# Patient Record
Sex: FEMALE | Race: WHITE | Employment: OTHER | URBAN - METROPOLITAN AREA
[De-identification: names, ages, dates, MRNs, and addresses within clinical notes are randomized per-mention and may not be internally consistent; named-entity substitution may affect disease eponyms.]

---

## 2019-08-16 ENCOUNTER — TELEPHONE (OUTPATIENT)
Dept: PRIMARY CARE CLINIC | Age: 84
End: 2019-08-16

## 2019-08-16 RX ORDER — METFORMIN HYDROCHLORIDE 500 MG/5ML
500 SOLUTION ORAL 2 TIMES DAILY
Qty: 30 ML | Refills: 1 | Status: SHIPPED | OUTPATIENT
Start: 2019-08-16 | End: 2019-08-16

## 2019-08-16 NOTE — TELEPHONE ENCOUNTER
Pt's scripts was sent over for the solution instead of the tablets. Med corrected and pended to you.

## 2019-08-19 ENCOUNTER — TELEPHONE (OUTPATIENT)
Dept: PRIMARY CARE CLINIC | Age: 84
End: 2019-08-19

## 2019-09-16 ENCOUNTER — OFFICE VISIT (OUTPATIENT)
Dept: PRIMARY CARE CLINIC | Age: 84
End: 2019-09-16
Payer: MEDICARE

## 2019-09-16 VITALS
DIASTOLIC BLOOD PRESSURE: 68 MMHG | WEIGHT: 182 LBS | OXYGEN SATURATION: 97 % | SYSTOLIC BLOOD PRESSURE: 150 MMHG | BODY MASS INDEX: 33.49 KG/M2 | TEMPERATURE: 99 F | HEART RATE: 72 BPM | HEIGHT: 62 IN

## 2019-09-16 DIAGNOSIS — E11.8 TYPE 2 DIABETES MELLITUS WITH COMPLICATION, WITHOUT LONG-TERM CURRENT USE OF INSULIN (HCC): Primary | ICD-10-CM

## 2019-09-16 DIAGNOSIS — M79.672 BILATERAL FOOT PAIN: ICD-10-CM

## 2019-09-16 DIAGNOSIS — I10 ESSENTIAL HYPERTENSION: ICD-10-CM

## 2019-09-16 DIAGNOSIS — M15.9 PRIMARY OSTEOARTHRITIS INVOLVING MULTIPLE JOINTS: ICD-10-CM

## 2019-09-16 DIAGNOSIS — E66.09 CLASS 1 OBESITY DUE TO EXCESS CALORIES WITH SERIOUS COMORBIDITY AND BODY MASS INDEX (BMI) OF 33.0 TO 33.9 IN ADULT: ICD-10-CM

## 2019-09-16 DIAGNOSIS — M81.0 AGE-RELATED OSTEOPOROSIS WITHOUT CURRENT PATHOLOGICAL FRACTURE: ICD-10-CM

## 2019-09-16 DIAGNOSIS — E78.2 MIXED HYPERLIPIDEMIA: ICD-10-CM

## 2019-09-16 DIAGNOSIS — M79.671 BILATERAL FOOT PAIN: ICD-10-CM

## 2019-09-16 DIAGNOSIS — R26.2 AMBULATORY DYSFUNCTION: ICD-10-CM

## 2019-09-16 DIAGNOSIS — I73.9 PVD (PERIPHERAL VASCULAR DISEASE) (HCC): ICD-10-CM

## 2019-09-16 PROBLEM — E66.811 CLASS 1 OBESITY DUE TO EXCESS CALORIES WITH SERIOUS COMORBIDITY AND BODY MASS INDEX (BMI) OF 33.0 TO 33.9 IN ADULT: Status: ACTIVE | Noted: 2019-09-16

## 2019-09-16 PROBLEM — M15.0 PRIMARY OSTEOARTHRITIS INVOLVING MULTIPLE JOINTS: Status: ACTIVE | Noted: 2019-09-16

## 2019-09-16 PROCEDURE — G8427 DOCREV CUR MEDS BY ELIG CLIN: HCPCS | Performed by: FAMILY MEDICINE

## 2019-09-16 PROCEDURE — 4040F PNEUMOC VAC/ADMIN/RCVD: CPT | Performed by: FAMILY MEDICINE

## 2019-09-16 PROCEDURE — G8400 PT W/DXA NO RESULTS DOC: HCPCS | Performed by: FAMILY MEDICINE

## 2019-09-16 PROCEDURE — 99214 OFFICE O/P EST MOD 30 MIN: CPT | Performed by: FAMILY MEDICINE

## 2019-09-16 PROCEDURE — 1123F ACP DISCUSS/DSCN MKR DOCD: CPT | Performed by: FAMILY MEDICINE

## 2019-09-16 PROCEDURE — G8417 CALC BMI ABV UP PARAM F/U: HCPCS | Performed by: FAMILY MEDICINE

## 2019-09-16 PROCEDURE — 1090F PRES/ABSN URINE INCON ASSESS: CPT | Performed by: FAMILY MEDICINE

## 2019-09-16 PROCEDURE — 1036F TOBACCO NON-USER: CPT | Performed by: FAMILY MEDICINE

## 2019-09-16 RX ORDER — PREDNISOLONE ACETATE 10 MG/ML
1 SUSPENSION/ DROPS OPHTHALMIC 3 TIMES DAILY
Refills: 3 | COMMUNITY
Start: 2019-08-30

## 2019-09-16 RX ORDER — BRIMONIDINE TARTRATE/TIMOLOL 0.2%-0.5%
DROPS OPHTHALMIC (EYE)
COMMUNITY
Start: 2019-09-07 | End: 2020-08-17

## 2019-09-16 ASSESSMENT — ENCOUNTER SYMPTOMS
BLOOD IN STOOL: 0
PHOTOPHOBIA: 0
COUGH: 0
DIARRHEA: 0
VOMITING: 0
CONSTIPATION: 0
NAUSEA: 0
SHORTNESS OF BREATH: 0
SORE THROAT: 0
ABDOMINAL PAIN: 0
BACK PAIN: 1

## 2019-09-16 ASSESSMENT — PATIENT HEALTH QUESTIONNAIRE - PHQ9
2. FEELING DOWN, DEPRESSED OR HOPELESS: 0
1. LITTLE INTEREST OR PLEASURE IN DOING THINGS: 0
SUM OF ALL RESPONSES TO PHQ QUESTIONS 1-9: 0
SUM OF ALL RESPONSES TO PHQ QUESTIONS 1-9: 0
SUM OF ALL RESPONSES TO PHQ9 QUESTIONS 1 & 2: 0

## 2020-02-02 ENCOUNTER — APPOINTMENT (OUTPATIENT)
Dept: GENERAL RADIOLOGY | Age: 85
End: 2020-02-02
Payer: MEDICARE

## 2020-02-02 ENCOUNTER — APPOINTMENT (OUTPATIENT)
Dept: CT IMAGING | Age: 85
End: 2020-02-02
Payer: MEDICARE

## 2020-02-02 ENCOUNTER — HOSPITAL ENCOUNTER (EMERGENCY)
Age: 85
Discharge: HOME | End: 2020-02-02
Payer: MEDICARE

## 2020-02-02 VITALS
SYSTOLIC BLOOD PRESSURE: 122 MMHG | OXYGEN SATURATION: 95 % | DIASTOLIC BLOOD PRESSURE: 82 MMHG | TEMPERATURE: 98.2 F | HEART RATE: 80 BPM | WEIGHT: 179 LBS | BODY MASS INDEX: 31.71 KG/M2 | RESPIRATION RATE: 18 BRPM | HEIGHT: 63 IN

## 2020-02-02 PROCEDURE — 72125 CT NECK SPINE W/O DYE: CPT

## 2020-02-02 PROCEDURE — 99284 EMERGENCY DEPT VISIT MOD MDM: CPT

## 2020-02-02 PROCEDURE — 73070 X-RAY EXAM OF ELBOW: CPT

## 2020-02-02 PROCEDURE — 72131 CT LUMBAR SPINE W/O DYE: CPT

## 2020-02-02 PROCEDURE — 6370000000 HC RX 637 (ALT 250 FOR IP): Performed by: NURSE PRACTITIONER

## 2020-02-02 PROCEDURE — 70450 CT HEAD/BRAIN W/O DYE: CPT

## 2020-02-02 RX ORDER — ACETAMINOPHEN 325 MG/1
650 TABLET ORAL ONCE
Status: COMPLETED | OUTPATIENT
Start: 2020-02-02 | End: 2020-02-02

## 2020-02-02 RX ORDER — ACETAMINOPHEN 500 MG
1000 TABLET ORAL EVERY 6 HOURS PRN
Qty: 60 TABLET | Refills: 0 | Status: SHIPPED | OUTPATIENT
Start: 2020-02-02 | End: 2021-03-02

## 2020-02-02 RX ORDER — DIAPER,BRIEF,INFANT-TODD,DISP
EACH MISCELLANEOUS ONCE
Status: COMPLETED | OUTPATIENT
Start: 2020-02-02 | End: 2020-02-02

## 2020-02-02 RX ADMIN — BACITRACIN ZINC: 500 OINTMENT TOPICAL at 18:01

## 2020-02-02 RX ADMIN — ACETAMINOPHEN 650 MG: 325 TABLET, FILM COATED ORAL at 18:01

## 2020-02-02 ASSESSMENT — PAIN DESCRIPTION - ONSET: ONSET: SUDDEN

## 2020-02-02 ASSESSMENT — PAIN DESCRIPTION - PAIN TYPE: TYPE: ACUTE PAIN

## 2020-02-02 ASSESSMENT — PAIN DESCRIPTION - LOCATION: LOCATION: BACK

## 2020-02-02 ASSESSMENT — PAIN DESCRIPTION - DESCRIPTORS: DESCRIPTORS: SHARP

## 2020-02-02 ASSESSMENT — PAIN SCALES - GENERAL: PAINLEVEL_OUTOF10: 9

## 2020-02-02 ASSESSMENT — PAIN DESCRIPTION - FREQUENCY: FREQUENCY: CONTINUOUS

## 2020-02-02 ASSESSMENT — PAIN DESCRIPTION - ORIENTATION: ORIENTATION: LOWER;MID

## 2020-02-02 ASSESSMENT — PAIN DESCRIPTION - PROGRESSION: CLINICAL_PROGRESSION: GRADUALLY WORSENING

## 2020-02-03 ENCOUNTER — TELEPHONE (OUTPATIENT)
Dept: PRIMARY CARE CLINIC | Age: 85
End: 2020-02-03

## 2020-02-03 NOTE — ED PROVIDER NOTES
[facility]    HPI:  2/2/20,   Time: 10:39 PM         Manjula Hutchison is a 81 y.o. female presenting to the ED for fall, beginning 4 days ago. The complaint has been constant, mild in severity, and worsened by nothing. States she had a fall secondary to a syncopal episode which occurred approximately 4 to 5 days ago. States when she fell she struck the left side of her head, left side of her elbow and has had persistent right-sided low back pain and into right hip pain. She denies any symptoms over the last several days. Has been taking Tylenol for discomfort. Denies any chest pain or shortness of breath. ROS:   Pertinent positives and negatives are stated within HPI, all other systems reviewed and are negative.  --------------------------------------------- PAST HISTORY ---------------------------------------------  Past Medical History:  has a past medical history of Cataract, Diabetes mellitus ([facility] [address] and Pneumonia. Past Surgical History:  has a past surgical history that includes Hysterectomy and Cataract removal with implant. Social History:  reports that she has never smoked. She has never used smokeless tobacco. She reports that she does not drink alcohol or use drugs. Family History: family history is not on file. The patients home medications have been reviewed. Allergies: Penicillins    -------------------------------------------------- RESULTS -------------------------------------------------  All laboratory and radiology results have been personally reviewed by myself   LABS:  No results found for this visit on 02/02/20. RADIOLOGY:  Interpreted by Radiologist.  Karin Palacio Contrast   Final Result      No evidence for acute intracranial process. Cortical atrophy and chronic periventricular microangiopathy. CT Cervical Spine WO Contrast   Final Result      No acute fracture or spondylolisthesis is identified on CT of cervical   spine.       Diffuse degenerative disc and facet disease result in multilevel   central and foraminal stenosis. Atherosclerotic vascular disease. CT Lumbar Spine WO Contrast   Final Result      No acute fracture or spondylolisthesis is identified on CT of cervical   spine. Diffuse degenerative disc and facet disease result in multilevel   central and foraminal stenosis. Atherosclerotic vascular disease. XR ELBOW LEFT (2 VIEWS)   Final Result   No fracture or dislocation.                      ------------------------- NURSING NOTES AND VITALS REVIEWED ---------------------------   The nursing notes within the ED encounter and vital signs as below have been reviewed. /82   Pulse 80   Temp 98.2 °F (36.8 °C) (Oral)   Resp 18   Ht 5' 2.5\" (1.588 m)   Wt 179 lb (81.2 kg)   SpO2 95%   BMI 32.22 kg/m²   Oxygen Saturation Interpretation: Normal      ---------------------------------------------------PHYSICAL EXAM--------------------------------------      Constitutional/General: Alert and oriented x3, well appearing, non toxic in NAD  Head: NC/AT, mild contusion noted to the left parietal area. There is visible mild ecchymosis noted. Eyes: PERRL, EOMI, 3 mm briskly reactive. No nystagmus. Mouth: Oropharynx clear, handling secretions, no trismus  Neck: Supple, full ROM, no meningeal signs, no tenderness on palpation to the midline of the cervical spine. She is full range of motion. Pulmonary: Lungs clear to auscultation bilaterally, no wheezes, rales, or rhonchi. Not in respiratory distress  Cardiovascular:  Regular rate and rhythm, no murmurs, gallops, or rubs. 2+ distal pulses  Abdomen: Soft, non tender, non distended,   Extremities: Moves all extremities x 4. Warm and well perfused has a superficial skin tear noted to the left lateral elbow with a visible contusion and abrasion noted. There is mild ecchymosis noted around the left elbow. She does have full range of motion with flexion and extension.   Radial pulses

## 2020-02-05 ENCOUNTER — HOSPITAL ENCOUNTER (EMERGENCY)
Age: 85
Discharge: HOME | End: 2020-02-05
Attending: EMERGENCY MEDICINE
Payer: MEDICARE

## 2020-02-05 VITALS
WEIGHT: 179 LBS | DIASTOLIC BLOOD PRESSURE: 84 MMHG | HEART RATE: 94 BPM | OXYGEN SATURATION: 97 % | TEMPERATURE: 98.4 F | RESPIRATION RATE: 16 BRPM | BODY MASS INDEX: 31.71 KG/M2 | HEIGHT: 63 IN | SYSTOLIC BLOOD PRESSURE: 132 MMHG

## 2020-02-05 PROCEDURE — 87070 CULTURE OTHR SPECIMN AEROBIC: CPT

## 2020-02-05 PROCEDURE — 90471 IMMUNIZATION ADMIN: CPT | Performed by: EMERGENCY MEDICINE

## 2020-02-05 PROCEDURE — 99282 EMERGENCY DEPT VISIT SF MDM: CPT

## 2020-02-05 PROCEDURE — 6370000000 HC RX 637 (ALT 250 FOR IP): Performed by: EMERGENCY MEDICINE

## 2020-02-05 PROCEDURE — 90715 TDAP VACCINE 7 YRS/> IM: CPT | Performed by: EMERGENCY MEDICINE

## 2020-02-05 PROCEDURE — 6360000002 HC RX W HCPCS: Performed by: EMERGENCY MEDICINE

## 2020-02-05 RX ORDER — CLINDAMYCIN HYDROCHLORIDE 150 MG/1
150 CAPSULE ORAL ONCE
Status: COMPLETED | OUTPATIENT
Start: 2020-02-05 | End: 2020-02-05

## 2020-02-05 RX ORDER — CLINDAMYCIN HYDROCHLORIDE 150 MG/1
150 CAPSULE ORAL 3 TIMES DAILY
Qty: 15 CAPSULE | Refills: 0 | Status: SHIPPED | OUTPATIENT
Start: 2020-02-05 | End: 2020-02-10

## 2020-02-05 RX ADMIN — CLINDAMYCIN HYDROCHLORIDE 150 MG: 150 CAPSULE ORAL at 18:55

## 2020-02-05 RX ADMIN — TETANUS TOXOID, REDUCED DIPHTHERIA TOXOID AND ACELLULAR PERTUSSIS VACCINE, ADSORBED 0.5 ML: 5; 2.5; 8; 8; 2.5 SUSPENSION INTRAMUSCULAR at 18:55

## 2020-02-06 NOTE — ED NOTES
Wound left arm examined and treated per Dr Carlos Pardo,  Dry drsg left arm, no bleeding, I then discharge the pt.      Hortensia Bronson RN  02/05/20 [id number]

## 2020-02-06 NOTE — ED PROVIDER NOTES
HPI:  2/6/20,   Time: 3:02 AM         Mandy Ram is a 81 y.o. female presenting to the ED for wound check of L elbow, beginning 3 days ago when she had a syncopal episode in her kitchen. The complaint has been constant, moderate in severity, and worsened by nothing. She states that it hs been swollen and painful and she is worried it has pus in it. ROS:   Pertinent positives and negatives are stated within HPI, all other systems reviewed and are negative.  --------------------------------------------- PAST HISTORY ---------------------------------------------  Past Medical History:  has a past medical history of Cataract, Diabetes mellitus ([facility] [address] and Pneumonia. Past Surgical History:  has a past surgical history that includes Hysterectomy and Cataract removal with implant. Social History:  reports that she has never smoked. She has never used smokeless tobacco. She reports that she does not drink alcohol or use drugs. Family History: family history is not on file. The patients home medications have been reviewed. Allergies: Penicillins    -------------------------------------------------- RESULTS -------------------------------------------------  All laboratory and radiology results have been personally reviewed by myself   LABS:  No results found for this visit on 02/05/20. RADIOLOGY:  Interpreted by Radiologist.  No orders to display       ------------------------- NURSING NOTES AND VITALS REVIEWED ---------------------------   The nursing notes within the ED encounter and vital signs as below have been reviewed.    /84   Pulse 94   Temp 98.4 °F (36.9 °C) (Oral)   Resp 16   Ht 5' 2.5\" (1.588 m)   Wt 179 lb (81.2 kg)   SpO2 97%   BMI 32.22 kg/m²   Oxygen Saturation Interpretation: Normal      ---------------------------------------------------PHYSICAL EXAM--------------------------------------        Constitutional/General: Alert and oriented x3, well appearing, non toxic in NAD  Head: NC/AT  Eyes: PERRL, EOMI  Mouth: Oropharynx clear, handling secretions, no trismus  Neck: Supple, full ROM, no meningeal signs  Pulmonary: Lungs clear to auscultation bilaterally, no wheezes, rales, or rhonchi. Not in respiratory distress  Cardiovascular:  Regular rate and rhythm, no murmurs, gallops, or rubs. 2+ distal pulses  Abdomen: Soft, non tender, non distended,   Extremities: Moves all extremities x 4. Warm and well perfused  Skin: warm and dry without rash Wound over L prox olecranon is healing well without surrounding erythema, has some edema but no fluctuence or induration  Neurologic: GCS 15,  Psych: Normal Affect      ------------------------------ ED COURSE/MEDICAL DECISION MAKING----------------------  Medications   Tetanus-Diphth-Acell Pertussis (BOOSTRIX) injection 0.5 mL (0.5 mLs Intramuscular Given 2/5/20 1855)   clindamycin (CLEOCIN) capsule 150 mg (150 mg Oral Given 2/5/20 1855)         Medical Decision Making: Will give antibiotics as pt is not satisfied that the topical therapy is working. Counseling: The emergency provider has spoken with the patient and spouse/SO and discussed todays results, in addition to providing specific details for the plan of care and counseling regarding the diagnosis and prognosis. Questions are answered at this time and they are agreeable with the plan.      --------------------------------- IMPRESSION AND DISPOSITION ---------------------------------    IMPRESSION  1.  Encounter for wound re-check        DISPOSITION  Disposition: Discharge to home  Patient condition is serious                  Adali Almonte MD  02/06/20 [id number]

## 2020-02-08 LAB — WOUND/ABSCESS: NORMAL

## 2020-02-13 ENCOUNTER — TELEPHONE (OUTPATIENT)
Dept: ADMINISTRATIVE | Age: 85
End: 2020-02-13

## 2020-02-13 RX ORDER — AMLODIPINE BESYLATE AND ATORVASTATIN CALCIUM 2.5; 1 MG/1; MG/1
1 TABLET, FILM COATED ORAL DAILY
COMMUNITY
End: 2020-02-13 | Stop reason: SDUPTHER

## 2020-02-13 RX ORDER — AMLODIPINE BESYLATE AND ATORVASTATIN CALCIUM 2.5; 1 MG/1; MG/1
1 TABLET, FILM COATED ORAL DAILY
Qty: 30 TABLET | Refills: 0 | Status: SHIPPED
Start: 2020-02-13 | End: 2020-03-08 | Stop reason: SDUPTHER

## 2020-02-17 ENCOUNTER — OFFICE VISIT (OUTPATIENT)
Dept: PRIMARY CARE CLINIC | Age: 85
End: 2020-02-17
Payer: MEDICARE

## 2020-02-17 ENCOUNTER — HOSPITAL ENCOUNTER (OUTPATIENT)
Age: 85
Discharge: HOME | End: 2020-02-19
Payer: MEDICARE

## 2020-02-17 VITALS — TEMPERATURE: 98.3 F | HEART RATE: 73 BPM | DIASTOLIC BLOOD PRESSURE: 80 MMHG | SYSTOLIC BLOOD PRESSURE: 124 MMHG
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
## 2020-02-17 PROBLEM — S49.92XA INJURY OF LEFT UPPER ARM: Status: ACTIVE | Noted: 2020-02-17

## 2020-02-17 LAB
ALBUMIN SERPL-MCNC: 3.7 G/DL (ref 3.5–5.2)
ALP BLD-CCNC: 94 U/L (ref 35–104)
ALT SERPL-CCNC: 14 U/L (ref 0–32)
ANION GAP SERPL CALCULATED.3IONS-SCNC: 16 MMOL/L (ref 7–16)
AST SERPL-CCNC: 14 U/L (ref 0–31)
BACTERIA: ABNORMAL /HPF
BASOPHILS ABSOLUTE: 0.04 E9/L (ref 0–0.2)
BASOPHILS RELATIVE PERCENT: 0.6 % (ref 0–2)
BILIRUB SERPL-MCNC: 0.3 MG/DL (ref 0–1.2)
BILIRUBIN URINE: NEGATIVE
BLOOD, URINE: NEGATIVE
BUN BLDV-MCNC: 20 MG/DL (ref 8–23)
CALCIUM SERPL-MCNC: 9.4 MG/DL (ref 8.6–10.2)
CHLORIDE BLD-SCNC: 101 MMOL/L (ref 98–107)
CHOLESTEROL, TOTAL: 162 MG/DL (ref 0–199)
CLARITY: CLEAR
CO2: 23 MMOL/L (ref 22–29)
COLOR: YELLOW
CREAT SERPL-MCNC: 0.7 MG/DL (ref 0.5–1)
EOSINOPHILS ABSOLUTE: 0.11 E9/L (ref 0.05–0.5)
EOSINOPHILS RELATIVE PERCENT: 1.6 % (ref 0–6)
EPITHELIAL CELLS, UA: ABNORMAL /HPF
GFR AFRICAN AMERICAN: >60
GFR NON-AFRICAN AMERICAN: >60 ML/MIN/1.73
GLUCOSE BLD-MCNC: 137 MG/DL (ref 74–99)
GLUCOSE URINE: NEGATIVE MG/DL
HBA1C MFR BLD: 7.4 % (ref 4–5.6)
HCT VFR BLD CALC: 38.9 % (ref 34–48)
HDLC SERPL-MCNC: 70 MG/DL
HEMOGLOBIN: 11.7 G/DL (ref 11.5–15.5)
IMMATURE GRANULOCYTES #: 0.02 E9/L
IMMATURE GRANULOCYTES %: 0.3 % (ref 0–5)
KETONES, URINE: NEGATIVE MG/DL
LDL CHOLESTEROL CALCULATED: 77 MG/DL (ref 0–99)
LEUKOCYTE ESTERASE, URINE: ABNORMAL
LYMPHOCYTES ABSOLUTE: 1.83 E9/L (ref 1.5–4)
LYMPHOCYTES RELATIVE PERCENT: 26.5 % (ref 20–42)
MCH RBC QN AUTO: 29 PG (ref 26–35)
MCHC RBC AUTO-ENTMCNC: 30.1 % (ref 32–34.5)
MCV RBC AUTO: 96.3 FL (ref 80–99.9)
MONOCYTES ABSOLUTE: 0.51 E9/L (ref 0.1–0.95)
MONOCYTES RELATIVE PERCENT: 7.4 % (ref 2–12)
NEUTROPHILS ABSOLUTE: 4.4 E9/L (ref 1.8–7.3)
NEUTROPHILS RELATIVE PERCENT: 63.6 % (ref 43–80)
NITRITE, URINE: NEGATIVE
PDW BLD-RTO: 13.2 FL (ref 11.5–15)
PH UA: 5.5 (ref 5–9)
PLATELET # BLD: 391 E9/L (ref 130–450)
PMV BLD AUTO: 9.2 FL (ref 7–12)
POTASSIUM SERPL-SCNC: 4 MMOL/L (ref 3.5–5)
PROTEIN UA: NEGATIVE MG/DL
RBC # BLD: 4.04 E12/L (ref 3.5–5.5)
RBC UA: ABNORMAL /HPF (ref 0–2)
SODIUM BLD-SCNC: 140 MMOL/L (ref 132–146)
SPECIFIC GRAVITY UA: 1.02 (ref 1–1.03)
TOTAL PROTEIN: 7.2 G/DL (ref 6.4–8.3)
TRIGL SERPL-MCNC: 75 MG/DL (ref 0–149)
TSH SERPL DL<=0.05 MIU/L-ACNC: 1.6 UIU/ML (ref 0.27–4.2)
UROBILINOGEN, URINE: 0.2 E.U./DL
VLDLC SERPL CALC-MCNC: 15 MG/DL
WBC # BLD: 6.9 E9/L (ref 4.5–11.5)
WBC UA: ABNORMAL /HPF (ref 0–5)

## 2020-02-17 PROCEDURE — 36415 COLL VENOUS BLD VENIPUNCTURE: CPT

## 2020-02-17 PROCEDURE — 80061 LIPID PANEL: CPT

## 2020-02-17 PROCEDURE — 1123F ACP DISCUSS/DSCN MKR DOCD: CPT | Performed by: FAMILY MEDICINE

## 2020-02-17 PROCEDURE — 84443 ASSAY THYROID STIM HORMONE: CPT

## 2020-02-17 PROCEDURE — 4040F PNEUMOC VAC/ADMIN/RCVD: CPT | Performed by: FAMILY MEDICINE

## 2020-02-17 PROCEDURE — 80053 COMPREHEN METABOLIC PANEL: CPT

## 2020-02-17 PROCEDURE — 99213 OFFICE O/P EST LOW 20 MIN: CPT | Performed by: FAMILY MEDICINE

## 2020-02-17 PROCEDURE — 83036 HEMOGLOBIN GLYCOSYLATED A1C: CPT

## 2020-02-17 PROCEDURE — 85025 COMPLETE CBC W/AUTO DIFF WBC: CPT

## 2020-02-17 PROCEDURE — 81001 URINALYSIS AUTO W/SCOPE: CPT

## 2020-02-17 PROCEDURE — G8400 PT W/DXA NO RESULTS DOC: HCPCS | Performed by: FAMILY MEDICINE

## 2020-02-17 PROCEDURE — 1090F PRES/ABSN URINE INCON ASSESS: CPT | Performed by: FAMILY MEDICINE

## 2020-02-17 PROCEDURE — G8417 CALC BMI ABV UP PARAM F/U: HCPCS | Performed by: FAMILY MEDICINE

## 2020-02-17 PROCEDURE — G0438 PPPS, INITIAL VISIT: HCPCS | Performed by: FAMILY MEDICINE

## 2020-02-17 PROCEDURE — G8484 FLU IMMUNIZE NO ADMIN: HCPCS | Performed by: FAMILY MEDICINE

## 2020-02-17 PROCEDURE — G8427 DOCREV CUR MEDS BY ELIG CLIN: HCPCS | Performed by: FAMILY MEDICINE

## 2020-02-17 PROCEDURE — 1036F TOBACCO NON-USER: CPT | Performed by: FAMILY MEDICINE

## 2020-02-17 ASSESSMENT — PATIENT HEALTH QUESTIONNAIRE - PHQ9
2. FEELING DOWN, DEPRESSED OR HOPELESS: 0
SUM OF ALL RESPONSES TO PHQ9 QUESTIONS 1 & 2: 0
1. LITTLE INTEREST OR PLEASURE IN DOING THINGS: 0
SUM OF ALL RESPONSES TO PHQ QUESTIONS 1-9: 0
SUM OF ALL RESPONSES TO PHQ QUESTIONS 1-9: 0

## 2020-02-17 NOTE — PATIENT INSTRUCTIONS
close friend. ? Do you know enough about life support methods that might be used? If not, talk to your doctor so you understand. ? What are you most afraid of that might happen? You might be afraid of having pain, losing your independence, or being kept alive by machines. ? Where would you prefer to die? Choices include your home, a hospital, or a nursing home. ? Would you like to have information about hospice care to support you and your family? ? Do you want to donate organs when you die? ? Do you want certain Sikh practices performed before you die? If so, put your wishes in the advance directive. · Read your advance directive every year, and make changes as needed. When should you call for help? Be sure to contact your doctor if you have any questions. Where can you learn more? Go to https://chpechristeneweb.Dropbox. org and sign in to your [employer] account. Enter R264 in the Let box to learn more about \"Advance Directives: Care Instructions. \"     If you do not have an account, please click on the \"Sign Up Now\" link. Current as of: April 1, 2019  Content Version: 12.3  © 9628-4269 Healthwise, Incorporated. Care instructions adapted under license by [facility] ([facility]). If you have questions about a medical condition or this instruction, always ask your healthcare professional. [address] any warranty or liability for your use of this information. Body Mass Index: Care Instructions  Your Care Instructions    Body mass index (BMI) can help you see if your weight is raising your risk for health problems. It uses a formula to compare how much you weigh with how tall you are. · A BMI lower than 18.5 is considered underweight. · A BMI between 18.5 and 24.9 is considered healthy. · A BMI between 25 and 29.9 is considered overweight. A BMI of 30 or higher is considered obese.   If your BMI is in the normal range, it means that you have a lower risk 2,300 milligrams (mg) of sodium a day. If you limit your sodium to 1,500 mg a day, you can lower your blood pressure even more. Tips for success  · Start small. Do not try to make dramatic changes to your diet all at once. You might feel that you are missing out on your favorite foods and then be more likely to not follow the plan. Make small changes, and stick with them. Once those changes become habit, add a few more changes. · Try some of the following:  ? Make it a goal to eat a fruit or vegetable at every meal and at snacks. This will make it easy to get the recommended amount of fruits and vegetables each day. ? Try yogurt topped with fruit and nuts for a snack or healthy dessert. ? Add lettuce, tomato, cucumber, and onion to sandwiches. ? Combine a ready-made pizza crust with low-fat mozzarella cheese and lots of vegetable toppings. Try using tomatoes, squash, spinach, broccoli, carrots, cauliflower, and onions. ? Have a variety of cut-up vegetables with a low-fat dip as an appetizer instead of chips and dip. ? Sprinkle sunflower seeds or chopped almonds over salads. Or try adding chopped walnuts or almonds to cooked vegetables. ? Try some vegetarian meals using beans and peas. Add garbanzo or kidney beans to salads. Make burritos and tacos with mashed chapa beans or black beans. Where can you learn more? Go to https://HEROZhector.CareWire. org and sign in to your [employer] account. Enter K184 in the [employer] box to learn more about \"DASH Diet: Care Instructions. \"     If you do not have an account, please click on the \"Sign Up Now\" link. Current as of: April 9, 2019  Content Version: 12.3  © 2515-5450 Healthwise, Incorporated. Care instructions adapted under license by [facility] [employer] [facility] ([facility]).  If you have questions about a medical condition or this instruction, always ask your healthcare professional. [place] [address] any warranty or liability for your use of this information. Learning About Low-Carbohydrate Diets for Weight Loss  What is a low-carbohydrate diet? Low-carb diets avoid foods that are high in carbohydrate. These high-carb foods include pasta, bread, rice, cereal, fruits, and starchy vegetables. Instead, these diets usually have you eat foods that are high in fat and protein. Many people lose weight quickly on a low-carb diet. But the early weight loss is water. People on this diet often gain the weight back after they start eating carbs again. Not all diet plans are safe or work well. A lot of the evidence shows that low-carb diets aren't healthy. That's because these diets often don't include healthy foods like fruits and vegetables. Losing weight safely means balancing protein, fat, and carbs with every meal and snack. And low-carb diets don't always provide the vitamins, minerals, and fiber you need. If you have a serious medical condition, talk to your doctor before you try any diet. These conditions include kidney disease, heart disease, type 2 diabetes, high cholesterol, and high blood pressure. If you are pregnant, it may not be safe for your baby if you are on a low-carb diet. How can you lose weight safely? You might have heard that a diet plan helped another person lose weight. But that doesn't mean that it will work for you. It is very hard to stay on a diet that includes lots of big changes in your eating habits. If you want to get to a healthy weight and stay there, making healthy lifestyle changes will often work better than dieting. These steps can help. · Make a plan for change. Work with your doctor to create a plan that is right for you. · See a dietitian. He or she can show you how to make healthy changes in your eating habits. · Manage stress. If you have a lot of stress in your life, it can be hard to focus on making healthy changes to your daily habits. · Track your food and activity.  You are likely to do better at losing weight if you keep track of what you eat and what you do. Follow-up care is a key part of your treatment and safety. Be sure to make and go to all appointments, and call your doctor if you are having problems. It's also a good idea to know your test results and keep a list of the medicines you take. Where can you learn more? Go to https://chpepiceweb.[email or web address] [employer]. org and sign in to your [employer] account. Enter A121 in the Nautal box to learn more about \"Learning About Low-Carbohydrate Diets for Weight Loss. \"     If you do not have an account, please click on the \"Sign Up Now\" link. Current as of: August 21, 2019  Content Version: 12.3  © 7003-7609 Healthwise, Incorporated. Care instructions adapted under license by [facility] ([facility]). If you have questions about a medical condition or this instruction, always ask your healthcare professional. [place] [address] any warranty or liability for your use of this information. Personalized Preventive Plan for Francis Desir - 2/17/2020  Medicare offers a range of preventive health benefits. Some of the tests and screenings are paid in full while other may be subject to a deductible, co-insurance, and/or copay. Some of these benefits include a comprehensive review of your medical history including lifestyle, illnesses that may run in your family, and various assessments and screenings as appropriate. After reviewing your medical record and screening and assessments performed today your provider may have ordered immunizations, labs, imaging, and/or referrals for you. A list of these orders (if applicable) as well as your Preventive Care list are included within your After Visit Summary for your review. Other Preventive Recommendations:    · A preventive eye exam performed by an eye specialist is recommended every 1-2 years to screen for glaucoma; cataracts, macular degeneration, and other eye disorders.   · A preventive dental visit is recommended every 6 months. · Try to get at least 150 minutes of exercise per week or 10,000 steps per day on a pedometer . · Order or download the FREE \"Exercise & Physical Activity: Your Everyday Guide\" from The "[employer]" [employer]. Call 6-862.881.4927 or search The [employer] online. · You need 4061-1446 mg of calcium and 8233-1774 IU of vitamin D per day. It is possible to meet your calcium requirement with diet alone, but a vitamin D supplement is usually necessary to meet this goal.  · When exposed to the sun, use a sunscreen that protects against both UVA and UVB radiation with an SPF of 30 or greater. Reapply every 2 to 3 hours or after sweating, drying off with a towel, or swimming. · Always wear a seat belt when traveling in a car. Always wear a helmet when riding a bicycle or motorcycle.

## 2020-02-17 NOTE — PROGRESS NOTES
Medicare Annual Wellness Visit  Name: Minus Dash Date: 2020   MRN: [id number] Sex: Female   Age: 81 y.o. Ethnicity: Non-/Non    : 1935 Race: Linda Ahmadi is here for Follow-Up from [facility] and Medicare AWV    Screenings for behavioral, psychosocial and functional/safety risks, and cognitive dysfunction are all negative except as indicated below. These results, as well as other patient data from the [address] form, are documented in Flowsheets linked to this Encounter. Allergies   Allergen Reactions    Penicillins          Prior to Visit Medications    Medication Sig Taking? Authorizing Provider   amLODIPine-atorvastatatin (CADUET) 2.5-10 MG per tablet Take 1 tablet by mouth daily  Chandan Chamorro DO   acetaminophen (TYLENOL) 500 MG tablet Take 2 tablets by mouth every 6 hours as needed for Pain  Ivon Hussein, APRN - CNP   metFORMIN (GLUCOPHAGE) 500 MG tablet TAKE 1 TABLET BY MOUTH TWICE A DAY  Chandan Chamorro, DO   blood glucose test strips (ACCU-CHEK ARA) strip 1 each by In Vitro route daily As needed. Chandan Chamorro DO   COMBIGAN 0.2-0.5 % ophthalmic solution   Historical Provider, MD   prednisoLONE acetate (PRED FORTE) 1 % ophthalmic suspension INSTILL ONE DROP BY OPHTHALMIC ROUTE INTO THE LEFT EYE 3 X DAILY  Historical Provider, MD   amLODIPine-atorvastatatin (CADUET) 5-40 MG per tablet Take 1 tablet by mouth daily. Historical Provider, MD   hydrochlorothiazide (MICROZIDE) 12.5 MG capsule Take 12.5 mg by mouth daily. Historical Provider, MD   aspirin 81 MG tablet Take 81 mg by mouth daily. Historical Provider, MD         Past Medical History:   Diagnosis Date    Cataract     Diabetes mellitus ([address]     Pneumonia        Past Surgical History:   Procedure Laterality Date    CATARACT REMOVAL WITH IMPLANT      HYSTERECTOMY         No family history on file.     CareTeam (Including outside providers/suppliers regularly involved in providing care):   Patient Care Team:  Darrion Vaca DO as PCP - General (Family Medicine)  Darrion Vaca DO [facility]    Wt Readings from Last 3 Encounters:   02/05/20 179 lb (81.2 kg)   02/02/20 179 lb (81.2 kg)   09/16/19 182 lb (82.6 kg)     Vitals:    02/17/20 1446   BP: 124/80   Site: Right Upper Arm   Position: Sitting   Pulse: 73   Temp: 98.3 °F (36.8 °C)   TempSrc: Temporal     There is no height or weight on file to calculate BMI. Based upon direct observation of the patient, evaluation of cognition reveals recent and remote memory intact. Patient's complete Health Risk Assessment and screening values have been reviewed and are found in Flowsheets. The following problems were reviewed today and where indicated follow up appointments were made and/or referrals ordered. Positive Risk Factor Screenings with Interventions:     Fall Risk:  Timed Up and Go Test > 12 seconds? (Complete if either Fall Risk answers are Yes): (!) yes  2 or more falls in past year?: (!) yes  Fall with injury in past year?: (!) yes  Fall Risk Interventions:    · Home safety tips provided  · Home exercises provided to promote strength and balance  · Patient advised to follow-up in this office for further evaluation and treatment within 1 year(s)    Health Habits/Nutrition:  Health Habits/Nutrition  Do you exercise for at least 20 minutes 2-3 times per week?: (!) No  Have you lost any weight without trying in the past 3 months?: No  Do you eat fewer than 2 meals per day?: No  Have you seen a dentist within the past year?: (!) No  There is no height or weight on file to calculate BMI.   Health Habits/Nutrition Interventions:  · Inadequate physical activity:  patient is not ready to increase his/her physical activity level at this time  · Nutritional issues:  educational materials for healthy, well-balanced diet provided    Personalized Preventive Plan   Current Health Maintenance Status  Immunization History   Administered Date(s) Administered    DTaP vaccine 02/05/2020    Tdap (Boostrix, Adacel) 02/05/2020        Health Maintenance   Topic Date Due    Lipid screen  07/09/1945    DEXA (modify frequency per FRAX score)  07/09/2001    Pneumococcal 65+ years Vaccine (1 of 1 - PPSV23) 07/09/2001    Potassium monitoring  04/29/2016    Creatinine monitoring  04/29/2016    Annual Wellness Visit (AWV)  08/16/2019    Flu vaccine (1) 06/30/2020 (Originally 9/1/2019)    Hepatitis B vaccine (1 of 3 - Risk 3-dose series) 02/17/2021 (Originally 7/9/1954)    Shingles Vaccine (1 of 2) 02/17/2021 (Originally 7/9/1986)    DTaP/Tdap/Td vaccine (2 - Td) 02/05/2030    Hepatitis A vaccine  Aged Out    Hib vaccine  Aged Out    Meningococcal (ACWY) vaccine  Aged Out     Recommendations for [employer] Due: see orders and patient instructions/AVS.  . Recommended screening schedule for the next 5-10 years is provided to the patient in written form: see Patient Eleanor Kathleen was seen today for follow-up from [facility] and medicare awv. Diagnoses and all orders for this visit:    Routine general medical examination at a health care facility  -     HI Behavior  obesity 15m []    Body mass index (bmi) 32.0-32.9, adult   -     HI Behavior  obesity 15m []    Asymptomatic menopausal state  -     DEXA Bone Density Axial Skeleton; Future    At high risk for falls    Injury of left upper arm, subsequent encounter    Fall from standing, subsequent encounter                  Advance Care Planning   Advanced Care Planning: Discussed the patients choices for care and treatment in case of a health event that adversely affects decision-making abilities. Also discussed the patients long-term treatment options.  Reviewed with the patient the [facility] Will Declaration forms  Reviewed the process of designating a competent adult as an initial incident. She states that there initially was a skin tear/laceration which she treated at home. Denies any loss of consciousness pre-or post fall. Denies any concussive symptoms since the incident. She went to the emergency department and had multiple CT scans performed. CT scan of the neck, head, and back for acute issues. X-ray of the elbow revealed no acute fracture dislocation. There were chronic changes however on all imaging as noted above. CT of the head showed cortical atrophy and chronic periventricular microangiopathy. CT of the cervical spine revealed diffuse degenerative disc and facet disease and multilevel central and foraminal stenosis in addition to atherosclerotic vascular disease. CT of the lumbar spine revealed diffuse degenerative disc and facet disease and multilevel central and foraminal stenosis. Also atherosclerotic vascular disease. Patient states she has been doing well since the initial incident because she went back to the emergency department 3 days later and was placed on clindamycin which she finished completely without incident. She is still concerned about swelling over the left lateral epicondyle/radial head however there is no sign of infection at this time. Review of Systems   Constitutional: Positive for fatigue. Negative for chills and fever. HENT: Negative for congestion, hearing loss, nosebleeds and sore throat. Eyes: Negative for photophobia. Respiratory: Negative for cough and shortness of breath. Cardiovascular: Negative for chest pain, palpitations and leg swelling. Gastrointestinal: Negative for abdominal pain, blood in stool, constipation, diarrhea, nausea and vomiting. Endocrine: Negative for polydipsia. Genitourinary: Negative for dysuria, frequency, hematuria and urgency. Musculoskeletal: Positive for arthralgias, back pain, gait problem, joint swelling, myalgias, weakness, and neck pain. Skin: Negative.     Neurological: spelling errors may occur.

## 2020-03-08 RX ORDER — AMLODIPINE BESYLATE AND ATORVASTATIN CALCIUM 2.5; 1 MG/1; MG/1
TABLET, FILM COATED ORAL
Qty: 30 TABLET | Refills: 0 | Status: SHIPPED
Start: 2020-03-08 | End: 2020-04-01

## 2020-04-01 RX ORDER — AMLODIPINE BESYLATE AND ATORVASTATIN CALCIUM 2.5; 1 MG/1; MG/1
TABLET, FILM COATED ORAL
Qty: 30 TABLET | Refills: 3 | Status: SHIPPED
Start: 2020-04-01 | End: 2021-03-02

## 2020-04-01 RX ORDER — HYDROCHLOROTHIAZIDE 12.5 MG/1
12.5 CAPSULE, GELATIN COATED ORAL DAILY
Qty: 30 CAPSULE | Refills: 2 | Status: SHIPPED
Start: 2020-04-01 | End: 2020-06-23

## 2020-06-03 RX ORDER — LANCETS
EACH MISCELLANEOUS
Qty: 100 EACH | Refills: 3 | Status: SHIPPED
Start: 2020-06-03 | End: 2020-06-09 | Stop reason: SDUPTHER

## 2020-06-09 RX ORDER — LANCETS
EACH MISCELLANEOUS
COMMUNITY
End: 2020-06-09 | Stop reason: SDUPTHER

## 2020-06-09 RX ORDER — LANCETS
1 EACH MISCELLANEOUS DAILY
Qty: 100 EACH | Refills: 5 | Status: SHIPPED
Start: 2020-06-09 | End: 2021-03-02

## 2020-06-23 RX ORDER — HYDROCHLOROTHIAZIDE 12.5 MG/1
CAPSULE, GELATIN COATED ORAL
Qty: 30 CAPSULE | Refills: 2 | Status: SHIPPED
Start: 2020-06-23 | End: 2020-09-28

## 2020-08-03 RX ORDER — ATORVASTATIN CALCIUM 10 MG/1
TABLET, FILM COATED ORAL
Qty: 30 TABLET | Refills: 3 | Status: SHIPPED
Start: 2020-08-03 | End: 2020-12-14

## 2020-08-03 RX ORDER — AMLODIPINE BESYLATE 2.5 MG/1
TABLET ORAL
Qty: 30 TABLET | Refills: 3 | Status: SHIPPED
Start: 2020-08-03 | End: 2020-12-14

## 2020-08-17 ENCOUNTER — OFFICE VISIT (OUTPATIENT)
Dept: PRIMARY CARE CLINIC | Age: 85
End: 2020-08-17
Payer: MEDICARE

## 2020-08-17 VITALS
SYSTOLIC BLOOD PRESSURE: 140 MMHG | HEART RATE: 67 BPM | DIASTOLIC BLOOD PRESSURE: 70 MMHG | WEIGHT: 177 LBS | OXYGEN SATURATION: 97 % | RESPIRATION RATE: 16 BRPM | HEIGHT: 63 IN | BODY MASS INDEX: 31.36 KG/M2 | TEMPERATURE: 97.8 F
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
## 2020-08-17 PROBLEM — S49.92XA INJURY OF LEFT UPPER ARM: Status: RESOLVED | Noted: 2020-02-17 | Resolved: 2020-08-17

## 2020-08-17 PROCEDURE — G8417 CALC BMI ABV UP PARAM F/U: HCPCS | Performed by: FAMILY MEDICINE

## 2020-08-17 PROCEDURE — 3051F HG A1C>EQUAL 7.0%<8.0%: CPT | Performed by: FAMILY MEDICINE

## 2020-08-17 PROCEDURE — 1123F ACP DISCUSS/DSCN MKR DOCD: CPT | Performed by: FAMILY MEDICINE

## 2020-08-17 PROCEDURE — 99214 OFFICE O/P EST MOD 30 MIN: CPT | Performed by: FAMILY MEDICINE

## 2020-08-17 PROCEDURE — G8427 DOCREV CUR MEDS BY ELIG CLIN: HCPCS | Performed by: FAMILY MEDICINE

## 2020-08-17 PROCEDURE — 4040F PNEUMOC VAC/ADMIN/RCVD: CPT | Performed by: FAMILY MEDICINE

## 2020-08-17 PROCEDURE — G8400 PT W/DXA NO RESULTS DOC: HCPCS | Performed by: FAMILY MEDICINE

## 2020-08-17 PROCEDURE — 1090F PRES/ABSN URINE INCON ASSESS: CPT | Performed by: FAMILY MEDICINE

## 2020-08-17 PROCEDURE — 1036F TOBACCO NON-USER: CPT | Performed by: FAMILY MEDICINE

## 2020-08-17 RX ORDER — TIMOLOL MALEATE 5 MG/ML
1 SOLUTION/ DROPS OPHTHALMIC 2 TIMES DAILY
Status: ON HOLD | COMMUNITY
Start: 2020-06-18 | End: 2021-03-05 | Stop reason: HOSPADM

## 2020-08-17 RX ORDER — BRIMONIDINE TARTRATE 2 MG/ML
1 SOLUTION/ DROPS OPHTHALMIC 2 TIMES DAILY
COMMUNITY
Start: 2020-06-20

## 2020-08-17 ASSESSMENT — ENCOUNTER SYMPTOMS
PHOTOPHOBIA: 0
COUGH: 0
ABDOMINAL PAIN: 0
BACK PAIN: 1
DIARRHEA: 0
SHORTNESS OF BREATH: 0
SORE THROAT: 0
NAUSEA: 0
VOMITING: 0
CONSTIPATION: 0
BLOOD IN STOOL: 0

## 2020-08-17 NOTE — PROGRESS NOTES
2020     Jovita Riddle (:  1935) is a 81 y.o. female, here for evaluation of the following medical concerns:    HPI  Patient here to establish with new PCP. Previous PCP abruptly close practice. Past medical history significant for type II but diabetes, hypertension, hyperlipidemia, osteoporosis/osteoarthritis, peripheral vascular disease, and ambulatory dysfunction. Patient states she has been taking all medications as prescribed without side effect or adverse reaction. Denies any missed or skipped dosing. Her only real concern at this time has been bilateral foot pain and the need to follow with a podiatrist.    Update 2020  Patient is here to follow-up on chronic issues and for medication refills. Patient states she has been doing well since last visit. Did have a fall in February of this year which necessitated emergency room evaluation multiple times. She did develop cellulitis of the left arm which has healed without issue. X-rays and CT performed at that time showed no acute issue. Patient states she has been doing well without acute issues. No decompensation of chronic issues either. Review of Systems   Constitutional: Positive for fatigue. Negative for chills and fever. HENT: Negative for congestion, hearing loss, nosebleeds and sore throat. Eyes: Negative for photophobia. Respiratory: Negative for cough and shortness of breath. Cardiovascular: Negative for chest pain, palpitations and leg swelling. Gastrointestinal: Negative for abdominal pain, blood in stool, constipation, diarrhea, nausea and vomiting. Endocrine: Negative for polydipsia. Genitourinary: Negative for dysuria, frequency, hematuria and urgency. Musculoskeletal: Positive for arthralgias, back pain, gait problem, joint swelling, myalgias and neck pain. Skin: Negative. Neurological: Negative for dizziness, tremors, weakness and headaches. Hematological: Does not bruise/bleed easily. Psychiatric/Behavioral: Positive for dysphoric mood and sleep disturbance. Negative for hallucinations, self-injury and suicidal ideas. All other systems reviewed and are negative. Prior to Visit Medications    Medication Sig Taking? Authorizing Provider   brimonidine (ALPHAGAN) 0.2 % ophthalmic solution INSTILL 1 DROP TWICE A DAY IN LEFT EYE Yes Historical Provider, MD   timolol (TIMOPTIC) 0.5 % ophthalmic solution INSTILL 1 DROP TWICE A DAY IN LEFT EYE Yes Historical Provider, MD   amLODIPine (NORVASC) 2.5 MG tablet TAKE 1 TABLET BY MOUTH EVERY DAY Yes Chandan Chamorro DO   atorvastatin (LIPITOR) 10 MG tablet TAKE 1 TABLET BY MOUTH EVERY DAY Yes Chandan Chamorro DO   hydroCHLOROthiazide (MICROZIDE) 12.5 MG capsule TAKE 1 CAPSULE BY MOUTH EVERY DAY Yes Chandan Chamorro DO   Accu-Chek FastClix Lancets MISC 1 each by Does not apply route daily Yes Chandan Chamorro, DO   metFORMIN (GLUCOPHAGE) 500 MG tablet TAKE 1 TABLET BY MOUTH TWICE A DAY Yes Chandan Chamorro,    amLODIPine-atorvastatatin (CADUET) 2.5-10 MG per tablet TAKE 1 TABLET BY MOUTH EVERY DAY Yes Chandan Chamorro DO   acetaminophen (TYLENOL) 500 MG tablet Take 2 tablets by mouth every 6 hours as needed for Pain Yes Ivon Hussein, APRN - CNP   blood glucose test strips (ACCU-CHEK ARA) strip 1 each by In Vitro route daily As needed. Yes Chandan Chamorro,    prednisoLONE acetate (PRED FORTE) 1 % ophthalmic suspension INSTILL ONE DROP BY OPHTHALMIC ROUTE INTO THE LEFT EYE 3 X DAILY Yes Historical Provider, MD   aspirin 81 MG tablet Take 81 mg by mouth daily.  Yes Historical Provider, MD        Social History     Tobacco Use    Smoking status: Never Smoker    Smokeless tobacco: Never Used   Substance Use Topics    Alcohol use: No        Vitals:    08/17/20 1510 08/17/20 1556   BP: (!) 190/80 (!) 140/70   Pulse: 67    Resp: 16    Temp: 97.8 °F (36.6 °C)    SpO2: 97%    Weight: 177 lb (80.3 kg)    Height: 5' 2.5\" (1.588 m)      Estimated body mass index is 31.86 kg/m² as calculated from the following:    Height as of this encounter: 5' 2.5\" (1.588 m). Weight as of this encounter: 177 lb (80.3 kg). Physical Exam  HENT:      Head: Normocephalic and atraumatic. Eyes:      General: No scleral icterus. Conjunctiva/sclera: Conjunctivae normal.      Pupils: Pupils are equal, round, and reactive to light. Comments: She can only see shapes with her left eye secondary to previous surgery. Neck:      Musculoskeletal: Neck supple. Thyroid: No thyromegaly. Cardiovascular:      Rate and Rhythm: Normal rate and regular rhythm. Heart sounds: Murmur present. Systolic murmur present with a grade of 4/6. Pulmonary:      Effort: Pulmonary effort is normal.      Breath sounds: Decreased breath sounds present. No rales. Abdominal:      General: Bowel sounds are normal. There is no distension. Palpations: Abdomen is soft. Tenderness: There is no abdominal tenderness. Musculoskeletal:         General: Tenderness and deformity present. Lumbar back: She exhibits pain and spasm. Right hand: She exhibits decreased range of motion, tenderness, bony tenderness and deformity. Left hand: She exhibits decreased range of motion, tenderness, bony tenderness and deformity. Lymphadenopathy:      Cervical: No cervical adenopathy. Skin:     General: Skin is warm and dry. Findings: Erythema present. No rash. Neurological:      Mental Status: She is alert and oriented to person, place, and time. Cranial Nerves: No cranial nerve deficit. Psychiatric:         Judgment: Judgment normal.           Assessment/Plan:   Diagnosis Orders   1. Essential hypertension  Hemoglobin A1C    Microalbumin / Creatinine Urine Ratio    Lipid Panel    Basic Metabolic Panel    Hepatic Function Panel    Uric Acid    TSH without Reflex    Vitamin D 25 Hydroxy    CBC Auto Differential   2.  Ischemic heart disease  Hemoglobin A1C    Microalbumin / Creatinine Urine Ratio    Lipid Panel    Basic Metabolic Panel    Hepatic Function Panel    Uric Acid    TSH without Reflex    Vitamin D 25 Hydroxy    CBC Auto Differential   3. Mixed hyperlipidemia  Hemoglobin A1C    Microalbumin / Creatinine Urine Ratio    Lipid Panel    Basic Metabolic Panel    Hepatic Function Panel    Uric Acid    TSH without Reflex    Vitamin D 25 Hydroxy    CBC Auto Differential   4. Primary osteoarthritis involving multiple joints  Hemoglobin A1C    Microalbumin / Creatinine Urine Ratio    Lipid Panel    Basic Metabolic Panel    Hepatic Function Panel    Uric Acid    TSH without Reflex    Vitamin D 25 Hydroxy    CBC Auto Differential   5. PVD (peripheral vascular disease) (HCC)  Hemoglobin A1C    Microalbumin / Creatinine Urine Ratio    Lipid Panel    Basic Metabolic Panel    Hepatic Function Panel    Uric Acid    TSH without Reflex    Vitamin D 25 Hydroxy    CBC Auto Differential   6. Visual disturbance  Hemoglobin A1C    Microalbumin / Creatinine Urine Ratio    Lipid Panel    Basic Metabolic Panel    Hepatic Function Panel    Uric Acid    TSH without Reflex    Vitamin D 25 Hydroxy    CBC Auto Differential   7. Type 2 diabetes mellitus with complication, without long-term current use of insulin (HCC)  Hemoglobin A1C    Microalbumin / Creatinine Urine Ratio    Lipid Panel    Basic Metabolic Panel    Hepatic Function Panel    Uric Acid    TSH without Reflex    Vitamin D 25 Hydroxy    CBC Auto Differential   8. Class 1 obesity due to excess calories with serious comorbidity and body mass index (BMI) of 33.0 to 33.9 in adult  Hemoglobin A1C    Microalbumin / Creatinine Urine Ratio    Lipid Panel    Basic Metabolic Panel    Hepatic Function Panel    Uric Acid    TSH without Reflex    Vitamin D 25 Hydroxy    CBC Auto Differential   9.  Ambulatory dysfunction  Hemoglobin A1C    Microalbumin / Creatinine Urine Ratio    Lipid Panel    Basic Metabolic Panel    Hepatic Function Panel    Uric Acid TSH without Reflex    Vitamin D 25 Hydroxy    CBC Auto Differential   10. Age-related osteoporosis without current pathological fracture  Hemoglobin A1C    Microalbumin / Creatinine Urine Ratio    Lipid Panel    Basic Metabolic Panel    Hepatic Function Panel    Uric Acid    TSH without Reflex    Vitamin D 25 Hydroxy    CBC Auto Differential   11. Bilateral foot pain  Hemoglobin A1C    Microalbumin / Creatinine Urine Ratio    Lipid Panel    Basic Metabolic Panel    Hepatic Function Panel    Uric Acid    TSH without Reflex    Vitamin D 25 Hydroxy    CBC Auto Differential     Advised patient to monitor her blood pressure for the next week and contact the office with results. Mildly elevated today however she states she did not take her medicine today. Denies any symptoms including chest pain, shortness of breath, headache, visual change. Further evaluation and treatment will be based on results from blood pressure check. Labs prior to next visit. Patient refused pneumonia vaccination. See her back in 6 months or sooner if clinically stable. Kacie Lane D.O.   2:39 PM  8/18/2020       This document may have been prepared at least partially through the use of voice recognition software. Although effort is taken to assure the accuracy of this document, it is possible that grammatical, syntax,  or spelling errors may occur.

## 2020-09-28 RX ORDER — HYDROCHLOROTHIAZIDE 12.5 MG/1
CAPSULE, GELATIN COATED ORAL
Qty: 30 CAPSULE | Refills: 2 | Status: SHIPPED
Start: 2020-09-28 | End: 2020-12-16

## 2020-12-14 RX ORDER — AMLODIPINE BESYLATE 2.5 MG/1
TABLET ORAL
Qty: 30 TABLET | Refills: 3 | Status: SHIPPED
Start: 2020-12-14 | End: 2021-03-02

## 2020-12-14 RX ORDER — ATORVASTATIN CALCIUM 10 MG/1
TABLET, FILM COATED ORAL
Qty: 30 TABLET | Refills: 3 | Status: SHIPPED
Start: 2020-12-14 | End: 2021-03-02

## 2020-12-16 RX ORDER — HYDROCHLOROTHIAZIDE 12.5 MG/1
CAPSULE, GELATIN COATED ORAL
Qty: 30 CAPSULE | Refills: 2 | Status: SHIPPED
Start: 2020-12-16 | End: 2021-03-02

## 2021-03-02 ENCOUNTER — APPOINTMENT (OUTPATIENT)
Dept: GENERAL RADIOLOGY | Age: 86
DRG: 543 | End: 2021-03-02
Payer: MEDICARE

## 2021-03-02 ENCOUNTER — HOSPITAL ENCOUNTER (INPATIENT)
Age: 86
LOS: 4 days | Discharge: HOME HEALTH | DRG: 543 | End: 2021-03-08
Attending: EMERGENCY MEDICINE | Admitting: INTERNAL MEDICINE
Payer: MEDICARE

## 2021-03-02 ENCOUNTER — APPOINTMENT (OUTPATIENT)
Dept: CT IMAGING | Age: 86
DRG: 543 | End: 2021-03-02
Payer: MEDICARE

## 2021-03-02 DIAGNOSIS — S22.000A COMPRESSION FRACTURE OF BODY OF THORACIC VERTEBRA (HCC): ICD-10-CM

## 2021-03-02 DIAGNOSIS — S32.000A COMPRESSION FRACTURE OF LUMBAR VERTEBRA, INITIAL ENCOUNTER, UNSPECIFIED LUMBAR VERTEBRAL LEVEL: ICD-10-CM

## 2021-03-02 DIAGNOSIS — R55 SYNCOPE AND COLLAPSE: Primary | ICD-10-CM

## 2021-03-02 LAB
ALBUMIN SERPL-MCNC: 3.9 G/DL (ref 3.5–5.2)
ALP BLD-CCNC: 81 U/L (ref 35–104)
ALT SERPL-CCNC: 12 U/L (ref 0–32)
ANION GAP SERPL CALCULATED.3IONS-SCNC: 10 MMOL/L (ref 7–16)
AST SERPL-CCNC: 16 U/L (ref 0–31)
BACTERIA: ABNORMAL /HPF
BASOPHILS ABSOLUTE: 0.02 E9/L (ref 0–0.2)
BASOPHILS RELATIVE PERCENT: 0.2 % (ref 0–2)
BILIRUB SERPL-MCNC: 0.5 MG/DL (ref 0–1.2)
BILIRUBIN URINE: NEGATIVE
BLOOD, URINE: ABNORMAL
BUN BLDV-MCNC: 26 MG/DL (ref 8–23)
BURR CELLS: ABNORMAL
CALCIUM SERPL-MCNC: 9.5 MG/DL (ref 8.6–10.2)
CHLORIDE BLD-SCNC: 102 MMOL/L (ref 98–107)
CHOLESTEROL, TOTAL: 164 MG/DL (ref 0–199)
CHP ED QC CHECK: YES
CLARITY: CLEAR
CO2: 26 MMOL/L (ref 22–29)
COLOR: YELLOW
CREAT SERPL-MCNC: 1 MG/DL (ref 0.5–1)
CRYSTALS, UA: ABNORMAL /HPF
EOSINOPHILS ABSOLUTE: 0.02 E9/L (ref 0.05–0.5)
EOSINOPHILS RELATIVE PERCENT: 0.2 % (ref 0–6)
EPITHELIAL CELLS, UA: ABNORMAL /HPF
FOLATE: >20 NG/ML (ref 4.8–24.2)
GFR AFRICAN AMERICAN: >60
GFR NON-AFRICAN AMERICAN: 53 ML/MIN/1.73
GLUCOSE BLD-MCNC: 239 MG/DL
GLUCOSE BLD-MCNC: 239 MG/DL (ref 74–99)
GLUCOSE URINE: 500 MG/DL
HBA1C MFR BLD: 7.1 % (ref 4–5.6)
HCT VFR BLD CALC: 36.7 % (ref 34–48)
HDLC SERPL-MCNC: 77 MG/DL
HEMOGLOBIN: 11.4 G/DL (ref 11.5–15.5)
IMMATURE GRANULOCYTES #: 0.05 E9/L
IMMATURE GRANULOCYTES %: 0.6 % (ref 0–5)
KETONES, URINE: NEGATIVE MG/DL
LDL CHOLESTEROL CALCULATED: 77 MG/DL (ref 0–99)
LEUKOCYTE ESTERASE, URINE: NEGATIVE
LYMPHOCYTES ABSOLUTE: 0.53 E9/L (ref 1.5–4)
LYMPHOCYTES RELATIVE PERCENT: 6.2 % (ref 20–42)
MCH RBC QN AUTO: 29.2 PG (ref 26–35)
MCHC RBC AUTO-ENTMCNC: 31.1 % (ref 32–34.5)
MCV RBC AUTO: 93.9 FL (ref 80–99.9)
METER GLUCOSE: 187 MG/DL (ref 74–99)
METER GLUCOSE: 197 MG/DL (ref 74–99)
METER GLUCOSE: 239 MG/DL (ref 74–99)
MONOCYTES ABSOLUTE: 0.16 E9/L (ref 0.1–0.95)
MONOCYTES RELATIVE PERCENT: 1.9 % (ref 2–12)
NEUTROPHILS ABSOLUTE: 7.75 E9/L (ref 1.8–7.3)
NEUTROPHILS RELATIVE PERCENT: 90.9 % (ref 43–80)
NITRITE, URINE: NEGATIVE
OVALOCYTES: ABNORMAL
PDW BLD-RTO: 13.2 FL (ref 11.5–15)
PH UA: 7 (ref 5–9)
PLATELET # BLD: 307 E9/L (ref 130–450)
PMV BLD AUTO: 9.2 FL (ref 7–12)
POIKILOCYTES: ABNORMAL
POLYCHROMASIA: ABNORMAL
POTASSIUM SERPL-SCNC: 3.5 MMOL/L (ref 3.5–5)
PRO-BNP: 279 PG/ML (ref 0–450)
PROCALCITONIN: 0.06 NG/ML (ref 0–0.08)
PROTEIN UA: ABNORMAL MG/DL
RBC # BLD: 3.91 E12/L (ref 3.5–5.5)
RBC UA: ABNORMAL /HPF (ref 0–2)
SODIUM BLD-SCNC: 138 MMOL/L (ref 132–146)
SPECIFIC GRAVITY UA: 1.02 (ref 1–1.03)
TOTAL CK: 179 U/L (ref 20–180)
TOTAL PROTEIN: 7.2 G/DL (ref 6.4–8.3)
TRIGL SERPL-MCNC: 50 MG/DL (ref 0–149)
TROPONIN: <0.01 NG/ML (ref 0–0.03)
UROBILINOGEN, URINE: 0.2 E.U./DL
VITAMIN B-12: 288 PG/ML (ref 211–946)
VLDLC SERPL CALC-MCNC: 10 MG/DL
WBC # BLD: 8.5 E9/L (ref 4.5–11.5)
WBC UA: ABNORMAL /HPF (ref 0–5)

## 2021-03-02 PROCEDURE — 84484 ASSAY OF TROPONIN QUANT: CPT

## 2021-03-02 PROCEDURE — 72131 CT LUMBAR SPINE W/O DYE: CPT

## 2021-03-02 PROCEDURE — 80061 LIPID PANEL: CPT

## 2021-03-02 PROCEDURE — 82607 VITAMIN B-12: CPT

## 2021-03-02 PROCEDURE — 72125 CT NECK SPINE W/O DYE: CPT

## 2021-03-02 PROCEDURE — 99284 EMERGENCY DEPT VISIT MOD MDM: CPT

## 2021-03-02 PROCEDURE — 83880 ASSAY OF NATRIURETIC PEPTIDE: CPT

## 2021-03-02 PROCEDURE — 85025 COMPLETE CBC W/AUTO DIFF WBC: CPT

## 2021-03-02 PROCEDURE — 80053 COMPREHEN METABOLIC PANEL: CPT

## 2021-03-02 PROCEDURE — 70450 CT HEAD/BRAIN W/O DYE: CPT

## 2021-03-02 PROCEDURE — 96374 THER/PROPH/DIAG INJ IV PUSH: CPT

## 2021-03-02 PROCEDURE — 82962 GLUCOSE BLOOD TEST: CPT

## 2021-03-02 PROCEDURE — 83036 HEMOGLOBIN GLYCOSYLATED A1C: CPT

## 2021-03-02 PROCEDURE — 6360000002 HC RX W HCPCS: Performed by: EMERGENCY MEDICINE

## 2021-03-02 PROCEDURE — 82550 ASSAY OF CK (CPK): CPT

## 2021-03-02 PROCEDURE — 84145 PROCALCITONIN (PCT): CPT

## 2021-03-02 PROCEDURE — 2580000003 HC RX 258: Performed by: EMERGENCY MEDICINE

## 2021-03-02 PROCEDURE — G0378 HOSPITAL OBSERVATION PER HR: HCPCS

## 2021-03-02 PROCEDURE — 93005 ELECTROCARDIOGRAM TRACING: CPT | Performed by: EMERGENCY MEDICINE

## 2021-03-02 PROCEDURE — 71045 X-RAY EXAM CHEST 1 VIEW: CPT

## 2021-03-02 PROCEDURE — 82746 ASSAY OF FOLIC ACID SERUM: CPT

## 2021-03-02 PROCEDURE — 12002 RPR S/N/AX/GEN/TRNK2.6-7.5CM: CPT

## 2021-03-02 PROCEDURE — 81001 URINALYSIS AUTO W/SCOPE: CPT

## 2021-03-02 PROCEDURE — 84207 ASSAY OF VITAMIN B-6: CPT

## 2021-03-02 RX ORDER — AMLODIPINE BESYLATE 2.5 MG/1
2.5 TABLET ORAL EVERY EVENING
Status: ON HOLD | COMMUNITY
End: 2021-03-05 | Stop reason: HOSPADM

## 2021-03-02 RX ORDER — HYDROCHLOROTHIAZIDE 12.5 MG/1
12.5 TABLET ORAL DAILY
Status: DISCONTINUED | OUTPATIENT
Start: 2021-03-02 | End: 2021-03-08 | Stop reason: HOSPADM

## 2021-03-02 RX ORDER — ACETAMINOPHEN 650 MG/1
650 SUPPOSITORY RECTAL EVERY 6 HOURS PRN
Status: DISCONTINUED | OUTPATIENT
Start: 2021-03-02 | End: 2021-03-08 | Stop reason: HOSPADM

## 2021-03-02 RX ORDER — ATORVASTATIN CALCIUM 10 MG/1
10 TABLET, FILM COATED ORAL EVERY EVENING
COMMUNITY
End: 2021-04-26

## 2021-03-02 RX ORDER — PROMETHAZINE HYDROCHLORIDE 25 MG/1
12.5 TABLET ORAL EVERY 6 HOURS PRN
Status: DISCONTINUED | OUTPATIENT
Start: 2021-03-02 | End: 2021-03-08 | Stop reason: HOSPADM

## 2021-03-02 RX ORDER — ACETAMINOPHEN 325 MG/1
650 TABLET ORAL EVERY 6 HOURS PRN
Status: DISCONTINUED | OUTPATIENT
Start: 2021-03-02 | End: 2021-03-08 | Stop reason: HOSPADM

## 2021-03-02 RX ORDER — NICOTINE POLACRILEX 4 MG
15 LOZENGE BUCCAL PRN
Status: DISCONTINUED | OUTPATIENT
Start: 2021-03-02 | End: 2021-03-08 | Stop reason: HOSPADM

## 2021-03-02 RX ORDER — ONDANSETRON 2 MG/ML
4 INJECTION INTRAMUSCULAR; INTRAVENOUS EVERY 6 HOURS PRN
Status: DISCONTINUED | OUTPATIENT
Start: 2021-03-02 | End: 2021-03-08 | Stop reason: HOSPADM

## 2021-03-02 RX ORDER — POLYETHYLENE GLYCOL 3350 17 G/17G
17 POWDER, FOR SOLUTION ORAL DAILY PRN
Status: DISCONTINUED | OUTPATIENT
Start: 2021-03-02 | End: 2021-03-08 | Stop reason: HOSPADM

## 2021-03-02 RX ORDER — BRIMONIDINE TARTRATE 2 MG/ML
1 SOLUTION/ DROPS OPHTHALMIC 2 TIMES DAILY
Status: DISCONTINUED | OUTPATIENT
Start: 2021-03-02 | End: 2021-03-08 | Stop reason: HOSPADM

## 2021-03-02 RX ORDER — ASPIRIN 81 MG/1
81 TABLET, CHEWABLE ORAL DAILY
Status: DISCONTINUED | OUTPATIENT
Start: 2021-03-02 | End: 2021-03-08 | Stop reason: HOSPADM

## 2021-03-02 RX ORDER — SODIUM CHLORIDE 0.9 % (FLUSH) 0.9 %
10 SYRINGE (ML) INJECTION PRN
Status: DISCONTINUED | OUTPATIENT
Start: 2021-03-02 | End: 2021-03-08 | Stop reason: HOSPADM

## 2021-03-02 RX ORDER — SODIUM CHLORIDE 0.9 % (FLUSH) 0.9 %
10 SYRINGE (ML) INJECTION EVERY 12 HOURS SCHEDULED
Status: DISCONTINUED | OUTPATIENT
Start: 2021-03-02 | End: 2021-03-08 | Stop reason: HOSPADM

## 2021-03-02 RX ORDER — PREDNISOLONE ACETATE 10 MG/ML
1 SUSPENSION/ DROPS OPHTHALMIC 3 TIMES DAILY
Status: DISCONTINUED | OUTPATIENT
Start: 2021-03-02 | End: 2021-03-08 | Stop reason: HOSPADM

## 2021-03-02 RX ORDER — AMLODIPINE BESYLATE 2.5 MG/1
2.5 TABLET ORAL EVERY EVENING
Status: DISCONTINUED | OUTPATIENT
Start: 2021-03-02 | End: 2021-03-04

## 2021-03-02 RX ORDER — HYDROCHLOROTHIAZIDE 12.5 MG/1
12.5 TABLET ORAL DAILY
Status: ON HOLD | COMMUNITY
End: 2021-03-05 | Stop reason: HOSPADM

## 2021-03-02 RX ORDER — 0.9 % SODIUM CHLORIDE 0.9 %
500 INTRAVENOUS SOLUTION INTRAVENOUS ONCE
Status: DISCONTINUED | OUTPATIENT
Start: 2021-03-02 | End: 2021-03-08 | Stop reason: HOSPADM

## 2021-03-02 RX ORDER — DEXTROSE MONOHYDRATE 25 G/50ML
12.5 INJECTION, SOLUTION INTRAVENOUS PRN
Status: DISCONTINUED | OUTPATIENT
Start: 2021-03-02 | End: 2021-03-08 | Stop reason: HOSPADM

## 2021-03-02 RX ORDER — DEXTROSE MONOHYDRATE 50 MG/ML
100 INJECTION, SOLUTION INTRAVENOUS PRN
Status: DISCONTINUED | OUTPATIENT
Start: 2021-03-02 | End: 2021-03-08 | Stop reason: HOSPADM

## 2021-03-02 RX ORDER — SODIUM CHLORIDE 9 MG/ML
INJECTION, SOLUTION INTRAVENOUS CONTINUOUS
Status: DISCONTINUED | OUTPATIENT
Start: 2021-03-02 | End: 2021-03-03

## 2021-03-02 RX ORDER — FENTANYL CITRATE 50 UG/ML
25 INJECTION, SOLUTION INTRAMUSCULAR; INTRAVENOUS ONCE
Status: COMPLETED | OUTPATIENT
Start: 2021-03-02 | End: 2021-03-02

## 2021-03-02 RX ORDER — TIMOLOL MALEATE 5 MG/ML
1 SOLUTION/ DROPS OPHTHALMIC 2 TIMES DAILY
Status: DISCONTINUED | OUTPATIENT
Start: 2021-03-02 | End: 2021-03-05

## 2021-03-02 RX ADMIN — FENTANYL CITRATE 25 MCG: 0.05 INJECTION, SOLUTION INTRAMUSCULAR; INTRAVENOUS at 14:46

## 2021-03-02 RX ADMIN — SODIUM CHLORIDE: 9 INJECTION, SOLUTION INTRAVENOUS at 20:11

## 2021-03-02 ASSESSMENT — PAIN SCALES - GENERAL
PAINLEVEL_OUTOF10: 4
PAINLEVEL_OUTOF10: 8

## 2021-03-02 NOTE — ED PROVIDER NOTES
Department of Emergency Medicine   ED  Provider Note  Admit Date/RoomTime: 3/2/2021 11:16 AM  ED Room: Adolfo Lawler          History of Present Illness:  3/2/21, Time: 12:33 PM EST  Chief Complaint   Patient presents with    Fall    Loss of Consciousness     fell at home, states that she passed out in her kitchen. c collar in place. + vomitting                 Loi Ramirez is a 81 y.o. female presenting to the ED for syncope. Came on suddenly, nothing makes it better or worse, there is no prodromal symptoms. Patient states the last thing she remembers was making Lola morning. She was found on the ground by her son. There was a pool of blood. She did strike her head, there was loss of conscious, she is on no anticoagulation. She does complain of low back pain along with head pain. EMS reports he vomited multiple times in route. She has not had this before. She denies any neck pain, chest pain, shortness breath, cough, sputum, paresthesias, weakness in extremities, lethargy, or any other symptoms or complaints. Review of Systems:   Pertinent positives and negatives are stated within HPI, all other systems reviewed and are negative.        --------------------------------------------- PAST HISTORY ---------------------------------------------  Past Medical History:  has a past medical history of Cataract, Diabetes mellitus ([address] Fall from standing, Injury of left upper arm, Pneumonia, Syncope and collapse, and Uncontrolled type 2 diabetes mellitus ([address] Past Surgical History:  has a past surgical history that includes Hysterectomy and Cataract removal with implant. Social History:  reports that she has never smoked. She has never used smokeless tobacco. She reports that she does not drink alcohol or use drugs. Family History: family history is not on file. . Unless otherwise noted, family history is non contributory    The patients home medications have been reviewed.     Allergies: Penicillins        ---------------------------------------------------PHYSICAL EXAM--------------------------------------    Constitutional/General: Alert and oriented x3  Head: Normocephalic with a 3 cm laceration over the occipital lobe  Eyes: PERRL, EOMI, sclera non icteric  Mouth: Oropharynx clear, handling secretions, no trismus, no asymmetry of the posterior oropharynx or uvular edema  Neck: Supple, full ROM, no stridor, no meningeal signs  Respiratory: Lungs clear to auscultation bilaterally, no wheezes, rales, or rhonchi. Not in respiratory distress  Cardiovascular:  Regular rate. Regular rhythm. 2+ distal pulses. Equal extremity pulses. Chest: No chest wall tenderness  GI:  Abdomen Soft, Non tender, Non distended. No rebound, guarding, or rigidity. No pulsatile masses. Musculoskeletal: Moves all extremities x 4. Warm and well perfused, no clubbing, cyanosis, or edema. Capillary refill <3 seconds  Integument: skin warm and dry. No rashes. Neurologic: GCS 15, no focal deficits, symmetric strength 5/5 in the upper and lower extremities bilaterally  Psychiatric: Normal Affect  Back: Lumbar TTP, no step offs         -------------------------------------------------- RESULTS -------------------------------------------------  I have personally reviewed all laboratory and imaging results for this patient. Results are listed below.      LABS: (Lab results interpreted by me)  Results for orders placed or performed during the hospital encounter of 03/02/21   CBC Auto Differential   Result Value Ref Range    WBC 8.5 4.5 - 11.5 E9/L    RBC 3.91 3.50 - 5.50 E12/L    Hemoglobin 11.4 (L) 11.5 - 15.5 g/dL    Hematocrit 36.7 34.0 - 48.0 %    MCV 93.9 80.0 - 99.9 fL    MCH 29.2 26.0 - 35.0 pg    MCHC 31.1 (L) 32.0 - 34.5 %    RDW 13.2 11.5 - 15.0 fL    Platelets 780 522 - 421 E9/L    MPV 9.2 7.0 - 12.0 fL   Comprehensive Metabolic Panel   Result Value Ref Range    Sodium 138 132 - 146 mmol/L    Potassium 3.5 3.5 - 5.0 mmol/L    Chloride 102 98 - 107 mmol/L    CO2 26 22 - 29 mmol/L    Anion Gap 10 7 - 16 mmol/L    Glucose 239 (H) 74 - 99 mg/dL    BUN 26 (H) 8 - 23 mg/dL    CREATININE 1.0 0.5 - 1.0 mg/dL    GFR Non-African American 53 >=60 mL/min/1.73    GFR African American >60     Calcium 9.5 8.6 - 10.2 mg/dL    Total Protein 7.2 6.4 - 8.3 g/dL    Albumin 3.9 3.5 - 5.2 g/dL    Total Bilirubin 0.5 0.0 - 1.2 mg/dL    Alkaline Phosphatase 81 35 - 104 U/L    ALT 12 0 - 32 U/L    AST 16 0 - 31 U/L   Troponin   Result Value Ref Range    Troponin <0.01 0.00 - 0.03 ng/mL   POCT Glucose   Result Value Ref Range    Glucose 239 mg/dL    QC OK? yes    POCT Glucose   Result Value Ref Range    Meter Glucose 239 (H) 74 - 99 mg/dL   ,       RADIOLOGY:  Interpreted by Radiologist unless otherwise specified  XR CHEST PORTABLE   Final Result   Cardiomegaly with vascular congestion and atelectasis in lung bases. Mild   CHF is suspected. Superimposed pneumonia has to be excluded. CT LUMBAR SPINE WO CONTRAST   Final Result   Nearly 30% compression deformity of the superior endplate of H93 and L2 which   are new since the previous examination. Acute compression fractures are   considered. Diffuse degenerative changes with central disc bulges at L4-5 and L5-S1. CT HEAD WO CONTRAST   Final Result   No acute intracranial abnormality. CT CERVICAL SPINE WO CONTRAST   Final Result   No acute compression deformities of cervical spine. Multilevel degenerative   changes again demonstrated.             EKG Interpretation  Interpreted by emergency department physician, Dr. Lucero Ruiz     Sinus, rate 63, no STEMI        ------------------------- NURSING NOTES AND VITALS REVIEWED ---------------------------   The nursing notes within the ED encounter and vital signs as below have been reviewed by myself  BP (!) 178/86   Pulse 66   Temp 96.9 °F (36.1 °C)   Resp 18   Ht 5' 2\" (1.575 m)   Wt 177 lb (80.3 kg)   SpO2 95%   BMI 32.37 kg/m² Oxygen Saturation Interpretation: Normal    The patients available past medical records and past encounters were reviewed. ------------------------------ ED COURSE/MEDICAL DECISION MAKING----------------------  Medications   0.9 % sodium chloride bolus (has no administration in time range)   0.9 % sodium chloride infusion (has no administration in time range)       PROCEDURE NOTE  3/2/21       Time: 1300    LACERATION REPAIR  Risks, benefits and alternatives (for applicable procedures below) described. Performed By: Santana Gordon MD.    Laceration #: 1. Location: occipital scalp  Length: 4 cm. The wound area was cleansend with shur-clens and draped in a sterile fashion. Local Anesthesia:  Lidocaine 1% without epinephrine. The wound was explored with the following results:  no foreign body or tendon injury seen. Debridement: None. Undermining: None. Wound Margins Revised: None. Flaps Aligned: no. The wound was closed with 5-0 Prolene using interrupted sutures. Dressing:  bacitracin. There were no additional wounds requiring formal closure. Total number suture:  3. The cardiac monitor revealed sinus with a heart rate in the 80s as interpreted by me. The cardiac monitor was ordered secondary to the patient's syncope and to monitor the patient for dysrhythmia. CPT 74978         Medical Decision Making:    Labs and imaging reviewed. Reevaluation, patient's resting comfortably. Laceration was repaired. Stable. Family bedside, they report that she is been having multiple syncopal episodes for the past month. She meant to follow-up with her PCP regarding this, was not able to. Given this, along with her findings, patient was admitted. Neurosurgery was also consulted. Counseling:    The emergency provider has spoken with the patient and discussed todays results, in addition to providing specific details for the plan of care and counseling regarding the diagnosis and

## 2021-03-03 PROBLEM — S22.000A COMPRESSION FRACTURE OF BODY OF THORACIC VERTEBRA (HCC): Status: ACTIVE | Noted: 2021-03-03

## 2021-03-03 PROBLEM — S32.020A COMPRESSION FRACTURE OF L2 VERTEBRA (HCC): Status: ACTIVE | Noted: 2021-03-03

## 2021-03-03 PROBLEM — R00.1 BRADYCARDIA: Status: ACTIVE | Noted: 2021-03-03

## 2021-03-03 PROBLEM — S01.01XA SCALP LACERATION: Status: ACTIVE | Noted: 2021-03-03

## 2021-03-03 PROBLEM — S22.080A COMPRESSION FRACTURE OF T12 VERTEBRA (HCC): Status: ACTIVE | Noted: 2021-03-03

## 2021-03-03 PROBLEM — R29.6 RECURRENT FALLS: Status: ACTIVE | Noted: 2021-03-03

## 2021-03-03 LAB
ANION GAP SERPL CALCULATED.3IONS-SCNC: 5 MMOL/L (ref 7–16)
BASOPHILS ABSOLUTE: 0.01 E9/L (ref 0–0.2)
BASOPHILS RELATIVE PERCENT: 0.1 % (ref 0–2)
BUN BLDV-MCNC: 21 MG/DL (ref 8–23)
CALCIUM SERPL-MCNC: 9 MG/DL (ref 8.6–10.2)
CHLORIDE BLD-SCNC: 103 MMOL/L (ref 98–107)
CO2: 31 MMOL/L (ref 22–29)
CREAT SERPL-MCNC: 0.8 MG/DL (ref 0.5–1)
EKG ATRIAL RATE: 63 BPM
EKG P AXIS: 69 DEGREES
EKG P-R INTERVAL: 166 MS
EKG Q-T INTERVAL: 436 MS
EKG QRS DURATION: 78 MS
EKG QTC CALCULATION (BAZETT): 446 MS
EKG R AXIS: 35 DEGREES
EKG T AXIS: 33 DEGREES
EKG VENTRICULAR RATE: 63 BPM
EOSINOPHILS ABSOLUTE: 0.01 E9/L (ref 0.05–0.5)
EOSINOPHILS RELATIVE PERCENT: 0.1 % (ref 0–6)
GFR AFRICAN AMERICAN: >60
GFR NON-AFRICAN AMERICAN: >60 ML/MIN/1.73
GLUCOSE BLD-MCNC: 168 MG/DL (ref 74–99)
HCT VFR BLD CALC: 32.8 % (ref 34–48)
HCT VFR BLD CALC: 33.1 % (ref 34–48)
HEMOGLOBIN: 10.1 G/DL (ref 11.5–15.5)
HEMOGLOBIN: 10.8 G/DL (ref 11.5–15.5)
IMMATURE GRANULOCYTES #: 0.04 E9/L
IMMATURE GRANULOCYTES %: 0.5 % (ref 0–5)
LYMPHOCYTES ABSOLUTE: 0.95 E9/L (ref 1.5–4)
LYMPHOCYTES RELATIVE PERCENT: 11.8 % (ref 20–42)
MCH RBC QN AUTO: 29.7 PG (ref 26–35)
MCHC RBC AUTO-ENTMCNC: 30.8 % (ref 32–34.5)
MCV RBC AUTO: 96.5 FL (ref 80–99.9)
METER GLUCOSE: 157 MG/DL (ref 74–99)
METER GLUCOSE: 187 MG/DL (ref 74–99)
METER GLUCOSE: 193 MG/DL (ref 74–99)
MONOCYTES ABSOLUTE: 0.5 E9/L (ref 0.1–0.95)
MONOCYTES RELATIVE PERCENT: 6.2 % (ref 2–12)
NEUTROPHILS ABSOLUTE: 6.53 E9/L (ref 1.8–7.3)
NEUTROPHILS RELATIVE PERCENT: 81.3 % (ref 43–80)
PDW BLD-RTO: 13.2 FL (ref 11.5–15)
PLATELET # BLD: 284 E9/L (ref 130–450)
PMV BLD AUTO: 10 FL (ref 7–12)
POTASSIUM SERPL-SCNC: 3.8 MMOL/L (ref 3.5–5)
RBC # BLD: 3.4 E12/L (ref 3.5–5.5)
REASON FOR REJECTION: NORMAL
REJECTED TEST: NORMAL
SODIUM BLD-SCNC: 139 MMOL/L (ref 132–146)
VITAMIN D 25-HYDROXY: 17 NG/ML (ref 30–100)
WBC # BLD: 8 E9/L (ref 4.5–11.5)

## 2021-03-03 PROCEDURE — 6370000000 HC RX 637 (ALT 250 FOR IP): Performed by: INTERNAL MEDICINE

## 2021-03-03 PROCEDURE — 2580000003 HC RX 258: Performed by: EMERGENCY MEDICINE

## 2021-03-03 PROCEDURE — 85018 HEMOGLOBIN: CPT

## 2021-03-03 PROCEDURE — G0378 HOSPITAL OBSERVATION PER HR: HCPCS

## 2021-03-03 PROCEDURE — 85025 COMPLETE CBC W/AUTO DIFF WBC: CPT

## 2021-03-03 PROCEDURE — 36415 COLL VENOUS BLD VENIPUNCTURE: CPT

## 2021-03-03 PROCEDURE — 80048 BASIC METABOLIC PNL TOTAL CA: CPT

## 2021-03-03 PROCEDURE — 82306 VITAMIN D 25 HYDROXY: CPT

## 2021-03-03 PROCEDURE — 82962 GLUCOSE BLOOD TEST: CPT

## 2021-03-03 PROCEDURE — 93010 ELECTROCARDIOGRAM REPORT: CPT | Performed by: INTERNAL MEDICINE

## 2021-03-03 PROCEDURE — 2580000003 HC RX 258: Performed by: INTERNAL MEDICINE

## 2021-03-03 PROCEDURE — 85014 HEMATOCRIT: CPT

## 2021-03-03 PROCEDURE — 99219 PR INITIAL OBSERVATION CARE/DAY 50 MINUTES: CPT | Performed by: INTERNAL MEDICINE

## 2021-03-03 RX ORDER — VITAMIN B COMPLEX
1000 TABLET ORAL DAILY
Status: DISCONTINUED | OUTPATIENT
Start: 2021-03-03 | End: 2021-03-08 | Stop reason: HOSPADM

## 2021-03-03 RX ADMIN — PREDNISOLONE ACETATE 1 DROP: 10 SUSPENSION/ DROPS OPHTHALMIC at 13:53

## 2021-03-03 RX ADMIN — SODIUM CHLORIDE: 9 INJECTION, SOLUTION INTRAVENOUS at 11:25

## 2021-03-03 RX ADMIN — PREDNISOLONE ACETATE 1 DROP: 10 SUSPENSION/ DROPS OPHTHALMIC at 20:05

## 2021-03-03 RX ADMIN — INSULIN LISPRO 1 UNITS: 100 INJECTION, SOLUTION INTRAVENOUS; SUBCUTANEOUS at 12:26

## 2021-03-03 RX ADMIN — ASPIRIN 81 MG: 81 TABLET, CHEWABLE ORAL at 09:04

## 2021-03-03 RX ADMIN — TIMOLOL MALEATE 1 DROP: 5 SOLUTION/ DROPS OPHTHALMIC at 09:01

## 2021-03-03 RX ADMIN — Medication 1000 UNITS: at 15:50

## 2021-03-03 RX ADMIN — BRIMONIDINE TARTRATE 1 DROP: 2 SOLUTION OPHTHALMIC at 08:59

## 2021-03-03 RX ADMIN — BRIMONIDINE TARTRATE 1 DROP: 2 SOLUTION OPHTHALMIC at 20:04

## 2021-03-03 RX ADMIN — PREDNISOLONE ACETATE 1 DROP: 10 SUSPENSION/ DROPS OPHTHALMIC at 09:02

## 2021-03-03 RX ADMIN — INSULIN LISPRO 1 UNITS: 100 INJECTION, SOLUTION INTRAVENOUS; SUBCUTANEOUS at 20:51

## 2021-03-03 RX ADMIN — INSULIN LISPRO 1 UNITS: 100 INJECTION, SOLUTION INTRAVENOUS; SUBCUTANEOUS at 17:30

## 2021-03-03 RX ADMIN — HYDROCHLOROTHIAZIDE 12.5 MG: 12.5 TABLET ORAL at 15:03

## 2021-03-03 RX ADMIN — SODIUM CHLORIDE, PRESERVATIVE FREE 10 ML: 5 INJECTION INTRAVENOUS at 20:03

## 2021-03-03 RX ADMIN — SODIUM CHLORIDE: 9 INJECTION, SOLUTION INTRAVENOUS at 00:59

## 2021-03-03 RX ADMIN — AMLODIPINE BESYLATE 2.5 MG: 5 TABLET ORAL at 18:03

## 2021-03-03 ASSESSMENT — PAIN SCALES - GENERAL: PAINLEVEL_OUTOF10: 0

## 2021-03-03 NOTE — PROGRESS NOTES
[facility]  Internal Medicine Residency Program  Progress Note - House Team 2    Patient:  Arleth Moran 81 y.o. female MRN: [id number]     Date of Service: 3/3/2021     CC: Multiple falls and possible syncope  Overnight events: no significant ON events   Hospital Day: 2    Subjective     The patient was seen and examined at bedside this AM, AOx3 in no acute distress. Today she has no new complaints aside from her head laceration which was repaired in the ED. She denies headache, chest pain, SOB, abdominal pain. She also denies vision changes, focal deficits or extremity swelling. Objective     Physical Exam:  Vitals: BP (!) 148/70   Pulse 60   Temp 98 °F (36.7 °C) (Oral)   Resp 16   Ht 5' 2\" (1.575 m)   Wt 177 lb (80.3 kg)   SpO2 96%   BMI 32.37 kg/m²     I & O - 24hr:     Intake/Output Summary (Last 24 hours) at 3/3/2021 1323  Last data filed at 3/3/2021 0915  Gross per 24 hour   Intake 120 ml   Output --   Net 120 ml       · General Appearance: alert, appears stated age, cooperative and no distress  · HEENT:  Head: scalp contusion  · Neck: no adenopathy, no carotid bruit, no JVD, supple, symmetrical, trachea midline and thyroid not enlarged, symmetric, no tenderness/mass/nodules  · Lung: clear to auscultation bilaterally  · Heart: regular rate and rhythm, S1, S2 normal, no murmur, click, rub or gallop  · Abdomen: soft, non-tender; bowel sounds normal; no masses,  no organomegaly  · Extremities:  extremities normal, atraumatic, no cyanosis or edema  · Musculokeletal: No joint swelling, no muscle tenderness. ROM normal in all joints of extremities.    · Neurologic: Mental status: Alert, oriented, thought content appropriate    Pertinent Labs & Imaging Studies     CBC:   Lab Results   Component Value Date    WBC 8.0 03/03/2021    RBC 3.40 03/03/2021    HGB 10.1 03/03/2021    HCT 32.8 03/03/2021    MCV 96.5 03/03/2021    MCH 29.7 03/03/2021    MCHC 30.8 03/03/2021    RDW 13.2 03/03/2021     03/03/2021    MPV 10.0 03/03/2021     CMP:    Lab Results   Component Value Date     03/03/2021    K 3.8 03/03/2021     03/03/2021    CO2 31 03/03/2021    BUN 21 03/03/2021    CREATININE 0.8 03/03/2021    GFRAA >60 03/03/2021    LABGLOM >60 03/03/2021    GLUCOSE 168 03/03/2021    PROT 7.2 03/02/2021    LABALBU 3.9 03/02/2021    CALCIUM 9.0 03/03/2021    BILITOT 0.5 03/02/2021    ALKPHOS 81 03/02/2021    AST 16 03/02/2021    ALT 12 03/02/2021     BUN/Creatinine:    Lab Results   Component Value Date    BUN 21 03/03/2021    CREATININE 0.8 03/03/2021     Hepatic Function Panel:    Lab Results   Component Value Date    ALKPHOS 81 03/02/2021    ALT 12 03/02/2021    AST 16 03/02/2021    PROT 7.2 03/02/2021    BILITOT 0.5 03/02/2021    BILIDIR 0.2 10/04/2013    LABALBU 3.9 03/02/2021     Albumin:    Lab Results   Component Value Date    LABALBU 3.9 03/02/2021     Calcium:    Lab Results   Component Value Date    CALCIUM 9.0 03/03/2021     Last 3 Troponin:    Lab Results   Component Value Date    TROPONINI <0.01 03/02/2021    TROPONINI <0.01 10/04/2013     U/A:    Lab Results   Component Value Date    COLORU Yellow 03/02/2021    PROTEINU TRACE 03/02/2021    PHUR 7.0 03/02/2021    WBCUA NONE 03/02/2021    RBCUA 1-3 03/02/2021    BACTERIA FEW 03/02/2021    CLARITYU Clear 03/02/2021    SPECGRAV 1.020 03/02/2021    LEUKOCYTESUR Negative 03/02/2021    UROBILINOGEN 0.2 03/02/2021    BILIRUBINUR Negative 03/02/2021    BLOODU MODERATE 03/02/2021    GLUCOSEU 500 03/02/2021     HgBA1c:    Lab Results   Component Value Date    LABA1C 7.1 03/02/2021     FOLATE:    Lab Results   Component Value Date    FOLATE >20.0 03/02/2021       Resident's Assessment and Plan     Slade Feil is a 81 y.o. female with a pmhx of T2 DM, HTN and PVD who presented to the ED on 03/02 with a c/o multiple falls and syncope, We are currently managing her for:     1.  Syncope likely 2/2 cardiogenic vs orthostatic vs neurogenic  Pt found unresponsive by family, states she has passed out before with no prodrome. CT head and neck w/o contrast show no acute intracranial pathology without acute compression deformities of spine. b12 and folate WNL. Troponins negative, EKG shows NSR, rate 65. . Lipid panel WNL , no evidence of rhabdomyolis   Will obtain echo   Also to check orthostatics    Neurosurgery consulted   B6 level pending   To review meds, pt taking HCTZ, Amlodipine, and timolol eye drops    Continue to monitor pts vitals     2. Compression fractures of T12 and L2 in the setting of recently diagnosed osteoporosis  L spine MRI shows compression fractures of T12 and L2,    Pain control PRN with tylenol [address] Neurosurgery to follow    3. Concern for CHF vs PNA   CXR shows mild vascular congestion with atelectasis at bases. Procalcitonin WNL, No leukocytosis. Pt afebrile. proBNP 279  - no antibiotics at this time  - pt shows no sign of volume overload/edema/JVD     4. Insulin dependent Diabetes mellitus   A1C 7.1%. Glucose on admission was 239, trending 180s-200.  Continue LDSS, humalog 0-3 nightly and humalog 0-6 TID with meals    POCT glucose with meals     5. Vitamin D deficiency    Vit D 17, to replenish with 1,000u capsule daily    6.  HTN   Continue amlodipine 2.5 daily       PT/OT evaluation: recs appreciated   DVT prophylaxis/GI prophylaxis: PCDs/-  Disposition: continue to monitor inpatient    [address] student, [employer]  Attending Physician: Dr. Zahra Brumfield

## 2021-03-03 NOTE — H&P
[facility]  Internal Medicine Residency Program  History and Physical    Patient:  Dai Torres 81 y.o. female MRN: [id number]     Date of Service: 3/2/2021    Hospital Day: 1      Chief complaint: had concerns including Fall and Loss of Consciousness (fell at home, states that she passed out in her kitchen. c collar in place. + vomitting ). History of Present Illness   The patient is a 81 y.o. female with a past medical history of T2 DM, HTN, PVD, ambulatory dysfunction who was admitted from the ED on 3/2/2021 for multiple falls. Per patient and family report, the patient has fallen multiple times in the recent past, but has refused ED evaluation each time. On the day of admission, she was making lunch when she experienced a prodrome of lightheadedness and dizziness prior to losing consciousness and falling to the floor, striking her head in the process. Downtime unknown. When the patient was found by family members, she was lying in a pool of blood 2/2 an L occipital scalp laceration. The patient was brought urgently to the ED for further evaluation. In the ED, the patient was hypertensive to 178/86, pulse 66 bpm, afebrile, oxygen saturation 95% on room air. Labs were remarkable for blood glucose 239 and hemoglobin 11.4. CT head negative for acute intracranial hemorrhage, showing only a posterior laceration with focal hematoma 3.4 cm in size. CT lumbar spine showing a 30% compression deformity of the superior endplate of Q92 and L2, concerning for new compression fractures in the setting of recent falls. CXR showing cardiomegaly with vascular congestion with concern for superimposed B/L pneumonia. Neurosurgery was consulted for the patient's new compression fractures, she was given fentanyl x1, and was admitted for further medical management and stabilization. On exam in the ED, the patient is complaining of some lightheadedness/dizziness.   She denies any vision changes, exam including sensation were truncated D/T patient's inability to cooperate and simultaneous suturing of her L occipital wound by ED staff. Vitals: /61   Pulse 65   Temp 98 °F (36.7 °C)   Resp 16   Ht 5' 2\" (1.575 m)   Wt 177 lb (80.3 kg)   SpO2 98%   BMI 32.37 kg/m²     Constitutional: Alert, conversational. Urban Sven commands. In no apparent distress. Head: Small laceration on the L occipital region. Otherwise normocephalic. Eyes: EOMI, (-) conjunctivitis (-) scleral icterus. Mucus membranes moist.  Mouth: Mucus membranes moist. Oropharynx clear. No deviation of the tongue or uvula. Neck: (-)  Swelling. Supple, Trachea midline. Respiratory: Lungs clear to auscultation bilaterally. (-) wheezes, (-)  rales, (-)  Rhonchi. No increased work of breathing or respiratory distress  Cardiovascular: RRR. (-) murmurs, (-) gallops, (-) rubs. S1 and S2 were normal.   GI:  Abdomen soft, (-) tenderness, (-) distention. (+) BS. (-)  rebound, (-) guarding, (-) rigidity. Extremities: Feet diffusely dry, multiple calluses and poorly kept nails. Warm and well perfused. (-) clubbing, (-) cyanosis. 2+ distal pulses. (-) peripheral edema. Moving all extremities spontaneously. Neurologic: No focal neurological deficits, no speech slurring or tremor.     Labs and Imaging Studies   Basic Labs  Recent Labs     03/02/21  1148 03/02/21  1152     --    K 3.5  --      --    CO2 26  --    BUN 26*  --    CREATININE 1.0  --    GLUCOSE 239* 239   CALCIUM 9.5  --        Recent Labs     03/02/21  1148   WBC 8.5   RBC 3.91   HGB 11.4*   HCT 36.7   MCV 93.9   MCH 29.2   MCHC 31.1*   RDW 13.2      MPV 9.2     CBC with Differential:    Lab Results   Component Value Date    WBC 8.5 03/02/2021    RBC 3.91 03/02/2021    HGB 11.4 03/02/2021    HCT 36.7 03/02/2021     03/02/2021    MCV 93.9 03/02/2021    MCH 29.2 03/02/2021    MCHC 31.1 03/02/2021    RDW 13.2 03/02/2021    LYMPHOPCT 6.2 03/02/2021 MONOPCT 1.9 03/02/2021    BASOPCT 0.2 03/02/2021    MONOSABS 0.16 03/02/2021    LYMPHSABS 0.53 03/02/2021    EOSABS 0.02 03/02/2021    BASOSABS 0.02 03/02/2021     CMP:    Lab Results   Component Value Date     03/02/2021    K 3.5 03/02/2021     03/02/2021    CO2 26 03/02/2021    BUN 26 03/02/2021    CREATININE 1.0 03/02/2021    GFRAA >60 03/02/2021    LABGLOM 53 03/02/2021    GLUCOSE 239 03/02/2021    PROT 7.2 03/02/2021    LABALBU 3.9 03/02/2021    CALCIUM 9.5 03/02/2021    BILITOT 0.5 03/02/2021    ALKPHOS 81 03/02/2021    AST 16 03/02/2021    ALT 12 03/02/2021     Troponin:    Lab Results   Component Value Date    TROPONINI <0.01 03/02/2021     U/A:    Lab Results   Component Value Date    COLORU Yellow 03/02/2021    PROTEINU TRACE 03/02/2021    PHUR 7.0 03/02/2021    WBCUA NONE 03/02/2021    RBCUA 1-3 03/02/2021    BACTERIA FEW 03/02/2021    CLARITYU Clear 03/02/2021    SPECGRAV 1.020 03/02/2021    LEUKOCYTESUR Negative 03/02/2021    UROBILINOGEN 0.2 03/02/2021    BILIRUBINUR Negative 03/02/2021    BLOODU MODERATE 03/02/2021    GLUCOSEU 500 03/02/2021     HgBA1c:    Lab Results   Component Value Date    LABA1C 7.1 03/02/2021     Vit B12 and Folate   Vitamin B-12 288  211 - 946 pg/mL Final 03/02/2021  8:15 PM  - [facility]   Folate >20.0  4.8 - 24.2 ng/mL Final 03/02/2021  8:15 PM  - [id number]  [address] [facility]     CK  Total   20 - 180 U/L Final 03/02/2021 11:48 AM  - [address] [facility]     Imaging Studies:     Ct Head Wo Contrast    Result Date: 3/2/2021  EXAMINATION: CT OF THE HEAD WITHOUT CONTRAST  3/2/2021 8:50 am TECHNIQUE: CT of the head was performed without the administration of intravenous contrast. Dose modulation, iterative reconstruction, and/or weight based adjustment of the mA/kV was utilized to reduce the radiation dose to as low as reasonably achievable.  COMPARISON: CT head without contrast 02/02/2020 HISTORY: ORDERING SYSTEM PROVIDED HISTORY: trauma TECHNOLOGIST PROVIDED HISTORY: Has a \"code stroke\" or \"stroke alert\" been called? ->No Reason for exam:->trauma Decision Support Exception->Emergency Medical Condition (MA) What reading provider will be dictating this exam?->CRC FINDINGS: BRAIN/VENTRICLES: There is no acute intracranial hemorrhage, mass effect or midline shift. No abnormal extra-axial fluid collection. The gray-white differentiation is maintained without evidence of an acute infarct. There is no evidence of hydrocephalus. There is mild burden of white matter hypoattenuation, which is typically seen in the setting of chronic small vessel ischemic disease. ORBITS: The visualized portion of the orbits demonstrate no acute abnormality. Status post left lens extraction. SINUSES: The visualized paranasal sinuses and mastoid air cells demonstrate no acute abnormality. SOFT TISSUES/SKULL:  A left posterior scalp contusion/laceration is present. There is diffuse soft tissue swelling in the area of contusion with a superimposed focal hematoma measuring up to 3.4 cm. No retained radiopaque foreign body is identified. No acute fracture is identified. There are advanced degenerative changes of the TMJs, left greater than right. No acute intracranial abnormality. Ct Cervical Spine Wo Contrast    Result Date: 3/2/2021  EXAMINATION: CT OF THE CERVICAL SPINE WITHOUT CONTRAST 3/2/2021 11:50 am TECHNIQUE: CT of the cervical spine was performed without the administration of intravenous contrast. Multiplanar reformatted images are provided for review. Dose modulation, iterative reconstruction, and/or weight based adjustment of the mA/kV was utilized to reduce the radiation dose to as low as reasonably achievable.  COMPARISON: CT cervical spine February 2, 2020 HISTORY: ORDERING SYSTEM PROVIDED HISTORY: trauma TECHNOLOGIST PROVIDED HISTORY: Reason for exam:->trauma Decision Support Exception->Emergency Medical Condition (MA) What reading provider will be dictating this exam?->CRC FINDINGS: BONES/ALIGNMENT: There is no acute fracture or traumatic malalignment. DEGENERATIVE CHANGES: Again demonstrated multilevel facet arthropathy and uncovertebral arthropathy of the cervical spine. SOFT TISSUES: There is no prevertebral soft tissue swelling. No acute compression deformities of cervical spine. Multilevel degenerative changes again demonstrated. Ct Lumbar Spine Wo Contrast    Result Date: 3/2/2021  EXAMINATION: CT OF THE LUMBAR SPINE WITHOUT CONTRAST  3/2/2021 TECHNIQUE: CT of the lumbar spine was performed without the administration of intravenous contrast. Multiplanar reformatted images are provided for review. Dose modulation, iterative reconstruction, and/or weight based adjustment of the mA/kV was utilized to reduce the radiation dose to as low as reasonably achievable. COMPARISON: Previous examination 02/02/2020 HISTORY: ORDERING SYSTEM PROVIDED HISTORY: trauma TECHNOLOGIST PROVIDED HISTORY: Reason for exam:->trauma Decision Support Exception->Emergency Medical Condition (MA) What reading provider will be dictating this exam?->CRC FINDINGS: There is nearly 30% compression deformity of the superior endplate of D90 and L2 which are  new since the previous examination. There is osteopenia. There is degenerative changes lumbar spine with central disc bulges at L4-5 and L5-S1. There is calcification of the aorta. Diverticulosis of colon is noted. Nearly 30% compression deformity of the superior endplate of W36 and L2 which are new since the previous examination. Acute compression fractures are considered. Diffuse degenerative changes with central disc bulges at L4-5 and L5-S1.     Xr Chest Portable    Result Date: 3/2/2021  EXAMINATION: ONE XRAY VIEW OF THE CHEST 3/2/2021 12:28 pm COMPARISON: October 4, 2013 HISTORY: ORDERING SYSTEM PROVIDED HISTORY: weakness, Possible Stroke TECHNOLOGIST PROVIDED HISTORY: Reason for exam:->weakness, Possible Stroke What reading provider will be dictating this exam?->CRC FINDINGS: There is cardiomegaly. There is mild vascular congestion with minimal atelectasis in lung bases. Mild hazy opacities are identified in the left midlung field and right costophrenic angle. There is no focal consolidation. Cardiomegaly with vascular congestion and atelectasis in lung bases. Mild CHF is suspected. Superimposed pneumonia has to be excluded. EKG: NSR with some respiratory variation, normal axis, , no acute ST segment elevations or depressions. Resident's Assessment and Plan     1. Compression fractures of lumbar spine 2/2 multiple falls in the setting of documented osteoporosis  · Per PCP note on 8/17/2020, patient has age-related osteoporosis  · Current radiographic imaging showing new compression fractures of T12 and L2  · Follow-up neurosurgical recommendations  · Continue to monitor  · Pain control as needed     2. Multiple falls, likely multifactorial, R/O mechanical causes versus general deconditioning/dehydration versus orthostatic hypotension versus neurocardiogenic syncope  · Patient does not appear clinically volume depleted on exam  · Hemoglobin A1c 7.1  · ProBNP, lipid panel, vitamin B12/folate all WNL  · F/U orthostatic vital signs and echocardiogram  · F/U vitamin B6  · Continue to monitor patient vitals  · PT/OT consult once cleared by neurosurgery  · Hold anticoagulation in setting of recent head trauma, PCD's for DVT prophylaxis  · Continue to monitor on telemetry  · Follow labs and replete electrolytes as necessary    3. Concern for mild CHF +/- pneumonia  · CXR showing cardiomegaly with pulmonary vascular congestion, no effusions noted. Concern for superimposed pneumonia. · Patient afebrile, no leukocytosis, Pro-Calc WNL  · proBNP 279  · No antibiotics or diuresis at this time  · We will continue to monitor and adjust treatment as necessary  · Follow-up echo    4.    H/O HTN  · Patient hypertensive in ED  · Continue amlodipine and HCTZ  · Daily labs, will replete electrolytes as necessary     5. Noninsulin-dependent diabetes mellitus  · Hemoglobin A1c 7.1%  · LDSS  · POCT BG with meals and at bedtime  · Hypoglycemia protocol in place     6.   Hx PVD  · Lipid panel WNL  · Continue ASA 81 mg daily    DVT prophylaxis/ GI prophylaxis: PCDs/Diet   Disposition: Continue inpatient management     Carlos Tobar DO, PGY-1   Attending physician: Dr. Humble Sanz

## 2021-03-03 NOTE — ED NOTES
Nurse to nurse received from [facility] at [address] [place] [address]  03/03/21 2542

## 2021-03-03 NOTE — ED NOTES
This rn sent Dr Barbara Gibson this message via perfect serve to make him aware of pt's hgb dropping. Hello! I am not certain if you have even had the opportunity to round on this pt yet but I will be leaving for the day and wanted to make you aware of her hgb dropping from 11.4 yesterday afternoon to 10.1 this morning. Thank you and have a great day!      Jamil Wilcox RN  03/03/21 [id number]       Jamil Wilcox RN  03/03/21 9428

## 2021-03-03 NOTE — PROGRESS NOTES
[facility]  Internal Medicine Residency Program  Progress Note - House Team 2    Patient:  Dai Torres 81 y.o. female MRN: [id number]     Date of Service: 3/3/2021     CC: Syncope and collapse   Overnight events: NAEO      Subjective     Pt seen and examined at bedside this AM. Appears in NAD. States that she feels much improved this AM. Complains only of head pain at site of laceration. Does state that she feels lightheaded upon sitting up. Denies other vision changes, chest pain/palpitations, dyspnea/cough, abdominal pain/n/v, or paresthesias of the extremities. Objective     Physical Exam:  Vitals: BP (!) 148/70   Pulse 61   Temp 98 °F (36.7 °C) (Oral)   Resp 16   Ht 5' 2\" (1.575 m)   Wt 177 lb (80.3 kg)   SpO2 98%   BMI 32.37 kg/m²       Constitutional: Alert, pleasant, conversational. Follows commands. In no apparent distress. Head: Small sutured laceration of the L occipital region w/ mild surrounding soft tissue edema. No active bleeding from site. Otherwise normocephalic. Eyes: EOMI, conjunctiva normal, (-) scleral icterus. Mucus membranes moist.  Mouth: Mucus membranes moist. Oropharynx clear. No deviation of the tongue or uvula. Neck: (-)  swelling, trachea midline, no JVD or hepatojugular reflux   Respiratory: Some end-expiratory wheeze bilatarally. Lungs otherwise clear to auscultation bilaterally. (-)  rales, (-)  Rhonchi. No increased work of breathing or respiratory distress. Cardiovascular: RRR. (-) murmurs, (-) gallops, (-) rubs. S1 and S2 were normal.   GI:  Abdomen soft, non-tender, non-distended. (+) BS. (-) guarding, (-) rigidity. Extremities: Warm and well perfused. (-) clubbing, (-) cyanosis. Trace pitting peripheral edema. Neurologic:  Mild sensory loss on the plantar aspect of the R foot, namely the R great toe. No other focal neurological deficits. No speech slurring or tremor. Moving all extremities spontaneously.    Subject  Pertinent cardiomegaly with pulmonary vascular congestion, no effusions noted. Concern for superimposed pneumonia.   Patient afebrile, no leukocytosis, Pro-Calc WNL  proBNP 279  No antibiotics or diuresis at this time  We will continue to monitor and adjust treatment as necessary  Follow-up echo     4.   H/O HTN  Patient hypertensive in ED  Continue amlodipine and HCTZ  Daily labs, will replete electrolytes as necessary                5.  Noninsulin-dependent diabetes mellitus  Hemoglobin A1c 7.1%  LDSS  POCT BG with meals and at bedtime  Hypoglycemia protocol in place                6.  Hx PVD  Lipid panel WNL  Continue ASA 81 mg daily    PT/OT evaluation: Holding PT/OT consult until cleared by Neurosurgery   DVT prophylaxis/ GI prophylaxis: PCDs/Diet   Disposition: Continue inpatient management    Shannan Sanderson DO, PGY-1  Attending physician: Dr. Eri Alonso

## 2021-03-03 NOTE — ED NOTES
Dr Nathan Freeman was sent message below via perfect sheri to make aware of pt's hgb dropping. Hello! I am not certain if you have even had the opportunity to round on this pt yet but I will be leaving for the day and wanted to make you aware of her hgb dropping from 11.4 yesterday afternoon to 10.1 this morning. Thank you and have a great day!     She responded immediately and is forwarding it to team      Jael Gamble, RN  03/03/21 [id number] [facility], RN  03/03/21 9511

## 2021-03-03 NOTE — CARE COORDINATION
SOCIAL WORK/DISCHARGE PLANNING;  Care coordination; pt admitted to unit this a. m.from home. Spoke with pt in room. She is alert and oriented, pleasant. She reported she lives in a two story plan home with her eldest son. Pt stays on the first floor. Son works during the day until [employer]. Pt reported that she was independent at home and used a walker. She has four other children who are also supportive. Pt reports no history of HHC or lauren. Her pcp is Dr. Lisa Ojeda and she goes to [facility] on [address]. Pt is hoping to return home. Explained pt/ot has been ordered and will await their evals to further assess discharge planning needs.   Karyle Gian [facility]  [phone]

## 2021-03-03 NOTE — CONSULTS
Department of Podiatry  Consult Note        Reason for Consult: Toenail trimming    CHIEF COMPLAINT:  Painful elongated toenails, painful calluses    HISTORY OF PRESENT ILLNESS:    The patient is a 81 y.o. female with significant past medical history of Long nails who is consulted for painful elongated toenails. Patient states that toenails have been painful for some time. States that they cannot clip them because of decreased mobility and fear of cutting themselves. She also states that her son usually cuts them for her. She also mention that she has painful calluses as well. No other pedal complaints. Past Medical History:    Past Medical History:   Diagnosis Date    Cataract     Diabetes mellitus ([facility] [facility]     Fall from standing 2/17/2020    Injury of left upper arm 2/17/2020    Pneumonia     Syncope and collapse 10/17/2016    [facility]controlled [facility] 8/17/2010       Past Surgical History:    Past Surgical History:   Procedure Laterality Date    CATARACT REMOVAL WITH IMPLANT      HYSTERECTOMY         Current Medications:    Medications Prior to Admission: amLODIPine (NORVASC) 2.5 MG tablet, Take 2.5 mg by mouth every evening  hydroCHLOROthiazide (HYDRODIURIL) 12.5 MG tablet, Take 12.5 mg by mouth daily  metFORMIN (GLUCOPHAGE) 500 MG tablet, Take 500 mg by mouth 2 times daily (with meals)  brimonidine (ALPHAGAN) 0.2 % ophthalmic solution, Place 1 drop into the left eye 2 times daily   timolol (TIMOPTIC) 0.5 % ophthalmic solution, Place 1 drop into the left eye 2 times daily   prednisoLONE acetate (PRED FORTE) 1 % ophthalmic suspension, Place 1 drop into the left eye 3 times daily   aspirin 81 MG tablet, Take 81 mg by mouth daily. atorvastatin (LIPITOR) 10 MG tablet, Take 10 mg by mouth every evening    Allergies:    Allergies   Allergen Reactions    Penicillins        Social History:       Social History     Tobacco Use    Smoking status: Never Smoker    Smokeless tobacco: Never Used   Substance Use Topics    Alcohol use: No        Social History     Substance and Sexual Activity   Drug Use No       Family History:   No family history on file. REVIEW OF SYSTEMS:      Vitals:    BP (!) 148/70   Pulse 60   Temp 98 °F (36.7 °C) (Oral)   Resp 16   Ht 5' 2\" (1.575 m)   Wt 177 lb (80.3 kg)   SpO2 96%   BMI 32.37 kg/m²     LABS:   Recent Labs     03/02/21  1148 03/03/21  0534   WBC 8.5 8.0   HGB 11.4* 10.1*   HCT 36.7 32.8*    284     Recent Labs     03/03/21  0703      K 3.8      CO2 31*   BUN 21   CREATININE 0.8     Recent Labs     03/02/21  1148   PROT 7.2       LOWER EXTREMITY EXAM    Derm: Nails 1-5 b/l are thickened, elongated, and crumbly with the presence of subungual debris. Webspaces 1-4 b/l are clean, dry, and intact. No hyperkeratotic tissue. No breaks in skin integument     Neurologic: Epicritic sensation Diminished b/l. Light touch sensation Decreased. Vascular:  DP/PT pedal pulses Palpable b/l. Skin temperature is warm to cool from proximal to distal. No diffuse edema noted to the foot/ankle. No redness, no streaking. Musculoskeletal: Muscle strength to all pedal groups 5/5. Pain to palpation of toenails 1-3 b/l. IMPRESSION/RECOMMENDATIONS     1. Painful Elongated Toenails - Onychomycosis   - Patient was examined and evaluated. - Reviewed patient's recent lab results and pertinent diagnostic imaging.  - Reviewed ancillary service notes. - Debrided nails 1-5 in length and thickness using nail nippers  - Patient instructed to f/u with Dr. Shelli Wallace on discharge for routine foot care. - Recommend follow-up with podiatric physician for proper diabetic foot care upon discharge. Will sign off on the patient. If any other pedal problems occur please contact us. Thank you for the opportunity to take part in the patient's care.      Kylie Zhu DPM FACFAS  Fellowship-Trained Foot and Ankle Surgeon  Diplomate, American Board of Foot and Ankle Surgeons  [phone]    Florentino Young DPM PGY-2  Cell: [phone]

## 2021-03-03 NOTE — ED NOTES
Nurse to nurse given to [facility] RN and Aman Ocampo RN     Sara Khan RN  03/03/21 [id number]

## 2021-03-04 PROBLEM — R55 SYNCOPE: Status: ACTIVE | Noted: 2021-03-04

## 2021-03-04 PROBLEM — H61.23 BILATERAL IMPACTED CERUMEN: Status: ACTIVE | Noted: 2021-03-04

## 2021-03-04 LAB
ANION GAP SERPL CALCULATED.3IONS-SCNC: 5 MMOL/L (ref 7–16)
BASOPHILS ABSOLUTE: 0.03 E9/L (ref 0–0.2)
BASOPHILS RELATIVE PERCENT: 0.4 % (ref 0–2)
BUN BLDV-MCNC: 20 MG/DL (ref 8–23)
CALCIUM SERPL-MCNC: 8.7 MG/DL (ref 8.6–10.2)
CHLORIDE BLD-SCNC: 106 MMOL/L (ref 98–107)
CO2: 28 MMOL/L (ref 22–29)
CREAT SERPL-MCNC: 0.8 MG/DL (ref 0.5–1)
EOSINOPHILS ABSOLUTE: 0.05 E9/L (ref 0.05–0.5)
EOSINOPHILS RELATIVE PERCENT: 0.6 % (ref 0–6)
GFR AFRICAN AMERICAN: >60
GFR NON-AFRICAN AMERICAN: >60 ML/MIN/1.73
GLUCOSE BLD-MCNC: 167 MG/DL (ref 74–99)
HCT VFR BLD CALC: 33 % (ref 34–48)
HEMOGLOBIN: 10.6 G/DL (ref 11.5–15.5)
IMMATURE GRANULOCYTES #: 0.03 E9/L
IMMATURE GRANULOCYTES %: 0.4 % (ref 0–5)
LV EF: 65 %
LVEF MODALITY: NORMAL
LYMPHOCYTES ABSOLUTE: 1.42 E9/L (ref 1.5–4)
LYMPHOCYTES RELATIVE PERCENT: 18.3 % (ref 20–42)
MCH RBC QN AUTO: 30.3 PG (ref 26–35)
MCHC RBC AUTO-ENTMCNC: 32.1 % (ref 32–34.5)
MCV RBC AUTO: 94.3 FL (ref 80–99.9)
METER GLUCOSE: 171 MG/DL (ref 74–99)
METER GLUCOSE: 174 MG/DL (ref 74–99)
METER GLUCOSE: 176 MG/DL (ref 74–99)
METER GLUCOSE: 209 MG/DL (ref 74–99)
MONOCYTES ABSOLUTE: 0.59 E9/L (ref 0.1–0.95)
MONOCYTES RELATIVE PERCENT: 7.6 % (ref 2–12)
NEUTROPHILS ABSOLUTE: 5.64 E9/L (ref 1.8–7.3)
NEUTROPHILS RELATIVE PERCENT: 72.7 % (ref 43–80)
PDW BLD-RTO: 13.2 FL (ref 11.5–15)
PLATELET # BLD: 278 E9/L (ref 130–450)
PMV BLD AUTO: 9.5 FL (ref 7–12)
POTASSIUM SERPL-SCNC: 3.4 MMOL/L (ref 3.5–5)
RBC # BLD: 3.5 E12/L (ref 3.5–5.5)
SODIUM BLD-SCNC: 139 MMOL/L (ref 132–146)
WBC # BLD: 7.8 E9/L (ref 4.5–11.5)

## 2021-03-04 PROCEDURE — 99231 SBSQ HOSP IP/OBS SF/LOW 25: CPT | Performed by: INTERNAL MEDICINE

## 2021-03-04 PROCEDURE — 36415 COLL VENOUS BLD VENIPUNCTURE: CPT

## 2021-03-04 PROCEDURE — 80048 BASIC METABOLIC PNL TOTAL CA: CPT

## 2021-03-04 PROCEDURE — 97535 SELF CARE MNGMENT TRAINING: CPT

## 2021-03-04 PROCEDURE — 82962 GLUCOSE BLOOD TEST: CPT

## 2021-03-04 PROCEDURE — 2140000000 HC CCU INTERMEDIATE R&B

## 2021-03-04 PROCEDURE — 97161 PT EVAL LOW COMPLEX 20 MIN: CPT

## 2021-03-04 PROCEDURE — 97165 OT EVAL LOW COMPLEX 30 MIN: CPT

## 2021-03-04 PROCEDURE — 97530 THERAPEUTIC ACTIVITIES: CPT

## 2021-03-04 PROCEDURE — 93306 TTE W/DOPPLER COMPLETE: CPT

## 2021-03-04 PROCEDURE — 6370000000 HC RX 637 (ALT 250 FOR IP): Performed by: INTERNAL MEDICINE

## 2021-03-04 PROCEDURE — 85025 COMPLETE CBC W/AUTO DIFF WBC: CPT

## 2021-03-04 PROCEDURE — 2580000003 HC RX 258: Performed by: INTERNAL MEDICINE

## 2021-03-04 PROCEDURE — 6360000002 HC RX W HCPCS: Performed by: INTERNAL MEDICINE

## 2021-03-04 RX ORDER — HYDRALAZINE HYDROCHLORIDE 20 MG/ML
10 INJECTION INTRAMUSCULAR; INTRAVENOUS EVERY 4 HOURS PRN
Status: DISCONTINUED | OUTPATIENT
Start: 2021-03-04 | End: 2021-03-08 | Stop reason: HOSPADM

## 2021-03-04 RX ORDER — AMLODIPINE BESYLATE 5 MG/1
5 TABLET ORAL DAILY
Status: DISCONTINUED | OUTPATIENT
Start: 2021-03-04 | End: 2021-03-07

## 2021-03-04 RX ORDER — HYDRALAZINE HYDROCHLORIDE 20 MG/ML
5 INJECTION INTRAMUSCULAR; INTRAVENOUS EVERY 6 HOURS PRN
Status: DISCONTINUED | OUTPATIENT
Start: 2021-03-04 | End: 2021-03-04

## 2021-03-04 RX ORDER — HYDRALAZINE HYDROCHLORIDE 20 MG/ML
10 INJECTION INTRAMUSCULAR; INTRAVENOUS EVERY 6 HOURS PRN
Status: DISCONTINUED | OUTPATIENT
Start: 2021-03-04 | End: 2021-03-04

## 2021-03-04 RX ORDER — LATANOPROST 50 UG/ML
1 SOLUTION/ DROPS OPHTHALMIC NIGHTLY
Status: DISCONTINUED | OUTPATIENT
Start: 2021-03-04 | End: 2021-03-05

## 2021-03-04 RX ORDER — POTASSIUM CHLORIDE 20 MEQ/1
20 TABLET, EXTENDED RELEASE ORAL ONCE
Status: COMPLETED | OUTPATIENT
Start: 2021-03-04 | End: 2021-03-04

## 2021-03-04 RX ADMIN — LATANOPROST 1 DROP: 50 SOLUTION OPHTHALMIC at 19:04

## 2021-03-04 RX ADMIN — SODIUM CHLORIDE, PRESERVATIVE FREE 10 ML: 5 INJECTION INTRAVENOUS at 20:18

## 2021-03-04 RX ADMIN — BRIMONIDINE TARTRATE 1 DROP: 2 SOLUTION OPHTHALMIC at 20:19

## 2021-03-04 RX ADMIN — INSULIN LISPRO 1 UNITS: 100 INJECTION, SOLUTION INTRAVENOUS; SUBCUTANEOUS at 12:23

## 2021-03-04 RX ADMIN — AMLODIPINE BESYLATE 5 MG: 5 TABLET ORAL at 08:58

## 2021-03-04 RX ADMIN — INSULIN LISPRO 1 UNITS: 100 INJECTION, SOLUTION INTRAVENOUS; SUBCUTANEOUS at 21:39

## 2021-03-04 RX ADMIN — PREDNISOLONE ACETATE 1 DROP: 10 SUSPENSION/ DROPS OPHTHALMIC at 08:59

## 2021-03-04 RX ADMIN — SODIUM CHLORIDE, PRESERVATIVE FREE 10 ML: 5 INJECTION INTRAVENOUS at 08:59

## 2021-03-04 RX ADMIN — PREDNISOLONE ACETATE 1 DROP: 10 SUSPENSION/ DROPS OPHTHALMIC at 20:19

## 2021-03-04 RX ADMIN — HYDRALAZINE HYDROCHLORIDE 10 MG: 20 INJECTION INTRAMUSCULAR; INTRAVENOUS at 16:44

## 2021-03-04 RX ADMIN — ENOXAPARIN SODIUM 30 MG: 30 INJECTION SUBCUTANEOUS at 12:22

## 2021-03-04 RX ADMIN — Medication 1000 UNITS: at 08:58

## 2021-03-04 RX ADMIN — BRIMONIDINE TARTRATE 1 DROP: 2 SOLUTION OPHTHALMIC at 08:59

## 2021-03-04 RX ADMIN — ACETAMINOPHEN 650 MG: 325 TABLET ORAL at 15:16

## 2021-03-04 RX ADMIN — ASPIRIN 81 MG: 81 TABLET, CHEWABLE ORAL at 08:58

## 2021-03-04 RX ADMIN — POTASSIUM CHLORIDE 20 MEQ: 1500 TABLET, EXTENDED RELEASE ORAL at 08:58

## 2021-03-04 RX ADMIN — SODIUM CHLORIDE, PRESERVATIVE FREE 10 ML: 5 INJECTION INTRAVENOUS at 16:45

## 2021-03-04 RX ADMIN — INSULIN LISPRO 1 UNITS: 100 INJECTION, SOLUTION INTRAVENOUS; SUBCUTANEOUS at 06:37

## 2021-03-04 RX ADMIN — ACETAMINOPHEN 650 MG: 325 TABLET ORAL at 08:58

## 2021-03-04 RX ADMIN — PREDNISOLONE ACETATE 1 DROP: 10 SUSPENSION/ DROPS OPHTHALMIC at 14:45

## 2021-03-04 RX ADMIN — Medication 10 DROP: at 19:05

## 2021-03-04 RX ADMIN — INSULIN LISPRO 1 UNITS: 100 INJECTION, SOLUTION INTRAVENOUS; SUBCUTANEOUS at 16:48

## 2021-03-04 ASSESSMENT — PAIN SCALES - GENERAL
PAINLEVEL_OUTOF10: 5
PAINLEVEL_OUTOF10: 3
PAINLEVEL_OUTOF10: 5
PAINLEVEL_OUTOF10: 3

## 2021-03-04 ASSESSMENT — PAIN - FUNCTIONAL ASSESSMENT
PAIN_FUNCTIONAL_ASSESSMENT: ACTIVITIES ARE NOT PREVENTED
PAIN_FUNCTIONAL_ASSESSMENT: ACTIVITIES ARE NOT PREVENTED

## 2021-03-04 ASSESSMENT — PAIN DESCRIPTION - ORIENTATION
ORIENTATION: POSTERIOR
ORIENTATION: POSTERIOR

## 2021-03-04 ASSESSMENT — PAIN DESCRIPTION - FREQUENCY
FREQUENCY: CONTINUOUS
FREQUENCY: CONTINUOUS

## 2021-03-04 ASSESSMENT — PAIN DESCRIPTION - DESCRIPTORS
DESCRIPTORS: DISCOMFORT;DULL;CONSTANT
DESCRIPTORS: DULL;DISCOMFORT;CONSTANT

## 2021-03-04 ASSESSMENT — PAIN DESCRIPTION - LOCATION
LOCATION: HEAD
LOCATION: HEAD

## 2021-03-04 ASSESSMENT — PAIN DESCRIPTION - PROGRESSION
CLINICAL_PROGRESSION: GRADUALLY IMPROVING
CLINICAL_PROGRESSION: GRADUALLY IMPROVING

## 2021-03-04 ASSESSMENT — PAIN DESCRIPTION - ONSET
ONSET: ON-GOING
ONSET: ON-GOING

## 2021-03-04 ASSESSMENT — PAIN DESCRIPTION - PAIN TYPE
TYPE: ACUTE PAIN
TYPE: ACUTE PAIN

## 2021-03-04 NOTE — PROGRESS NOTES
Spoke with echo at ext #[phone] regarding patient is a potential discharge and patient needs echocardiogram to be done.    Lorenzo Last

## 2021-03-04 NOTE — CONSULTS
[address]                  [address] [facility]  [facility]                                  CONSULTATION    PATIENT NAME: BEBO CAMACHO                    :        1935  MED REC NO:   [id number]                            ROOM:       [id number]  ACCOUNT NO:   [de-identified]                           ADMIT DATE: 2021  PROVIDER:     Lazaro Meek MD    CONSULT DATE:  2021    CHIEF COMPLAINT:  History of fall and loss of consciousness. HISTORY OF PRESENT ILLNESS:  This is an 43-year-old female, who  apparently had, what appears to be, a syncopal episode. She fell and  hit her head on some sharp object and had bleeding from the scalp. The  scalp laceration was sutured in the emergency room. She underwent a CT  scan of the brain, which was reviewed. The CT scan of the head revealed  no acute intracranial abnormality. CT scan of the cervical spine also  revealed no acute fractures. CT scan of the lumbar spine was consistent  with a 30% compression deformity of the superior endplate of X48 and L2,  which appears new. The fracture was considered acute. She does have  bulging disk at level of L4-L5 and L5-S1. The patient walks with a  walker because of her difficulty with ambulation. She denies new pain  in the back or in the lower extremities. She does have problem with her  left knee. The CT scans were reviewed by me. PAST MEDICAL HISTORY:  Cataract surgery, diabetes mellitus, fall from  ascending height, injury of the left upper arm, pneumonia, syncope and  collapse. PAST SURGICAL HISTORY:  Cataract removed with implant and hysterectomy. PERSONAL HISTORY:  Allergy to PENICILLIN. SOCIAL HISTORY:  Not a smoker. Does not use alcohol or street drugs. PHYSICAL EXAMINATION:  GENERAL:  She is a well-developed, well-nourished female, in no acute  distress.   NEUROLOGIC:  She is alert, awake and oriented to time, place and person. Speech is good. Memory is good. Cranial nerves are grossly intact. Movements of cervical spine are fair. Extraocular movements are full. Handgrip is equal bilaterally, but she has difficulty abducting her  shoulders because of shoulder problems on both sides, worse on the right  than the left. Sensation intact in the upper extremities otherwise. She has equal strength in the lower extremities. Again has limitation  of movements of the knees bilaterally and lifting her legs off the bed  equally well but weak. No definite tenderness noted over the thoracic  or lumbar spine. Rest of the neurologic examination is unremarkable. IMPRESSION:  1. Syncopal episode, rule out cerebral concussion. 2.  Scalp laceration. 3.  Compression fracture of L2 and T12 and multiple medical  comorbidities. As the patient denies significant pain in the back and I have offered  her to have the MRI scan of the lumbar spine for further evaluation, the  patient would like to defer that for the time being. Nothing else to  offer to the patient as the patient is relatively comfortable at this  time from neurosurgical standpoint. We will follow along.         Annabelle Hay MD    D: 03/03/2021 17:15:50       T: 03/03/2021 17:22:28     MARIA [email or web address]  Job#: [id number]     Doc#: [id number]    CC:

## 2021-03-04 NOTE — PROGRESS NOTES
[facility]  Internal Medicine Residency Program  Progress Note - House Team 2    Patient:  Trav Contreras 81 y.o. female MRN: [id number]     Date of Service: 3/4/2021     CC: multiple falls and syncope   Overnight events: Hgb Stable   Hospital Day: 3    Subjective     The patient was seen and examined at bedside this AM, AO x 3 in no acute distress. She does state that her scalp laceration is causing her pain at this time and would like medication. She also would like her ear canals to be examined as she feels that her equilibrium is off and having trouble hearing. She denies headache/dizziness, chest pain, SOB, abdominal pain, urinary s/s, constipation, incontinence or swelling. Objective     Physical Exam:  Vitals: /74   Pulse 68   Temp 98.4 °F (36.9 °C) (Temporal)   Resp 20   Ht 5' 2\" (1.575 m)   Wt 177 lb (80.3 kg)   SpO2 96%   BMI 32.37 kg/m²     I & O - 24hr:     Intake/Output Summary (Last 24 hours) at 3/4/2021 1320  Last data filed at 3/4/2021 1256  Gross per 24 hour   Intake 900 ml   Output --   Net 900 ml       · General Appearance: alert, appears stated age, cooperative and no distress  · HEENT:  Head: scalp contusion  · Neck: no adenopathy, no carotid bruit, no JVD, supple, symmetrical, trachea midline and thyroid not enlarged, symmetric, no tenderness/mass/nodules  · Lung: clear to auscultation bilaterally  · Heart: regular rate and rhythm, S1, S2 normal, no murmur, click, rub or gallop  · Abdomen: soft, non-tender; bowel sounds normal; no masses,  no organomegaly  · Extremities:  extremities normal, atraumatic, no cyanosis or edema  · Musculokeletal: No joint swelling, no muscle tenderness. ROM normal in all joints of extremities.    · Neurologic: Mental status: Alert, oriented, thought content appropriate    Pertinent Labs & Imaging Studies     CBC with Differential:    Lab Results   Component Value Date    WBC 7.8 03/04/2021    RBC 3.50 03/04/2021    HGB 10.6 03/04/2021    HCT 33.0 03/04/2021     03/04/2021    MCV 94.3 03/04/2021    MCH 30.3 03/04/2021    MCHC 32.1 03/04/2021    RDW 13.2 03/04/2021    LYMPHOPCT 18.3 03/04/2021    MONOPCT 7.6 03/04/2021    BASOPCT 0.4 03/04/2021    MONOSABS 0.59 03/04/2021    LYMPHSABS 1.42 03/04/2021    EOSABS 0.05 03/04/2021    BASOSABS 0.03 03/04/2021     CMP:    Lab Results   Component Value Date     03/04/2021    K 3.4 03/04/2021     03/04/2021    CO2 28 03/04/2021    BUN 20 03/04/2021    CREATININE 0.8 03/04/2021    GFRAA >60 03/04/2021    LABGLOM >60 03/04/2021    GLUCOSE 167 03/04/2021    PROT 7.2 03/02/2021    LABALBU 3.9 03/02/2021    CALCIUM 8.7 03/04/2021    BILITOT 0.5 03/02/2021    ALKPHOS 81 03/02/2021    AST 16 03/02/2021    ALT 12 03/02/2021     Calcium:    Lab Results   Component Value Date    CALCIUM 8.7 03/04/2021     Magnesium:    Lab Results   Component Value Date    MG 1.9 10/04/2013     U/A:    Lab Results   Component Value Date    COLORU Yellow 03/02/2021    PROTEINU TRACE 03/02/2021    PHUR 7.0 03/02/2021    WBCUA NONE 03/02/2021    RBCUA 1-3 03/02/2021    BACTERIA FEW 03/02/2021    CLARITYU Clear 03/02/2021    SPECGRAV 1.020 03/02/2021    LEUKOCYTESUR Negative 03/02/2021    UROBILINOGEN 0.2 03/02/2021    BILIRUBINUR Negative 03/02/2021    BLOODU MODERATE 03/02/2021    GLUCOSEU 500 03/02/2021       Resident's Assessment and Plan     Alejandra Watt is a 81 y.o. female with a pmhx of T2 DM, HTN and PVD who presented to the ED on 03/02 with a c/o multiple falls and syncope, We are currently managing her for:     1. Syncope likely 2/2 cardiogenic vs orthostatic vs neurogenic  Pt found unresponsive by family, states she has passed out before with no prodrome. CT head and neck w/o contrast on 03/02 show no acute intracranial pathology without acute compression deformities of spine. b12 and folate WNL. Troponins negative, EKG shows NSR, rate 65. .  Lipid panel WNL , no evidence of rhabdomyolis  · Echo results pending  · Orthostatics negative,   · Neurosurgery consulted - offered pt MRI but she denies at this time. · B6 level pending  ·  pt taking HCTZ 12.5, and amlodipine 2.5 - holding HCTZ and will increase norvasc to 5mg  · Continue to monitor pts vitals      2. Compression fractures of T12 and L2 in the setting of recently diagnosed osteoporosis  L spine MRI shows compression fractures of T12 and L2,   · Pain control PRN with tylenol 650  · Neurosurgery recs appreciated      3. Insulin dependent Diabetes mellitus   A1C 7.1%. Glucose on admission was 239, trending 170's. · Continue LDSS, humalog 0-3 nightly and humalog 0-6 TID with meals   · POCT glucose with meals      4. Vitamin D deficiency   · Vit D 1,000u capsule daily     5.  HTN  - Increasing Norvasc to 5mg   - d/c HCTZ      PT/OT evaluation: PT recs appreciated   DVT prophylaxis/GI prophylaxis: lovenox/ -   Disposition: Continue to monitor     [address], OMS-III  Attending Physician: Dr. Jo Flannery

## 2021-03-04 NOTE — PROGRESS NOTES
[facility]  Internal Medicine Residency Program  Progress Note - House Team 2    Patient:  Julio Belle 81 y.o. female MRN: [id number]     Date of Service: 3/4/2021     CC: Syncope and collapse   Overnight events: NAEO      Subjective     Pt seen and examined at bedside this AM. Appears in NAD. Still c/o head pain at site of laceration, as well as a sensation of fullness in the ears. Otherwise reports overall improvement. No acute complaints or concerns. Denies lightheadedness/dizziness, chest pain/palpitations, abdominal pain/n/v.     Objective     Physical Exam:  Vitals: BP (!) 162/68   Pulse 75   Temp 98.3 °F (36.8 °C) (Oral)   Resp 20   Ht 5' 2\" (1.575 m)   Wt 177 lb (80.3 kg)   SpO2 95%   BMI 32.37 kg/m²       Constitutional: Alert, pleasant, conversational. Follows commands. In no apparent distress. Head: Small sutured laceration of the L occipital region w/ mild surrounding soft tissue edema. No active bleeding from site. Otherwise normocephalic. Eyes: EOMI, conjunctiva normal, (-) scleral icterus. Mucus membranes moist.  Ears: External canal patent w/o erythema. Cerumen impaction bilaterally, TM obscured. Mouth: Mucus membranes moist. Oropharynx clear. No deviation of the tongue or uvula. Neck: (-)  swelling, trachea midline, no JVD or hepatojugular reflux   Respiratory: Some end-expiratory wheeze bilatarally. Lungs otherwise clear to auscultation bilaterally. (-)  rales, (-)  Rhonchi. No increased work of breathing or respiratory distress. Cardiovascular: RRR. (-) murmurs, (-) gallops, (-) rubs. S1 and S2 were normal.   GI:  Abdomen soft, non-tender, non-distended. (+) BS. (-) guarding, (-) rigidity. Extremities: Warm and well perfused. (-) clubbing, (-) cyanosis. Trace pitting peripheral edema. Neurologic: No focal neurological deficits. No speech slurring or tremor. Moving all extremities spontaneously.    Subject  Pertinent Labs & Imaging Studies   asif  CBC with Differential:    Lab Results   Component Value Date    WBC 7.8 03/04/2021    RBC 3.50 03/04/2021    HGB 10.6 03/04/2021    HCT 33.0 03/04/2021     03/04/2021    MCV 94.3 03/04/2021    MCH 30.3 03/04/2021    MCHC 32.1 03/04/2021    RDW 13.2 03/04/2021    LYMPHOPCT 18.3 03/04/2021    MONOPCT 7.6 03/04/2021    BASOPCT 0.4 03/04/2021    MONOSABS 0.59 03/04/2021    LYMPHSABS 1.42 03/04/2021    EOSABS 0.05 03/04/2021    BASOSABS 0.03 03/04/2021     BMP:    Lab Results   Component Value Date     03/04/2021    K 3.4 03/04/2021     03/04/2021    CO2 28 03/04/2021    BUN 20 03/04/2021    LABALBU 3.9 03/02/2021    CREATININE 0.8 03/04/2021    CALCIUM 8.7 03/04/2021    GFRAA >60 03/04/2021    LABGLOM >60 03/04/2021    GLUCOSE 167 03/04/2021     Vit D   Vit D, 25-Hydroxy 17Low   30 - 100 ng/mL Final 03/03/2021  7:03 AM [facility]     Resident's Assessment and Plan     1. Compression fractures of lumbar spine 2/2 multiple falls in the setting of documented osteoporosis  Per PCP note on 8/17/2020, patient has age-related osteoporosis  Current radiographic imaging showing new compression fractures of T12 and L2  Pt declined MRI spine offered by Neurosurgery. No further interventions recommended. Pain control as needed  Will consult PT/OT after cleared by Neurosurgery                 2.  Multiple falls, likely multifactorial d/t polyuria and gait abnormality in the setting of general deconditioning. Orthostatic vitals (-)  F/u echocardiogram and vit B6   PT/OT consult once cleared by neurosurgery  Hold timolol eye drops d/t bradycardia   Continue to monitor on telemetry  Follow labs and replete electrolytes as necessary     3. Concern for mild CHF +/- pneumonia  CXR showing cardiomegaly with pulmonary vascular congestion, no effusions noted. Concern for superimposed pneumonia.   Patient afebrile, no leukocytosis, Pro-Calc WNL  proBNP 279  No antibiotics or diuresis at this time  We will continue to monitor and adjust treatment as necessary  Follow-up echo     4.   H/O HTN  Patient hypertensive in ED  Holding HCTZ d/t polyuria at home   Increased Amlodipine to 5mg daily - will monitor for effect   Daily labs, will replete electrolytes as necessary                5.  Noninsulin-dependent diabetes mellitus  Hemoglobin A1c 7.1%  LDSS  POCT BG with meals and at bedtime  Hypoglycemia protocol in place                6.   Cerumen impaction   Will irrigate B/L ears      7.    Hx PVD  Lipid panel WNL  Continue ASA 81 mg daily    PT/OT evaluation: Holding PT/OT consult until cleared by Neurosurgery   DVT prophylaxis/ GI prophylaxis: Lovenox, PCDs/Diet   Disposition: Continue inpatient management    Cesar Wei DO, PGY-1  Attending physician: Dr. Kody Wolf

## 2021-03-04 NOTE — PROGRESS NOTES
Spoke with Dr. Charlie Iraheta regarding if patient is ok to work with pt/ot. Dr. Charlie Iraheta states he will be around to see the patient and let us know.    Alvaro Diamond

## 2021-03-04 NOTE — CARE COORDINATION
SOCIAL WORK/DISCHARGE PLANNING;  Spoke with pt re; lauren as pt's p.t/ot [facility] scores are 15& 16 respectively. During initial discussion pt seemed like she would be receptive and asked me to call her son, Elton([phone])  or son-in-law, Norberto([phone])to discuss. Called Elton and no answer-unable to leave a message. I reached Elle Lam and he reported that family feels pt would benefit from lauren. He will have his wife Ivon call me when she gets off work. Elle Lam states Ivon will be up here at 5:00pm and will discuss with pt. I went back in the room and pt's room mate who is being discharged, was telling pt she should just go home. Pt is now saying she will not go to rehab and want to go home. She says she will hire someone to stay with her during the day while son is working. Will await call from pt's daughter for final discharge plan.   Irving Ferraro [facility]  [phone]

## 2021-03-04 NOTE — PROGRESS NOTES
Occupational Therapy  OCCUPATIONAL THERAPY INITIAL EVALUATION        Date:3/4/2021  Patient Name: Loi Ramirez  MRN: [id number]  : 1935  Room: [address] [place], [place] [address]    Referring Physician:  Munir Ramos DO    Evaluating OT:  MARQUISE Adair, OTR/L #[id number]    AM-PAC Daily Activity Raw Score:  1624  Recommended Adaptive Equipment:  TBD as pt progresses - Extended Tub Bench, Adaptive equipment    Reason for Admission:  Pt was admitted after experiencing syncopal episode at home w/ subsequent fall to the ground - hitting her head, (+) LOC. Sustained Spinal Injury - Compression Fx T12 and L2. No intervention per Neurosurgery. Diagnosis:     1. Syncope and collapse    2. Compression fracture of body of thoracic vertebra (HCC)    3. Compression fracture of lumbar vertebra, initial encounter, unspecified lumbar vertebral level ([facility] [address]      Procedures this admission:  None     Pertinent Medical History:  Cataract - Removal w/ implant, DM, Fall - injury L UE 2020     Precautions:  Falls  Spinal Precautions  Impulsive/Limited Safety Awareness      Home Living: Pt lives with her son in a 2-story house. Bed and Full bath on 1st floor.  (+) Basement. Bathroom setup:  Tub-Shower, standard-height Commode   Equipment owned: Tub \"Stool\", [employer]    Available Family Assist:  Son reportedly works Full Time - Pt is home alone most of the day    Prior Level of Function:  Pt reported being IND with all ADLs, Light IADLs (Folding Laundry, Making \"Sauce\", etc), Transfers and Mobility using [employer] for ambulation. Driving:  No  Occupation:  None reported    Pain Level:  Denies Pain;      Additional Complaints:  Mild Dizziness w/ upright ax    Vitals/Lab Values:  WFL Room Air, (-) Orthostatics per Nsg notes    Cognition: A & O x 3 - generally oriented   Able to Follow Multi-Step Commands w/ Mod VCs -    Memory:  fair (+)   Sequencing:  fair (+)   Problem solving:  fair  (+)   Judgement/safety:  fair (-)  Additional Comments: Purpose of OT services;  OT Plan of Care;     ADL-  Instruction/training on use of DME/AD/Adaptive equip/techs to improve safety/IND with Functional Ax - adherence to spinal precautions   Mobility-  Instruction/training on safety and improved independence with bed mobility, functional transfers, functional mobility, walker safety    Sitting EOB - to improve dynamic sitting balance and activity tolerance during ADLs as noted above   Activity tolerance - Instruction/training on energy conservation/work simplification, techs to increase endurance for completion of Functional Ax   Cognitive retraining -  Oriented pt to current Date, Place and Situation; Cues for safety during Functional Ax for safety, improved safety awareness, sequencing, problem solving, adherence to spinal precautions   Skilled monitoring of pt's response to tx ax   Techs for improved Safety/Safety Awareness w/ Functional Activity/Mobility     Recommendations for Continued Participation in OT services during Hospitalization and at D/C - Brief stay at SNF for continued therapy prior to returning home w/o 24/7 SUP/Assist     Made all appropriate Environmental Modifications to facilitate pt's level of IND and safety. Pt and/or Family verbalized/demonstrated a fair understanding of education provided. Will Review PRN.        Assessment of current deficits   Functional mobility [x]  ADLs [x] Strength [x]  Cognition [x]  Functional transfers  [x] IADLs [x] Safety Awareness [x]  Endurance [x]  Fine Motor Coordination [] Balance [x] Vision/perception [x] Sensation []   Gross Motor Coordination [] ROM [x] Delirium []                  Motor Control []      Plan of Care: OT 1-3 x/week for 1-2 weeks PRN   [x] ADL retraining/AE, Equipment Needs/Recommendations   [x] Energy Conservation Techniques/Strategies      [x] Neuromuscular Re-Education      [x] Functional Transfer Training         [x] Functional Mobility Training          [x] Cognitive Re-Training          [x] Splinting/Positioning Needs           [x] Therapeutic Activity   [x]Therapeutic Exercise   [x] Visual/Perceptual   [] Delirium Prevention/Treatment   [x] Positioning to Improve Functional [place], Safety, and Skin Integrity   [x] Patient and/or Family Education to Increase Safety and Functional [place]   [x] Environmental Modifications  [x] Compensatory techniques for ADLs   [x] Other:       Pt would benefit from continued skilled OT services to increase safety and independence with completion of ADL/IADL tasks for functional independence and quality of life. Pt/Family actively participated in the establishment of goals. Rehab Potential:  Fair(+) for established goals    Patient / Family Goal:  Not stated at this time     Patient and/or Family were instructed on Functional Diagnosis, Prognosis/Goals and OT Plan of Care. Demonstrated Fair(+) understanding. Evaluation Time includes thorough review of current medical information, gathering information on past medical history/social history and prior level of function, completion of standardized testing/informal observation of tasks, assessment of data and education on plan of care and goals.      Eval Complexity: Low  Profile and History - Mod  Assessment of Occupational Performance and Identification of Deficits -Mod  Clinical Decision Making - Low     Time In:  1252              Time Out:  1320  Total Treatment Time:  18 minutes      Treatment Charges: Mins Units   OT Eval Low 97165 X 1   OT Eval Medium 63647     OT Eval High 86412     OT Re-Eval [id number]     Therapeutic Ex  75967     Therapeutic Activities 72722     ADL/Self Care 21307 18 1   Neuro Re-ed 13589     Orthotic manage/training  78182     Non-Billable Time     Total Timed Treatment 18 [address] [place], [place], OTR/L  # [id number]

## 2021-03-04 NOTE — PROGRESS NOTES
Pt is impulsive and has poor insight into safety/deficits with mobility. Pt able to sit up to EOB without assist but close standby at all times for safety. Pt repeatedly standing up and sitting down by pulling on walker, unreceptive to education. Ambulating with use of walker showing flexed posture and slow gait speed. Pt is unsteady and anxious only able to tolerate a short distance and expressing fear of falling. Pt educated on recommendation of not ambulating alone at this time. Returned to bed to end session   Pt with all needs met and call light in reach. Pt would benefit from continued PT POC to address functional deficits described above. Treatment:  Patient practiced and was instructed in the following treatment:     Patient education provided continuously throughout session for sequencing, safety maintenance, and improving any deficits found during the evaluation.  Bed mobility training - pt given verbal and tactile cues to facilitate proper sequencing and safety during rolling and supine>sit as well as provided with physical assistance to complete task     STS and pivot transfer training - pt educated on proper hand and foot placement, safety and sequencing, and use of proper technique to safely complete sit<>stand and pivot transfers with hands on assistance to complete task safely. Pt stood from bed and commode with increased safety concern from commode.  Gait training- pt was given verbal and tactile cues to facilitate normal speed and improved balance during ambulation as well as provided with physical assistance to complete task. Maximal cues for safety. Pt's/ family goals   1. Return home     Patient and or family understand(s) diagnosis, prognosis, and plan of care.   Yes     PLAN:    Current Treatment Recommendations   [] Strengthening     [] ROM   [x] Balance Training   [x] Endurance Training   [x] Transfer Training   [x] Gait Training   [x] Stair Training   [] Positioning   [x]

## 2021-03-05 LAB
ANION GAP SERPL CALCULATED.3IONS-SCNC: 7 MMOL/L (ref 7–16)
BASOPHILS ABSOLUTE: 0.03 E9/L (ref 0–0.2)
BASOPHILS RELATIVE PERCENT: 0.4 % (ref 0–2)
BUN BLDV-MCNC: 20 MG/DL (ref 8–23)
CALCIUM SERPL-MCNC: 9 MG/DL (ref 8.6–10.2)
CHLORIDE BLD-SCNC: 108 MMOL/L (ref 98–107)
CO2: 27 MMOL/L (ref 22–29)
CREAT SERPL-MCNC: 0.8 MG/DL (ref 0.5–1)
EOSINOPHILS ABSOLUTE: 0.05 E9/L (ref 0.05–0.5)
EOSINOPHILS RELATIVE PERCENT: 0.7 % (ref 0–6)
GFR AFRICAN AMERICAN: >60
GFR NON-AFRICAN AMERICAN: >60 ML/MIN/1.73
GLUCOSE BLD-MCNC: 131 MG/DL (ref 74–99)
HCT VFR BLD CALC: 32.9 % (ref 34–48)
HEMOGLOBIN: 10.6 G/DL (ref 11.5–15.5)
IMMATURE GRANULOCYTES #: 0.04 E9/L
IMMATURE GRANULOCYTES %: 0.5 % (ref 0–5)
LYMPHOCYTES ABSOLUTE: 1.96 E9/L (ref 1.5–4)
LYMPHOCYTES RELATIVE PERCENT: 25.9 % (ref 20–42)
MCH RBC QN AUTO: 30.3 PG (ref 26–35)
MCHC RBC AUTO-ENTMCNC: 32.2 % (ref 32–34.5)
MCV RBC AUTO: 94 FL (ref 80–99.9)
METER GLUCOSE: 152 MG/DL (ref 74–99)
METER GLUCOSE: 175 MG/DL (ref 74–99)
METER GLUCOSE: 218 MG/DL (ref 74–99)
METER GLUCOSE: 241 MG/DL (ref 74–99)
MONOCYTES ABSOLUTE: 0.55 E9/L (ref 0.1–0.95)
MONOCYTES RELATIVE PERCENT: 7.3 % (ref 2–12)
NEUTROPHILS ABSOLUTE: 4.95 E9/L (ref 1.8–7.3)
NEUTROPHILS RELATIVE PERCENT: 65.2 % (ref 43–80)
PDW BLD-RTO: 13.3 FL (ref 11.5–15)
PLATELET # BLD: 291 E9/L (ref 130–450)
PMV BLD AUTO: 9.5 FL (ref 7–12)
POTASSIUM SERPL-SCNC: 3.6 MMOL/L (ref 3.5–5)
RBC # BLD: 3.5 E12/L (ref 3.5–5.5)
SARS-COV-2, NAAT: NOT DETECTED
SODIUM BLD-SCNC: 142 MMOL/L (ref 132–146)
WBC # BLD: 7.6 E9/L (ref 4.5–11.5)

## 2021-03-05 PROCEDURE — 36415 COLL VENOUS BLD VENIPUNCTURE: CPT

## 2021-03-05 PROCEDURE — 87635 SARS-COV-2 COVID-19 AMP PRB: CPT

## 2021-03-05 PROCEDURE — 80048 BASIC METABOLIC PNL TOTAL CA: CPT

## 2021-03-05 PROCEDURE — 6370000000 HC RX 637 (ALT 250 FOR IP): Performed by: INTERNAL MEDICINE

## 2021-03-05 PROCEDURE — 2140000000 HC CCU INTERMEDIATE R&B

## 2021-03-05 PROCEDURE — 6360000002 HC RX W HCPCS: Performed by: INTERNAL MEDICINE

## 2021-03-05 PROCEDURE — 85025 COMPLETE CBC W/AUTO DIFF WBC: CPT

## 2021-03-05 PROCEDURE — 2580000003 HC RX 258: Performed by: INTERNAL MEDICINE

## 2021-03-05 PROCEDURE — 97530 THERAPEUTIC ACTIVITIES: CPT

## 2021-03-05 PROCEDURE — 97535 SELF CARE MNGMENT TRAINING: CPT

## 2021-03-05 PROCEDURE — 82962 GLUCOSE BLOOD TEST: CPT

## 2021-03-05 PROCEDURE — 99231 SBSQ HOSP IP/OBS SF/LOW 25: CPT | Performed by: INTERNAL MEDICINE

## 2021-03-05 RX ORDER — DORZOLAMIDE HCL 20 MG/ML
1 SOLUTION/ DROPS OPHTHALMIC 3 TIMES DAILY
Qty: 10 ML | Refills: 1 | Status: SHIPPED | OUTPATIENT
Start: 2021-03-05

## 2021-03-05 RX ORDER — CHOLECALCIFEROL (VITAMIN D3) 25 MCG
1000 TABLET ORAL DAILY
Qty: 30 TABLET | Refills: 1 | Status: SHIPPED | OUTPATIENT
Start: 2021-03-06 | End: 2021-05-12

## 2021-03-05 RX ORDER — LANCETS
1 EACH MISCELLANEOUS DAILY
Qty: 100 EACH | Refills: 5 | Status: ON HOLD | OUTPATIENT
Start: 2021-03-05 | End: 2021-07-06 | Stop reason: HOSPADM

## 2021-03-05 RX ORDER — LATANOPROST 50 UG/ML
1 SOLUTION/ DROPS OPHTHALMIC NIGHTLY
Qty: 1 BOTTLE | Refills: 1 | Status: CANCELLED | OUTPATIENT
Start: 2021-03-05

## 2021-03-05 RX ORDER — AMLODIPINE BESYLATE 5 MG/1
5 TABLET ORAL DAILY
Qty: 30 TABLET | Refills: 1 | Status: SHIPPED | OUTPATIENT
Start: 2021-03-06 | End: 2021-03-08 | Stop reason: HOSPADM

## 2021-03-05 RX ORDER — DORZOLAMIDE HCL 20 MG/ML
1 SOLUTION/ DROPS OPHTHALMIC 3 TIMES DAILY
Status: DISCONTINUED | OUTPATIENT
Start: 2021-03-05 | End: 2021-03-08 | Stop reason: HOSPADM

## 2021-03-05 RX ADMIN — SODIUM CHLORIDE, PRESERVATIVE FREE 10 ML: 5 INJECTION INTRAVENOUS at 09:52

## 2021-03-05 RX ADMIN — DORZOLAMIDE HYDROCHLORIDE 1 DROP: 20 SOLUTION/ DROPS OPHTHALMIC at 20:00

## 2021-03-05 RX ADMIN — SODIUM CHLORIDE, PRESERVATIVE FREE 10 ML: 5 INJECTION INTRAVENOUS at 19:57

## 2021-03-05 RX ADMIN — HYDRALAZINE HYDROCHLORIDE 10 MG: 20 INJECTION INTRAMUSCULAR; INTRAVENOUS at 19:57

## 2021-03-05 RX ADMIN — AMLODIPINE BESYLATE 5 MG: 5 TABLET ORAL at 09:51

## 2021-03-05 RX ADMIN — ACETAMINOPHEN 650 MG: 325 TABLET ORAL at 18:47

## 2021-03-05 RX ADMIN — BRIMONIDINE TARTRATE 1 DROP: 2 SOLUTION OPHTHALMIC at 20:27

## 2021-03-05 RX ADMIN — Medication 10 DROP: at 09:53

## 2021-03-05 RX ADMIN — DORZOLAMIDE HYDROCHLORIDE 1 DROP: 20 SOLUTION/ DROPS OPHTHALMIC at 16:01

## 2021-03-05 RX ADMIN — PREDNISOLONE ACETATE 1 DROP: 10 SUSPENSION/ DROPS OPHTHALMIC at 14:12

## 2021-03-05 RX ADMIN — INSULIN LISPRO 1 UNITS: 100 INJECTION, SOLUTION INTRAVENOUS; SUBCUTANEOUS at 20:08

## 2021-03-05 RX ADMIN — INSULIN LISPRO 1 UNITS: 100 INJECTION, SOLUTION INTRAVENOUS; SUBCUTANEOUS at 16:02

## 2021-03-05 RX ADMIN — Medication 1000 UNITS: at 09:51

## 2021-03-05 RX ADMIN — ACETAMINOPHEN 650 MG: 325 TABLET ORAL at 11:33

## 2021-03-05 RX ADMIN — PREDNISOLONE ACETATE 1 DROP: 10 SUSPENSION/ DROPS OPHTHALMIC at 20:08

## 2021-03-05 RX ADMIN — PREDNISOLONE ACETATE 1 DROP: 10 SUSPENSION/ DROPS OPHTHALMIC at 09:53

## 2021-03-05 RX ADMIN — BRIMONIDINE TARTRATE 1 DROP: 2 SOLUTION OPHTHALMIC at 09:53

## 2021-03-05 RX ADMIN — ENOXAPARIN SODIUM 30 MG: 30 INJECTION SUBCUTANEOUS at 09:52

## 2021-03-05 RX ADMIN — INSULIN LISPRO 2 UNITS: 100 INJECTION, SOLUTION INTRAVENOUS; SUBCUTANEOUS at 11:27

## 2021-03-05 RX ADMIN — ACETAMINOPHEN 650 MG: 325 TABLET ORAL at 02:56

## 2021-03-05 RX ADMIN — ASPIRIN 81 MG: 81 TABLET, CHEWABLE ORAL at 09:51

## 2021-03-05 RX ADMIN — INSULIN LISPRO 1 UNITS: 100 INJECTION, SOLUTION INTRAVENOUS; SUBCUTANEOUS at 06:47

## 2021-03-05 ASSESSMENT — PAIN DESCRIPTION - PAIN TYPE
TYPE: ACUTE PAIN
TYPE: ACUTE PAIN

## 2021-03-05 ASSESSMENT — PAIN DESCRIPTION - ORIENTATION: ORIENTATION: POSTERIOR

## 2021-03-05 ASSESSMENT — PAIN DESCRIPTION - PROGRESSION
CLINICAL_PROGRESSION: GRADUALLY IMPROVING
CLINICAL_PROGRESSION: GRADUALLY IMPROVING
CLINICAL_PROGRESSION: GRADUALLY WORSENING

## 2021-03-05 ASSESSMENT — PAIN SCALES - GENERAL
PAINLEVEL_OUTOF10: 0
PAINLEVEL_OUTOF10: 2
PAINLEVEL_OUTOF10: 4
PAINLEVEL_OUTOF10: 3
PAINLEVEL_OUTOF10: 0
PAINLEVEL_OUTOF10: 4
PAINLEVEL_OUTOF10: 4
PAINLEVEL_OUTOF10: 0
PAINLEVEL_OUTOF10: 0
PAINLEVEL_OUTOF10: 2

## 2021-03-05 ASSESSMENT — PAIN DESCRIPTION - ONSET
ONSET: ON-GOING
ONSET: GRADUAL

## 2021-03-05 ASSESSMENT — PAIN DESCRIPTION - DESCRIPTORS
DESCRIPTORS: ACHING;DISCOMFORT;CONSTANT
DESCRIPTORS: ACHING;DISCOMFORT;HEADACHE

## 2021-03-05 ASSESSMENT — PAIN - FUNCTIONAL ASSESSMENT
PAIN_FUNCTIONAL_ASSESSMENT: PREVENTS OR INTERFERES SOME ACTIVE ACTIVITIES AND ADLS
PAIN_FUNCTIONAL_ASSESSMENT: ACTIVITIES ARE NOT PREVENTED

## 2021-03-05 ASSESSMENT — PAIN DESCRIPTION - LOCATION
LOCATION: HEAD
LOCATION: HEAD

## 2021-03-05 ASSESSMENT — PAIN DESCRIPTION - FREQUENCY
FREQUENCY: CONTINUOUS
FREQUENCY: INTERMITTENT

## 2021-03-05 NOTE — DISCHARGE SUMMARY
[address] [facility]  Discharge Summary    PCP: David Cerda DO    Admit Date:3/2/2021  Discharge Date: 3/5/2021    Admission Diagnosis:   1. ***  2.     Discharge Diagnosis:  1. ***  2.     Hospital Course: ***    Significant findings (history and exam, laboratory, radiological, pathology, other tests):     Vitals: /70   Pulse 75   Temp 97.8 °F (36.6 °C) (Oral)   Resp 18   Ht 5' 2\" (1.575 m)   Wt 169 lb 14.4 oz (77.1 kg)   SpO2 96%   BMI 31.08 kg/m²     Constitutional: Alert, conversational. Forestine Buoy commands. In no apparent distress. Head: Normocephalic and atraumatic. Eyes: EOMI, (-) conjunctivitis (-) scleral icterus. Mucus membranes moist.  Mouth: Mucus membranes moist. Oropharynx clear. No deviation of the tongue or uvula. Normal dentition. Neck: (-) swelling, (-) JVD, Supple, Trachea midline. Respiratory: Lungs clear to auscultation bilaterally. (-)  wheezes, (-)  rales, (-)  Rhonchi. No increased work of breathing or respiratory distress. Cardiovascular: RRR. (-)  murmurs, (-) gallops,  (-) rubs. S1 and S2 were normal.   GI:  Abdomen soft, (-) tenderness, (-) distention. (+) BS. (-) guarding, (-) rigidity. Extremities: Warm and well perfused. (-) clubbing, (-) cyanosis. 2+ distal pulses. (-) peripheral edema. Moving extremities spontaneously. Neurologic:  No focal neurological deficits.  No speech slurring or tremor     Pending test results: ***    Consults:  1. ***  2.     Procedures:  1. ***    Condition at discharge: [address] [facility] Patient Condition:[id number]}    Disposition: {disposition:39071}    Discharge Medications:  Current Discharge Medication List      CONTINUE these medications which have NOT CHANGED    Details   amLODIPine (NORVASC) 2.5 MG tablet Take 2.5 mg by mouth every evening      hydroCHLOROthiazide (HYDRODIURIL) 12.5 MG tablet Take 12.5 mg by mouth daily metFORMIN (GLUCOPHAGE) 500 MG tablet Take 500 mg by mouth 2 times daily (with meals)      brimonidine (ALPHAGAN) 0.2 % ophthalmic solution Place 1 drop into the left eye 2 times daily       timolol (TIMOPTIC) 0.5 % ophthalmic solution Place 1 drop into the left eye 2 times daily       prednisoLONE acetate (PRED FORTE) 1 % ophthalmic suspension Place 1 drop into the left eye 3 times daily   Refills: 3      aspirin 81 MG tablet Take 81 mg by mouth daily.       atorvastatin (LIPITOR) 10 MG tablet Take 10 mg by mouth every evening         STOP taking these medications       hydroCHLOROthiazide (MICROZIDE) 12.5 MG capsule Comments:   Reason for Stopping:         blood glucose test strips (ACCU-CHEK ARA) strip Comments:   Reason for Stopping:         Accu-Chek FastClix Lancets MISC Comments:   Reason for Stopping:         amLODIPine-atorvastatatin (CADUET) 2.5-10 MG per tablet Comments:   Reason for Stopping:         acetaminophen (TYLENOL) 500 MG tablet Comments:   Reason for Stopping:               Activity: {discharge activity:18579}  Diet: {diet:73047}    Follow-up appointments:   1. ***    Patient Instructions: David Lawson DO  PGY 1   10:48 AM 3/5/2021

## 2021-03-05 NOTE — PLAN OF CARE
Problem: Falls - Risk of:  Goal: Will remain free from falls  Description: Will remain free from falls  Outcome: Met This Shift     Problem: Pain:  Goal: Pain level will decrease  Description: Pain level will decrease  Outcome: Met This Shift     Problem:  Activity:  Goal: Ability to tolerate increased activity will improve  Description: Ability to tolerate increased activity will improve  Outcome: Met This Shift

## 2021-03-05 NOTE — PROGRESS NOTES
ENT resident notified of new consult via perfect serve.      Dr. Ashok Louise back and states he spoke with the provider and they plan on seeing patient outpatient   Myron Moreira

## 2021-03-05 NOTE — PROGRESS NOTES
OT BEDSIDE TREATMENT NOTE      Date:3/5/2021  Patient Name: Letha Watson  MRN: [id number]  : 1935  Room: [address] [place], [place] [address]     Referring Physician:  Janusz Sosa DO     Evaluating OT:  MARQUISE Kaminski, OTR/L #[id number]     AM-PAC Daily Activity Raw Score:  16/24  Recommended Adaptive Equipment:  TBD as pt progresses - Extended Tub Bench, Adaptive equipment     Reason for Admission:  Pt was admitted after experiencing syncopal episode at home w/ subsequent fall to the ground - hitting her head, (+) LOC.       Sustained Spinal Injury - Compression Fx T12 and L2. No intervention per Neurosurgery.       Diagnosis:     1. Syncope and collapse    2. Compression fracture of body of thoracic vertebra (HCC)    3. Compression fracture of lumbar vertebra, initial encounter, unspecified lumbar vertebral level ([facility] [address]       Procedures this admission:  None      Pertinent Medical History:  Cataract - Removal w/ implant, DM, Fall - injury L UE 2020      Precautions:  Falls  Spinal Precautions  Impulsive/Limited Safety Awareness        Home Living: Pt lives with her son in a 2-story house. Bed and Full bath on 1st floor.  (+) Basement. Bathroom setup:  Tub-Shower, standard-height Commode   Equipment owned: Tub \"Stool\", [employer]     Available Family Assist:  Son reportedly works Full Time - Pt is home alone most of the day     Prior Level of Function:  Pt reported being IND with all ADLs, Light IADLs (Folding Laundry, Making \"Sauce\", etc), Transfers and Mobility using [employer] for ambulation. Driving:  No  Occupation:  None reported     Pain Level:  Denies Pain;      Additional Complaints:  Mild Dizziness w/ upright ax     Vitals/Lab Values:  WFL Room Air, (-) Orthostatics per Nsg notes     Cognition: A & O x 3 - generally oriented       Able to Follow Multi-Step Commands w/ Mod VCs -               Memory:  fair (+)              Sequencing:  fair (+)              Problem solving:  fair  (+)              Judgement/safety:  fair (-)  Additional Comments:  Pt was pleasant and cooperative - Very talkative, Very easily distracted requiring Mod VCs to focus on task, improve safety and safety awareness                   Functional Assessment:    Initial Eval Status  Date: 3-4-21 Treatment Status  Date: 3/5/21 Short Term/Long Term Goals  Treatment frequency: PRN 1-3 x/week   1-2 weeks   Feeding IND       n/t  NA   Grooming Min A     Able to wash hands while standing at sink; despite VCs for improved posture/safety, pt leaned on forearms on sink, unable to tolerate further ax at sink d/t fatigue - required seated rest break  Min A for functional reaching to comb hair - limited AROM of Terrance Shoulders, Mod VCs to avoid scalp laceration during task    MIN A pt standing at sink to wash hands requiring assist for safety standing at sink  SUP  Standing At The Sink   UB Dressing Mod A     Mod A to don gown, Min A to Doff Gown - seated EOB after set up  Mod VCs for improved safety awareness    N/t MOD A per last report  Min A   LB Dressing Mod A     Min A for dynamic standing balance + Mod A to pull pants over hips (simulated)  Pt ed for safety, adaptive techs/equip    MAX A to reina/doff socks;  Min A   Bathing NT       n/t Min A   Toileting Mod A     Able to complete krystal-care w/ SUP while seated  Max A for bowel hygiene while standing, Min A for standing balance, pt ed for improved safety    MIN A pt completed all aspects of toileting requiring assist with clothing management  Min A   Bed Mobility  Rolling:  NT  Repositioning:  SUP/Mod VCs   Supine to Sit:  SUP    Sit to Supine:  SUP      VCs for safety  Supine>sit supervision   Sit>supering n/t pt up in chair  IND   Functional Transfers Sit to stand:  Min A  Stand to sit:  Min A       Min A for safety + Mod VCs/Pt ed re: safety/hand placement from EOB ~ 4x, Commode 2x    MIN A sit>stand from EOB   MIN A sit<>stand from toilet   MIN A stand>sit to chair pt requiring v/c's for hand placement and control during descent  SUP   Functional Mobility Min A      Min A w/ WW, limited distance d/t fatigue, required seated rest break for 2nd short distance to bathroom, mod VCs for walker safety/hand placement on walker    MIN A short house hold distances with s/w v/c's for walker safety  SUP   Balance Sitting:      Static:  SUP EOB/Commode    Dynamic:  Close SUP w/ functional ax EOB/Commode     Standing:      Static:  Close SUP w/ [employer]    Dynamic:  Min A  w/ functional ax/mobility w/ [employer]     sitting:   Static: supervision   Dynamic: close supervision   Standing: MIN A with s/w      Activity Tolerance Fair  Limited by fatigue, Limited endurance, weakness     Tolerated Sitting:  EOB ~ 5 mins 2x, + ~ 10 mins commode w/ functional ax      Tolerated Standing:  ~ 1 min + 1 min + 3 mins w/ functional ax/mobility - required seated rest breaks b/t trials    Fair completed light ADL and functional transfer/mobility tasks      Visual/  Perceptual WFL - pt reported chronic poor vision L eye, denied change in vision since recent fall  Glasses: Yes        Hearing WFL  Hearing Aids  No              Comments: Upon arrival pt supine in bed, agreeable to therapy session, son present. Pt educated with regards to bed mobility, functional transfers, energy conservation techniques, hand placement, functional mobility, LE dressing, toileting/hygiene, bathing AE. At end of session pt seated in chair with son present,  all lines and tubes intact, call light within reach. · Pt has made good  progress towards set goals.    · Continue with current plan of care      Treatment Time In:1027            Treatment Time Out: 7267                Treatment Charges: Mins Units   Ther Ex  71466     Manual Therapy Radha Robertson [phone] [id number]   ADL/Home Mgt 71065 10 1   Neuro Re-ed 29611     Group Therapy      Orthotic manage/training  29051     Non-Billable Time     Total Timed Treatment 29 347 No [facility] [place] RODRIGUEZ/L [id number]

## 2021-03-05 NOTE — PROGRESS NOTES
Notified primary team while rounding that the patient lost IV access this morning.    Mike Henderson

## 2021-03-05 NOTE — CARE COORDINATION
SOCIAL WORK/DISCHARGE PLANNING;  Spoke with pt's daughter Ivon and then with pt. Ivon states they talked with pt last evening and she agreed to short term rehab. Pt this a.m. told me she would go for short term rehab. Reviewed choices with Ivon and she requested [facility] as first choice and then [facility]. Referral called to Krupa at [facility]. She will call me after reviewing chart info. [facility]  [phone]    Per Mitch Infante pt accepted by [facility]. They will start insurance pre-cert and call when obtained(hoping for today). Completed Hospital Exempt form/enevlope and placed in chart. Will need covid negative test for tx to [facility]. Provided RN with test.  [facility]  [phone]    [facility] checked on insurance pre-cert and it is still pending. Will call when approved.   [facility]  [phone]

## 2021-03-05 NOTE — PLAN OF CARE
Problem:  Activity:  Goal: Ability to tolerate increased activity will improve  Description: Ability to tolerate increased activity will improve  Outcome: Met This Shift

## 2021-03-05 NOTE — PROGRESS NOTES
Physician Progress Note      PATIENT:               BEBO CAMACHO  CSN #:                  [id number]  :                       1935  ADMIT DATE:       3/2/2021 11:16 AM  DISCH DATE:  RESPONDING  PROVIDER #:        Erica Woods DO          QUERY TEXT:    Dear Dr Kylie Pizarro and associates,    Patient admitted with syncope and collapse, falls, and compression fracture. .   Noted documentation of pneumonia/CHF in H&P and PN note 3.3. In order to   support the diagnosis of pneumonia, please include additional clinical   indicators in your documentation. Or please document if the diagnosis of   pneumonia has been ruled out after further study. The medical record reflects the following:  Risk Factors: age DM pneumonia  Clinical Indicators: WBC 8.0 procalcitonin 0.06  CXR: Cardiomegaly with   vascular congestion and atelectasis in lung bases. ?Mild CHF is suspected. ?Superimposed pneumonia has to be excluded. ? Treatment: CXR NaCl infusion    Griselda Darnel RN BSN CCDS  Options provided:  -- Pneumonia present as evidenced by, Please document evidence. -- Pneumonia was ruled out  -- Other - I will add my own diagnosis  -- Disagree - Not applicable / Not valid  -- Disagree - Clinically unable to determine / Unknown  -- Refer to Clinical Documentation Reviewer    PROVIDER RESPONSE TEXT:    Pneumonia was ruled out after study.     Query created by: Yuni Villar on 3/4/2021 1:20 PM      Electronically signed by:  Erica Woods DO 3/5/2021 12:39 PM

## 2021-03-05 NOTE — PROGRESS NOTES
[facility]  Internal Medicine Residency Program  Progress Note - House Team 2    Patient:  Arleth Moran 81 y.o. female MRN: [id number]     Date of Service: 3/5/2021     CC: Syncope and collapse   Overnight events: NAEO      Subjective     Pt seen and examined at bedside this AM. Appears in NAD. C/o ear fullness and loss of hearing - concerned that this may be contributing to her falls. Otherwise feeling very well, no acute complaints or concerns. Objective     Physical Exam:  Vitals: BP (!) 129/99   Pulse 82   Temp 98.7 °F (37.1 °C) (Temporal)   Resp 16   Ht 5' 2\" (1.575 m)   Wt 169 lb 14.4 oz (77.1 kg)   SpO2 98%   BMI 31.08 kg/m²       Constitutional: Alert, pleasant, conversational. Mario Alberto  commands. In no apparent distress. Head: Small sutured laceration of the L occipital region w/ mild surrounding soft tissue edema. No active bleeding from site. Otherwise normocephalic. Eyes: EOMI, conjunctiva normal, (-) scleral icterus. Mucus membranes moist.  Ears: External canal patent w/o erythema. Cerumen impaction bilaterally, TM obscured. Mouth: Mucus membranes moist. Oropharynx clear. No deviation of the tongue or uvula. Neck: (-)  swelling, trachea midline, no JVD or hepatojugular reflux   Respiratory: CTAB. (-)  rales, (-)  Rhonchi. No increased work of breathing or respiratory distress. Cardiovascular: RRR. (-) murmurs, (-) gallops, (-) rubs. S1 and S2 were normal.   GI:  Abdomen soft, non-tender, non-distended. (+) BS. (-) guarding, (-) rigidity. Extremities: Warm and well perfused. (-) clubbing, (-) cyanosis. (-) peripheral edema. Neurologic: No focal neurological deficits. No speech slurring or tremor. Moving all extremities spontaneously.    Subject  Pertinent Labs & Imaging Studies   asif  CBC with Differential:    Lab Results   Component Value Date    WBC 7.6 03/05/2021    RBC 3.50 03/05/2021    HGB 10.6 03/05/2021    HCT 32.9 03/05/2021     03/05/2021 MCV 94.0 03/05/2021    MCH 30.3 03/05/2021    MCHC 32.2 03/05/2021    RDW 13.3 03/05/2021    LYMPHOPCT 25.9 03/05/2021    MONOPCT 7.3 03/05/2021    BASOPCT 0.4 03/05/2021    MONOSABS 0.55 03/05/2021    LYMPHSABS 1.96 03/05/2021    EOSABS 0.05 03/05/2021    BASOSABS 0.03 03/05/2021     BMP:    Lab Results   Component Value Date     03/05/2021    K 3.6 03/05/2021     03/05/2021    CO2 27 03/05/2021    BUN 20 03/05/2021    LABALBU 3.9 03/02/2021    CREATININE 0.8 03/05/2021    CALCIUM 9.0 03/05/2021    GFRAA >60 03/05/2021    LABGLOM >60 03/05/2021    GLUCOSE 131 03/05/2021     Vit D   Vit D, 25-Hydroxy 17Low   30 - 100 ng/mL Final 03/03/2021  7:03 AM  - [facility]     Resident's Assessment and Plan     1. Compression fractures of lumbar spine 2/2 multiple falls in the setting of documented age-related osteoporosis  Current radiographic imaging showing new compression fractures of T12 and L2  Pt declined MRI spine offered by Neurosurgery. No further interventions recommended. Vit D level low - started 1,000U cholecalciferol daily   Pain control as needed  PT/OT consulted, [facility] 15/16 respectively - pt amenable to [facility] for strengthening prior to d/c home                 2.  Multiple falls, likely multifactorial d/t polyuria and gait abnormality in the setting of general deconditioning.    Orthostatic vitals (-)  Echo showing EF 65 w/ grade I DD and mild mitral calcification and mild aortic valve sclerosis  D/c Timolol eye drops - concern for symptomatic bradycardia   Continue to monitor on telemetry  Follow labs and replete electrolytes as necessary     3.   H/O HTN  Patient normotensive   D/C HCTZ   Increased Amlodipine to 5mg daily - will monitor for effect   Daily labs, will replete electrolytes as necessary                5.  Noninsulin-dependent diabetes mellitus  Hemoglobin A1c 7.1%  LDSS  POCT BG with meals and at bedtime  Hypoglycemia protocol in place                6. Cerumen impaction   Attempted to irrigate ears this AM - unsuccessful   Continue Debrox for total of 7 days - will monitor   ENT consult as outpatient for cerumen disimpaction      7. Hx PVD  Lipid panel WNL  Continue ASA 81 mg daily    8.   Glaucoma   D/c Timolol d/t symptomatic bradycardia   Started Dorzolamide TID per Dr. Joyce Fitzgerald (Ophthalmology) recs   Continue Brimonidine and Prednisolone acetate  F/u as OP with Ophthalmology     PT/OT evaluation: Holding PT/OT consult until cleared by Neurosurgery   DVT prophylaxis/ GI prophylaxis: Lovenox, PCDs/Diet   Disposition: Continue inpatient management, anticipate d/c to [facility] within 24-48hrs     Ida Lares DO, PGY-1  Attending physician: Dr. Livan Dean

## 2021-03-06 LAB
ANION GAP SERPL CALCULATED.3IONS-SCNC: 6 MMOL/L (ref 7–16)
BASOPHILS ABSOLUTE: 0.02 E9/L (ref 0–0.2)
BASOPHILS RELATIVE PERCENT: 0.3 % (ref 0–2)
BUN BLDV-MCNC: 21 MG/DL (ref 8–23)
CALCIUM SERPL-MCNC: 8.5 MG/DL (ref 8.6–10.2)
CHLORIDE BLD-SCNC: 108 MMOL/L (ref 98–107)
CO2: 29 MMOL/L (ref 22–29)
CREAT SERPL-MCNC: 0.8 MG/DL (ref 0.5–1)
EOSINOPHILS ABSOLUTE: 0.11 E9/L (ref 0.05–0.5)
EOSINOPHILS RELATIVE PERCENT: 1.5 % (ref 0–6)
GFR AFRICAN AMERICAN: >60
GFR NON-AFRICAN AMERICAN: >60 ML/MIN/1.73
GLUCOSE BLD-MCNC: 139 MG/DL (ref 74–99)
HCT VFR BLD CALC: 30.8 % (ref 34–48)
HEMOGLOBIN: 9.5 G/DL (ref 11.5–15.5)
IMMATURE GRANULOCYTES #: 0.03 E9/L
IMMATURE GRANULOCYTES %: 0.4 % (ref 0–5)
LYMPHOCYTES ABSOLUTE: 2.34 E9/L (ref 1.5–4)
LYMPHOCYTES RELATIVE PERCENT: 32.5 % (ref 20–42)
MCH RBC QN AUTO: 29.6 PG (ref 26–35)
MCHC RBC AUTO-ENTMCNC: 30.8 % (ref 32–34.5)
MCV RBC AUTO: 96 FL (ref 80–99.9)
METER GLUCOSE: 139 MG/DL (ref 74–99)
METER GLUCOSE: 162 MG/DL (ref 74–99)
METER GLUCOSE: 169 MG/DL (ref 74–99)
METER GLUCOSE: 183 MG/DL (ref 74–99)
MONOCYTES ABSOLUTE: 0.58 E9/L (ref 0.1–0.95)
MONOCYTES RELATIVE PERCENT: 8.1 % (ref 2–12)
NEUTROPHILS ABSOLUTE: 4.11 E9/L (ref 1.8–7.3)
NEUTROPHILS RELATIVE PERCENT: 57.2 % (ref 43–80)
PDW BLD-RTO: 13.5 FL (ref 11.5–15)
PLATELET # BLD: 266 E9/L (ref 130–450)
PMV BLD AUTO: 9.5 FL (ref 7–12)
POTASSIUM SERPL-SCNC: 3.5 MMOL/L (ref 3.5–5)
RBC # BLD: 3.21 E12/L (ref 3.5–5.5)
SODIUM BLD-SCNC: 143 MMOL/L (ref 132–146)
WBC # BLD: 7.2 E9/L (ref 4.5–11.5)

## 2021-03-06 PROCEDURE — 6360000002 HC RX W HCPCS: Performed by: INTERNAL MEDICINE

## 2021-03-06 PROCEDURE — 80048 BASIC METABOLIC PNL TOTAL CA: CPT

## 2021-03-06 PROCEDURE — 6370000000 HC RX 637 (ALT 250 FOR IP): Performed by: INTERNAL MEDICINE

## 2021-03-06 PROCEDURE — 99231 SBSQ HOSP IP/OBS SF/LOW 25: CPT | Performed by: INTERNAL MEDICINE

## 2021-03-06 PROCEDURE — 85025 COMPLETE CBC W/AUTO DIFF WBC: CPT

## 2021-03-06 PROCEDURE — 36415 COLL VENOUS BLD VENIPUNCTURE: CPT

## 2021-03-06 PROCEDURE — 1200000000 HC SEMI PRIVATE

## 2021-03-06 PROCEDURE — 82962 GLUCOSE BLOOD TEST: CPT

## 2021-03-06 PROCEDURE — 2580000003 HC RX 258: Performed by: INTERNAL MEDICINE

## 2021-03-06 RX ORDER — BISACODYL 10 MG
10 SUPPOSITORY, RECTAL RECTAL DAILY PRN
Status: DISCONTINUED | OUTPATIENT
Start: 2021-03-06 | End: 2021-03-08 | Stop reason: HOSPADM

## 2021-03-06 RX ADMIN — INSULIN LISPRO 1 UNITS: 100 INJECTION, SOLUTION INTRAVENOUS; SUBCUTANEOUS at 21:38

## 2021-03-06 RX ADMIN — AMLODIPINE BESYLATE 5 MG: 5 TABLET ORAL at 08:17

## 2021-03-06 RX ADMIN — ASPIRIN 81 MG: 81 TABLET, CHEWABLE ORAL at 08:17

## 2021-03-06 RX ADMIN — INSULIN LISPRO 1 UNITS: 100 INJECTION, SOLUTION INTRAVENOUS; SUBCUTANEOUS at 17:26

## 2021-03-06 RX ADMIN — PREDNISOLONE ACETATE 1 DROP: 10 SUSPENSION/ DROPS OPHTHALMIC at 21:44

## 2021-03-06 RX ADMIN — PREDNISOLONE ACETATE 1 DROP: 10 SUSPENSION/ DROPS OPHTHALMIC at 08:18

## 2021-03-06 RX ADMIN — PREDNISOLONE ACETATE 1 DROP: 10 SUSPENSION/ DROPS OPHTHALMIC at 14:31

## 2021-03-06 RX ADMIN — SODIUM CHLORIDE, PRESERVATIVE FREE 10 ML: 5 INJECTION INTRAVENOUS at 08:17

## 2021-03-06 RX ADMIN — DORZOLAMIDE HYDROCHLORIDE 1 DROP: 20 SOLUTION/ DROPS OPHTHALMIC at 08:18

## 2021-03-06 RX ADMIN — Medication 1000 UNITS: at 08:17

## 2021-03-06 RX ADMIN — BRIMONIDINE TARTRATE 1 DROP: 2 SOLUTION OPHTHALMIC at 21:44

## 2021-03-06 RX ADMIN — BISACODYL 10 MG: 10 SUPPOSITORY RECTAL at 00:39

## 2021-03-06 RX ADMIN — ENOXAPARIN SODIUM 30 MG: 30 INJECTION SUBCUTANEOUS at 08:17

## 2021-03-06 RX ADMIN — DORZOLAMIDE HYDROCHLORIDE 1 DROP: 20 SOLUTION/ DROPS OPHTHALMIC at 14:31

## 2021-03-06 RX ADMIN — DORZOLAMIDE HYDROCHLORIDE 1 DROP: 20 SOLUTION/ DROPS OPHTHALMIC at 21:44

## 2021-03-06 RX ADMIN — BRIMONIDINE TARTRATE 1 DROP: 2 SOLUTION OPHTHALMIC at 08:18

## 2021-03-06 RX ADMIN — INSULIN LISPRO 1 UNITS: 100 INJECTION, SOLUTION INTRAVENOUS; SUBCUTANEOUS at 12:17

## 2021-03-06 RX ADMIN — ACETAMINOPHEN 650 MG: 325 TABLET ORAL at 17:06

## 2021-03-06 ASSESSMENT — PAIN SCALES - GENERAL
PAINLEVEL_OUTOF10: 5
PAINLEVEL_OUTOF10: 0
PAINLEVEL_OUTOF10: 0

## 2021-03-06 NOTE — PLAN OF CARE
Problem: Falls - Risk of:  Goal: Will remain free from falls  Description: Will remain free from falls  Outcome: Ongoing  Goal: Absence of physical injury  Description: Absence of physical injury  Outcome: Ongoing     Problem: Pain:  Goal: Pain level will decrease  Description: Pain level will decrease  Outcome: Ongoing  Goal: Control of acute pain  Description: Control of acute pain  Outcome: Ongoing  Goal: Control of chronic pain  Description: Control of chronic pain  Outcome: Ongoing     Problem: Musculor/Skeletal Functional Status  Goal: Highest potential functional level  Outcome: Ongoing  Goal: Absence of falls  Outcome: Ongoing     Problem:  Activity:  Goal: Ability to tolerate increased activity will improve  Description: Ability to tolerate increased activity will improve  Outcome: Ongoing

## 2021-03-06 NOTE — PROGRESS NOTES
per Dr. Karel Mitchell (Ophthalmology) recs   Continue Brimonidine and Prednisolone acetate  F/u as OP with Ophthalmology     PT/OT evaluation: Holding PT/OT consult until cleared by Neurosurgery   DVT prophylaxis/ GI prophylaxis: Lovenox, PCDs/Diet   Disposition: Continue inpatient management, anticipate d/c to [facility] pending pre-cert     Whitney Franco DO, PGY-1  Attending physician: Dr. Ezequiel Chua

## 2021-03-07 LAB
METER GLUCOSE: 108 MG/DL (ref 74–99)
METER GLUCOSE: 143 MG/DL (ref 74–99)
METER GLUCOSE: 185 MG/DL (ref 74–99)
METER GLUCOSE: 190 MG/DL (ref 74–99)

## 2021-03-07 PROCEDURE — 6360000002 HC RX W HCPCS: Performed by: INTERNAL MEDICINE

## 2021-03-07 PROCEDURE — 99231 SBSQ HOSP IP/OBS SF/LOW 25: CPT | Performed by: INTERNAL MEDICINE

## 2021-03-07 PROCEDURE — 82962 GLUCOSE BLOOD TEST: CPT

## 2021-03-07 PROCEDURE — 6370000000 HC RX 637 (ALT 250 FOR IP): Performed by: INTERNAL MEDICINE

## 2021-03-07 PROCEDURE — 97530 THERAPEUTIC ACTIVITIES: CPT

## 2021-03-07 PROCEDURE — 2580000003 HC RX 258: Performed by: INTERNAL MEDICINE

## 2021-03-07 PROCEDURE — 1200000000 HC SEMI PRIVATE

## 2021-03-07 PROCEDURE — 97535 SELF CARE MNGMENT TRAINING: CPT

## 2021-03-07 RX ORDER — SENNA PLUS 8.6 MG/1
1 TABLET ORAL DAILY
Status: DISCONTINUED | OUTPATIENT
Start: 2021-03-07 | End: 2021-03-08 | Stop reason: HOSPADM

## 2021-03-07 RX ORDER — AMLODIPINE BESYLATE 5 MG/1
2.5 TABLET ORAL 2 TIMES DAILY
Status: DISCONTINUED | OUTPATIENT
Start: 2021-03-07 | End: 2021-03-08 | Stop reason: HOSPADM

## 2021-03-07 RX ADMIN — BRIMONIDINE TARTRATE 1 DROP: 2 SOLUTION OPHTHALMIC at 21:01

## 2021-03-07 RX ADMIN — SODIUM CHLORIDE, PRESERVATIVE FREE 10 ML: 5 INJECTION INTRAVENOUS at 20:54

## 2021-03-07 RX ADMIN — AMLODIPINE BESYLATE 5 MG: 5 TABLET ORAL at 08:15

## 2021-03-07 RX ADMIN — ASPIRIN 81 MG: 81 TABLET, CHEWABLE ORAL at 08:15

## 2021-03-07 RX ADMIN — PREDNISOLONE ACETATE 1 DROP: 10 SUSPENSION/ DROPS OPHTHALMIC at 08:19

## 2021-03-07 RX ADMIN — BRIMONIDINE TARTRATE 1 DROP: 2 SOLUTION OPHTHALMIC at 08:15

## 2021-03-07 RX ADMIN — ACETAMINOPHEN 650 MG: 325 TABLET ORAL at 20:53

## 2021-03-07 RX ADMIN — SODIUM CHLORIDE, PRESERVATIVE FREE 10 ML: 5 INJECTION INTRAVENOUS at 08:15

## 2021-03-07 RX ADMIN — INSULIN LISPRO 1 UNITS: 100 INJECTION, SOLUTION INTRAVENOUS; SUBCUTANEOUS at 12:00

## 2021-03-07 RX ADMIN — HYDRALAZINE HYDROCHLORIDE 10 MG: 20 INJECTION INTRAMUSCULAR; INTRAVENOUS at 17:09

## 2021-03-07 RX ADMIN — PREDNISOLONE ACETATE 1 DROP: 10 SUSPENSION/ DROPS OPHTHALMIC at 20:53

## 2021-03-07 RX ADMIN — DORZOLAMIDE HYDROCHLORIDE 1 DROP: 20 SOLUTION/ DROPS OPHTHALMIC at 20:53

## 2021-03-07 RX ADMIN — PREDNISOLONE ACETATE 1 DROP: 10 SUSPENSION/ DROPS OPHTHALMIC at 14:44

## 2021-03-07 RX ADMIN — ENOXAPARIN SODIUM 30 MG: 30 INJECTION SUBCUTANEOUS at 08:15

## 2021-03-07 RX ADMIN — Medication 1000 UNITS: at 08:15

## 2021-03-07 RX ADMIN — INSULIN LISPRO 1 UNITS: 100 INJECTION, SOLUTION INTRAVENOUS; SUBCUTANEOUS at 08:17

## 2021-03-07 RX ADMIN — INSULIN LISPRO 1 UNITS: 100 INJECTION, SOLUTION INTRAVENOUS; SUBCUTANEOUS at 20:54

## 2021-03-07 RX ADMIN — SENNOSIDES 8.6 MG: 8.6 TABLET, FILM COATED ORAL at 12:00

## 2021-03-07 RX ADMIN — AMLODIPINE BESYLATE 2.5 MG: 5 TABLET ORAL at 20:53

## 2021-03-07 RX ADMIN — DORZOLAMIDE HYDROCHLORIDE 1 DROP: 20 SOLUTION/ DROPS OPHTHALMIC at 14:44

## 2021-03-07 RX ADMIN — DORZOLAMIDE HYDROCHLORIDE 1 DROP: 20 SOLUTION/ DROPS OPHTHALMIC at 08:15

## 2021-03-07 RX ADMIN — BISACODYL 10 MG: 10 SUPPOSITORY RECTAL at 17:13

## 2021-03-07 ASSESSMENT — PAIN DESCRIPTION - DESCRIPTORS: DESCRIPTORS: CONSTANT;DULL;DISCOMFORT

## 2021-03-07 ASSESSMENT — PAIN DESCRIPTION - FREQUENCY: FREQUENCY: CONTINUOUS

## 2021-03-07 ASSESSMENT — PAIN DESCRIPTION - ONSET: ONSET: ON-GOING

## 2021-03-07 ASSESSMENT — PAIN SCALES - GENERAL
PAINLEVEL_OUTOF10: 2
PAINLEVEL_OUTOF10: 2
PAINLEVEL_OUTOF10: 0
PAINLEVEL_OUTOF10: 2
PAINLEVEL_OUTOF10: 0

## 2021-03-07 ASSESSMENT — PAIN DESCRIPTION - ORIENTATION: ORIENTATION: POSTERIOR;LEFT

## 2021-03-07 ASSESSMENT — PAIN - FUNCTIONAL ASSESSMENT: PAIN_FUNCTIONAL_ASSESSMENT: ACTIVITIES ARE NOT PREVENTED

## 2021-03-07 ASSESSMENT — PAIN DESCRIPTION - LOCATION: LOCATION: HEAD;KNEE

## 2021-03-07 ASSESSMENT — PAIN DESCRIPTION - PAIN TYPE: TYPE: ACUTE PAIN

## 2021-03-07 NOTE — PLAN OF CARE
Problem: Falls - Risk of:  Goal: Will remain free from falls  Description: Will remain free from falls  3/7/2021 1214 by Lance Arriaza RN  Outcome: Met This Shift  3/6/2021 2256 by Doris Moreno RN  Outcome: Met This Shift  Goal: Absence of physical injury  Description: Absence of physical injury  3/7/2021 1214 by Lance Arriaza RN  Outcome: Met This Shift  3/6/2021 2256 by Doris Moreno RN  Outcome: Met This Shift     Problem: Pain:  Goal: Pain level will decrease  Description: Pain level will decrease  Outcome: Met This Shift  Goal: Control of acute pain  Description: Control of acute pain  Outcome: Met This Shift  Goal: Control of chronic pain  Description: Control of chronic pain  Outcome: Met This Shift     Problem: Musculor/Skeletal Functional Status  Goal: Highest potential functional level  Outcome: Met This Shift  Goal: Absence of falls  Outcome: Met This Shift     Problem:  Activity:  Goal: Ability to tolerate increased activity will improve  Description: Ability to tolerate increased activity will improve  Outcome: Met This Shift

## 2021-03-07 NOTE — PLAN OF CARE
House Team 2   Updated HPI     The patient is a 81 y.o. female with a past medical history of T2 DM, HTN, PVD, ambulatory dysfunction who was admitted from the ED on 3/2/2021 for multiple falls. Per patient and family report, the patient has fallen multiple times in the recent past, but has refused ED evaluation each time. On the day of admission, she was making lunch when she experienced a prodrome of lightheadedness and dizziness prior to losing consciousness and falling to the floor, striking her head in the process. Downtime unknown. When the patient was found by family members, she was lying in a pool of blood 2/2 an L occipital scalp laceration. The patient was brought urgently to the ED for further evaluation.      In the ED, the patient was hypertensive to 178/86, pulse 66 bpm, afebrile, oxygen saturation 95% on room air. Labs were remarkable for blood glucose 239 and hemoglobin 11.4. CT head negative for acute intracranial hemorrhage, showing only a posterior laceration with focal hematoma 3.4 cm in size. CT lumbar spine showing a 30% compression deformity of the superior endplate of R91 and L2, concerning for new compression fractures in the setting of recent falls. CXR showing cardiomegaly with vascular congestion with concern for superimposed B/L pneumonia. Neurosurgery was consulted for the patient's new compression fractures, she was given fentanyl x1, and was admitted for further medical management and stabilization. Neurosurgery evaluated the patient in the AM, and cleared her for PT/OT evaluation. No plans for further intervention at this time. PT/OT were consulted, and she scored 15/16 respectively, and has qualified for [facility] placement on discharge. She is accepted at [facility] and is currently pending pre-cert. Medically, her HCTZ was d/c'd as she was complaining of frequent polyuria. Amlodipine was increased to 5mg daily to compensate.  Timolol eye drops were d/c'd for concern over

## 2021-03-07 NOTE — PROGRESS NOTES
MCH 29.6 03/06/2021    MCHC 30.8 03/06/2021    RDW 13.5 03/06/2021    LYMPHOPCT 32.5 03/06/2021    MONOPCT 8.1 03/06/2021    BASOPCT 0.3 03/06/2021    MONOSABS 0.58 03/06/2021    LYMPHSABS 2.34 03/06/2021    EOSABS 0.11 03/06/2021    BASOSABS 0.02 03/06/2021     BMP:    Lab Results   Component Value Date     03/06/2021    K 3.5 03/06/2021     03/06/2021    CO2 29 03/06/2021    BUN 21 03/06/2021    LABALBU 3.9 03/02/2021    CREATININE 0.8 03/06/2021    CALCIUM 8.5 03/06/2021    GFRAA >60 03/06/2021    LABGLOM >60 03/06/2021    GLUCOSE 139 03/06/2021       Resident's Assessment and Plan     Lata Balderrama is a 81 y.o. female    Assessment:  1. Syncopal episode with recurrent falls, multifactorial, in the setting of pre-existing ambulatory dysfunction and bradychardia  2. Compression fractures of T12 and L2, 2/2 multiple falls and age-related osteoporosis and head laceration  3. Vit D deficiency  4. Hx HTN   5. Noninsulin-dependent diabetes mellitus  6. Cerumen impaction   7. Glaucoma    Plan:  -Echo showing EF 65 w/ grade I DD, orthostatic -  -Switch Timolol eye drops to Dorzolamide TID 2/2 Bradycardia contributing to falls  -Evaluated by NS, declined further w/up >> Awating dc to [facility]   -continue Vit D supp  -Dc HCTZ for BP management and split Norvasc to 2.5mg BID  -Continue on Debrox drops      PT/OT evaluation: Ordered   DVT prophylaxis/ GI prophylaxis:  Lovenox/Diet  Disposition:  To [facility] pending Justin Hinkle MD, PGY-3  Attending physician: Dr. Boyd Benson

## 2021-03-07 NOTE — PROGRESS NOTES
OT BEDSIDE TREATMENT NOTE      Date:3/7/2021  Patient Name: Ebenezer Jenkins  MRN: [id number]  : 1935  Room: [address] [place] [address]-A     Referring Physician:  Neldon Lennox, DO     Evaluating OT:  MARQUISE Tamayo, OTR/L #[id number]     AM-PAC Daily Activity Raw Score:  1724  Recommended Adaptive Equipment:  TBD as pt progresses - Extended Tub Bench, Adaptive equipment     Reason for Admission:  Pt was admitted after experiencing syncopal episode at home w/ subsequent fall to the ground - hitting her head, (+) LOC.       Sustained Spinal Injury - Compression Fx T12 and L2. No intervention per Neurosurgery.       Diagnosis:     1. Syncope and collapse    2. Compression fracture of body of thoracic vertebra (HCC)    3. Compression fracture of lumbar vertebra, initial encounter, unspecified lumbar vertebral level ([facility] [address]       Procedures this admission:  None      Pertinent Medical History:  Cataract - Removal w/ implant, DM, Fall - injury L UE 2020      Precautions:  Falls  Spinal Precautions/neutralitiy      Home Living: Pt lives with her son in a 2-story house. Bed and Full bath on 1st floor.  (+) Basement. Bathroom setup:  Tub-Shower, standard-height Commode   Equipment owned: Tub \"Stool\", [employer]     Available Family Assist:  Son reportedly works Full Time - Pt is home alone most of the day     Prior Level of Function:  Pt reported being IND with all ADLs, Light IADLs (Folding Laundry, Making \"Sauce\", etc), Transfers and Mobility using [employer] for ambulation.    Driving:  No  Occupation:  None reported     Pain Level:  Denies Pain;        Cognition: A & O x 3 - oriented, pleasant & cooperative, talkative, easily distracted off task, requires re-direction to task at hand to improve safety                Memory:  fair (+)              Sequencing:  fair (+)              Problem solving:  fair  (+)              Judgement/safety:  fair (-)                Functional Assessment:    Initial Eval Status  Date: 3-4-21 Treatment Status  Date:   3/7/21 Short Term/Long Term Goals  Treatment frequency: PRN 1-3 x/week   1-2 weeks   Feeding IND       ----- NA   Grooming Min A     Able to wash hands while standing at sink; despite VCs for improved posture/safety, pt leaned on forearms on sink, unable to tolerate further ax at sink d/t fatigue - required seated rest break  Min A for functional reaching to comb hair - limited AROM of Terrance Shoulders, Mod VCs to avoid scalp laceration during task    Min/CGA   standing at sink to wash hands requiring assist for safety & to reach soap SUP  Standing At The Sink   UB Dressing Mod A     Mod A to don gown, Min A to [employer] - seated EOB after set up  Mod VCs for improved safety awareness    Min A    With gown seated at EOB Min A   LB Dressing Mod A     Min A for dynamic standing balance + Mod A to pull pants over hips (simulated)  Pt ed for safety, adaptive techs/equip    Mod-Max A    Simulated pants  Continue to educate on AE to maintain spinal neutrality   Min A   Bathing NT       n/t Min A   Toileting Mod A     Able to complete krystal-care w/ SUP while seated  Max A for bowel hygiene while standing, Min A for standing balance, pt ed for improved safety    Min A    For standing balance, pt able to complete hygiene   Min A   Bed Mobility  Rolling:  NT  Repositioning:  SUP/Mod VCs   Supine to Sit:  SUP    Sit to Supine:  SUP      VCs for safety  Supervision  Supine to sit    IND   Functional Transfers Sit to stand:  Min A  Stand to sit:  Min A       Min A for safety + Mod VCs/Pt ed re: safety/hand placement from EOB ~ 4x, Commode 2x    Min A  Cues for hand placement & safety, importance for slowing down & stay focused on task at hand SUP   Functional Mobility Min A      Min A w/ WW, limited distance d/t fatigue, required seated rest break for 2nd short distance to bathroom, mod VCs for walker safety/hand placement on walker    Min A  With walker  short house hold distances with v/c's for walker safety, again cues for slowing down pace & improving posture SUP   Balance Sitting:      Static:  SUP EOB/Commode    Dynamic:  Close SUP w/ functional ax EOB/Commode     Standing:      Static:  Close SUP w/ [employer]    Dynamic:  Min A  w/ functional ax/mobility w/ [employer]    Sitting: SBA/S  Standing: Min A  With walker     Activity Tolerance Fair  Limited by fatigue, Limited endurance, weakness     Tolerated Sitting:  EOB ~ 5 mins 2x, + ~ 10 mins commode w/ functional ax      Tolerated Standing:  ~ 1 min + 1 min + 3 mins w/ functional ax/mobility - required seated rest breaks b/t trials    Fair  Moderate tasks     Visual/  Perceptual WFL - pt reported chronic poor vision L eye, denied change in vision since recent fall  Glasses: Yes        Hearing WFL  Hearing Aids  No         Comments: Upon arrival pt supine in bed, agreeable to therapy session. . Pt educated with regards to bed mobility, functional transfers, energy conservation techniques, hand placement, functional transfers & mobility, walker safety, LE dressing, toileting/hygiene. At end of session pt seated in chair, nsg informed all lines and tubes intact, call light within reach. · Pt has made good progress towards set goals. · Continue with current plan of care    Treatment Time In: 10:45           Treatment Time Out: 11:10              Treatment Charges: Mins Units   Ther Ex  26931     Manual Therapy [address] [facility]     Thera Activities 03235 10 1   ADL/Home Mgt 50728 15 1   Neuro Re-ed 14925     Group Therapy      Orthotic manage/training  46709     Non-Billable Time     Total Timed Treatment 25 2       Jennifer TREVIZOEdmundo  [address] [place] [address], [address] [place] [address]

## 2021-03-07 NOTE — PROGRESS NOTES
Physical Therapy  Treatment Note    Name: Malena Simpson  : 1935  MRN: [id number]    Referring Provider:  Nika Escobar DO    Date of Service: 3/7/2021    Evaluating PT:  Hermelindo Ramírez PT, DPT. [id number]    Room #:  5206/5206-A  Diagnosis:  Syncope and collapse   Reason for admission:  Multiple falls, dizziness, syncope   Precautions:  Falls, impulsive, T12 L2 comp fxs no intervention, impaired vision  Procedures: none  Equipment Recommendations:  FWW     SUBJECTIVE:  Pt lives with son (who works daily) in a 2 story home with 1 small step to enter. First floor setup. Ambulating with use of walker at home. OBJECTIVE:   Initial Evaluation  Date: 3/4 Treatment  Date: 3/7/2021   Short Term/ Long Term   Goals   AM-PAC 6 Clicks  70/76    Was pt agreeable to Eval/treatment? Yes  yes    Does pt have pain? Denies pain No c/o pain    Bed Mobility  Rolling: NT  Supine to sit: SBA  Sit to supine: SBA  Scooting: SBA Supervision Independent    Transfers Sit to stand: Rodolfo  Stand to sit: Rodolfo  Stand pivot: NT STS: Rodolfo  Stand pivot: Rodolfo front [employer] Independent    Ambulation    20 feet with [employer] Rodolfo   40 feet front [employer] Rodolfo x 2 reps >100 feet with [employer] Mod I    Stair negotiation: ascended and descended  NT NT >1 steps with 1 rail Mod I   ROM BUE:  See OT eval   BLE:  WFL     Strength BUE:  See OT eval   BLE:  knee ext 5/5  Ankle DF 5/5  Increase by 1/3 MMT grade    Balance Sitting EOB:  SBA  Dynamic Standing:  Rodolfo [employer] Sitting EOB: Supervision  Dynamic standing: Rodolfo front [employer] Sitting EOB:  indep  Dynamic Standing: Mod I [employer]     Pt is A & O x 4  Sensation:  Pt denies numbness and tingling to extremities  Edema:  unremarkable    Patient education  Pt educated on role of PT intervention. Pt educated on safety in room with utilization of call light for assistance with mobility.   Pt educated on importance of maximizing OOB time with assistance from nursing and therapy staff for improved overall activity tolerance and functional mobility. Reviewed safety with front [employer]. Patient response to education:   Pt verbalized understanding Pt demonstrated skill Pt requires further education in this area   yes yes yes     ASSESSMENT:    Comments:  RN cleared pt for activity prior to session. Pt received supine in bed and agreeable to PT intervention with OT collaboration at this time. Pt performed all functional mobility as noted above. Pt continues to present with poor safety awareness and generalized fatigue/weakness. Pt ambulated short distance in hallway and was then transferred to bedside chair. Pt left with all needs met and call light in reach. Pt requires frequent redirection throughout session as she is easily distracted. Pt requires continued skilled PT intervention for the purposes of maximizing functional mobility and independence. Treatment:  Patient practiced and was instructed in the following treatment:     Therapeutic Activities Completed:  o Functional mobility as noted above:   - Bed mobility: Supervision  - Transfer training: Rodolfo to complete with front [employer].  Cues throughout for proper hand placement and sequencing. Pt with poor safety awareness. - Ambulation: 40 feet with front [employer] Rodolfo x 2 reps. Cues throughout for proper sequencing with front [employer] for safety.   o Skilled repositioning in seated position for comfort.   o Pt education as noted above. PLAN:    Patient is making fair progress towards established goals. Will continue with current POC.       Time in  1048  Time out  1108    Total Treatment Time  10 minutes     CPT codes:  [] Gait training 71375 0 minutes  [] Manual therapy 36622 0 minutes  [x] Therapeutic activities 69536 10 minutes  [] Therapeutic exercises 14974 0 minutes  [] Neuromuscular reeducation 06197 0 minutes    Bryce Bird, PT, DPT  [id number]

## 2021-03-08 ENCOUNTER — TELEPHONE (OUTPATIENT)
Dept: PRIMARY CARE CLINIC | Age: 86
End: 2021-03-08

## 2021-03-08 ENCOUNTER — TELEPHONE (OUTPATIENT)
Dept: ADMINISTRATIVE | Age: 86
End: 2021-03-08

## 2021-03-08 VITALS
RESPIRATION RATE: 16 BRPM | SYSTOLIC BLOOD PRESSURE: 155 MMHG | OXYGEN SATURATION: 100 % | WEIGHT: 169.9 LBS | DIASTOLIC BLOOD PRESSURE: 70 MMHG | BODY MASS INDEX: 31.27 KG/M2 | HEART RATE: 95 BPM | HEIGHT: 62 IN | TEMPERATURE: 97 F

## 2021-03-08 LAB
ANION GAP SERPL CALCULATED.3IONS-SCNC: 8 MMOL/L (ref 7–16)
BUN BLDV-MCNC: 15 MG/DL (ref 8–23)
CALCIUM SERPL-MCNC: 9.5 MG/DL (ref 8.6–10.2)
CHLORIDE BLD-SCNC: 108 MMOL/L (ref 98–107)
CO2: 26 MMOL/L (ref 22–29)
CREAT SERPL-MCNC: 0.8 MG/DL (ref 0.5–1)
GFR AFRICAN AMERICAN: >60
GFR NON-AFRICAN AMERICAN: >60 ML/MIN/1.73
GLUCOSE BLD-MCNC: 154 MG/DL (ref 74–99)
METER GLUCOSE: 141 MG/DL (ref 74–99)
METER GLUCOSE: 156 MG/DL (ref 74–99)
POTASSIUM SERPL-SCNC: 3.8 MMOL/L (ref 3.5–5)
SODIUM BLD-SCNC: 142 MMOL/L (ref 132–146)

## 2021-03-08 PROCEDURE — 6360000002 HC RX W HCPCS: Performed by: INTERNAL MEDICINE

## 2021-03-08 PROCEDURE — 82962 GLUCOSE BLOOD TEST: CPT

## 2021-03-08 PROCEDURE — 6370000000 HC RX 637 (ALT 250 FOR IP): Performed by: INTERNAL MEDICINE

## 2021-03-08 PROCEDURE — 97535 SELF CARE MNGMENT TRAINING: CPT

## 2021-03-08 PROCEDURE — 99239 HOSP IP/OBS DSCHRG MGMT >30: CPT | Performed by: INTERNAL MEDICINE

## 2021-03-08 PROCEDURE — 80048 BASIC METABOLIC PNL TOTAL CA: CPT

## 2021-03-08 PROCEDURE — 36415 COLL VENOUS BLD VENIPUNCTURE: CPT

## 2021-03-08 PROCEDURE — 2580000003 HC RX 258: Performed by: INTERNAL MEDICINE

## 2021-03-08 RX ORDER — AMLODIPINE BESYLATE 2.5 MG/1
2.5 TABLET ORAL 2 TIMES DAILY
Qty: 30 TABLET | Refills: 3 | Status: SHIPPED | OUTPATIENT
Start: 2021-03-08 | End: 2021-05-21

## 2021-03-08 RX ADMIN — ENOXAPARIN SODIUM 30 MG: 30 INJECTION SUBCUTANEOUS at 08:36

## 2021-03-08 RX ADMIN — DORZOLAMIDE HYDROCHLORIDE 1 DROP: 20 SOLUTION/ DROPS OPHTHALMIC at 08:30

## 2021-03-08 RX ADMIN — SODIUM CHLORIDE, PRESERVATIVE FREE 10 ML: 5 INJECTION INTRAVENOUS at 04:48

## 2021-03-08 RX ADMIN — HYDRALAZINE HYDROCHLORIDE 10 MG: 20 INJECTION INTRAMUSCULAR; INTRAVENOUS at 04:47

## 2021-03-08 RX ADMIN — PREDNISOLONE ACETATE 1 DROP: 10 SUSPENSION/ DROPS OPHTHALMIC at 13:09

## 2021-03-08 RX ADMIN — INSULIN LISPRO 1 UNITS: 100 INJECTION, SOLUTION INTRAVENOUS; SUBCUTANEOUS at 12:23

## 2021-03-08 RX ADMIN — PREDNISOLONE ACETATE 1 DROP: 10 SUSPENSION/ DROPS OPHTHALMIC at 08:30

## 2021-03-08 RX ADMIN — BRIMONIDINE TARTRATE 1 DROP: 2 SOLUTION OPHTHALMIC at 08:30

## 2021-03-08 RX ADMIN — AMLODIPINE BESYLATE 2.5 MG: 5 TABLET ORAL at 08:31

## 2021-03-08 RX ADMIN — SODIUM CHLORIDE, PRESERVATIVE FREE 10 ML: 5 INJECTION INTRAVENOUS at 08:36

## 2021-03-08 RX ADMIN — Medication 1000 UNITS: at 08:31

## 2021-03-08 RX ADMIN — SENNOSIDES 8.6 MG: 8.6 TABLET, FILM COATED ORAL at 08:31

## 2021-03-08 RX ADMIN — DORZOLAMIDE HYDROCHLORIDE 1 DROP: 20 SOLUTION/ DROPS OPHTHALMIC at 13:08

## 2021-03-08 RX ADMIN — ASPIRIN 81 MG: 81 TABLET, CHEWABLE ORAL at 08:31

## 2021-03-08 NOTE — PROGRESS NOTES
Messaged Dr Zehra Osman, resident of Team 2, for change in discharge plan. Patient now wanting to go home with Magalis Torres.

## 2021-03-08 NOTE — PROGRESS NOTES
Messaged Dr Katherine Mandel,  Resident Team 2, for a discharge order to go to [facility].

## 2021-03-08 NOTE — DISCHARGE SUMMARY
[address]  Discharge Summary    PCP: Amber Landers DO    Admit Date:3/2/2021  Discharge Date: 3/8/2021    Admission Diagnosis:   1. Compression fractures of lumbar spine 2/2 multiple falls in the setting of documented osteoporosis  2. Multiple falls, likely multifactorial  3. Concern for mild CHF +/- pneumonia  4. Hx HTN  5. Hx Noninsulin-dependent diabetes mellitus  6. Hx PVD    Discharge Diagnosis:  1. Compression fractures of T12 and L2, 2/2 multiple falls in the setting of documented age-related osteoporosis  2. Syncopal episode with recurrent falls, likely multifactorial   3. Hx HTN   4. Noninsulin-dependent diabetes mellitus   5. Cerumen impaction   6. Hx PVD   7. Glaucoma   8. Vit D deficiency      Hospital Course: The patient is a 81 y.o. female with a past medical history of T2 DM, HTN, PVD, ambulatory dysfunction who was admitted from the ED on 3/2/2021 for multiple falls in the recent past. Last fall on the day of admission. Patient was making lunch when she experienced a prodrome of lightheadedness and dizziness prior to losing consciousness and falling to the floor, striking her head. Downtime unknown. Patient was brought to the ED for further evaluation.      In the ED, the patient was hypertensive to 178/86, pulse 66 bpm, afebrile, oxygen saturation 95% on room air. Labs were remarkable for blood glucose 239 and hemoglobin 11.4. CT head negative for acute intracranial hemorrhage, showing only a posterior laceration with focal hematoma 3.4 cm in size. CT lumbar spine showing a 30% compression deformity of the superior endplate of E04 and L2, concerning for new compression fractures in the setting of recent falls. CXR showing cardiomegaly with vascular congestion with concern for superimposed B/L pneumonia.  Neurosurgery was consulted for the patient's new compression fractures, she was given fentanyl x1, and was admitted for further medical management and stabilization.     On exam in the ED, the patient is complaining of some lightheadedness/dizziness. She denies any vision changes, chest pain, dyspnea/cough, current abdominal pain/nausea/vomiting, melena/hematochezia/constipation, hematuria/dysuria, or new paresthesias/swelling of the extremities. Neurosurgery evaluated the patient and no plan for further intervention at this time. Her HCTZ was discontinued as she was complaining of polyuria which may contribute to her falls. Amlodipine was increased to 5 mg twice daily to compensate. Atenolol eyedrops were discontinued for concerns of bradycardia. 5 hydroxy vitamin D level was 17. Patient is on cholecalciferol 1000 unit daily. Patient will need outpatient DEXA and biphosphonate. PT/OT were consulted, and she scored 15/16 respectively. The patient has been medically stable and was planned to be discharged to [facility] ([address]). However, family stated they wanted to take her home. Family is discharged home with home health care. Significant findings (history and exam, laboratory, radiological, pathology, other tests):   · General Appearance: alert, appears stated age, cooperative and no distress  · HEENT:  Small sutured laceration of the L occipital region. No active bleeding from site. Otherwise normocephalic. Left pupil irregular, non-reactive to light. Right pupil round and reactive to light. · Neck: no adenopathy, no carotid bruit, no JVD and supple, symmetrical, trachea midline  · Lung: mildly diminished breath sounds bilaterally. rales (-), rhonchi (-), wheezes (-)  · Heart: regular rate and rhythm, S1, S2 normal, no murmur, click, rub or gallop  · Abdomen: soft, non-tender; bowel sounds normal; no masses,  no organomegaly  · Extremities:  extremities normal, atraumatic, no cyanosis or edema  · Musculokeletal: No joint swelling, no muscle tenderness. ROM normal in all joints of extremities.  No spinal tenderness Reason for Stopping:         timolol (TIMOPTIC) 0.5 % ophthalmic solution Comments:   Reason for Stopping:         amLODIPine-atorvastatatin (CADUET) 2.5-10 MG per tablet Comments:   Reason for Stopping:         acetaminophen (TYLENOL) 500 MG tablet Comments:   Reason for Stopping:               Activity: activity as tolerated  Diet: regular diet    Follow-up appointments:    Please follow up with your primary care physician ( Abi Chamorro DO) within 10 days of discharge from hospital. Please call as soon as possible to make an appointment. Please contact the internal medicine clinic for an appointment if you are unable to get an appointment with your PCP.  Please keep all other follow up appointments:  o Dr. Fernando Braun (Ophthalmology) - Please make an appointment with Dr. Fernando Braun at [facility] as soon as possible after you get home, as your eye medications were changed while inpatient. o Dr. Bari Warner (Ear, Nose, and Throat) - Please make an appointment as soon as possible when you get home to have your ears cleaned. Patient Instructions:    New Medications started during this hospital stay  o Debrox - please place 10 drops in each ear twice a day as needed for wax removal.   o Dorzolamide (Trusopt) - please place one drop in your left eye three times daily     Changes to your medications  o Amlodipine 5mg - Please take one 2.5mg tablet twice a day.  Medications you should stop taking   o Hydrochlorothiazide   o Timolol eye drop    · Please measure your blood pressure once every day and write it down. Bring this log to your next doctor's appointment, and he will use the information to make adjustments to your medications. If you ever feel dizzy or lightheaded, have numbness, tingling, or weakness of one side of your body, or have chest pain, trouble breathing, headache, or confusion, take your blood pressure. If the top number is above 180, GO TO THE EMERGENCY DEPARTMENT IMMEDIATELY.  Please contact us if you have any concerns, wish to change or make an appointment:  [facility] Internal medicine clinic    Phone: [phone]   Fax: [phone] [place] [address] [address]   Or please call the nurse line at [phone].         Shayna Ivy MD  PGY 1   1:15 PM 3/8/2021

## 2021-03-08 NOTE — PROGRESS NOTES
[facility]  Internal Medicine Residency Program  Progress Note - House Team 1    Patient:  Michelle De Paz 81 y.o. female MRN: [id number]     Date of Service: 3/7/2021     CC: syncope and fall  Overnight events: NAEO     Subjective     Patient seen and examined at bedside this AM.    Patient has been ambulating without pain. She has mild pain at the site of laceration. Denies chest pain, SOB, cough, change in vision, change in bowel or urination.  before breakfast. Patient refused insulin. Objective     Physical Exam:  Vitals: BP (!) 140/78   Pulse 72   Temp 97.2 °F (36.2 °C) (Temporal)   Resp 16   Ht 5' 2\" (1.575 m)   Wt 169 lb 14.4 oz (77.1 kg)   SpO2 98%   BMI 31.08 kg/m²     I & O - 24hr: No intake/output data recorded. General Appearance: alert, appears stated age, cooperative, no distress and follow commands  HEENT:  Small sutured laceration of the L occipital region w/ mild surrounding soft tissue edema and healing ecchymosis. No active bleeding from site. Otherwise normocephalic. EOMI. Left pupil irregular, non-reactive to light. Right pupil round and reactive to light. Neck: no carotid bruit, no JVD and supple, symmetrical, trachea midline  Lung: Mildly diminished breath sounds bilaterally. no rales, rhonchi, or wheeze. no respiratory distress  Heart: regular rate and rhythm, S1, S2 normal, no murmur, click, rub or gallop  Abdomen: soft, non-tender; bowel sounds normal; no masses,  no organomegaly  Extremities:  extremities normal, atraumatic, no cyanosis or edema  Musculokeletal: No joint swelling, no muscle tenderness. ROM normal in all joints of extremities. Neurologic: Mental status: Alert, oriented, thought content appropriate, no focal neurological deficits, no slurred speech or tremor, moving all extremities spontaneously.   Subject  Pertinent Labs & Imaging Studies   asif  CBC with Differential:    Lab Results   Component Value Date    WBC 7.2 03/06/2021 RBC 3.21 03/06/2021    HGB 9.5 03/06/2021    HCT 30.8 03/06/2021     03/06/2021    MCV 96.0 03/06/2021    MCH 29.6 03/06/2021    MCHC 30.8 03/06/2021    RDW 13.5 03/06/2021    LYMPHOPCT 32.5 03/06/2021    MONOPCT 8.1 03/06/2021    BASOPCT 0.3 03/06/2021    MONOSABS 0.58 03/06/2021    LYMPHSABS 2.34 03/06/2021    EOSABS 0.11 03/06/2021    BASOSABS 0.02 03/06/2021     CMP:    Lab Results   Component Value Date     03/06/2021    K 3.5 03/06/2021     03/06/2021    CO2 29 03/06/2021    BUN 21 03/06/2021    CREATININE 0.8 03/06/2021    GFRAA >60 03/06/2021    LABGLOM >60 03/06/2021    GLUCOSE 139 03/06/2021    PROT 7.2 03/02/2021    LABALBU 3.9 03/02/2021    CALCIUM 8.5 03/06/2021    BILITOT 0.5 03/02/2021    ALKPHOS 81 03/02/2021    AST 16 03/02/2021    ALT 12 03/02/2021       Resident's Assessment and Plan     Ira Ocampo is a 81 y.o. female    1.  Compression fractures of T12 and L2, 2/2 multiple falls in the setting of documented age-related osteoporosis  Current radiographic imaging showing new compression fractures of T12 and L2  Neurosurgery consulted. Pt declined MRI spine. No need for treating compression fractures. Vit D level low - continue 1,000U cholecalciferol   Pain control as needed  PT/OT consulted, [facility] 15/16 respectively - awaiting d/c to [facility] pending pre-cert                2.  Syncopal episode with recurrent falls, likely multifactorial d/t polyuria and gait abnormality in the setting of pre-existing ambulatory dysfunction, bradycardia, and general deconditioning.    Orthostatic vitals (-)  Echo showing EF 65 w/ grade I DD and mild mitral calcification and mild aortic valve sclerosis   Continue to monitor on telemetry  Follow labs and replete electrolytes as necessary     3.   H/O HTN  D/C HCTZ d/t polyuria   Continue Amlodipine 2.5mg BID  Daily labs, will replete electrolytes as necessary                4.  Noninsulin-dependent diabetes mellitus  Hemoglobin A1c arrthymias - ?sinus preethi from timolol eye drops  Echo OK  DC tmie >30min -- now plan for home   >50% of time spent coordinating care with other providers and/or counseling patient/family  Remainder of medical problems as per resident note.

## 2021-03-08 NOTE — TELEPHONE ENCOUNTER
Pt called in and scheduled her Hospital follow up. She was discharged today and she said she needs scheduled in a week. I scheduled pt for next Monday, when she and I were done with all of her appt details, her son got on the phone and said he wanted to move the appt to a morning, she was yellling in the background not to move appt. They were fighting back and forth and he demanded that I move it to earlier, and she was screaming not to move it. I put pt on hold to speak with my supervisor for direction and he hung up. IF this gentleman calls back and wants to r/s her appt to a morning appt, please trasfer the call to me at 2045.

## 2021-03-08 NOTE — PROGRESS NOTES
Status  Date: 3-4-21 Treatment Status  Date:   3/8/21 Short Term/Long Term Goals  Treatment frequency: PRN 1-3 x/week   1-2 weeks   Feeding IND       Indpendent NA   Grooming Min A     Able to wash hands while standing at sink; despite VCs for improved posture/safety, pt leaned on forearms on sink, unable to tolerate further ax at sink d/t fatigue - required seated rest break  Min A for functional reaching to comb hair - limited AROM of Terrance Shoulders, Mod VCs to avoid scalp laceration during task    Rodolfo  Pt washed face, applied deodorant, with assistance to comb back of hair seated upright in chair at sink SUP  Standing At The Sink   UB Dressing Mod A     Mod A to don gown, Min A to [employer] - seated EOB after set up  Mod VCs for improved safety awareness    Rodolfo  [facility] hospital gown seated upright in chair Min A   LB Dressing Mod A     Min A for dynamic standing balance + Mod A to pull pants over hips (simulated)  Pt ed for safety, adaptive techs/equip    [facility] upright in chair, using of cross over technique, with max cueing to follow of spinal precautions, with pt having poor follow thorugh, and stating \"Its okay, I know when cued not to bed at hips to reina socks\"    DNT pants this date Min A   Bathing NT       modA  Pt completed sponge bathing task seated/standing upright in chair at sink, with pt able to wash of UB and krystal area, with assistance to wsah of LB and stand to wash of buttocks Min A   Toileting Mod A     Able to complete krystal-care w/ SUP while seated  Max A for bowel hygiene while standing, Min A for standing balance, pt ed for improved safety    Rodolfo  Pt completed toileting task on standard commode, with pt able to complete of clothing management and hygeine, with assistance with transfer for safety with use of grab bar Min A   Bed Mobility  Rolling:  NT  Repositioning:  SUP/Mod VCs   Supine to Sit:  SUP    Sit to Supine:  SUP      VCs for safety Supervision  EOB<>Supine    Cueing on log roll technique   IND   Functional Transfers Sit to stand:  Min A  Stand to sit:  Min A       Min A for safety + Mod VCs/Pt ed re: safety/hand placement from EOB ~ 4x, Commode 2x    Min A  Sit to Stand  Stand to Sit    Max cueing for hand placement SUP   Functional Mobility Min A      Min A w/ WW, limited distance d/t fatigue, required seated rest break for 2nd short distance to bathroom, mod VCs for walker safety/hand placement on walker    Rodolfo  Pt ambulated short household distance in room EOB<>Bathroom with w.w. poor safety, fast gait with cueing to slow down and manage of walker  SUP   Balance Sitting:      Static:  SUP EOB/Commode    Dynamic:  Close SUP w/ functional ax EOB/Commode     Standing:      Static:  Close SUP w/ [address]    Dynamic:  Min A  w/ functional ax/mobility w/ [address]    Sitting Supported:  SBA    Standing:  Rodolfo     Activity Tolerance Fair  Limited by fatigue, Limited endurance, weakness     Tolerated Sitting:  EOB ~ 5 mins 2x, + ~ 10 mins commode w/ functional ax      Tolerated Standing:  ~ 1 min + 1 min + 3 mins w/ functional ax/mobility - required seated rest breaks b/t trials    Fair       Visual/  Perceptual WFL - pt reported chronic poor vision L eye, denied change in vision since recent fall  Glasses: Yes        Hearing WFL  Hearing Aids  No         Treatment/Comments: Upon arrival pt seated EOB with nursing aide, agreeable to therapy. Pt completed of bed mobility, functional mobility, transfers and ADL task this session. Pt educated on precautions to follow, safety and hand placement with transfers, safety and walker management with mobility, and techniques to assit with LB dressing task to follow of precautions, with poor follow through. At end of session pt seated upright in bed, all lines and tubes intact, call light within reach. · Pt has made good progress towards set goals.    · Continue with current plan of care focusing on increasing of independency with transfers and ADL tasks    Treatment Time In: 10:00am         Treatment Time Out: 10:25am              Treatment Charges: Mins Units   Ther Ex  90618     Manual Therapy 06710     Thera Activities [id number]     ADL/Home Mgt [id number] 25 2   Neuro Re-ed [id number]     Group Therapy      Orthotic manage/training  07121     Non-Billable Time     Total Timed Treatment 25 2       [place] RODRIGUEZ/NARCISA [id number]

## 2021-03-08 NOTE — PLAN OF CARE
Problem: Falls - Risk of:  Goal: Will remain free from falls  Description: Will remain free from falls  3/8/2021 1053 by Milagros Fierro  Outcome: Met This Shift  3/7/2021 2336 by Radha Azul RN  Outcome: Met This Shift  Goal: Absence of physical injury  Description: Absence of physical injury  Outcome: Met This Shift     Problem: Pain:  Goal: Pain level will decrease  Description: Pain level will decrease  3/8/2021 1053 by Milagros Fierro  Outcome: Met This Shift  3/7/2021 2336 by Radha Azul RN  Outcome: Met This Shift  Goal: Control of acute pain  Description: Control of acute pain  3/8/2021 1053 by Milagros Fierro  Outcome: Met This Shift  3/7/2021 2336 by Radha Azul RN  Outcome: Met This Shift  Goal: Control of chronic pain  Description: Control of chronic pain  Outcome: Met This Shift

## 2021-03-08 NOTE — TELEPHONE ENCOUNTER
Taina Granados called from [address] today and asked if pcp will follow patient for Skilled Nursing, PT and OT. Will follow orders.

## 2021-03-09 LAB — VITAMIN B6: 18.8 NMOL/L (ref 20–125)

## 2021-03-13 ENCOUNTER — HOSPITAL ENCOUNTER (EMERGENCY)
Age: 86
Discharge: HOME | End: 2021-03-13
Payer: MEDICARE

## 2021-03-13 VITALS
TEMPERATURE: 97.1 F | SYSTOLIC BLOOD PRESSURE: 160 MMHG | OXYGEN SATURATION: 97 % | BODY MASS INDEX: 31.65 KG/M2 | HEART RATE: 83 BPM | RESPIRATION RATE: 16 BRPM | DIASTOLIC BLOOD PRESSURE: 76 MMHG | HEIGHT: 62 IN | WEIGHT: 172 LBS

## 2021-03-13 DIAGNOSIS — Z48.02 VISIT FOR SUTURE REMOVAL: Primary | ICD-10-CM

## 2021-03-13 PROCEDURE — 99282 EMERGENCY DEPT VISIT SF MDM: CPT

## 2021-03-13 PROCEDURE — 99283 EMERGENCY DEPT VISIT LOW MDM: CPT

## 2021-03-13 ASSESSMENT — PAIN DESCRIPTION - FREQUENCY: FREQUENCY: CONTINUOUS

## 2021-03-13 ASSESSMENT — PAIN DESCRIPTION - ORIENTATION: ORIENTATION: LEFT

## 2021-03-13 ASSESSMENT — PAIN - FUNCTIONAL ASSESSMENT: PAIN_FUNCTIONAL_ASSESSMENT: ACTIVITIES ARE NOT PREVENTED

## 2021-03-13 ASSESSMENT — PAIN SCALES - GENERAL: PAINLEVEL_OUTOF10: 2

## 2021-03-13 ASSESSMENT — PAIN DESCRIPTION - LOCATION: LOCATION: HEAD

## 2021-03-13 ASSESSMENT — PAIN DESCRIPTION - DESCRIPTORS: DESCRIPTORS: SORE

## 2021-03-14 NOTE — ED PROVIDER NOTES
[facility]  Department of Emergency Medicine   ED  Encounter Note  Admit Date/RoomTime: 3/13/2021  1:55 PM  ED Room: JEANMARIE/JEANMARIE    NAME: Bull Scott  : 1935  MRN: [id number]     Chief Complaint:  Suture / Staple Removal (Had sutures placed to left side of head on  after fall. Here for removal.)    History of Present Illness       Bull Scott is a 81 y.o. old female who presents to the emergency department by private vehicle for removal of sutures from scalp, which were placed 10 day(s) prior to arrival at an emergency department. Current antibiotic use: None. Tetanus Status:  up to date. Associated Symptoms:     []   Pain      []   Swelling      []   Redness      []   Drainage      []   Bleeding      []   Fever/Chills     ROS   Pertinent positives and negatives are stated within HPI, all other systems reviewed and are negative. Past Medical History:  has a past medical history of Cataract, Diabetes mellitus ([address] Fall from standing, Injury of left upper arm, Pneumonia, Syncope and collapse, and Uncontrolled type 2 diabetes mellitus ([address] Surgical History:  has a past surgical history that includes Hysterectomy and Cataract removal with implant. Social History:  reports that she has never smoked. She has never used smokeless tobacco. She reports that she does not drink alcohol or use drugs. Family History: family history is not on file. Allergies: Penicillins    Physical Exam   Oxygen Saturation Interpretation: Normal.        ED Triage Vitals [21 1353]   BP Temp Temp Source Pulse Resp SpO2 Height Weight   (!) 160/76 97.1 °F (36.2 °C) Infrared 83 16 97 % 5' 2\" (1.575 m) 172 lb (78 kg)         Constitutional:  Alert, development consistent with age. HEENT:  NC/NT. Airway patent. Neck:  Normal ROM. Supple.   Respiratory:  Clear to auscultation and breath sounds equal.    CV: Regular rate and rhythm, normal heart sounds, without for suture removal      Plan   Discharge home. Patient condition is good    New Medications     Discharge Medication List as of 3/13/2021  2:22 PM        Electronically signed by MICHELLE Melendez CNP   DD: 3/13/21  **This report was transcribed using voice recognition software. Every effort was made to ensure accuracy; however, inadvertent computerized transcription errors may be present.   END OF ED PROVIDER NOTE        MICHELLE Power CNP  03/13/21 7034

## 2021-03-15 ENCOUNTER — OFFICE VISIT (OUTPATIENT)
Dept: PRIMARY CARE CLINIC | Age: 86
End: 2021-03-15
Payer: MEDICARE

## 2021-03-15 VITALS
TEMPERATURE: 97.8 F | WEIGHT: 178 LBS | SYSTOLIC BLOOD PRESSURE: 146 MMHG | OXYGEN SATURATION: 98 % | HEIGHT: 62 IN | RESPIRATION RATE: 16 BRPM | BODY MASS INDEX: 32.76 KG/M2 | DIASTOLIC BLOOD PRESSURE: 70 MMHG | HEART RATE: 75 BPM

## 2021-03-15 DIAGNOSIS — S32.020S COMPRESSION FRACTURE OF L2 VERTEBRA, SEQUELA: Primary | ICD-10-CM

## 2021-03-15 DIAGNOSIS — R55 SYNCOPE, UNSPECIFIED SYNCOPE TYPE: ICD-10-CM

## 2021-03-15 DIAGNOSIS — M15.9 PRIMARY OSTEOARTHRITIS INVOLVING MULTIPLE JOINTS: ICD-10-CM

## 2021-03-15 DIAGNOSIS — S01.01XS LACERATION OF SCALP, SEQUELA: ICD-10-CM

## 2021-03-15 DIAGNOSIS — S22.000A COMPRESSION FRACTURE OF BODY OF THORACIC VERTEBRA (HCC): ICD-10-CM

## 2021-03-15 DIAGNOSIS — E11.8 TYPE 2 DIABETES MELLITUS WITH COMPLICATION, WITHOUT LONG-TERM CURRENT USE OF INSULIN (HCC): ICD-10-CM

## 2021-03-15 DIAGNOSIS — R00.1 BRADYCARDIA: ICD-10-CM

## 2021-03-15 DIAGNOSIS — R26.2 AMBULATORY DYSFUNCTION: ICD-10-CM

## 2021-03-15 DIAGNOSIS — I25.9 ISCHEMIC HEART DISEASE: ICD-10-CM

## 2021-03-15 DIAGNOSIS — G47.00 INSOMNIA, UNSPECIFIED TYPE: ICD-10-CM

## 2021-03-15 DIAGNOSIS — H61.22 IMPACTED CERUMEN, LEFT EAR: ICD-10-CM

## 2021-03-15 DIAGNOSIS — R82.71 BACTERIURIA: ICD-10-CM

## 2021-03-15 DIAGNOSIS — I10 ESSENTIAL HYPERTENSION: ICD-10-CM

## 2021-03-15 DIAGNOSIS — R29.6 RECURRENT FALLS: ICD-10-CM

## 2021-03-15 DIAGNOSIS — E55.9 VITAMIN D DEFICIENCY, UNSPECIFIED: ICD-10-CM

## 2021-03-15 PROCEDURE — G8484 FLU IMMUNIZE NO ADMIN: HCPCS | Performed by: FAMILY MEDICINE

## 2021-03-15 PROCEDURE — 1111F DSCHRG MED/CURRENT MED MERGE: CPT | Performed by: FAMILY MEDICINE

## 2021-03-15 PROCEDURE — 1090F PRES/ABSN URINE INCON ASSESS: CPT | Performed by: FAMILY MEDICINE

## 2021-03-15 PROCEDURE — G8417 CALC BMI ABV UP PARAM F/U: HCPCS | Performed by: FAMILY MEDICINE

## 2021-03-15 PROCEDURE — 99214 OFFICE O/P EST MOD 30 MIN: CPT | Performed by: FAMILY MEDICINE

## 2021-03-15 PROCEDURE — 1036F TOBACCO NON-USER: CPT | Performed by: FAMILY MEDICINE

## 2021-03-15 PROCEDURE — G8400 PT W/DXA NO RESULTS DOC: HCPCS | Performed by: FAMILY MEDICINE

## 2021-03-15 PROCEDURE — 3051F HG A1C>EQUAL 7.0%<8.0%: CPT | Performed by: FAMILY MEDICINE

## 2021-03-15 PROCEDURE — 1123F ACP DISCUSS/DSCN MKR DOCD: CPT | Performed by: FAMILY MEDICINE

## 2021-03-15 PROCEDURE — 4040F PNEUMOC VAC/ADMIN/RCVD: CPT | Performed by: FAMILY MEDICINE

## 2021-03-15 PROCEDURE — G8427 DOCREV CUR MEDS BY ELIG CLIN: HCPCS | Performed by: FAMILY MEDICINE

## 2021-03-15 RX ORDER — LORAZEPAM 0.5 MG/1
0.25 TABLET ORAL 3 TIMES DAILY PRN
Qty: 15 TABLET | Refills: 3 | Status: SHIPPED | OUTPATIENT
Start: 2021-03-15 | End: 2021-04-14

## 2021-03-15 RX ORDER — HYDROCHLOROTHIAZIDE 12.5 MG/1
12.5 CAPSULE, GELATIN COATED ORAL DAILY
COMMUNITY
End: 2021-06-28

## 2021-03-15 ASSESSMENT — ENCOUNTER SYMPTOMS
COUGH: 0
SHORTNESS OF BREATH: 0
VOMITING: 0
PHOTOPHOBIA: 0
CONSTIPATION: 0
SORE THROAT: 0
DIARRHEA: 0
NAUSEA: 0
BACK PAIN: 1
ABDOMINAL PAIN: 0
BLOOD IN STOOL: 0

## 2021-03-15 ASSESSMENT — PATIENT HEALTH QUESTIONNAIRE - PHQ9: 1. LITTLE INTEREST OR PLEASURE IN DOING THINGS: 0

## 2021-03-15 NOTE — PROGRESS NOTES
Post-Discharge Transitional Care Management Services or Hospital Follow Up      Bull Scott   YOB: 1935    Date of Office Visit:  3/15/2021  Date of Hospital Admission: 3/13/21  Date of Hospital Discharge: 3/13/21  Readmission Risk Score(high >=14%.  Medium >=10%):Readmission Risk Score: 13      Care management risk score Rising risk (score 2-5) and Complex Care (Scores >=6): 1     Non face to face  following discharge, date last encounter closed (first attempt may have been earlier): *No documented post hospital discharge outreach found in the last 14 days *No documented post hospital discharge outreach found in the last 14 days    Call initiated 2 business days of discharge: *No response recorded in the last 14 days     Patient Active Problem List   Diagnosis    Type 2 diabetes mellitus with complication, without long-term current use of insulin [facility]    Bilateral foot pain    Essential hypertension    Mixed hyperlipidemia    Age-related osteoporosis without current pathological fracture    Primary osteoarthritis involving multiple joints    Class 1 obesity due to excess calories with serious comorbidity and body mass index (BMI) of 33.0 to 33.9 in adult    PVD (peripheral vascular disease) ([facility] [address] [facility]    Ambulatory dysfunction    Ischemic heart disease    Visual disturbance    Syncope and collapse    Scalp laceration    Compression fracture of L2 vertebra (HCC)    Compression fracture of body of thoracic vertebra (HCC)    Bradycardia    Recurrent falls    Syncope    Bilateral impacted cerumen       Allergies   Allergen Reactions    Penicillins        Medications listed as ordered at the time of discharge from hospital   [place], [address] [place] [address] Medication Instructions RENALDO:    Printed on:03/15/21 0132   Medication Information                      Accu-Chek FastClix Lancets MISC  1 each by Does not apply route daily             amLODIPine (NORVASC) 2.5 MG tablet  Take 1 tablet by mouth 2 times daily             aspirin 81 MG tablet  Take 81 mg by mouth daily. atorvastatin (LIPITOR) 10 MG tablet  Take 10 mg by mouth every evening             blood glucose test strips (ACCU-CHEK ARA) strip  1 each by In Vitro route daily As needed. brimonidine (ALPHAGAN) 0.2 % ophthalmic solution  Place 1 drop into the left eye 2 times daily              Cholecalciferol (VITAMIN D) 25 MCG TABS  Take 1 tablet by mouth daily             dorzolamide (TRUSOPT) 2 % ophthalmic solution  Place 1 drop into the left eye 3 times daily             hydroCHLOROthiazide (MICROZIDE) 12.5 MG capsule  Take 12.5 mg by mouth daily             LORazepam (ATIVAN) 0.5 MG tablet  Take 0.5 tablets by mouth 3 times daily as needed for Anxiety for up to 30 days. metFORMIN (GLUCOPHAGE) 500 MG tablet  Take 500 mg by mouth 2 times daily (with meals)             prednisoLONE acetate (PRED FORTE) 1 % ophthalmic suspension  Place 1 drop into the left eye 3 times daily                    Medications marked \"taking\" at this time  Outpatient Medications Marked as Taking for the 3/15/21 encounter (Office Visit) with Thais Chamorro, DO   Medication Sig Dispense Refill    hydroCHLOROthiazide (MICROZIDE) 12.5 MG capsule Take 12.5 mg by mouth daily      LORazepam (ATIVAN) 0.5 MG tablet Take 0.5 tablets by mouth 3 times daily as needed for Anxiety for up to 30 days. 15 tablet 3    amLODIPine (NORVASC) 2.5 MG tablet Take 1 tablet by mouth 2 times daily 30 tablet 3    Cholecalciferol (VITAMIN D) 25 MCG TABS Take 1 tablet by mouth daily 30 tablet 1    blood glucose test strips (ACCU-CHEK ARA) strip 1 each by In Vitro route daily As needed.  100 each 3    Accu-Chek FastClix Lancets MISC 1 each by Does not apply route daily 100 each 5    dorzolamide (TRUSOPT) 2 % ophthalmic solution Place 1 drop into the left eye 3 times daily 10 mL 1    atorvastatin (LIPITOR) 10 MG tablet Take 10 mg by mouth every evening  metFORMIN (GLUCOPHAGE) 500 MG tablet Take 500 mg by mouth 2 times daily (with meals)      brimonidine (ALPHAGAN) 0.2 % ophthalmic solution Place 1 drop into the left eye 2 times daily       prednisoLONE acetate (PRED FORTE) 1 % ophthalmic suspension Place 1 drop into the left eye 3 times daily   3    aspirin 81 MG tablet Take 81 mg by mouth daily. Medications patient taking as of now reconciled against medications ordered at time of hospital discharge: Yes    Chief Complaint   Patient presents with    Follow-Up from Hospital     fall on 03/02/21, states hit head and had stitches, daughter-in-law states she doesn't sleep well       HPI    Inpatient course: Discharge summary reviewed- see chart. Interval history/Current status: She was discharged home after recent hospital visit. Patient was able to ambulate at the hospital without issue and refused any neurosurgical intervention. Patient states that since discharge she has been doing well without any increase in lower back pain. Denies any recent falls or syncopal episodes since discharge. Patient has been doing well with medication adjustments. Blood pressure mildly elevated today 146/70. Patient is tolerating the discontinuation of hydrochlorothiazide. This was held secondary to concern regarding hypotension and possible dehydration. Her main issue according to the daughter-in-law is that she is unable to sleep. She continues to get up at 4 in the morning and stay up until 10:00 at night. When questioned further she states that she gets symptomatic treatment her son gets up for work. No other issues at this time. Previous laceration site is healing without signs of cellulitis. Sutures have been removed as per previous notes. Patient has been taking all her medications as prescribed. Follow-up with ophthalmology next month. Review of Systems   Constitutional: Negative for chills, fatigue and fever.    HENT: Negative for congestion, hearing loss, nosebleeds and sore throat. Eyes: Negative for photophobia. Respiratory: Negative for cough and shortness of breath. Cardiovascular: Negative for chest pain, palpitations and leg swelling. Gastrointestinal: Negative for abdominal pain, blood in stool, constipation, diarrhea, nausea and vomiting. Endocrine: Negative for polydipsia. Genitourinary: Negative for dysuria, frequency, hematuria and urgency. Musculoskeletal: Positive for arthralgias, back pain, gait problem, joint swelling, myalgias and neck pain. Skin: Negative. Neurological: Positive for syncope (Prior to recent hospital visit). Negative for dizziness, tremors, weakness and headaches. Hematological: Does not bruise/bleed easily. Psychiatric/Behavioral: Positive for dysphoric mood and sleep disturbance. Negative for hallucinations, self-injury and suicidal ideas. All other systems reviewed and are negative. Vitals:    03/15/21 1405   BP: (!) 146/70   Pulse: 75   Resp: 16   Temp: 97.8 °F (36.6 °C)   SpO2: 98%   Weight: 178 lb (80.7 kg)   Height: 5' 2\" (1.575 m)     Body mass index is 32.56 kg/m². Wt Readings from Last 3 Encounters:   03/15/21 178 lb (80.7 kg)   03/13/21 172 lb (78 kg)   03/05/21 169 lb 14.4 oz (77.1 kg)     BP Readings from Last 3 Encounters:   03/15/21 (!) 146/70   03/13/21 (!) 160/76   03/08/21 (!) 155/70       Physical Exam  Vitals signs reviewed. HENT:      Head: Normocephalic and atraumatic. Left Ear: There is impacted cerumen. Eyes:      General: No scleral icterus. Conjunctiva/sclera: Conjunctivae normal.      Pupils: Pupils are equal, round, and reactive to light. Comments: She can only see shapes with her left eye secondary to previous surgery. Neck:      Musculoskeletal: Neck supple. Thyroid: No thyromegaly. Cardiovascular:      Rate and Rhythm: Normal rate and regular rhythm. Heart sounds: Murmur present.  Systolic murmur present with a grade of 4/6. Pulmonary:      Effort: Pulmonary effort is normal.      Breath sounds: Decreased breath sounds present. No rales. Abdominal:      General: Bowel sounds are normal. There is no distension. Palpations: Abdomen is soft. Tenderness: There is no abdominal tenderness. Musculoskeletal:         General: Tenderness and deformity present. Lumbar back: She exhibits pain and spasm. Right hand: She exhibits decreased range of motion, tenderness, bony tenderness and deformity. Left hand: She exhibits decreased range of motion, tenderness, bony tenderness and deformity. Lymphadenopathy:      Cervical: No cervical adenopathy. Skin:     General: Skin is warm and dry. Findings: Erythema present. No rash. Neurological:      Mental Status: She is alert and oriented to person, place, and time. Cranial Nerves: No cranial nerve deficit. Gait: Gait abnormal.      Comments: Walks with rolling walker   Psychiatric:         Mood and Affect: Mood normal.         Thought Content: Thought content normal.         Judgment: Judgment normal.       Echo  Left ventricular internal dimensions were normal in diastole and systole. Borderline concentric left ventricular hypertrophy. No regional wall motion abnormalities seen. Normal left ventricular ejection fraction. There is doppler evidence of stage I diastolic dysfunction. The left atrium is borderline dilated. Mild mitral annular calcification. Mild mitral regurgitation is present. The aortic valve appears mildly sclerotic. CT Head  No acute intracranial abnormality. CT Cervical     No acute compression deformities of cervical spine.  Multilevel degenerative   changes again demonstrated. CT Lumbar  There is nearly 30% compression deformity of the superior endplate of E85 and   L2 which are  new since the previous examination.  There is osteopenia.    There is degenerative changes lumbar spine with central disc bulges at L4-5   and L5-S1.  There is calcification of the aorta.  Diverticulosis of colon is   noted. Assessment/Plan:   Diagnosis Orders   1. Compression fracture of L2 vertebra, sequela  GA DISCHARGE MEDS RECONCILED W/ CURRENT OUTPATIENT MED LIST    CBC Auto Differential    COMPREHENSIVE METABOLIC PANEL    TSH    Vitamin D 25 Hydroxy    Urinalysis with Microscopic    Culture, Urine    LIPID PANEL    URINE ELECTROLYTES    OSMOLALITY, URINE    OSMOLALITY, SERUM    VITAMIN B12 & FOLATE   2. Compression fracture of body of thoracic vertebra (HCC)  GA DISCHARGE MEDS RECONCILED W/ CURRENT OUTPATIENT MED LIST    CBC Auto Differential    COMPREHENSIVE METABOLIC PANEL    TSH    Vitamin D 25 Hydroxy    Urinalysis with Microscopic    Culture, Urine    LIPID PANEL    URINE ELECTROLYTES    OSMOLALITY, URINE    OSMOLALITY, SERUM    VITAMIN B12 & FOLATE   3. Bradycardia  GA DISCHARGE MEDS RECONCILED W/ CURRENT OUTPATIENT MED LIST    CBC Auto Differential    COMPREHENSIVE METABOLIC PANEL    TSH    Vitamin D 25 Hydroxy    Urinalysis with Microscopic    Culture, Urine    LIPID PANEL    URINE ELECTROLYTES    OSMOLALITY, URINE    OSMOLALITY, SERUM    VITAMIN B12 & FOLATE   4. Recurrent falls  GA DISCHARGE MEDS RECONCILED W/ CURRENT OUTPATIENT MED LIST    CBC Auto Differential    COMPREHENSIVE METABOLIC PANEL    TSH    Vitamin D 25 Hydroxy    Urinalysis with Microscopic    Culture, Urine    LIPID PANEL    URINE ELECTROLYTES    OSMOLALITY, URINE    OSMOLALITY, SERUM    VITAMIN B12 & FOLATE   5. Syncope, unspecified syncope type  GA DISCHARGE MEDS RECONCILED W/ CURRENT OUTPATIENT MED LIST    CBC Auto Differential    COMPREHENSIVE METABOLIC PANEL    TSH    Vitamin D 25 Hydroxy    Urinalysis with Microscopic    Culture, Urine    LIPID PANEL    URINE ELECTROLYTES    OSMOLALITY, URINE    OSMOLALITY, SERUM    VITAMIN B12 & FOLATE   6.  Laceration of scalp, sequela  GA DISCHARGE MEDS RECONCILED W/ CURRENT OUTPATIENT MED LIST OSMOLALITY, SERUM    VITAMIN B12 & FOLATE   12. Impacted cerumen, left ear     13. Vitamin D deficiency, unspecified   Vitamin D 25 Hydroxy    Culture, Urine   14. Bacteriuria   Culture, Urine   15. Insomnia, unspecified type  LORazepam (ATIVAN) 0.5 MG tablet     At this time patient will continue with current medication regimen. Follow with home health as directed. Follow with ophthalmology as scheduled. Patient again refuses any neurosurgical intervention. If symptoms worsen or fail to improve in the next several months we may refer back to neurosurgery. Patient will continue to monitor blood pressure twice daily. If it starts to elevate again we will add additional medications. Baseline labs ordered prior to next visit. Next visit will also be an annual wellness visit. See her back and 1 month. Holger Lock D.O.   2:58 PM  3/15/2021       This document may have been prepared at least partially through the use of voice recognition software. Although effort is taken to assure the accuracy of this document, it is possible that grammatical, syntax,  or spelling errors may occur.       Medical Decision Making: high complexity

## 2021-04-26 RX ORDER — ATORVASTATIN CALCIUM 10 MG/1
TABLET, FILM COATED ORAL
Qty: 30 TABLET | Refills: 3 | Status: SHIPPED
Start: 2021-04-26 | End: 2021-09-17

## 2021-05-06 RX ORDER — MULTIVIT-MIN/IRON/FOLIC ACID/K 18-600-40
CAPSULE ORAL
Qty: 30 TABLET | Refills: 1 | OUTPATIENT
Start: 2021-05-06

## 2021-05-12 RX ORDER — MULTIVIT-MIN/IRON/FOLIC ACID/K 18-600-40
CAPSULE ORAL
Qty: 30 TABLET | Refills: 1 | Status: SHIPPED
Start: 2021-05-12 | End: 2021-12-13

## 2021-05-17 RX ORDER — DORZOLAMIDE HCL 20 MG/ML
1 SOLUTION/ DROPS OPHTHALMIC 3 TIMES DAILY
Qty: 10 ML | Refills: 1 | OUTPATIENT
Start: 2021-05-17

## 2021-05-21 RX ORDER — AMLODIPINE BESYLATE 2.5 MG/1
TABLET ORAL
Qty: 60 TABLET | Refills: 1 | Status: SHIPPED
Start: 2021-05-21 | End: 2021-07-15

## 2021-06-28 ENCOUNTER — HOSPITAL ENCOUNTER (INPATIENT)
Age: 86
LOS: 8 days | Discharge: NURSING, REHAB OR LONG-TERM CARE | DRG: 871 | End: 2021-07-06
Attending: EMERGENCY MEDICINE | Admitting: HOSPITALIST
Payer: MEDICARE

## 2021-06-28 ENCOUNTER — APPOINTMENT (OUTPATIENT)
Dept: GENERAL RADIOLOGY | Age: 86
DRG: 871 | End: 2021-06-28
Payer: MEDICARE

## 2021-06-28 ENCOUNTER — APPOINTMENT (OUTPATIENT)
Dept: CT IMAGING | Age: 86
DRG: 871 | End: 2021-06-28
Payer: MEDICARE

## 2021-06-28 DIAGNOSIS — R41.0 CONFUSION: ICD-10-CM

## 2021-06-28 DIAGNOSIS — J18.9 COMMUNITY ACQUIRED PNEUMONIA OF LEFT UPPER LOBE OF LUNG: Primary | ICD-10-CM

## 2021-06-28 DIAGNOSIS — R91.8 LUNG MASS: ICD-10-CM

## 2021-06-28 PROBLEM — J13 CAP (COMMUNITY ACQUIRED PNEUMONIA) DUE TO PNEUMOCOCCUS (HCC): Status: ACTIVE | Noted: 2021-06-28

## 2021-06-28 LAB
ALBUMIN SERPL-MCNC: 3 G/DL (ref 3.5–5.2)
ALP BLD-CCNC: 57 U/L (ref 35–104)
ALT SERPL-CCNC: 17 U/L (ref 0–32)
ANION GAP SERPL CALCULATED.3IONS-SCNC: 14 MMOL/L (ref 7–16)
AST SERPL-CCNC: 28 U/L (ref 0–31)
BACTERIA: ABNORMAL /HPF
BASOPHILS ABSOLUTE: 0 E9/L (ref 0–0.2)
BASOPHILS RELATIVE PERCENT: 0.2 % (ref 0–2)
BILIRUB SERPL-MCNC: 0.7 MG/DL (ref 0–1.2)
BILIRUBIN URINE: NEGATIVE
BLOOD, URINE: ABNORMAL
BUN BLDV-MCNC: 23 MG/DL (ref 6–23)
BURR CELLS: ABNORMAL
CALCIUM SERPL-MCNC: 8.9 MG/DL (ref 8.6–10.2)
CHLORIDE BLD-SCNC: 101 MMOL/L (ref 98–107)
CLARITY: ABNORMAL
CO2: 20 MMOL/L (ref 22–29)
COLOR: YELLOW
CREAT SERPL-MCNC: 1.3 MG/DL (ref 0.5–1)
EKG ATRIAL RATE: 95 BPM
EKG P AXIS: 31 DEGREES
EKG P-R INTERVAL: 142 MS
EKG Q-T INTERVAL: 346 MS
EKG QRS DURATION: 90 MS
EKG QTC CALCULATION (BAZETT): 434 MS
EKG R AXIS: 32 DEGREES
EKG T AXIS: 28 DEGREES
EKG VENTRICULAR RATE: 95 BPM
EOSINOPHILS ABSOLUTE: 0 E9/L (ref 0.05–0.5)
EOSINOPHILS RELATIVE PERCENT: 0 % (ref 0–6)
EPITHELIAL CELLS, UA: ABNORMAL /HPF
GFR AFRICAN AMERICAN: 47
GFR NON-AFRICAN AMERICAN: 39 ML/MIN/1.73
GLUCOSE BLD-MCNC: 275 MG/DL (ref 74–99)
GLUCOSE URINE: 500 MG/DL
HCT VFR BLD CALC: 29.7 % (ref 34–48)
HEMOGLOBIN: 9.4 G/DL (ref 11.5–15.5)
KETONES, URINE: ABNORMAL MG/DL
LACTIC ACID, SEPSIS: 1 MMOL/L (ref 0.5–1.9)
LACTIC ACID, SEPSIS: 2.3 MMOL/L (ref 0.5–1.9)
LEUKOCYTE ESTERASE, URINE: NEGATIVE
LYMPHOCYTES ABSOLUTE: 0.3 E9/L (ref 1.5–4)
LYMPHOCYTES RELATIVE PERCENT: 1.7 % (ref 20–42)
MAGNESIUM: 1.5 MG/DL (ref 1.6–2.6)
MCH RBC QN AUTO: 29.7 PG (ref 26–35)
MCHC RBC AUTO-ENTMCNC: 31.6 % (ref 32–34.5)
MCV RBC AUTO: 93.7 FL (ref 80–99.9)
MONOCYTES ABSOLUTE: 0.45 E9/L (ref 0.1–0.95)
MONOCYTES RELATIVE PERCENT: 2.6 % (ref 2–12)
NEUTROPHILS ABSOLUTE: 14.5 E9/L (ref 1.8–7.3)
NEUTROPHILS RELATIVE PERCENT: 95.7 % (ref 43–80)
NITRITE, URINE: NEGATIVE
OVALOCYTES: ABNORMAL
PDW BLD-RTO: 13.3 FL (ref 11.5–15)
PH UA: 6 (ref 5–9)
PLATELET # BLD: 256 E9/L (ref 130–450)
PMV BLD AUTO: 9.7 FL (ref 7–12)
POIKILOCYTES: ABNORMAL
POLYCHROMASIA: ABNORMAL
POTASSIUM REFLEX MAGNESIUM: 3.5 MMOL/L (ref 3.5–5)
PROTEIN UA: 100 MG/DL
RBC # BLD: 3.17 E12/L (ref 3.5–5.5)
RBC UA: ABNORMAL /HPF (ref 0–2)
SODIUM BLD-SCNC: 135 MMOL/L (ref 132–146)
SPECIFIC GRAVITY UA: >=1.03 (ref 1–1.03)
TOTAL PROTEIN: 6.6 G/DL (ref 6.4–8.3)
TROPONIN, HIGH SENSITIVITY: 53 NG/L (ref 0–9)
TROPONIN, HIGH SENSITIVITY: 55 NG/L (ref 0–9)
UROBILINOGEN, URINE: 1 E.U./DL
WBC # BLD: 15.1 E9/L (ref 4.5–11.5)
WBC UA: ABNORMAL /HPF (ref 0–5)

## 2021-06-28 PROCEDURE — 81001 URINALYSIS AUTO W/SCOPE: CPT

## 2021-06-28 PROCEDURE — 93010 ELECTROCARDIOGRAM REPORT: CPT | Performed by: INTERNAL MEDICINE

## 2021-06-28 PROCEDURE — 93005 ELECTROCARDIOGRAM TRACING: CPT | Performed by: STUDENT IN AN ORGANIZED HEALTH CARE EDUCATION/TRAINING PROGRAM

## 2021-06-28 PROCEDURE — 96375 TX/PRO/DX INJ NEW DRUG ADDON: CPT

## 2021-06-28 PROCEDURE — 6360000004 HC RX CONTRAST MEDICATION: Performed by: RADIOLOGY

## 2021-06-28 PROCEDURE — 70450 CT HEAD/BRAIN W/O DYE: CPT

## 2021-06-28 PROCEDURE — 6360000002 HC RX W HCPCS: Performed by: STUDENT IN AN ORGANIZED HEALTH CARE EDUCATION/TRAINING PROGRAM

## 2021-06-28 PROCEDURE — 96366 THER/PROPH/DIAG IV INF ADDON: CPT

## 2021-06-28 PROCEDURE — 84484 ASSAY OF TROPONIN QUANT: CPT

## 2021-06-28 PROCEDURE — 71260 CT THORAX DX C+: CPT

## 2021-06-28 PROCEDURE — 73562 X-RAY EXAM OF KNEE 3: CPT

## 2021-06-28 PROCEDURE — 87040 BLOOD CULTURE FOR BACTERIA: CPT

## 2021-06-28 PROCEDURE — 2580000003 HC RX 258: Performed by: RADIOLOGY

## 2021-06-28 PROCEDURE — 36415 COLL VENOUS BLD VENIPUNCTURE: CPT

## 2021-06-28 PROCEDURE — 71045 X-RAY EXAM CHEST 1 VIEW: CPT

## 2021-06-28 PROCEDURE — 80053 COMPREHEN METABOLIC PANEL: CPT

## 2021-06-28 PROCEDURE — 2060000000 HC ICU INTERMEDIATE R&B

## 2021-06-28 PROCEDURE — 85025 COMPLETE CBC W/AUTO DIFF WBC: CPT

## 2021-06-28 PROCEDURE — 99222 1ST HOSP IP/OBS MODERATE 55: CPT | Performed by: NEUROLOGICAL SURGERY

## 2021-06-28 PROCEDURE — 83605 ASSAY OF LACTIC ACID: CPT

## 2021-06-28 PROCEDURE — 6370000000 HC RX 637 (ALT 250 FOR IP): Performed by: FAMILY MEDICINE

## 2021-06-28 PROCEDURE — 2500000003 HC RX 250 WO HCPCS: Performed by: STUDENT IN AN ORGANIZED HEALTH CARE EDUCATION/TRAINING PROGRAM

## 2021-06-28 PROCEDURE — 83735 ASSAY OF MAGNESIUM: CPT

## 2021-06-28 PROCEDURE — 72125 CT NECK SPINE W/O DYE: CPT

## 2021-06-28 PROCEDURE — 2580000003 HC RX 258: Performed by: STUDENT IN AN ORGANIZED HEALTH CARE EDUCATION/TRAINING PROGRAM

## 2021-06-28 PROCEDURE — 6370000000 HC RX 637 (ALT 250 FOR IP): Performed by: HOSPITALIST

## 2021-06-28 PROCEDURE — 99285 EMERGENCY DEPT VISIT HI MDM: CPT

## 2021-06-28 PROCEDURE — 96365 THER/PROPH/DIAG IV INF INIT: CPT

## 2021-06-28 PROCEDURE — 6370000000 HC RX 637 (ALT 250 FOR IP): Performed by: STUDENT IN AN ORGANIZED HEALTH CARE EDUCATION/TRAINING PROGRAM

## 2021-06-28 RX ORDER — PREDNISOLONE ACETATE 10 MG/ML
1 SUSPENSION/ DROPS OPHTHALMIC 3 TIMES DAILY
Status: DISCONTINUED | OUTPATIENT
Start: 2021-06-28 | End: 2021-07-06 | Stop reason: HOSPADM

## 2021-06-28 RX ORDER — AMLODIPINE BESYLATE AND ATORVASTATIN CALCIUM 5; 40 MG/1; MG/1
1 TABLET, FILM COATED ORAL DAILY
Status: ON HOLD | COMMUNITY
End: 2021-07-06 | Stop reason: HOSPADM

## 2021-06-28 RX ORDER — AMLODIPINE BESYLATE AND ATORVASTATIN CALCIUM 5; 40 MG/1; MG/1
1 TABLET, FILM COATED ORAL DAILY
Status: DISCONTINUED | OUTPATIENT
Start: 2021-06-28 | End: 2021-06-28 | Stop reason: ALTCHOICE

## 2021-06-28 RX ORDER — SODIUM CHLORIDE 0.9 % (FLUSH) 0.9 %
10 SYRINGE (ML) INJECTION
Status: COMPLETED | OUTPATIENT
Start: 2021-06-28 | End: 2021-06-28

## 2021-06-28 RX ORDER — ASPIRIN 81 MG/1
81 TABLET ORAL DAILY
Status: DISCONTINUED | OUTPATIENT
Start: 2021-06-28 | End: 2021-07-06 | Stop reason: HOSPADM

## 2021-06-28 RX ORDER — AMLODIPINE BESYLATE 2.5 MG/1
2.5 TABLET ORAL 2 TIMES DAILY
Status: DISCONTINUED | OUTPATIENT
Start: 2021-06-28 | End: 2021-07-06 | Stop reason: HOSPADM

## 2021-06-28 RX ORDER — ACETAMINOPHEN 325 MG/1
650 TABLET ORAL EVERY 4 HOURS PRN
Status: DISCONTINUED | OUTPATIENT
Start: 2021-06-28 | End: 2021-07-06 | Stop reason: HOSPADM

## 2021-06-28 RX ORDER — ATORVASTATIN CALCIUM 10 MG/1
10 TABLET, FILM COATED ORAL DAILY
Status: DISCONTINUED | OUTPATIENT
Start: 2021-06-28 | End: 2021-07-06 | Stop reason: HOSPADM

## 2021-06-28 RX ORDER — MAGNESIUM SULFATE IN WATER 40 MG/ML
2000 INJECTION, SOLUTION INTRAVENOUS ONCE
Status: COMPLETED | OUTPATIENT
Start: 2021-06-28 | End: 2021-06-28

## 2021-06-28 RX ORDER — ACETAMINOPHEN 500 MG
1000 TABLET ORAL ONCE
Status: COMPLETED | OUTPATIENT
Start: 2021-06-28 | End: 2021-06-28

## 2021-06-28 RX ORDER — DORZOLAMIDE HCL 20 MG/ML
1 SOLUTION/ DROPS OPHTHALMIC 3 TIMES DAILY
Status: DISCONTINUED | OUTPATIENT
Start: 2021-06-28 | End: 2021-07-06 | Stop reason: HOSPADM

## 2021-06-28 RX ORDER — IBUPROFEN 200 MG
200 TABLET ORAL EVERY 6 HOURS PRN
Status: ON HOLD | COMMUNITY
End: 2021-07-06 | Stop reason: HOSPADM

## 2021-06-28 RX ORDER — VITAMIN B COMPLEX
1000 TABLET ORAL DAILY
Status: DISCONTINUED | OUTPATIENT
Start: 2021-06-28 | End: 2021-07-06 | Stop reason: HOSPADM

## 2021-06-28 RX ORDER — ACETAMINOPHEN 325 MG/1
650 TABLET ORAL EVERY 6 HOURS PRN
COMMUNITY

## 2021-06-28 RX ORDER — BRIMONIDINE TARTRATE 2 MG/ML
1 SOLUTION/ DROPS OPHTHALMIC 2 TIMES DAILY
Status: DISCONTINUED | OUTPATIENT
Start: 2021-06-28 | End: 2021-07-06 | Stop reason: HOSPADM

## 2021-06-28 RX ORDER — 0.9 % SODIUM CHLORIDE 0.9 %
1000 INTRAVENOUS SOLUTION INTRAVENOUS ONCE
Status: COMPLETED | OUTPATIENT
Start: 2021-06-28 | End: 2021-06-28

## 2021-06-28 RX ADMIN — DORZOLAMIDE HYDROCHLORIDE 1 DROP: 20 SOLUTION/ DROPS OPHTHALMIC at 19:58

## 2021-06-28 RX ADMIN — IOPAMIDOL 90 ML: 755 INJECTION, SOLUTION INTRAVENOUS at 14:11

## 2021-06-28 RX ADMIN — ACETAMINOPHEN 650 MG: 325 TABLET ORAL at 22:20

## 2021-06-28 RX ADMIN — CEFTRIAXONE 1000 MG: 1 INJECTION, POWDER, FOR SOLUTION INTRAMUSCULAR; INTRAVENOUS at 11:59

## 2021-06-28 RX ADMIN — Medication 10 ML: at 14:11

## 2021-06-28 RX ADMIN — MAGNESIUM SULFATE HEPTAHYDRATE 2000 MG: 40 INJECTION, SOLUTION INTRAVENOUS at 09:21

## 2021-06-28 RX ADMIN — DOXYCYCLINE 100 MG: 100 INJECTION, POWDER, LYOPHILIZED, FOR SOLUTION INTRAVENOUS at 12:21

## 2021-06-28 RX ADMIN — ACETAMINOPHEN 1000 MG: 500 TABLET ORAL at 07:22

## 2021-06-28 RX ADMIN — SODIUM CHLORIDE 1000 ML: 0.9 INJECTION, SOLUTION INTRAVENOUS at 07:15

## 2021-06-28 RX ADMIN — METFORMIN HYDROCHLORIDE 500 MG: 500 TABLET ORAL at 19:57

## 2021-06-28 RX ADMIN — PREDNISOLONE ACETATE 1 DROP: 10 SUSPENSION/ DROPS OPHTHALMIC at 19:58

## 2021-06-28 RX ADMIN — AMLODIPINE BESYLATE 2.5 MG: 2.5 TABLET ORAL at 19:57

## 2021-06-28 RX ADMIN — BRIMONIDINE TARTRATE 1 DROP: 2 SOLUTION OPHTHALMIC at 19:58

## 2021-06-28 ASSESSMENT — ENCOUNTER SYMPTOMS
PHOTOPHOBIA: 0
COUGH: 0
DIARRHEA: 0
NAUSEA: 0
CONSTIPATION: 0
VOICE CHANGE: 0
TROUBLE SWALLOWING: 0
VOMITING: 0
SHORTNESS OF BREATH: 0
ABDOMINAL PAIN: 0
RHINORRHEA: 0

## 2021-06-28 ASSESSMENT — PAIN DESCRIPTION - FREQUENCY: FREQUENCY: CONTINUOUS

## 2021-06-28 ASSESSMENT — PAIN DESCRIPTION - PAIN TYPE: TYPE: ACUTE PAIN

## 2021-06-28 ASSESSMENT — PAIN SCALES - GENERAL
PAINLEVEL_OUTOF10: 7
PAINLEVEL_OUTOF10: 0
PAINLEVEL_OUTOF10: 0
PAINLEVEL_OUTOF10: 6

## 2021-06-28 ASSESSMENT — PAIN DESCRIPTION - ORIENTATION: ORIENTATION: RIGHT

## 2021-06-28 ASSESSMENT — PAIN DESCRIPTION - LOCATION: LOCATION: KNEE

## 2021-06-28 ASSESSMENT — PAIN DESCRIPTION - DESCRIPTORS: DESCRIPTORS: CONSTANT

## 2021-06-28 NOTE — ED NOTES
Bed: 21  Expected date:   Expected time:   Means of arrival:   Comments:  ems     Rolanda Dos Santos, RN  06/28/21 2415

## 2021-06-28 NOTE — H&P
Hospital Medicine History & Physical      PCP: Greg Aburto DO    Date of Admission: 6/28/2021    Date of Service: Pt seen/examined on 06/28/2021 and Admitted inpatient status   Hx taken from patient    Chief Complaint: Mechanical fall with bilateral knee pain right greater than left and altered mental status with confusion and combative behavior by family  History Of Present Illness: Marta Matute is a 81 y.o. female who has a past medical history of diabetes, hypertension, hyperlipidemia, osteoarthritis, peripheral vascular disease, ischemic heart disease with recurrent falls. Presents emergency department and altered mental status confusion after walking on the floor found down by family at home and complaining of right knee pain. Family states the patient has been altered with confusion for the last week. Patient when seen in emergency department was alone without any family and was awake alert oriented and did not present confused. She denies any pain or discomfort. At this time patient will be admitted to [facility] service for community-acquired pneumonia and altered mental status. With right knee pain. Past Medical History:        Diagnosis Date    Cataract     Diabetes mellitus ([facility] [address]     Fall from standing 2/17/2020    Injury of left upper arm 2/17/2020    Pneumonia     Syncope and collapse 10/17/2016    Uncontrolled type 2 diabetes mellitus ([address] 8/17/2010       Past Surgical History:        Procedure Laterality Date    CATARACT REMOVAL WITH IMPLANT      HYSTERECTOMY         MedicationsPrior to Admission:    Prior to Admission medications    Medication Sig Start Date End Date Taking?  Authorizing Provider   ibuprofen (ADVIL;MOTRIN) 200 MG tablet Take 200 mg by mouth every 6 hours as needed for Pain   Yes Historical Provider, MD   acetaminophen (TYLENOL) 325 MG tablet Take 650 mg by mouth every 6 hours as needed for Pain   Yes Historical Provider, MD amLODIPine-atorvastatatin (CADUET) 5-40 MG per tablet Take 1 tablet by mouth daily   Yes Historical Provider, MD   metFORMIN (GLUCOPHAGE) 500 MG tablet TAKE 1 TABLET BY MOUTH TWICE A DAY 5/10/21  Yes Chandan Chamorro, DO   aspirin 81 MG tablet Take 81 mg by mouth daily. Yes Historical Provider, MD   amLODIPine (NORVASC) 2.5 MG tablet TAKE 1 TABLET BY MOUTH TWICE A DAY 5/21/21   Chandan Chamorro, DO   Vitamin D, Cholecalciferol, 25 MCG (1000 UT) TABS TAKE 1 TABLET BY MOUTH EVERY DAY 5/12/21   Chandan Chamorro, DO   atorvastatin (LIPITOR) 10 MG tablet TAKE 1 TABLET BY MOUTH EVERY DAY 4/26/21   Chandan Chamorro, DO   blood glucose test strips (ACCU-CHEK ARA) strip 1 each by In Vitro route daily As needed. 3/5/21   Montserrat Dion Courts, DO   Accu-Chek FastClix Lancets MISC 1 each by Does not apply route daily 3/5/21   Montserrat L Spangler, DO   dorzolamide (TRUSOPT) 2 % ophthalmic solution Place 1 drop into the left eye 3 times daily 3/5/21   Montserrat L Aníbal, DO   brimonidine (ALPHAGAN) 0.2 % ophthalmic solution Place 1 drop into the left eye 2 times daily  6/20/20   Historical Provider, MD   prednisoLONE acetate (PRED FORTE) 1 % ophthalmic suspension Place 1 drop into the left eye 3 times daily  8/30/19   Historical Provider, MD       :    Penicillins    Social History:    The patient currently lives family  TOBACCO:   reports that she has never smoked. She has never used smokeless tobacco.  ETOH:   reports no history of alcohol use. Family History:  Positive as follows: Family history has been reviewed and is not pertinent to this admission    History reviewed. No pertinent family history. REVIEW OF SYSTEMS:   Pertinent positives and negatives  as notedin the HPI and ROS. All other systems reviewed and negative. Review of Systems   Neurological: Positive for weakness. Psychiatric/Behavioral: Positive for confusion.          PHYSICAL EXAM:  BP (!) 107/47   Pulse 77   Temp 97.7 °F (36.5 °C) (Oral)   Resp 22   Ht 5' 2\" (1.575 m)   Wt 178 lb (80.7 kg)   SpO2 95%   BMI 32.56 kg/m²     Physical Exam  Constitutional:       Appearance: Normal appearance. HENT:      Head: Normocephalic. Nose: Nose normal.      Mouth/Throat:      Mouth: Mucous membranes are moist.   Eyes:      Pupils: Pupils are equal, round, and reactive to light. Cardiovascular:      Rate and Rhythm: Normal rate and regular rhythm. Pulses: Normal pulses. Heart sounds: Normal heart sounds. Pulmonary:      Effort: Pulmonary effort is normal.      Breath sounds: Normal breath sounds. Abdominal:      General: Bowel sounds are normal.      Palpations: Abdomen is soft. Musculoskeletal:         General: Normal range of motion. Cervical back: Normal range of motion and neck supple. Skin:     General: Skin is warm and dry. Neurological:      Mental Status: She is disoriented. Psychiatric:         Behavior: Behavior normal.           Imaging:    XR KNEE LEFT (3 VIEWS)    Result Date: 6/28/2021  EXAMINATION: ONE XRAY VIEW OF THE CHEST; THREE XRAY VIEWS OF THE LEFT KNEE 6/28/2021 7:22 am COMPARISON: Chest 2nd March 2021 HISTORY: ORDERING SYSTEM PROVIDED HISTORY: fever TECHNOLOGIST PROVIDED HISTORY: Reason for exam:->fever What reading provider will be dictating this exam?->CRC; ORDERING SYSTEM PROVIDED HISTORY: fall TECHNOLOGIST PROVIDED HISTORY: Reason for exam:->fall What reading provider will be dictating this exam?->CRC FINDINGS: Left knee: Tricompartmental osteoarthritis is overall moderate. No fracture or dislocation. Normal soft tissues. Chest: A soft tissue density seen centrally abutting the left pulmonary hilum may represent a central neoplasm or pneumonia. Heart and pulmonary vascularity are normal.  Neither costophrenic angle is blunted. Left knee: Moderate osteoarthritis. Chest: Central left lung neoplasm versus pneumonia.   Recommend contrast-enhanced CT if this does not resolve spontaneously after appropriate medical therapy. XR KNEE RIGHT (3 VIEWS)    Result Date: 6/28/2021  EXAMINATION: THREE XRAY VIEWS OF THE RIGHT KNEE 6/28/2021 7:22 am COMPARISON: None. HISTORY: ORDERING SYSTEM PROVIDED HISTORY: fall, swelling TECHNOLOGIST PROVIDED HISTORY: Reason for exam:->fall, swelling What reading provider will be dictating this exam?->CRC FINDINGS: Moderate tricompartmental osteoarthritis. No fracture or dislocation. Normal soft tissues. Moderate tricompartmental osteoarthritis. CT HEAD WO CONTRAST    Result Date: 6/28/2021  EXAMINATION: CT OF THE HEAD WITHOUT CONTRAST  6/28/2021 8:42 am TECHNIQUE: CT of the head was performed without the administration of intravenous contrast. Dose modulation, iterative reconstruction, and/or weight based adjustment of the mA/kV was utilized to reduce the radiation dose to as low as reasonably achievable. COMPARISON: None. HISTORY: ORDERING SYSTEM PROVIDED HISTORY: found on floor TECHNOLOGIST PROVIDED HISTORY: Has a \"code stroke\" or \"stroke alert\" been called? ->No Reason for exam:->found on floor What reading provider will be dictating this exam?->CRC FINDINGS: There are streaking artifacts in the polar regions of both frontal lobes. There is a vague ill-defined area of apparent increased density over the left frontal polar convexity with small focus of hyperdensity. These findings are difficult to be interpreted. Cannot exclude discrete superficial cortical hemorrhage in presence of history of trauma. Recommended follow-up study in few hours time interval for re-evaluation and better baseline determination. Peripheral CSF space ventricular system correlates with the age group. The areas of hypodensity the which are vague and ill-defined throughout the white matter both cerebral hemispheres correlate with chronic small vessel changes. The There is no focal mass effect or midline shift.  There is no evidence for a sizable area of a new acute recent insult to the brain parenchyma not accounting the findings in the polar regions of the left frontal lobe. Images with bone window settings demonstrate the no significant findings. There are streaking artifacts in the polar regions of both frontal lobes which makes difficult interpretation of fine detail. There is an apparently relative focal localized increased density in the polar region of the left frontal lobe. In presence of a history of trauma cannot exclude discrete areas of superficial cortical hemorrhage. Recommended follow-up study if yours time interval for re-evaluation and better baseline determination. Alternative will be correlation with MRI study of the brain. CT CERVICAL SPINE WO CONTRAST    Result Date: 6/28/2021  EXAMINATION: CT OF THE CERVICAL SPINE WITHOUT CONTRAST 6/28/2021 8:42 am TECHNIQUE: CT of the cervical spine was performed without the administration of intravenous contrast. Multiplanar reformatted images are provided for review. Dose modulation, iterative reconstruction, and/or weight based adjustment of the mA/kV was utilized to reduce the radiation dose to as low as reasonably achievable. COMPARISON: 03/02/2021 HISTORY: ORDERING SYSTEM PROVIDED HISTORY: found on bathroom floor TECHNOLOGIST PROVIDED HISTORY: Reason for exam:->found on bathroom floor Decision Support Exception - unselect if not a suspected or confirmed emergency medical condition->Emergency Medical Condition (MA) What reading provider will be dictating this exam?->CRC FINDINGS: Some images are mildly degraded by patient motion artifact. No prevertebral edema. Advanced diffuse facet arthritis. Grade 1 anterior spondylolisthesis of C4 on C5, C5 on C6, C6 on C7 and C7 on T1. These findings are similar to previous and likely due to the facet arthritis. The previously described retrolisthesis of C1 on C2 is also similar to previous. The hypertrophic changes contribute to at least mild-moderate multilevel canal and foraminal narrowing.   No acute fracture identified. Relatively advanced degenerative changes of the bilateral temporomandibular joints. Scattered vascular calcifications. No focal fluid collection. Advanced degenerative changes. MRI would be useful if symptoms persist.     XR CHEST PORTABLE    Result Date: 6/28/2021  EXAMINATION: ONE XRAY VIEW OF THE CHEST; THREE XRAY VIEWS OF THE LEFT KNEE 6/28/2021 7:22 am COMPARISON: Chest 2nd March 2021 HISTORY: ORDERING SYSTEM PROVIDED HISTORY: fever TECHNOLOGIST PROVIDED HISTORY: Reason for exam:->fever What reading provider will be dictating this exam?->CRC; ORDERING SYSTEM PROVIDED HISTORY: fall TECHNOLOGIST PROVIDED HISTORY: Reason for exam:->fall What reading provider will be dictating this exam?->CRC FINDINGS: Left knee: Tricompartmental osteoarthritis is overall moderate. No fracture or dislocation. Normal soft tissues. Chest: A soft tissue density seen centrally abutting the left pulmonary hilum may represent a central neoplasm or pneumonia. Heart and pulmonary vascularity are normal.  Neither costophrenic angle is blunted. Left knee: Moderate osteoarthritis. Chest: Central left lung neoplasm versus pneumonia. Recommend contrast-enhanced CT if this does not resolve spontaneously after appropriate medical therapy. EKG:      CBC   Recent Labs     06/28/21  0718   WBC 15.1*   HGB 9.4*   HCT 29.7*         RENAL  Recent Labs     06/28/21  0718      K 3.5      CO2 20*   BUN 23   CREATININE 1.3*     LFT'S  Recent Labs     06/28/21  0718   AST 28   ALT 17   BILITOT 0.7   ALKPHOS 57     COAG  No results for input(s): INR in the last 72 hours. CARDIAC ENZYMES  No results for input(s): CKTOTAL, CKMB, CKMBINDEX, TROPONINT in the last 72 hours.     U/A:   Lab Results   Component Value Date    COLORU Yellow 06/28/2021    WBCUA 0-1 06/28/2021    RBCUA 1-3 06/28/2021    BACTERIA MANY 06/28/2021    CLARITYU SL CLOUDY 06/28/2021    SPECGRAV >=1.030 06/28/2021    LEUKOCYTESUR Negative 06/28/2021    BLOODU LARGE 06/28/2021    GLUCOSEU 500 06/28/2021       ABG  No results found for: LMT7XUQ, BEART, N8LOBCRS, PHART, THGBART, LVE0SXR, PO2ART, UUO0YIX        Active Hospital Problems    Diagnosis Date Noted    Pneumonia [J18.9] 06/28/2021    CAP (community acquired pneumonia) due to Pneumococcus [facility]) Naresh Half 06/28/2021       PHYSICIAN CERTIFICATION    I certify that Destinee Phan is expected to be hospitalized for > 2  midnights based on the following assessment and plan:    ASSESSMENT/PLAN:  1. Metabolic encephalopathy with confusion-maybe 2 to fever previously pneumonia that was found on x-ray at this time patient is a lot clearer we will continue to monitor; patient was found on the floor of his home on buttocks; denies head injury while moving in the emergency department  2. Community-acquired pneumonia-IV antibiotics, good pulmonary toiletry, maintain hydration  3. Hypertension-amlodipine, will monitor blood pressure  4. Non-insulin-dependent diabetes mellitus-Metformin, monitor blood glucose and diabetic diet  5.  Hyperlipidemia-statin high        DVT Prophylaxis: scd  Diet: No diet orders on file  Code Status:  When stable back       Coleta Lennox, MD  [facility]

## 2021-06-28 NOTE — ED PROVIDER NOTES
Patient is an 81-year-old female with past medical history of diabetes who presents to the emergency department with complaint of episodes of confusion. Patient presents by EMS from home, EMS reports patient for the past week has had episodes of confusion including on their arrival this morning patient was agitated, confused, yelling and in argument with family. EMS reports patient was on the floor in the bathroom on her knees on their arrival.   Unknown if she fell or sat down. Patient had become AAO x4 in the ambulance but then confused again on arrival in the emergency department. She did have episode of noted hypotension of 80s over 40s for EMS, that returned to 120s/80s with IV fluids. Patient without any reported falls or trauma. Patient is not on anticoagulation. Patient has been noted to be febrile at home. Patient complains of problems urinating with retention, and bilateral knee pain. Patient denies neck pain or head pain, chest pain or shortness of breath. History is limited secondary to the patient's confusion         Review of Systems   Constitutional: Positive for fever. Negative for chills. HENT: Negative for congestion, rhinorrhea and voice change. Eyes: Negative for visual disturbance. Respiratory: Negative for cough and shortness of breath. Cardiovascular: Negative for chest pain, palpitations and leg swelling. Gastrointestinal: Negative for abdominal pain, constipation, diarrhea, nausea and vomiting. Endocrine: Negative for polyuria. Genitourinary:        Feeling of urinary retention   Musculoskeletal: Positive for arthralgias and joint swelling. Negative for myalgias and neck pain. Skin: Negative for rash and wound. Allergic/Immunologic: Negative for immunocompromised state. Neurological: Negative for dizziness, syncope, weakness, light-headedness, numbness and headaches. Psychiatric/Behavioral: Negative for confusion. The patient is not nervous/anxious. Physical Exam  Vitals and nursing note reviewed. Constitutional:       General: She is not in acute distress. Appearance: She is not ill-appearing or toxic-appearing. HENT:      Head: Normocephalic and atraumatic. Nose: Nose normal.      Mouth/Throat:      Mouth: Mucous membranes are moist.      Pharynx: Oropharynx is clear. Eyes:      Extraocular Movements: Extraocular movements intact. Conjunctiva/sclera: Conjunctivae normal.      Pupils: Pupils are equal, round, and reactive to light. Cardiovascular:      Rate and Rhythm: Normal rate and regular rhythm. Pulses: Normal pulses. Heart sounds: Normal heart sounds. Pulmonary:      Effort: Pulmonary effort is normal.      Breath sounds: Normal breath sounds. Abdominal:      General: There is no distension. Palpations: Abdomen is soft. Tenderness: There is no abdominal tenderness. Musculoskeletal:         General: Swelling present. Cervical back: Normal range of motion and neck supple. Right knee: Swelling and bony tenderness present. No crepitus. Tenderness present. Left knee: Bony tenderness present. Skin:     General: Skin is warm and dry. Capillary Refill: Capillary refill takes less than 2 seconds. Neurological:      General: No focal deficit present. Mental Status: She is alert and oriented to person, place, and time. GCS: GCS eye subscore is 4. GCS verbal subscore is 5. GCS motor subscore is 6. Cranial Nerves: Cranial nerves are intact. Sensory: Sensation is intact. Motor: Motor function is intact. Patient is an 81-year-old female presents to the emergency department after reported fall at home landing on her knees, with intermittent confusion. Fall was unwitnessed, unknown if she struck her head or neck. Patient presents awake, alert, following all commands, reported confusion and hypotension by EMS and family.   Patient vital signs were stable, physical exam is unremarkable. Patient does have bruising to her knees, with right greater than left. Work-up including labs shows leukocytosis, lactic acidosis. Chest x-ray shows mass versus pneumonia in the left upper lobe. CT shows the same with more likely mass. Patient was covered for antibiotics for pneumonia, magnesium was repleted. Patient without any further confusion or hypotension in the ER. Decision made to admit and patient was discussed with hospitalist, agreed to put on their service. Further scans and x-rays of the knees head and neck are unremarkable. Patient was monitored in the emergency department further change and was admitted in stable condition. Radiology Procedure Waiver   Name: Nazario Garcia  : 1935  MRN: [id number]    Date:  21    Time: 9:09 AM EDT    Benefits of immediately proceeding with Radiology exam(s) without pre-testing outweigh the risks or are not indicated as specified below and therefore the following is/are being waived:    [] Pregnancy test   [] Patients LMP on-time and regular.   [] Patient had Tubal Ligation or has other Contraception Device. [] Patient  is Menopausal or Premenarcheal.    [] Patient had Full or Partial Hysterectomy. [] Protocol for Iodine allergy    [] MRI Questionnaire     [x] BUN/Creatinine   [] Patient age w/no hx of renal dysfunction. [x] Patient on Dialysis. [] Recent Normal Labs. Electronically signed by Alex Cherry MD on 21 at 9:09 AM EDT               --------------------------------------------- PAST HISTORY ---------------------------------------------  Past Medical History:  has a past medical history of Arthritis, Cataract, Diabetes mellitus ([address] Fall from standing, Injury of left upper arm, Pneumonia, Syncope and collapse, and Uncontrolled type 2 diabetes mellitus ([address] Past Surgical History:  has a past surgical history that includes Hysterectomy and Cataract removal with implant.     Social History: reports that she has never smoked. She has never used smokeless tobacco. She reports that she does not drink alcohol and does not use drugs. Family History: family history is not on file. The patients home medications have been reviewed.     Allergies: Penicillins    -------------------------------------------------- RESULTS -------------------------------------------------    Lab  Results for orders placed or performed during the hospital encounter of 06/28/21   Culture, Blood 1    Specimen: Blood   Result Value Ref Range    Blood Culture, Routine 24 Hours no growth    Culture, Blood 2    Specimen: Blood   Result Value Ref Range    Culture, Blood 2 24 Hours no growth    CBC Auto Differential   Result Value Ref Range    WBC 15.1 (H) 4.5 - 11.5 E9/L    RBC 3.17 (L) 3.50 - 5.50 E12/L    Hemoglobin 9.4 (L) 11.5 - 15.5 g/dL    Hematocrit 29.7 (L) 34.0 - 48.0 %    MCV 93.7 80.0 - 99.9 fL    MCH 29.7 26.0 - 35.0 pg    MCHC 31.6 (L) 32.0 - 34.5 %    RDW 13.3 11.5 - 15.0 fL    Platelets 395 158 - 037 E9/L    MPV 9.7 7.0 - 12.0 fL    Neutrophils % 95.7 (H) 43.0 - 80.0 %    Lymphocytes % 1.7 (L) 20.0 - 42.0 %    Monocytes % 2.6 2.0 - 12.0 %    Eosinophils % 0.0 0.0 - 6.0 %    Basophils % 0.2 0.0 - 2.0 %    Neutrophils Absolute 14.50 (H) 1.80 - 7.30 E9/L    Lymphocytes Absolute 0.30 (L) 1.50 - 4.00 E9/L    Monocytes Absolute 0.45 0.10 - 0.95 E9/L    Eosinophils Absolute 0.00 (L) 0.05 - 0.50 E9/L    Basophils Absolute 0.00 0.00 - 0.20 E9/L    Polychromasia 1+     Poikilocytes 1+     Columbus Cells 1+     Ovalocytes 1+    Comprehensive Metabolic Panel w/ Reflex to MG   Result Value Ref Range    Sodium 135 132 - 146 mmol/L    Potassium reflex Magnesium 3.5 3.5 - 5.0 mmol/L    Chloride 101 98 - 107 mmol/L    CO2 20 (L) 22 - 29 mmol/L    Anion Gap 14 7 - 16 mmol/L    Glucose 275 (H) 74 - 99 mg/dL    BUN 23 6 - 23 mg/dL    CREATININE 1.3 (H) 0.5 - 1.0 mg/dL    GFR Non-African American 39 >=60 mL/min/1.73    GFR African American 47     Calcium 8.9 8.6 - 10.2 mg/dL    Total Protein 6.6 6.4 - 8.3 g/dL    Albumin 3.0 (L) 3.5 - 5.2 g/dL    Total Bilirubin 0.7 0.0 - 1.2 mg/dL    Alkaline Phosphatase 57 35 - 104 U/L    ALT 17 0 - 32 U/L    AST 28 0 - 31 U/L   Urinalysis with Microscopic   Result Value Ref Range    Color, UA Yellow Straw/Yellow    Clarity, UA SL CLOUDY Clear    Glucose, Ur 500 (A) Negative mg/dL    Bilirubin Urine Negative Negative    Ketones, Urine TRACE (A) Negative mg/dL    Specific Gravity, UA >=1.030 1.005 - 1.030    Blood, Urine LARGE (A) Negative    pH, UA 6.0 5.0 - 9.0    Protein,  (A) Negative mg/dL    Urobilinogen, Urine 1.0 <2.0 E.U./dL    Nitrite, Urine Negative Negative    Leukocyte Esterase, Urine Negative Negative    WBC, UA 0-1 0 - 5 /HPF    RBC, UA 1-3 0 - 2 /HPF    Epithelial Cells, UA FEW /HPF    Bacteria, UA MANY (A) None Seen /HPF   Lactate, Sepsis   Result Value Ref Range    Lactic Acid, Sepsis 2.3 (H) 0.5 - 1.9 mmol/L   Lactate, Sepsis   Result Value Ref Range    Lactic Acid, Sepsis 1.0 0.5 - 1.9 mmol/L   Troponin   Result Value Ref Range    Troponin, High Sensitivity 55 (H) 0 - 9 ng/L   Magnesium   Result Value Ref Range    Magnesium 1.5 (L) 1.6 - 2.6 mg/dL   Troponin   Result Value Ref Range    Troponin, High Sensitivity 53 (H) 0 - 9 ng/L   Procalcitonin   Result Value Ref Range    Procalcitonin 3.81 (H) 0.00 - 0.08 ng/mL   CBC   Result Value Ref Range    WBC 20.5 (H) 4.5 - 11.5 E9/L    RBC 3.08 (L) 3.50 - 5.50 E12/L    Hemoglobin 9.0 (L) 11.5 - 15.5 g/dL    Hematocrit 28.1 (L) 34.0 - 48.0 %    MCV 91.2 80.0 - 99.9 fL    MCH 29.2 26.0 - 35.0 pg    MCHC 32.0 32.0 - 34.5 %    RDW 14.1 11.5 - 15.0 fL    Platelets 152 472 - 766 E9/L    MPV 9.6 7.0 - 12.0 fL   Comprehensive metabolic panel   Result Value Ref Range    Sodium 133 132 - 146 mmol/L    Potassium 3.7 3.5 - 5.0 mmol/L    Chloride 100 98 - 107 mmol/L    CO2 22 22 - 29 mmol/L    Anion Gap 11 7 - 16 mmol/L    Glucose 217 (H) 74 - 99 mg/dL    BUN 26 (H) 6 - 23 mg/dL    CREATININE 1.1 (H) 0.5 - 1.0 mg/dL    GFR Non-African American 47 >=60 mL/min/1.73    GFR African American 57     Calcium 9.2 8.6 - 10.2 mg/dL    Total Protein 6.1 (L) 6.4 - 8.3 g/dL    Albumin 2.5 (L) 3.5 - 5.2 g/dL    Total Bilirubin 0.3 0.0 - 1.2 mg/dL    Alkaline Phosphatase 79 35 - 104 U/L    ALT 25 0 - 32 U/L    AST 28 0 - 31 U/L   Procalcitonin   Result Value Ref Range    Procalcitonin 2.35 (H) 0.00 - 0.08 ng/mL   CBC   Result Value Ref Range    WBC 15.1 (H) 4.5 - 11.5 E9/L    RBC 3.07 (L) 3.50 - 5.50 E12/L    Hemoglobin 9.0 (L) 11.5 - 15.5 g/dL    Hematocrit 28.2 (L) 34.0 - 48.0 %    MCV 91.9 80.0 - 99.9 fL    MCH 29.3 26.0 - 35.0 pg    MCHC 31.9 (L) 32.0 - 34.5 %    RDW 14.3 11.5 - 15.0 fL    Platelets 601 152 - 898 E9/L    MPV 9.6 7.0 - 12.0 fL   Comprehensive metabolic panel   Result Value Ref Range    Sodium 139 132 - 146 mmol/L    Potassium 3.6 3.5 - 5.0 mmol/L    Chloride 104 98 - 107 mmol/L    CO2 26 22 - 29 mmol/L    Anion Gap 9 7 - 16 mmol/L    Glucose 212 (H) 74 - 99 mg/dL    BUN 25 (H) 6 - 23 mg/dL    CREATININE 1.1 (H) 0.5 - 1.0 mg/dL    GFR Non-African American 47 >=60 mL/min/1.73    GFR African American 57     Calcium 9.1 8.6 - 10.2 mg/dL    Total Protein 6.1 (L) 6.4 - 8.3 g/dL    Albumin 2.3 (L) 3.5 - 5.2 g/dL    Total Bilirubin 0.3 0.0 - 1.2 mg/dL    Alkaline Phosphatase 79 35 - 104 U/L    ALT 24 0 - 32 U/L    AST 22 0 - 31 U/L   EKG 12 Lead   Result Value Ref Range    Ventricular Rate 95 BPM    Atrial Rate 95 BPM    P-R Interval 142 ms    QRS Duration 90 ms    Q-T Interval 346 ms    QTc Calculation (Bazett) 434 ms    P Axis 31 degrees    R Axis 32 degrees    T Axis 28 degrees       Radiology  CT CHEST W CONTRAST   Final Result   1. Large, 9.1 x 4.9 x 5.5 cm masslike consolidative opacity in the left upper   lobe, abutting the mediastinal pleural reflection. The opacity may be   infectious, inflammatory or neoplastic in etiology.    2. Mildly prominent mediastinal lymph nodes, which may be reactive or   metastatic.   3. 1.9 x 1.4 cm nodule in the body of the left adrenal gland of unclear   etiology. 4. Age indeterminate anterior wedge compression deformity of T12.   5. Sclerosis along the superior endplate of the T3 vertebral body of unclear   etiology. 6. Especially if the biopsy proves the left upper lobe opacity to be   neoplastic in etiology, PET scan would be recommended for further evaluation. CT HEAD WO CONTRAST   Final Result   1. No skull fracture or acute intracranial abnormality. 2. Specifically, no intracranial hemorrhage detected. CT HEAD WO CONTRAST   Final Result   There are streaking artifacts in the polar regions of both frontal lobes   which makes difficult interpretation of fine detail. There is an apparently   relative focal localized increased density in the polar region of the left   frontal lobe. In presence of a history of trauma cannot exclude discrete   areas of superficial cortical hemorrhage. Recommended follow-up study if yours time interval for re-evaluation and   better baseline determination. Alternative will be correlation with MRI study of the brain. CT CERVICAL SPINE WO CONTRAST   Final Result   Advanced degenerative changes. MRI would be useful if symptoms persist.         XR KNEE LEFT (3 VIEWS)   Final Result   Left knee: Moderate osteoarthritis. Chest: Central left lung neoplasm versus pneumonia. Recommend   contrast-enhanced CT if this does not resolve spontaneously after appropriate   medical therapy. XR KNEE RIGHT (3 VIEWS)   Final Result   Moderate tricompartmental osteoarthritis. XR CHEST PORTABLE   Final Result   Left knee: Moderate osteoarthritis. Chest: Central left lung neoplasm versus pneumonia. Recommend   contrast-enhanced CT if this does not resolve spontaneously after appropriate   medical therapy. EKG:  This EKG is signed and interpreted by me. Rate: 95  Rhythm: Sinus  Interpretation: no acute changes  Comparison: stable as compared to patient's most recent EKG      ------------------------- NURSING NOTES AND VITALS REVIEWED ---------------------------  Date / Time Roomed:  6/28/2021  6:38 AM  ED Bed Assignment:  [id number]    The nursing notes within the ED encounter and vital signs as below have been reviewed. Patient Vitals for the past 24 hrs:   BP Temp Temp src Pulse Resp SpO2   07/01/21 0747 123/62 97 °F (36.1 °C) Temporal 85 20 91 %   07/01/21 0507 -- -- -- -- -- 92 %   06/30/21 2324 (!) 117/56 96.5 °F (35.8 °C) Temporal 78 18 95 %   06/30/21 2012 122/64 96.4 °F (35.8 °C) Temporal 88 16 97 %       Oxygen Saturation Interpretation: Normal    ------------------------------------------ PROGRESS NOTES ------------------------------------------  Re-evaluation(s):  Patients symptoms show no change  Repeat physical examination is not changed    I have spoken with the patient and discussed todays results, in addition to providing specific details for the plan of care and counseling regarding the diagnosis and prognosis. Their questions are answered at this time and they are agreeable with the plan.      --------------------------------- ADDITIONAL PROVIDER NOTES ---------------------------------  Consultations:  Spoke with Dr. Wendel Aase,  They will admit this patient. This patient's ED course included: a personal history and physicial examination, re-evaluation prior to disposition, cardiac monitoring and continuous pulse oximetry    This patient has remained hemodynamically stable, remained unchanged and been closely monitored during their ED course. Please note that the withdrawal or failure to initiate urgent interventions for this patient would likely result in a life threatening deterioration or permanent disability. Clinical Impression  1. Community acquired pneumonia of left upper lobe of lung    2. Lung mass    3.  Confusion Disposition  Patient's disposition: Admit to telemetry  Patient's condition is stable    6/28/21, 6:19 PM EDT. This note is prepared by Aaron Viveros MD -PGY- 2             Aaron Viveros MD  Resident  07/01/21 9044    ATTENDING PROVIDER ATTESTATION:     Lance Garcia presented to the emergency department for evaluation of Fall (fall in bathroom. \"knees gave out. \" c/o knee pain. increased confusion and agitation over the last week.) and Fever    I have reviewed and discussed the case, including pertinent history (medical, surgical, family and social) and exam findings with the Resident and the Nurse assigned to Lance Garcia. I have personally performed and/or participated in the history, exam, medical decision making, and procedures and agree with all pertinent clinical information. I have reviewed my findings and recommendations with Lance Garcia and members of family present at the time of disposition. I, Dr. Shahriar Roberts am the primary physician of record for this patient. MDM: The patient is 81 y.o. female  with a past medical history of       Diagnosis Date    Arthritis     Cataract     Diabetes mellitus ([address]     Fall from standing 2/17/2020    Injury of left upper arm 2/17/2020    Pneumonia     Syncope and collapse 10/17/2016    Uncontrolled type 2 diabetes mellitus ([address] 8/17/2010     presenting to the emergency department with a chief complaint of fall confusion. Differential diagnosis but not limited to, sepsis, electrolyte derangement, dehydration, intracranial hemorrhage. The patient did have labs and imaging which were reviewed. The patient CBC remarkable for leukocytosis, CMP unremarkable the patient did have chest x-ray concerning for mass versus infection, patient given antibiotics. Patient will be admitted. My findings/plan: The primary encounter diagnosis was Community acquired pneumonia of left upper lobe of lung.  Diagnoses of Lung mass and Confusion were also pertinent to this visit.   Current Discharge Medication List        Tanner Cisse, [address], DO  07/01/21 5236

## 2021-06-28 NOTE — Clinical Note
Patient Class: Inpatient [101]   REQUIRED: Diagnosis: Pneumonia [[id number]]   Estimated Length of Stay: Estimated stay of more than 2 midnights   Admitting Provider: Mahesh Epps [[id number]]   Telemetry/Cardiac Monitoring Required?: Yes

## 2021-06-28 NOTE — CONSULTS
NEUROSURGERY CONSULTATION     Chief Complaint: Fall, subdural hematoma    HPI:   Edyta Naik is a 81 y.o.  female who presents to the ED after falling. It is reported that pt was found down by her family with increased confusion. Pt states she did not hit her head. Denies LOC. Currently c/o bilateral knee pain. Head CT scan demonstrated possible cortical hemorrhage for which neurosurgery was consulted. Repeat head CT scan does not demonstrate any intracranial hemorrhage. Past Medical History:   Diagnosis Date    Arthritis     Cataract     Diabetes mellitus ([facility] [address]     Fall from standing 2/17/2020    Injury of left upper arm 2/17/2020    Pneumonia     Syncope and collapse 10/17/2016    Uncontrolled type 2 diabetes mellitus ([facility] [address] 8/17/2010     Past Surgical History:   Procedure Laterality Date    CATARACT REMOVAL WITH IMPLANT      HYSTERECTOMY        History reviewed. No pertinent family history. Social History     Socioeconomic History    Marital status:       Spouse name: Not on file    Number of children: Not on file    Years of education: Not on file    Highest education level: Not on file   Occupational History    Not on file   Tobacco Use    Smoking status: Never Smoker    Smokeless tobacco: Never Used   Substance and Sexual Activity    Alcohol use: No    Drug use: No    Sexual activity: Not on file   Other Topics Concern    Not on file   Social History Narrative    Not on file     Social Determinants of Health     Financial Resource Strain:     Difficulty of Paying Living Expenses:    Food Insecurity:     Worried About [address] in the Last Year:     Ran Out of Food in the Last Year:    Transportation Needs:     Lack of Transportation (Medical):     Lack of Transportation (Non-Medical):    Physical Activity:     Days of Exercise per Week:     Minutes of Exercise per Session:    Stress:     Feeling of Stress :    Social Connections:     Frequency of Communication with Friends and Family: Frequency of Social Gatherings with Friends and Family:     Attends Gn[employer] Services: Active Member of Clubs or Organizations:     Attends Club or Organization Meetings:     Marital Status:    Intimate Partner Violence:     Fear of Current or Ex-Partner:     Emotionally Abused:     Physically Abused:     Sexually Abused:        Medications:   Current Facility-Administered Medications   Medication Dose Route Frequency Provider Last Rate Last Admin    prednisoLONE acetate (PRED FORTE) 1 % ophthalmic suspension 1 drop  1 drop Left Eye TID Asia Coleman MD        brimonidine (ALPHAGAN) 0.2 % ophthalmic solution 1 drop  1 drop Left Eye BID Asia Coleman MD        dorzolamide (TRUSOPT) 2 % ophthalmic solution 1 drop  1 drop Left Eye TID Asia Coleman MD        atorvastatin (LIPITOR) tablet 10 mg  10 mg Oral Daily Khanh Edward MD        amLODIPine (NORVASC) tablet 2.5 mg  2.5 mg Oral BID Asia Coleman MD        aspirin EC tablet 81 mg  81 mg Oral Daily Khanh Edward MD        metFORMIN (GLUCOPHAGE) tablet 500 mg  500 mg Oral BID Asia Coleman MD        Vitamin D (CHOLECALCIFEROL) tablet 1,000 Units  1,000 Units Oral Daily Asia Coleman MD            Allergies:    Penicillins       Review of Systems   Constitutional: Negative for fever. HENT: Negative for trouble swallowing. Eyes: Negative for photophobia. Respiratory: Negative for shortness of breath. Cardiovascular: Negative for chest pain. Gastrointestinal: Negative for abdominal pain. Endocrine: Negative for heat intolerance. Genitourinary: Negative for flank pain. Musculoskeletal: Positive for arthralgias. Negative for myalgias. Bilateral knee pain   Skin: Negative for wound. Neurological: Positive for weakness. Negative for seizures, numbness and headaches. Psychiatric/Behavioral: Negative for confusion. Physical Exam  Constitutional:       Appearance: Normal appearance. She is well-developed.    HENT:      Head: Normocephalic and atraumatic. Eyes:      Conjunctiva/sclera: Conjunctivae normal.      Pupils: Pupils are equal, round, and reactive to light. Cardiovascular:      Rate and Rhythm: Normal rate. Pulmonary:      Effort: Pulmonary effort is normal.   Abdominal:      General: There is no distension. Musculoskeletal:      Cervical back: Normal range of motion and neck supple. Skin:     General: Skin is warm and dry. Neurological:      Mental Status: She is alert. Comments: Alert and oriented x3  CN3-12 intact  4/5 strength throughout, pain in both knees with LE exam  Sensation intact to light touch     Psychiatric:         Thought Content: Thought content normal.          BP (!) 147/85   Pulse 83   Temp 97.8 °F (36.6 °C) (Axillary)   Resp 18   Ht 5' 2\" (1.575 m)   Wt 178 lb 9.2 oz (81 kg)   SpO2 97%   BMI 32.66 kg/m²        Assessment:   Fall from standing    Plan:  -No hemorrhage on head CT scan. No surgical intervention needed  -PT/OT  -Call if questions       Electronically signed by Katie Lozano PA-C on 6/28/2021 at 5:33 PM     I have examined the patient and agree with above.   No evidence of hemorrhage on CT    Edward Alexander MD

## 2021-06-29 PROCEDURE — 6360000002 HC RX W HCPCS: Performed by: INTERNAL MEDICINE

## 2021-06-29 PROCEDURE — 2580000003 HC RX 258: Performed by: INTERNAL MEDICINE

## 2021-06-29 PROCEDURE — 2060000000 HC ICU INTERMEDIATE R&B

## 2021-06-29 PROCEDURE — 6370000000 HC RX 637 (ALT 250 FOR IP): Performed by: INTERNAL MEDICINE

## 2021-06-29 PROCEDURE — 6370000000 HC RX 637 (ALT 250 FOR IP): Performed by: FAMILY MEDICINE

## 2021-06-29 PROCEDURE — 6370000000 HC RX 637 (ALT 250 FOR IP): Performed by: HOSPITALIST

## 2021-06-29 RX ORDER — HYDROXYZINE HYDROCHLORIDE 10 MG/1
10 TABLET, FILM COATED ORAL ONCE
Status: COMPLETED | OUTPATIENT
Start: 2021-06-29 | End: 2021-06-29

## 2021-06-29 RX ADMIN — PREDNISOLONE ACETATE 1 DROP: 10 SUSPENSION/ DROPS OPHTHALMIC at 13:13

## 2021-06-29 RX ADMIN — HYDROXYZINE HYDROCHLORIDE 10 MG: 10 TABLET ORAL at 22:22

## 2021-06-29 RX ADMIN — ASPIRIN 81 MG: 81 TABLET, COATED ORAL at 09:45

## 2021-06-29 RX ADMIN — PREDNISOLONE ACETATE 1 DROP: 10 SUSPENSION/ DROPS OPHTHALMIC at 20:08

## 2021-06-29 RX ADMIN — AMLODIPINE BESYLATE 2.5 MG: 2.5 TABLET ORAL at 09:45

## 2021-06-29 RX ADMIN — DORZOLAMIDE HYDROCHLORIDE 1 DROP: 20 SOLUTION/ DROPS OPHTHALMIC at 20:07

## 2021-06-29 RX ADMIN — ACETAMINOPHEN 650 MG: 325 TABLET ORAL at 06:20

## 2021-06-29 RX ADMIN — AMLODIPINE BESYLATE 2.5 MG: 2.5 TABLET ORAL at 20:06

## 2021-06-29 RX ADMIN — METFORMIN HYDROCHLORIDE 500 MG: 500 TABLET ORAL at 09:45

## 2021-06-29 RX ADMIN — ACETAMINOPHEN 650 MG: 325 TABLET ORAL at 20:00

## 2021-06-29 RX ADMIN — DORZOLAMIDE HYDROCHLORIDE 1 DROP: 20 SOLUTION/ DROPS OPHTHALMIC at 09:46

## 2021-06-29 RX ADMIN — BRIMONIDINE TARTRATE 1 DROP: 2 SOLUTION OPHTHALMIC at 20:08

## 2021-06-29 RX ADMIN — Medication 1000 UNITS: at 09:45

## 2021-06-29 RX ADMIN — WATER 1000 MG: 1 INJECTION INTRAMUSCULAR; INTRAVENOUS; SUBCUTANEOUS at 18:46

## 2021-06-29 RX ADMIN — PREDNISOLONE ACETATE 1 DROP: 10 SUSPENSION/ DROPS OPHTHALMIC at 09:46

## 2021-06-29 RX ADMIN — DORZOLAMIDE HYDROCHLORIDE 1 DROP: 20 SOLUTION/ DROPS OPHTHALMIC at 13:13

## 2021-06-29 RX ADMIN — ATORVASTATIN CALCIUM 10 MG: 10 TABLET, FILM COATED ORAL at 09:45

## 2021-06-29 RX ADMIN — BRIMONIDINE TARTRATE 1 DROP: 2 SOLUTION OPHTHALMIC at 09:46

## 2021-06-29 ASSESSMENT — PAIN SCALES - GENERAL
PAINLEVEL_OUTOF10: 0
PAINLEVEL_OUTOF10: 0
PAINLEVEL_OUTOF10: 6
PAINLEVEL_OUTOF10: 7
PAINLEVEL_OUTOF10: 0
PAINLEVEL_OUTOF10: 0

## 2021-06-29 NOTE — PLAN OF CARE
Problem: Falls - Risk of:  Goal: Will remain free from falls  Description: Will remain free from falls  6/29/2021 1043 by Minh Michaels RN  Outcome: Ongoing  6/29/2021 0454 by Yasmeen Youssef RN  Outcome: Ongoing  6/29/2021 0454 by Yasmeen Youssef RN  Outcome: Met This Shift  Goal: Absence of physical injury  Description: Absence of physical injury  6/29/2021 1043 by Minh Michaels RN  Outcome: Ongoing  6/29/2021 0457 by Yasmeen Youssef RN  Outcome: Ongoing     Problem: Skin Integrity:  Goal: Will show no infection signs and symptoms  Description: Will show no infection signs and symptoms  Outcome: Ongoing  Goal: Absence of new skin breakdown  Description: Absence of new skin breakdown  Outcome: Ongoing

## 2021-06-29 NOTE — CARE COORDINATION
Care Coordination-  The patient was admitted after sustaining a fall at home and is now complaining bilateral knee pain. She is also confused and has combative behavior with her family. Ct of the chest shows a large mass consolidation in the left upper lobe. She is on 2 l n.c now. Pt Russ singleton ordered. I spoke to the patients older sister who has the POA her name is David eGiger @ [phone] and she tells me the patient lives with her son Pablo Dotson in a 2 story home 2 steps to get into the main entrance. Her bed abd bath are all on the main floor. Dme she has a quad a cane and a wheeled walker at home. No history of having hhc or skilled care.  Her PCP is Dr Ce Greenwood and her pharmacy is [facility]. I will await pt russ singleton I will follow

## 2021-06-29 NOTE — PROGRESS NOTES
Patient refusing to sit or stand for orthostatic BP. Tried educating patient. Can not convince patient to sit edge of bed or stand. She states \"I'll fall! I have all these bruises from falling! \"   Will attempt tomorrow with physical therapy consult

## 2021-06-29 NOTE — PROGRESS NOTES
OT SESSION ATTEMPT     Date:2021  Patient Name: Soo Nicolas  MRN: [id number]  : 1935  Room: [address] [place] [address]     Attempted OT session this date:    [] unavailable due to other medical staff currently with pt   [] on hold, await MRI/ neurosurgical recommendations. [] on hold per nursing staff secondary to lab / radiology results    [x] Pt declined Occupational Therapy  this date due to \"pain in BLE\" pt stated \"I will fall, look at all these bruises on my arms from falling. \"  Benefits of participation in therapy reviewed with pt and pt educated on support from therapy team to prevent risk of fall. Pt continued to decline services. [] off unit   [] Other:     Will reattempt OT eval at a later time.     Colton Hurd, [address] OTR/L #[id number]

## 2021-06-29 NOTE — PLAN OF CARE
Problem: Falls - Risk of:    Goal: Will remain free from falls  Description: Will remain free from falls    Outcome: Ongoing

## 2021-06-29 NOTE — PROGRESS NOTES
Hospitalist Progress Note      Synopsis: Patient admitted on 6/28/2021 for a suspected mechanical fall in the setting of AMS and hypotension. Imaging concerning for community acquired pneumonia and extensive lung mass, now on IV antibiotics with pulmonology consult. Subjective    Pt poor historian  Per nursing she is apparently refusing orthostatics and therapy evals    Exam:  /60   Pulse 92   Temp 98.3 °F (36.8 °C) (Temporal)   Resp 30   Ht 5' 2\" (1.575 m)   Wt 178 lb 9.2 oz (81 kg)   SpO2 97%   BMI 32.66 kg/m²   General appearance: No apparent distress, appears stated age and cooperative. Obese   HEENT: Pupils equal, round, and reactive to light. Conjunctivae/corneas clear. Neck: Supple. No jugular venous distention. Trachea midline. Respiratory:  Normal respiratory effort. Clear to auscultation, bilaterally without Rales/Wheezes/Rhonchi. Cardiovascular: Regular rate and rhythm with normal S1/S2 without murmurs, rubs or gallops. Abdomen: Soft, non-tender, non-distended with normal bowel sounds. Musculoskeletal: No clubbing, cyanosis or edema bilaterally. Brisk capillary refill. 2+ lower extremity pulses (dorsalis pedis).    Skin:  No rashes    Neurologic: awake, alert and following commands; oriented only to self    Medications:  Reviewed    Infusion Medications   Scheduled Medications    prednisoLONE acetate  1 drop Left Eye TID    brimonidine  1 drop Left Eye BID    dorzolamide  1 drop Left Eye TID    atorvastatin  10 mg Oral Daily    amLODIPine  2.5 mg Oral BID    aspirin  81 mg Oral Daily    metFORMIN  500 mg Oral BID    Vitamin D  1,000 Units Oral Daily     PRN Meds: acetaminophen    I/O    Intake/Output Summary (Last 24 hours) at 6/29/2021 1803  Last data filed at 6/29/2021 1546  Gross per 24 hour   Intake --   Output 1550 ml   Net -1550 ml       Labs:   Recent Labs     06/28/21  0718   WBC 15.1*   HGB 9.4*   HCT 29.7*          Recent Labs     06/28/21  0718    K 3.5      CO2 20*   BUN 23   CREATININE 1.3*   CALCIUM 8.9       Recent Labs     06/28/21  0718   PROT 6.6   ALKPHOS 57   ALT 17   AST 28   BILITOT 0.7       No results for input(s): INR in the last 72 hours. No results for input(s): Yohan Oiler in the last 72 hours. Chronic labs:  Lab Results   Component Value Date    CHOL 164 03/02/2021    TRIG 50 03/02/2021    HDL 77 03/02/2021    LDLCALC 77 03/02/2021    TSH 1.600 02/17/2020    INR 1.0 10/04/2013    LABA1C 7.1 (H) 03/02/2021       Radiology:  Imaging studies reviewed today. ASSESSMENT:  Fall - likely multifactorial  Recent Community acquired pneumonia  Metabolic encephalopathy  Lung mass   Mediastinal lymph nodes  Adrenal mass   HTN  DM2  HLD  Obesity      PLAN:  Orthostatics  PT/OT  Continue home meds as ordered  Rocephin started and will check procalcitonin   Pulmonary consulted given lung mass on CT imaging of chest   Encourage oral intake   Strict I/O    Diet: ADULT DIET; Regular; 3 carb choices (45 gm/meal)  Code Status: Prior  PT/OT Eval Status:   ordered  DVT Prophylaxis:   scd  Recommended disposition at discharge:  pending pt/ot eval     +++++++++++++++++++++++++++++++++++++++++++++++++  Rula Cris Goldberg, MD   [facility].  +++++++++++++++++++++++++++++++++++++++++++++++++  NOTE: This report was transcribed using voice recognition software. Every effort was made to ensure accuracy; however, inadvertent computerized transcription errors may be present.

## 2021-06-30 ENCOUNTER — ANESTHESIA EVENT (OUTPATIENT)
Dept: ENDOSCOPY | Age: 86
DRG: 871 | End: 2021-06-30
Payer: MEDICARE

## 2021-06-30 LAB
ALBUMIN SERPL-MCNC: 2.5 G/DL (ref 3.5–5.2)
ALP BLD-CCNC: 79 U/L (ref 35–104)
ALT SERPL-CCNC: 25 U/L (ref 0–32)
ANION GAP SERPL CALCULATED.3IONS-SCNC: 11 MMOL/L (ref 7–16)
AST SERPL-CCNC: 28 U/L (ref 0–31)
BILIRUB SERPL-MCNC: 0.3 MG/DL (ref 0–1.2)
BUN BLDV-MCNC: 26 MG/DL (ref 6–23)
CALCIUM SERPL-MCNC: 9.2 MG/DL (ref 8.6–10.2)
CHLORIDE BLD-SCNC: 100 MMOL/L (ref 98–107)
CO2: 22 MMOL/L (ref 22–29)
CREAT SERPL-MCNC: 1.1 MG/DL (ref 0.5–1)
GFR AFRICAN AMERICAN: 57
GFR NON-AFRICAN AMERICAN: 47 ML/MIN/1.73
GLUCOSE BLD-MCNC: 217 MG/DL (ref 74–99)
HCT VFR BLD CALC: 28.1 % (ref 34–48)
HEMOGLOBIN: 9 G/DL (ref 11.5–15.5)
MCH RBC QN AUTO: 29.2 PG (ref 26–35)
MCHC RBC AUTO-ENTMCNC: 32 % (ref 32–34.5)
MCV RBC AUTO: 91.2 FL (ref 80–99.9)
PDW BLD-RTO: 14.1 FL (ref 11.5–15)
PLATELET # BLD: 306 E9/L (ref 130–450)
PMV BLD AUTO: 9.6 FL (ref 7–12)
POTASSIUM SERPL-SCNC: 3.7 MMOL/L (ref 3.5–5)
PROCALCITONIN: 3.81 NG/ML (ref 0–0.08)
RBC # BLD: 3.08 E12/L (ref 3.5–5.5)
SODIUM BLD-SCNC: 133 MMOL/L (ref 132–146)
TOTAL PROTEIN: 6.1 G/DL (ref 6.4–8.3)
WBC # BLD: 20.5 E9/L (ref 4.5–11.5)

## 2021-06-30 PROCEDURE — 6370000000 HC RX 637 (ALT 250 FOR IP): Performed by: HOSPITALIST

## 2021-06-30 PROCEDURE — 80053 COMPREHEN METABOLIC PANEL: CPT

## 2021-06-30 PROCEDURE — 85027 COMPLETE CBC AUTOMATED: CPT

## 2021-06-30 PROCEDURE — 97165 OT EVAL LOW COMPLEX 30 MIN: CPT

## 2021-06-30 PROCEDURE — 99223 1ST HOSP IP/OBS HIGH 75: CPT | Performed by: INTERNAL MEDICINE

## 2021-06-30 PROCEDURE — 6360000002 HC RX W HCPCS: Performed by: INTERNAL MEDICINE

## 2021-06-30 PROCEDURE — 2060000000 HC ICU INTERMEDIATE R&B

## 2021-06-30 PROCEDURE — 36415 COLL VENOUS BLD VENIPUNCTURE: CPT

## 2021-06-30 PROCEDURE — 2580000003 HC RX 258: Performed by: INTERNAL MEDICINE

## 2021-06-30 PROCEDURE — 84145 PROCALCITONIN (PCT): CPT

## 2021-06-30 PROCEDURE — 6370000000 HC RX 637 (ALT 250 FOR IP): Performed by: FAMILY MEDICINE

## 2021-06-30 RX ADMIN — MEROPENEM 1000 MG: 1 INJECTION, POWDER, FOR SOLUTION INTRAVENOUS at 19:00

## 2021-06-30 RX ADMIN — ACETAMINOPHEN 650 MG: 325 TABLET ORAL at 13:46

## 2021-06-30 RX ADMIN — Medication 1000 UNITS: at 08:12

## 2021-06-30 RX ADMIN — PREDNISOLONE ACETATE 1 DROP: 10 SUSPENSION/ DROPS OPHTHALMIC at 20:12

## 2021-06-30 RX ADMIN — BRIMONIDINE TARTRATE 1 DROP: 2 SOLUTION OPHTHALMIC at 20:12

## 2021-06-30 RX ADMIN — AMLODIPINE BESYLATE 2.5 MG: 2.5 TABLET ORAL at 20:12

## 2021-06-30 RX ADMIN — DORZOLAMIDE HYDROCHLORIDE 1 DROP: 20 SOLUTION/ DROPS OPHTHALMIC at 13:48

## 2021-06-30 RX ADMIN — ATORVASTATIN CALCIUM 10 MG: 10 TABLET, FILM COATED ORAL at 08:12

## 2021-06-30 RX ADMIN — DORZOLAMIDE HYDROCHLORIDE 1 DROP: 20 SOLUTION/ DROPS OPHTHALMIC at 20:12

## 2021-06-30 RX ADMIN — ACETAMINOPHEN 650 MG: 325 TABLET ORAL at 20:12

## 2021-06-30 RX ADMIN — BRIMONIDINE TARTRATE 1 DROP: 2 SOLUTION OPHTHALMIC at 08:15

## 2021-06-30 RX ADMIN — PREDNISOLONE ACETATE 1 DROP: 10 SUSPENSION/ DROPS OPHTHALMIC at 08:11

## 2021-06-30 RX ADMIN — PREDNISOLONE ACETATE 1 DROP: 10 SUSPENSION/ DROPS OPHTHALMIC at 13:48

## 2021-06-30 RX ADMIN — ACETAMINOPHEN 650 MG: 325 TABLET ORAL at 08:10

## 2021-06-30 RX ADMIN — AMLODIPINE BESYLATE 2.5 MG: 2.5 TABLET ORAL at 08:12

## 2021-06-30 RX ADMIN — ASPIRIN 81 MG: 81 TABLET, COATED ORAL at 08:10

## 2021-06-30 RX ADMIN — DORZOLAMIDE HYDROCHLORIDE 1 DROP: 20 SOLUTION/ DROPS OPHTHALMIC at 08:18

## 2021-06-30 ASSESSMENT — PAIN DESCRIPTION - LOCATION
LOCATION: KNEE
LOCATION: KNEE

## 2021-06-30 ASSESSMENT — PAIN SCALES - GENERAL
PAINLEVEL_OUTOF10: 0
PAINLEVEL_OUTOF10: 4
PAINLEVEL_OUTOF10: 6
PAINLEVEL_OUTOF10: 0

## 2021-06-30 ASSESSMENT — PAIN DESCRIPTION - PROGRESSION: CLINICAL_PROGRESSION: NOT CHANGED

## 2021-06-30 ASSESSMENT — PAIN DESCRIPTION - FREQUENCY: FREQUENCY: CONTINUOUS

## 2021-06-30 ASSESSMENT — PAIN DESCRIPTION - DESCRIPTORS
DESCRIPTORS: CONSTANT;ACHING
DESCRIPTORS: ACHING;CONSTANT

## 2021-06-30 ASSESSMENT — PAIN DESCRIPTION - ORIENTATION
ORIENTATION: RIGHT;LEFT
ORIENTATION: RIGHT

## 2021-06-30 ASSESSMENT — LIFESTYLE VARIABLES: SMOKING_STATUS: 0

## 2021-06-30 ASSESSMENT — PAIN DESCRIPTION - ONSET: ONSET: ON-GOING

## 2021-06-30 ASSESSMENT — PAIN - FUNCTIONAL ASSESSMENT: PAIN_FUNCTIONAL_ASSESSMENT: PREVENTS OR INTERFERES SOME ACTIVE ACTIVITIES AND ADLS

## 2021-06-30 ASSESSMENT — PAIN DESCRIPTION - PAIN TYPE
TYPE: CHRONIC PAIN
TYPE: ACUTE PAIN

## 2021-06-30 NOTE — PLAN OF CARE
Problem: Skin Integrity:    Goal: Will show no infection signs and symptoms  Description: Will show no infection signs and symptoms    Outcome: Ongoing

## 2021-06-30 NOTE — ANESTHESIA PRE PROCEDURE
Department of Anesthesiology  Preprocedure Note       Name:  Rob Wilson   Age:  81 y.o.  :  1935                                          MRN:  [id number]         Date:  2021      Surgeon: Kieran Salgado):  Tj Manzano MD    Procedure Bronchoscopy    Medications prior to admission:   Prior to Admission medications    Medication Sig Start Date End Date Taking? Authorizing Provider   ibuprofen (ADVIL;MOTRIN) 200 MG tablet Take 200 mg by mouth every 6 hours as needed for Pain   Yes Historical Provider, MD   acetaminophen (TYLENOL) 325 MG tablet Take 650 mg by mouth every 6 hours as needed for Pain   Yes Historical Provider, MD   amLODIPine-atorvastatatin (CADUET) 5-40 MG per tablet Take 1 tablet by mouth daily   Yes Historical Provider, MD   amLODIPine (NORVASC) 2.5 MG tablet TAKE 1 TABLET BY MOUTH TWICE A DAY 21  Yes Chandan Chamorro, DO   Vitamin D, Cholecalciferol, 25 MCG (1000 UT) TABS TAKE 1 TABLET BY MOUTH EVERY DAY 21  Yes Chandan Chamorro DO   metFORMIN (GLUCOPHAGE) 500 MG tablet TAKE 1 TABLET BY MOUTH TWICE A DAY 5/10/21  Yes Chandan Chamorro DO   atorvastatin (LIPITOR) 10 MG tablet TAKE 1 TABLET BY MOUTH EVERY DAY 21  Yes Chandan Chamorro, DO   blood glucose test strips (ACCU-CHEK ARA) strip 1 each by In Vitro route daily As needed. 3/5/21  Yes Alyssa Marcelina Schaumann, DO   Accu-Chek FastClix Lancets MISC 1 each by Does not apply route daily 3/5/21  Yes Montserrat Spangler DO   dorzolamide (TRUSOPT) 2 % ophthalmic solution Place 1 drop into the left eye 3 times daily 3/5/21  Yes Montserrat Spangler, DO   brimonidine (ALPHAGAN) 0.2 % ophthalmic solution Place 1 drop into the left eye 2 times daily  20  Yes Historical Provider, MD   prednisoLONE acetate (PRED FORTE) 1 % ophthalmic suspension Place 1 drop into the left eye 3 times daily  19  Yes Historical Provider, MD   aspirin 81 MG tablet Take 81 mg by mouth daily.    Yes Historical Provider, MD       Current medications:    Current Facility-Administered Medications   Medication Dose Route Frequency Provider Last Rate Last Admin    cefTRIAXone (ROCEPHIN) 1,000 mg in sterile water 10 mL IV syringe  1,000 mg Intravenous Q24H Ankita Cunningham MD   1,000 mg at 06/29/21 1846    prednisoLONE acetate (PRED FORTE) 1 % ophthalmic suspension 1 drop  1 drop Left Eye TID Melinda Wilburn MD   1 drop at 06/30/21 0811    brimonidine (ALPHAGAN) 0.2 % ophthalmic solution 1 drop  1 drop Left Eye BID Melinda Wilburn MD   1 drop at 06/30/21 0815    dorzolamide (TRUSOPT) 2 % ophthalmic solution 1 drop  1 drop Left Eye TID Melinda Wilburn MD   1 drop at 06/30/21 0818    atorvastatin (LIPITOR) tablet 10 mg  10 mg Oral Daily Melinda Wilburn MD   10 mg at 06/30/21 0812    amLODIPine (NORVASC) tablet 2.5 mg  2.5 mg Oral BID Melinda Wilburn MD   2.5 mg at 06/30/21 0159    aspirin EC tablet 81 mg  81 mg Oral Daily Melinda Wilburn MD   81 mg at 06/30/21 0810    metFORMIN (GLUCOPHAGE) tablet 500 mg  500 mg Oral BID Melinda Wilburn MD   500 mg at 06/29/21 0945    Vitamin D (CHOLECALCIFEROL) tablet 1,000 Units  1,000 Units Oral Daily Melinda Wilburn MD   1,000 Units at 06/30/21 0812    acetaminophen (TYLENOL) tablet 650 mg  650 mg Oral Q4H PRN Sinan Das MD   650 mg at 06/30/21 0810       Allergies:     Allergies   Allergen Reactions    Penicillins        Problem List:    Patient Active Problem List   Diagnosis Code    Type 2 diabetes mellitus with complication, without long-term current use of insulin ([facility]) E11.8    Bilateral foot pain M79.671, M79.672    Essential hypertension I10    Mixed hyperlipidemia E78.2    Age-related osteoporosis without current pathological fracture M81.0    Primary osteoarthritis involving multiple joints M89.49    Class 1 obesity due to excess calories with serious comorbidity and body mass index (BMI) of 33.0 to 33.9 in adult E66.09, Z68.33    PVD (peripheral vascular disease) ([facility]) I73.9    Ambulatory dysfunction R26.2    Ischemic heart disease I25.9    Visual disturbance H53.9    Syncope and collapse R55    Scalp laceration S01. 01XA    Compression fracture of L2 vertebra ([facility] S32.020A    Compression fracture of body of thoracic vertebra (HCC) S22.000A    Bradycardia R00.1    Recurrent falls R29.6    Syncope R55    Bilateral impacted cerumen H61.23    Pneumonia J18.9    CAP (community acquired pneumonia) due to Pneumococcus ([facility] J13       Past Medical History:        Diagnosis Date    Arthritis     Cataract     Diabetes mellitus ([facility]     Fall from standing 2/17/2020    Injury of left upper arm 2/17/2020    Pneumonia     Syncope and collapse 10/17/2016    Uncontrolled type 2 diabetes mellitus ([facility] 8/17/2010       Past Surgical History:        Procedure Laterality Date    CATARACT REMOVAL WITH IMPLANT      HYSTERECTOMY         Social History:    Social History     Tobacco Use    Smoking status: Never Smoker    Smokeless tobacco: Never Used   Substance Use Topics    Alcohol use: No                                Counseling given: Not Answered      Vital Signs (Current):   Vitals:    06/29/21 2222 06/30/21 0345 06/30/21 0810 06/30/21 0815   BP:  (!) 115/58 139/76 139/76   Pulse:  89  93   Resp:  16  16   Temp: 36.9 °C (98.5 °F) 37.2 °C (98.9 °F)  37.1 °C (98.7 °F)   TempSrc:  Temporal  Temporal   SpO2:  96% 90% 92%   Weight:       Height:                                                  BP Readings from Last 3 Encounters:   06/30/21 139/76   03/15/21 (!) 146/70   03/13/21 (!) 160/76       NPO Status:   RN aware to remain NPO after midnight. BMI:   Wt Readings from Last 3 Encounters:   06/28/21 178 lb 9.2 oz (81 kg)   03/15/21 178 lb (80.7 kg)   03/13/21 172 lb (78 kg)     Body mass index is 32.66 kg/m².     CBC:   Lab Results   Component Value Date    WBC 20.5 06/30/2021    RBC 3.08 06/30/2021    HGB 9.0 06/30/2021    HCT 28.1 06/30/2021    MCV 91.2 06/30/2021    RDW 14.1 06/30/2021     06/30/2021       CMP:   Lab Results   Component Value Date     06/30/2021    K 3.7 06/30/2021    K 3.5 06/28/2021     06/30/2021    CO2 22 06/30/2021    BUN 26 06/30/2021    CREATININE 1.1 06/30/2021    GFRAA 57 06/30/2021    LABGLOM 47 06/30/2021    GLUCOSE 217 06/30/2021    PROT 6.1 06/30/2021    CALCIUM 9.2 06/30/2021    BILITOT 0.3 06/30/2021    ALKPHOS 79 06/30/2021    AST 28 06/30/2021    ALT 25 06/30/2021       POC Tests: No results for input(s): POCGLU, POCNA, POCK, POCCL, POCBUN, POCHEMO, POCHCT in the last 72 hours. Coags:   Lab Results   Component Value Date    PROTIME 10.9 10/04/2013    INR 1.0 10/04/2013    APTT 28.5 10/04/2013       HCG (If Applicable): No results found for: PREGTESTUR, PREGSERUM, HCG, HCGQUANT     ABGs: No results found for: PHART, PO2ART, IAT7KCI, CHS7PHU, BEART, P4CRLMXQ     Type & Screen (If Applicable):  No results found for: LABABO, LABRH    Drug/Infectious Status (If Applicable):  No results found for: HIV, HEPCAB    COVID-19 Screening (If Applicable):   Lab Results   Component Value Date    COVID19 Not Detected 03/05/2021     EKG 6/28/2021  Normal sinus rhythm  Normal ECG  When compared with ECG of 02-MAR-2021 11:36,  Significant changes have occurred  Confirmed by Julieta Doctor ([id number]) on 6/28/2021 11:11:41 AM    ECHO 3/4/2021   Findings      Left Ventricle   Left ventricular internal dimensions were normal in diastole and systole. Borderline concentric left ventricular hypertrophy. No regional wall motion abnormalities seen. Normal left ventricular ejection fraction. Ejection fraction is visually estimated at 65%. There is doppler evidence of stage I diastolic dysfunction. Right Ventricle   Normal right ventricular size and function. Left Atrium   The left atrium is borderline dilated. Interatrial septum appears intact. Right Atrium   Normal right atrium size. Mitral Valve   Structurally normal mitral valve. Mild mitral annular calcification. Mild mitral regurgitation is present. Tricuspid Valve   The tricuspid valve appears structurally normal.      Aortic Valve   The aortic valve appears mildly sclerotic. Pulmonic Valve   The pulmonic valve was not well visualized. Pericardial Effusion   No evidence of pericardial effusion. Aorta   Aortic root dimension within normal limits. Conclusions      Summary   Left ventricular internal dimensions were normal in diastole and systole. Borderline concentric left ventricular hypertrophy. No regional wall motion abnormalities seen. Normal left ventricular ejection fraction. There is doppler evidence of stage I diastolic dysfunction. The left atrium is borderline dilated. Mild mitral annular calcification. Mild mitral regurgitation is present. The aortic valve appears mildly sclerotic. Signature      ----------------------------------------------------------------   Electronically signed by Luis Bingham MD(Interpreting   physician) on 03/04/2021 04:51 PM    CT HEAD 6/28/202 1418  FINDINGS:   BRAIN/VENTRICLES: No mass effect, edema or hemorrhage is seen.    Mild-to-moderate volume loss and chronic microvascular ischemic changes in   the brain are both age-appropriate.  No hydrocephalus or extra-axial fluid is   seen.       ORBITS: The visualized portion of the orbits demonstrate no acute abnormality.       SINUSES: The visualized paranasal sinuses and mastoid air cells demonstrate   no acute abnormality.       SOFT TISSUES/SKULL:  No acute abnormality of the visualized skull or soft   tissues.             CT CERVICAL SPINE WO CONTRAST     Result Date: 6/28/2021  EXAMINATION: CT OF THE CERVICAL SPINE WITHOUT CONTRAST 6/28/2021 8:42 am TECHNIQUE: CT of the cervical spine was performed without the administration of intravenous contrast. Multiplanar reformatted images are provided for review. Dose modulation, iterative reconstruction, and/or weight based adjustment of the mA/kV was utilized to reduce the radiation dose to as low as reasonably achievable. COMPARISON: 03/02/2021 HISTORY: ORDERING SYSTEM PROVIDED HISTORY: found on bathroom floor TECHNOLOGIST PROVIDED HISTORY: Reason for exam:->found on bathroom floor Decision Support Exception - unselect if not a suspected or confirmed emergency medical condition->Emergency Medical Condition (MA) What reading provider will be dictating this exam?->CRC FINDINGS: Some images are mildly degraded by patient motion artifact. No prevertebral edema. Advanced diffuse facet arthritis. Grade 1 anterior spondylolisthesis of C4 on C5, C5 on C6, C6 on C7 and C7 on T1. These findings are similar to previous and likely due to the facet arthritis. The previously described retrolisthesis of C1 on C2 is also similar to previous. The hypertrophic changes contribute to at least mild-moderate multilevel canal and foraminal narrowing. No acute fracture identified. Relatively advanced degenerative changes of the bilateral temporomandibular joints. Scattered vascular calcifications. No focal fluid collection.      Advanced degenerative changes. MRI would be useful if symptoms persist.      XR CHEST PORTABLE     Result Date: 6/28/2021  EXAMINATION: ONE XRAY VIEW OF THE CHEST; THREE XRAY VIEWS OF THE LEFT KNEE 6/28/2021 7:22 am COMPARISON: Chest 2nd March 2021 HISTORY: ORDERING SYSTEM PROVIDED HISTORY: fever TECHNOLOGIST PROVIDED HISTORY: Reason for exam:->fever What reading provider will be dictating this exam?->CRC; ORDERING SYSTEM PROVIDED HISTORY: fall TECHNOLOGIST PROVIDED HISTORY: Reason for exam:->fall What reading provider will be dictating this exam?->CRC FINDINGS: Left knee: Tricompartmental osteoarthritis is overall moderate. No fracture or dislocation. Normal soft tissues.  Chest: A soft tissue density seen centrally abutting the left pulmonary hilum may represent a central neoplasm or pneumonia. Heart and pulmonary vascularity are normal.  Neither costophrenic angle is blunted.      Left knee: Moderate osteoarthritis. Chest: Central left lung neoplasm versus pneumonia. Recommend contrast-enhanced CT if this does not resolve spontaneously after appropriate medical therapy. Anesthesia Evaluation  Patient summary reviewed and Nursing notes reviewed no history of anesthetic complications:   Airway: Mallampati: II  TM distance: >3 FB   Neck ROM: limited  Mouth opening: > = 3 FB Dental:    (+) upper dentures and lower dentures      Pulmonary:   (+) pneumonia: unresolved,      (-) not a current smoker                          ROS comment: -extensive lung mass   Cardiovascular:  Exercise tolerance: poor (<4 METS),   (+) hypertension:, CAD:, dysrhythmias (bradycardia):,       ECG reviewed  Rhythm: regular  Rate: normal  Echocardiogram reviewed         Beta Blocker:  Not on Beta Blocker      ROS comment: Syncope  Ischemic heart disease     Neuro/Psych:                ROS comment: altered mental status confusion  GI/Hepatic/Renal:   (+) liver disease (Metabolic encephalopathy):,      Morbid obesity: BMI 32. Endo/Other:    (+) DiabetesType II DM, , : arthritis: OA., .                  ROS comment: Fall - likely multifactorial    Mediastinal lymph nodes  Adrenal mass     Compression fracture of L2 vertebra Abdominal:             Vascular:   + PVD, aortic or cerebral, . Other Findings:           Anesthesia Plan      general     ASA 3           MIPS: Prophylactic antiemetics administered. Plan discussed with CRNA and attending. Sade Del Angel RN   6/30/2021      Pt seen, examined, chart reviewed, plan discussed.   Mary Davila MD  7/1/2021  1:47 PM

## 2021-06-30 NOTE — CONSULTS
Pulmonary [address] [facility]                             Pulmonary Consult/Progress Note :          Patient: Rob Wilson  MRN: [id number]  : 1935      Date of Admission: .2021  6:38 AM    Consulting Physician:        Reason for Consultation:  CC : SOB and cough   HPI:   Rob Wilson is a 81y.o. year old who never smoke in the past ,presneted with fall and worsening SOB as well    She Presents emergency department and altered mental status with some  confusion after walking on the floor found down by family at home,later she was  complain of R knee pain . Family states the patient has been altered with confusion for the last week. Patient when seen in emergency department was alone without any family and was awake alert oriented and did not present confused. She denies any pain or discomfort. At this time patient will be admitted to [facility] service for community-acquired pneumonia and altered mental status. With right knee pain. PAST MEDICAL HISTORY:   Past Medical History:   Diagnosis Date    Arthritis     Cataract     [facility] mellitus ([facility] [address]     Fall from standing 2020    Injury of left upper arm 2020    Pneumonia     Syncope and collapse 10/17/2016    Uncontrolled type 2 diabetes mellitus ([facility] [address] 2010       PAST SURGICAL HISTORY:   Past Surgical History:   Procedure Laterality Date    CATARACT REMOVAL WITH IMPLANT      HYSTERECTOMY         FAMILY HISTORY:   History reviewed. No pertinent family history. SOCIAL HISTORY:   Social History     Socioeconomic History    Marital status:      Spouse name: Not on file    Number of children: Not on file    Years of education: Not on file    Highest education level: Not on file   Occupational History    Not on file   Tobacco Use    Smoking status: Never Smoker    Smokeless tobacco: Never Used   Substance and Sexual Activity    Alcohol use: No    Drug use:  No  Sexual activity: Not on file   Other Topics Concern    Not on file   Social History Narrative    Not on file     Social Determinants of Health     Financial Resource Strain:     Difficulty of Paying Living Expenses:    Food Insecurity:     Worried About Running Out of Food in the Last Year:     [address] in the Last Year:    Transportation Needs:     Lack of Transportation (Medical):  Lack of Transportation (Non-Medical):    Physical Activity:     Days of Exercise per Week:     Minutes of Exercise per Session:    Stress:     Feeling of Stress :    Social Connections:     Frequency of Communication with Friends and Family:     Frequency of Social Gatherings with Friends and Family:     Attends Druze Services:     Active Member of Clubs or Organizations:     Attends Club or Organization Meetings:     Marital Status:    Intimate Partner Violence:     Fear of Current or Ex-Partner:     Emotionally Abused:     Physically Abused:     Sexually Abused:      Social History     Tobacco Use   Smoking Status Never Smoker   Smokeless Tobacco Never Used     Social History     Substance and Sexual Activity   Alcohol Use No     Social History     Substance and Sexual Activity   Drug Use No       OCCUPATIONAL HISTORY:            HOME MEDICATIONS:  Prior to Admission medications    Medication Sig Start Date End Date Taking?  Authorizing Provider   ibuprofen (ADVIL;MOTRIN) 200 MG tablet Take 200 mg by mouth every 6 hours as needed for Pain   Yes Historical Provider, MD   acetaminophen (TYLENOL) 325 MG tablet Take 650 mg by mouth every 6 hours as needed for Pain   Yes Historical Provider, MD   amLODIPine-atorvastatatin (CADUET) 5-40 MG per tablet Take 1 tablet by mouth daily   Yes Historical Provider, MD   amLODIPine (NORVASC) 2.5 MG tablet TAKE 1 TABLET BY MOUTH TWICE A DAY 5/21/21  Yes Chandan Chamorro, DO   Vitamin D, Cholecalciferol, 25 MCG (1000 UT) TABS TAKE 1 TABLET BY MOUTH EVERY DAY 5/12/21 Yes Chandan Chamorro,    metFORMIN (GLUCOPHAGE) 500 MG tablet TAKE 1 TABLET BY MOUTH TWICE A DAY 5/10/21  Yes Chandan Chamorro, DO   atorvastatin (LIPITOR) 10 MG tablet TAKE 1 TABLET BY MOUTH EVERY DAY 4/26/21  Yes Chandan Chamorro, DO   blood glucose test strips (ACCU-CHEK ARA) strip 1 each by In Vitro route daily As needed. 3/5/21  Yes Montserrat Julio , DO   Accu-Chek FastClix Lancets MISC 1 each by Does not apply route daily 3/5/21  Yes Montserrat Spangler DO   dorzolamide (TRUSOPT) 2 % ophthalmic solution Place 1 drop into the left eye 3 times daily 3/5/21  Yes Montserrat Spangler DO   brimonidine (ALPHAGAN) 0.2 % ophthalmic solution Place 1 drop into the left eye 2 times daily  6/20/20  Yes Historical Provider, MD   prednisoLONE acetate (PRED FORTE) 1 % ophthalmic suspension Place 1 drop into the left eye 3 times daily  8/30/19  Yes Historical Provider, MD   aspirin 81 MG tablet Take 81 mg by mouth daily.    Yes Historical Provider, MD       CURRENT MEDICATIONS:  Current Facility-Administered Medications: cefTRIAXone (ROCEPHIN) 1,000 mg in sterile water 10 mL IV syringe, 1,000 mg, Intravenous, Q24H  prednisoLONE acetate (PRED FORTE) 1 % ophthalmic suspension 1 drop, 1 drop, Left Eye, TID  brimonidine (ALPHAGAN) 0.2 % ophthalmic solution 1 drop, 1 drop, Left Eye, BID  dorzolamide (TRUSOPT) 2 % ophthalmic solution 1 drop, 1 drop, Left Eye, TID  atorvastatin (LIPITOR) tablet 10 mg, 10 mg, Oral, Daily  amLODIPine (NORVASC) tablet 2.5 mg, 2.5 mg, Oral, BID  aspirin EC tablet 81 mg, 81 mg, Oral, Daily  metFORMIN (GLUCOPHAGE) tablet 500 mg, 500 mg, Oral, BID  Vitamin D (CHOLECALCIFEROL) tablet 1,000 Units, 1,000 Units, Oral, Daily  acetaminophen (TYLENOL) tablet 650 mg, 650 mg, Oral, Q4H PRN    IV MEDICATIONS:      ALLERGIES:  Allergies   Allergen Reactions    Penicillins        REVIEW OF SYSTEMS:  General ROS:  No weight loss ,no fatigue     ENT ROS:   No Sore throat ,no lymphoadenopathy,no nasal stuffiness Hematological and Lymphatic ROS:   No ecchymosis ,no tendency to bleed  Respiratory ROS:    SOB  Cardiovascular ROS:   No CP,No Palpitation   Gastrointestinal ROS:   No Gi bleed,no nausea or vomiting      - Musculoskeletal ROS:      - no joint swelling ,no joint pain   Neurological ROS:     -no weakness or numbness    Dermatological ROS:   No skin rash ,no urticaria     PHYSICAL EXAMINATION:     VITAL SIGNS:  /76   Pulse 93   Temp 98.7 °F (37.1 °C) (Temporal)   Resp 16   Ht 5' 2\" (1.575 m)   Wt 178 lb 9.2 oz (81 kg)   SpO2 92%   BMI 32.66 kg/m²   Wt Readings from Last 3 Encounters:   06/28/21 178 lb 9.2 oz (81 kg)   03/15/21 178 lb (80.7 kg)   03/13/21 172 lb (78 kg)     Temp Readings from Last 3 Encounters:   06/30/21 98.7 °F (37.1 °C) (Temporal)   03/15/21 97.8 °F (36.6 °C)   03/13/21 97.1 °F (36.2 °C) (Infrared)     TMAX:  BP Readings from Last 3 Encounters:   06/30/21 139/76   03/15/21 (!) 146/70   03/13/21 (!) 160/76     Pulse Readings from Last 3 Encounters:   06/30/21 93   03/15/21 75   03/13/21 83           INTAKE/OUTPUTS:  I/O last 3 completed shifts:  In: -   Out: 1050 [Urine:1050]    Intake/Output Summary (Last 24 hours) at 6/30/2021 1142  Last data filed at 6/30/2021 0813  Gross per 24 hour   Intake --   Output 1050 ml   Net -1050 ml       General Appearance: alert and oriented to person, place and time, well-developed and   well-nourished, in no acute distress   Eyes: pupils equal, round, and reactive to light, extraocular eye movements intact, conjunctivae normal and sclera anicteric   Neck: neck supple and non tender without mass, no thyromegaly, no thyroid nodules and no cervical adenopathy   Pulmonary/Chest:Rhonchi *   Cardiovascular: normal rate, regular rhythm, normal S1 and S2, no murmurs, rubs, clicks or gallops, distal pulses intact, no carotid bruits, no murmurs, no gallops, no carotid bruits and no JVD   Abdomen: obese, soft, non-tender, non-distended, normal bowel sounds, no masses or organomegaly   Extremities:mild edema*  Musculoskeletal: normal range of motion, no joint swelling, deformity or tenderness   Neurologic: reflexes normal and symmetric, no cranial nerve deficit noted    LABS/IMAGING:    CBC:  Lab Results   Component Value Date    WBC 20.5 (H) 06/30/2021    HGB 9.0 (L) 06/30/2021    HCT 28.1 (L) 06/30/2021    MCV 91.2 06/30/2021     06/30/2021    LYMPHOPCT 1.7 (L) 06/28/2021    RBC 3.08 (L) 06/30/2021    MCH 29.2 06/30/2021    MCHC 32.0 06/30/2021    RDW 14.1 06/30/2021    NEUTOPHILPCT 95.7 (H) 06/28/2021    MONOPCT 2.6 06/28/2021    BASOPCT 0.2 06/28/2021    NEUTROABS 14.50 (H) 06/28/2021    LYMPHSABS 0.30 (L) 06/28/2021    MONOSABS 0.45 06/28/2021    EOSABS 0.00 (L) 06/28/2021    BASOSABS 0.00 06/28/2021       Recent Labs     06/30/21  0620 06/28/21  0718   WBC 20.5* 15.1*   HGB 9.0* 9.4*   HCT 28.1* 29.7*   MCV 91.2 93.7    256       BMP:   Recent Labs     06/28/21  0718 06/30/21  0620    133   K 3.5 3.7    100   CO2 20* 22   BUN 23 26*   CREATININE 1.3* 1.1*       MG:   Lab Results   Component Value Date    MG 1.5 06/28/2021     Ca/Phos:   Lab Results   Component Value Date    CALCIUM 9.2 06/30/2021     Amylase: No results found for: AMYLASE  Lipase: No results found for: LIPASE  LIVER PROFILE:   Recent Labs     06/28/21  0718 06/30/21  0620   AST 28 28   ALT 17 25   BILITOT 0.7 0.3   ALKPHOS 57 79       PT/INR: No results for input(s): PROTIME, INR in the last 72 hours. APTT: No results for input(s): APTT in the last 72 hours.     Cardiac Enzymes:  Lab Results   Component Value Date    CKTOTAL 179 03/02/2021    TROPONINI <0.01 03/02/2021               MICROBIOLOGY:      CXR:    PFTs:    2D Echocardiogram:    CT Chest:    PROBLEM LIST:  Patient Active Problem List   Diagnosis    Type 2 diabetes mellitus with complication, without long-term current use of insulin (HCC)    Bilateral foot pain    Essential hypertension    Mixed hyperlipidemia

## 2021-06-30 NOTE — CONSULTS
Department of Internal Medicine  Infectious Diseases   Consult Note      Reason for Consult:  Pneumonia, Leukocytosis       Requesting Physician:  Dr Bhardwaj Brothers:              Pt is poor historian . This is an 81 yrs old female with hx of DM, Cataract presented to the ER with recurrent falls and weakness - she denied headache , nausea , vomiting . She was febrile, temp of 101 F   She denied chest pain, reported SOB . WBC was 15-20 K , procalcitonin 3.81  CT chest showed about  9.1 x 4.9 x 5.5 cm masslike consolidative opacity in the left upper   Lobe. Pt ws started on IV rocephin       Past Medical History:      Past Medical History:   Diagnosis Date    Arthritis     Cataract     [facility] mellitus ([facility] [address]     Fall from standing 2/17/2020    Injury of left upper arm 2/17/2020    Pneumonia     Syncope and collapse 10/17/2016    Uncontrolled type 2 diabetes mellitus ([facility] [address] 8/17/2010         Past Surgical History:      Past Surgical History:   Procedure Laterality Date    CATARACT REMOVAL WITH IMPLANT      HYSTERECTOMY           Current Medications:      Current Facility-Administered Medications   Medication Dose Route Frequency Provider Last Rate Last Admin    cefTRIAXone (ROCEPHIN) 1,000 mg in sterile water 10 mL IV syringe  1,000 mg Intravenous Q24H Ankita Cunningham MD   1,000 mg at 06/29/21 1846    prednisoLONE acetate (PRED FORTE) 1 % ophthalmic suspension 1 drop  1 drop Left Eye TID Laura Solorzano MD   1 drop at 06/30/21 1348    brimonidine (ALPHAGAN) 0.2 % ophthalmic solution 1 drop  1 drop Left Eye BID Laura Solorzano MD   1 drop at 06/30/21 0815    dorzolamide (TRUSOPT) 2 % ophthalmic solution 1 drop  1 drop Left Eye TID Laura Solorzano MD   1 drop at 06/30/21 1348    atorvastatin (LIPITOR) tablet 10 mg  10 mg Oral Daily Laura Solorzano MD   10 mg at 06/30/21 0812    amLODIPine (NORVASC) tablet 2.5 mg  2.5 mg Oral BID Laura Solorzano MD   2.5 mg at 06/30/21 7354    aspirin EC tablet 81 mg  81 mg Oral Daily Melinda Wilburn MD   81 mg at 06/30/21 0810    metFORMIN (GLUCOPHAGE) tablet 500 mg  500 mg Oral BID Melinda Wilburn MD   500 mg at 06/29/21 0945    Vitamin D (CHOLECALCIFEROL) tablet 1,000 Units  1,000 Units Oral Daily Melinda Wilburn MD   1,000 Units at 06/30/21 0606    acetaminophen (TYLENOL) tablet 650 mg  650 mg Oral Q4H PRN Sinan Das MD   650 mg at 06/30/21 1346       Allergies:  Penicillins    Social History:      Social History     Socioeconomic History    Marital status:      Spouse name: Not on file    Number of children: Not on file    Years of education: Not on file    Highest education level: Not on file   Occupational History    Not on file   Tobacco Use    Smoking status: Never Smoker    Smokeless tobacco: Never Used   Substance and Sexual Activity    Alcohol use: No    Drug use: No    Sexual activity: Not on file   Other Topics Concern    Not on file   Social History Narrative    Not on file     Social Determinants of Health     Financial Resource Strain:     Difficulty of Paying Living Expenses:    Food Insecurity:     Worried About Running Out of Food in the Last Year:     [address] in the Last Year:    Transportation Needs:     Lack of Transportation (Medical):      Lack of Transportation (Non-Medical):    Physical Activity:     Days of Exercise per Week:     Minutes of Exercise per Session:    Stress:     Feeling of Stress :    Social Connections:     Frequency of Communication with Friends and Family:     Frequency of Social Gatherings with Friends and Family:     Attends Pentecostal Services:     Active Member of Clubs or Organizations:     Attends Club or Organization Meetings:     Marital Status:    Intimate Partner Violence:     Fear of Current or Ex-Partner:     Emotionally Abused:     Physically Abused:     Sexually Abused:          Family History:     Not pertinent to present illness     REVIEW OF SYSTEMS: CONSTITUTIONAL: had fever   HEENT: denies blurring of vision or double vision, denies hearing problem  RESPIRATORY: SOB   CARDIOVASCULAR:  Denies palpitation  GASTROINTESTINAL:  Denies abdomen pain, diarrhea or constipation. GENITOURINARY:  Denies burning urination or frequency of urination  INTEGUMENT: denies wound , rash  HEMATOLOGIC/LYMPHATIC:  Denies lymph node swelling, gum bleeding or easy bruising. MUSCULOSKELETAL:  Denies leg pain , joint pain , joint swelling  NEUROLOGICAL:  Falls, weakness       PHYSICAL EXAM:      Vitals:     Vitals:    06/30/21 0815   BP: 139/76   Pulse: 93   Resp: 16   Temp: 98.7 °F (37.1 °C)   SpO2: 92%       General Appearance:    Awake, alert , no acute distress. Head:    Normocephalic, atraumatic   Eyes:    No pallor, no icterus,   Ears:    No obvious deformity or drainage.    Nose:   No nasal drainage   Throat:   Dry oral mucosa    Neck:   Supple, no lymphadenopathy   Lungs:     Crackles and rhonchi left lung    Heart:    Regular rate and rhythm, no murmur   Abdomen:     Soft, non-tender, bowel sounds present    Extremities:   No edema, no cyanosis   Pulses:   Dorsalis pedis palpable    Skin:   no rashes or lesions     CBC with Differential:      Lab Results   Component Value Date    WBC 20.5 06/30/2021    RBC 3.08 06/30/2021    HGB 9.0 06/30/2021    HCT 28.1 06/30/2021     06/30/2021    MCV 91.2 06/30/2021    MCH 29.2 06/30/2021    MCHC 32.0 06/30/2021    RDW 14.1 06/30/2021    LYMPHOPCT 1.7 06/28/2021    MONOPCT 2.6 06/28/2021    BASOPCT 0.2 06/28/2021    MONOSABS 0.45 06/28/2021    LYMPHSABS 0.30 06/28/2021    EOSABS 0.00 06/28/2021    BASOSABS 0.00 06/28/2021       CMP     Lab Results   Component Value Date     06/30/2021    K 3.7 06/30/2021    K 3.5 06/28/2021     06/30/2021    CO2 22 06/30/2021    BUN 26 06/30/2021    CREATININE 1.1 06/30/2021    GFRAA 57 06/30/2021    LABGLOM 47 06/30/2021    GLUCOSE 217 06/30/2021    PROT 6.1 06/30/2021    LABALBU 2.5 06/30/2021    CALCIUM 9.2 06/30/2021    BILITOT 0.3 06/30/2021    ALKPHOS 79 06/30/2021    AST 28 06/30/2021    ALT 25 06/30/2021         Hepatic Function Panel:    Lab Results   Component Value Date    ALKPHOS 79 06/30/2021    ALT 25 06/30/2021    AST 28 06/30/2021    PROT 6.1 06/30/2021    BILITOT 0.3 06/30/2021    BILIDIR 0.2 10/04/2013    LABALBU 2.5 06/30/2021       PT/INR:    Lab Results   Component Value Date    PROTIME 10.9 10/04/2013    INR 1.0 10/04/2013       TSH:    Lab Results   Component Value Date    TSH 1.600 02/17/2020       U/A:    Lab Results   Component Value Date    COLORU Yellow 06/28/2021    PHUR 6.0 06/28/2021    WBCUA 0-1 06/28/2021    RBCUA 1-3 06/28/2021    BACTERIA MANY 06/28/2021    CLARITYU SL CLOUDY 06/28/2021    SPECGRAV >=1.030 06/28/2021    LEUKOCYTESUR Negative 06/28/2021    UROBILINOGEN 1.0 06/28/2021    BILIRUBINUR Negative 06/28/2021    BLOODU LARGE 06/28/2021    GLUCOSEU 500 06/28/2021       ABG:  No results found for: ILI5ITP, BEART, Z6DBFPLT, PHART, THGBART, GIG6RIT, PO2ART, MPA6CGP    MICROBIOLOGY:    Blood culture - neg to date       Radiology :    Chest X ray    CT scan of chest -  1. Large, 9.1 x 4.9 x 5.5 cm masslike consolidative opacity in the left upper   lobe, abutting the mediastinal pleural reflection.  The opacity may be   infectious, inflammatory or neoplastic in etiology. 2. Mildly prominent mediastinal lymph nodes, which may be reactive or   metastatic.   3. 1.9 x 1.4 cm nodule in the body of the left adrenal gland of unclear   etiology. 4. Age indeterminate anterior wedge compression deformity of T12.   5. Sclerosis along the superior endplate of the T3 vertebral body of unclear   etiology. 6. Especially if the biopsy proves the left upper lobe opacity to be   neoplastic in etiology, PET scan would be recommended for further evaluation       IMPRESSION:       1. Left lung mass   2.  Pneumonia, leukocytosis, elevated procalcitonin     RECOMMENDATIONS: 1. IV rocephin to Meropenem 1 gram IV q 12 hrs    2. Bronchoscopy - check cx   3.  CBC with diff    Thank you Dr Roopa Rincon for the consult

## 2021-06-30 NOTE — PROGRESS NOTES
Pt with burst of AFIB  Strip signed and placed in pt chart  Pt is running Normal Sinus now  MD notified via [address] [place] [address]

## 2021-06-30 NOTE — PROGRESS NOTES
Physical Therapy    PT order received and medical chart reviewed 6/30 PM. Attempted PT evaluation however other medical staff were assessing/treating patient. Will re-attempt at a later time/date. Thank you.     Alissa Razo, PT, DPT  [id number]

## 2021-06-30 NOTE — PROGRESS NOTES
Hospitalist Progress Note      Synopsis: Patient admitted on 6/28/2021 for a suspected mechanical fall in the setting of AMS and hypotension. Imaging concerning for community acquired pneumonia and extensive lung mass, now on IV antibiotics with pulmonology consult. Subjective    Pt lacks insight  More confused today   Refused to work with therapy yesterday     Exam:  /76   Pulse 93   Temp 98.7 °F (37.1 °C) (Temporal)   Resp 16   Ht 5' 2\" (1.575 m)   Wt 178 lb 9.2 oz (81 kg)   SpO2 92%   BMI 32.66 kg/m²   General appearance: No apparent distress, appears stated age and cooperative. Obese   HEENT: Pupils equal, round, and reactive to light. Conjunctivae/corneas clear. Neck: Supple. No jugular venous distention. Trachea midline. Respiratory:  Normal respiratory effort. Clear to auscultation, bilaterally without Rales/Wheezes/Rhonchi. Cardiovascular: Regular rate and rhythm with normal S1/S2 without murmurs, rubs or gallops. Abdomen: Soft, non-tender, non-distended with normal bowel sounds. Musculoskeletal: No clubbing, cyanosis or edema bilaterally. Brisk capillary refill. 2+ lower extremity pulses (dorsalis pedis).    Skin:  No rashes    Neurologic: awake, alert and following commands; oriented only to self    Medications:  Reviewed    Infusion Medications   Scheduled Medications    meropenem  1,000 mg Intravenous Q12H    prednisoLONE acetate  1 drop Left Eye TID    brimonidine  1 drop Left Eye BID    dorzolamide  1 drop Left Eye TID    atorvastatin  10 mg Oral Daily    amLODIPine  2.5 mg Oral BID    aspirin  81 mg Oral Daily    metFORMIN  500 mg Oral BID    Vitamin D  1,000 Units Oral Daily     PRN Meds: acetaminophen    I/O    Intake/Output Summary (Last 24 hours) at 6/30/2021 1628  Last data filed at 6/30/2021 0639  Gross per 24 hour   Intake --   Output 650 ml   Net -650 ml       Labs:   Recent Labs     06/28/21  0718 06/30/21  0620   WBC 15.1* 20.5*   HGB 9.4* 9.0*   HCT 29.7* 28.1*    306       Recent Labs     06/28/21  0718 06/30/21  0620    133   K 3.5 3.7    100   CO2 20* 22   BUN 23 26*   CREATININE 1.3* 1.1*   CALCIUM 8.9 9.2       Recent Labs     06/28/21  0718 06/30/21  0620   PROT 6.6 6.1*   ALKPHOS 57 79   ALT 17 25   AST 28 28   BILITOT 0.7 0.3       No results for input(s): INR in the last 72 hours. No results for input(s): [place] in the last 72 hours. Chronic labs:  Lab Results   Component Value Date    CHOL 164 03/02/2021    TRIG 50 03/02/2021    HDL 77 03/02/2021    LDLCALC 77 03/02/2021    TSH 1.600 02/17/2020    INR 1.0 10/04/2013    LABA1C 7.1 (H) 03/02/2021       Radiology:  Imaging studies reviewed today. ASSESSMENT:  Fall - likely multifactorial  Recent Community acquired pneumonia  Metabolic encephalopathy  Lung mass   Mediastinal lymph nodes  Adrenal mass   HTN  DM2  HLD  Obesity  Paroxysmal atrial fibrillation       PLAN:  Orthostatics  PT/OT  Continue home meds as ordered  Rocephin switched to meropenem per ID  Pulmonary consulted given lung mass on CT imaging of chest; considering bronch in am   Encourage oral intake   Strict I/O  ECHO ordered and pending    Diet: ADULT DIET; Regular; 3 carb choices (45 gm/meal)  Code Status: Prior  PT/OT Eval Status:   ordered  DVT Prophylaxis:   scd  Recommended disposition at discharge:  pending pt/ot eval     +++++++++++++++++++++++++++++++++++++++++++++++++  Ankita Castillo Chi, MD   [facility].  +++++++++++++++++++++++++++++++++++++++++++++++++  NOTE: This report was transcribed using voice recognition software. Every effort was made to ensure accuracy; however, inadvertent computerized transcription errors may be present.

## 2021-07-01 ENCOUNTER — ANESTHESIA (OUTPATIENT)
Dept: ENDOSCOPY | Age: 86
DRG: 871 | End: 2021-07-01
Payer: MEDICARE

## 2021-07-01 VITALS
DIASTOLIC BLOOD PRESSURE: 65 MMHG | RESPIRATION RATE: 35 BRPM | SYSTOLIC BLOOD PRESSURE: 152 MMHG | OXYGEN SATURATION: 90 %

## 2021-07-01 LAB
ALBUMIN SERPL-MCNC: 2.3 G/DL (ref 3.5–5.2)
ALP BLD-CCNC: 79 U/L (ref 35–104)
ALT SERPL-CCNC: 24 U/L (ref 0–32)
ANION GAP SERPL CALCULATED.3IONS-SCNC: 9 MMOL/L (ref 7–16)
AST SERPL-CCNC: 22 U/L (ref 0–31)
BILIRUB SERPL-MCNC: 0.3 MG/DL (ref 0–1.2)
BUN BLDV-MCNC: 25 MG/DL (ref 6–23)
CALCIUM SERPL-MCNC: 9.1 MG/DL (ref 8.6–10.2)
CHLORIDE BLD-SCNC: 104 MMOL/L (ref 98–107)
CO2: 26 MMOL/L (ref 22–29)
CREAT SERPL-MCNC: 1.1 MG/DL (ref 0.5–1)
GFR AFRICAN AMERICAN: 57
GFR NON-AFRICAN AMERICAN: 47 ML/MIN/1.73
GLUCOSE BLD-MCNC: 212 MG/DL (ref 74–99)
HCT VFR BLD CALC: 28.2 % (ref 34–48)
HEMOGLOBIN: 9 G/DL (ref 11.5–15.5)
MCH RBC QN AUTO: 29.3 PG (ref 26–35)
MCHC RBC AUTO-ENTMCNC: 31.9 % (ref 32–34.5)
MCV RBC AUTO: 91.9 FL (ref 80–99.9)
PDW BLD-RTO: 14.3 FL (ref 11.5–15)
PLATELET # BLD: 329 E9/L (ref 130–450)
PMV BLD AUTO: 9.6 FL (ref 7–12)
POTASSIUM SERPL-SCNC: 3.6 MMOL/L (ref 3.5–5)
PROCALCITONIN: 2.35 NG/ML (ref 0–0.08)
RBC # BLD: 3.07 E12/L (ref 3.5–5.5)
SODIUM BLD-SCNC: 139 MMOL/L (ref 132–146)
TOTAL PROTEIN: 6.1 G/DL (ref 6.4–8.3)
WBC # BLD: 15.1 E9/L (ref 4.5–11.5)

## 2021-07-01 PROCEDURE — 87206 SMEAR FLUORESCENT/ACID STAI: CPT

## 2021-07-01 PROCEDURE — 97162 PT EVAL MOD COMPLEX 30 MIN: CPT

## 2021-07-01 PROCEDURE — 87070 CULTURE OTHR SPECIMN AEROBIC: CPT

## 2021-07-01 PROCEDURE — 3700000000 HC ANESTHESIA ATTENDED CARE: Performed by: INTERNAL MEDICINE

## 2021-07-01 PROCEDURE — 80053 COMPREHEN METABOLIC PANEL: CPT

## 2021-07-01 PROCEDURE — 31624 DX BRONCHOSCOPE/LAVAGE: CPT | Performed by: INTERNAL MEDICINE

## 2021-07-01 PROCEDURE — 85027 COMPLETE CBC AUTOMATED: CPT

## 2021-07-01 PROCEDURE — 6360000002 HC RX W HCPCS: Performed by: INTERNAL MEDICINE

## 2021-07-01 PROCEDURE — 2580000003 HC RX 258: Performed by: INTERNAL MEDICINE

## 2021-07-01 PROCEDURE — 6360000002 HC RX W HCPCS: Performed by: NURSE ANESTHETIST, CERTIFIED REGISTERED

## 2021-07-01 PROCEDURE — 87116 MYCOBACTERIA CULTURE: CPT

## 2021-07-01 PROCEDURE — 2060000000 HC ICU INTERMEDIATE R&B

## 2021-07-01 PROCEDURE — 0B9G8ZX DRAINAGE OF LEFT UPPER LUNG LOBE, VIA NATURAL OR ARTIFICIAL OPENING ENDOSCOPIC, DIAGNOSTIC: ICD-10-PCS | Performed by: INTERNAL MEDICINE

## 2021-07-01 PROCEDURE — 7100000000 HC PACU RECOVERY - FIRST 15 MIN: Performed by: INTERNAL MEDICINE

## 2021-07-01 PROCEDURE — 87205 SMEAR GRAM STAIN: CPT

## 2021-07-01 PROCEDURE — 36415 COLL VENOUS BLD VENIPUNCTURE: CPT

## 2021-07-01 PROCEDURE — 2500000003 HC RX 250 WO HCPCS: Performed by: NURSE ANESTHETIST, CERTIFIED REGISTERED

## 2021-07-01 PROCEDURE — 84145 PROCALCITONIN (PCT): CPT

## 2021-07-01 PROCEDURE — 3700000001 HC ADD 15 MINUTES (ANESTHESIA): Performed by: INTERNAL MEDICINE

## 2021-07-01 PROCEDURE — 6370000000 HC RX 637 (ALT 250 FOR IP): Performed by: HOSPITALIST

## 2021-07-01 PROCEDURE — 97530 THERAPEUTIC ACTIVITIES: CPT

## 2021-07-01 PROCEDURE — 7100000001 HC PACU RECOVERY - ADDTL 15 MIN: Performed by: INTERNAL MEDICINE

## 2021-07-01 PROCEDURE — 88112 CYTOPATH CELL ENHANCE TECH: CPT

## 2021-07-01 PROCEDURE — 94640 AIRWAY INHALATION TREATMENT: CPT

## 2021-07-01 PROCEDURE — 87015 SPECIMEN INFECT AGNT CONCNTJ: CPT

## 2021-07-01 PROCEDURE — 99233 SBSQ HOSP IP/OBS HIGH 50: CPT | Performed by: INTERNAL MEDICINE

## 2021-07-01 PROCEDURE — 2700000000 HC OXYGEN THERAPY PER DAY

## 2021-07-01 PROCEDURE — 89051 BODY FLUID CELL COUNT: CPT

## 2021-07-01 PROCEDURE — 2580000003 HC RX 258: Performed by: NURSE ANESTHETIST, CERTIFIED REGISTERED

## 2021-07-01 PROCEDURE — 3609010800 HC BRONCHOSCOPY ALVEOLAR LAVAGE: Performed by: INTERNAL MEDICINE

## 2021-07-01 PROCEDURE — 87102 FUNGUS ISOLATION CULTURE: CPT

## 2021-07-01 PROCEDURE — 6370000000 HC RX 637 (ALT 250 FOR IP): Performed by: FAMILY MEDICINE

## 2021-07-01 PROCEDURE — 2709999900 HC NON-CHARGEABLE SUPPLY: Performed by: INTERNAL MEDICINE

## 2021-07-01 PROCEDURE — 6370000000 HC RX 637 (ALT 250 FOR IP): Performed by: ANESTHESIOLOGY

## 2021-07-01 RX ORDER — MEPERIDINE HYDROCHLORIDE 25 MG/ML
12.5 INJECTION INTRAMUSCULAR; INTRAVENOUS; SUBCUTANEOUS EVERY 5 MIN PRN
Status: DISCONTINUED | OUTPATIENT
Start: 2021-07-01 | End: 2021-07-01 | Stop reason: HOSPADM

## 2021-07-01 RX ORDER — PROPOFOL 10 MG/ML
INJECTION, EMULSION INTRAVENOUS PRN
Status: DISCONTINUED | OUTPATIENT
Start: 2021-07-01 | End: 2021-07-01 | Stop reason: SDUPTHER

## 2021-07-01 RX ORDER — MORPHINE SULFATE 2 MG/ML
2 INJECTION, SOLUTION INTRAMUSCULAR; INTRAVENOUS EVERY 5 MIN PRN
Status: DISCONTINUED | OUTPATIENT
Start: 2021-07-01 | End: 2021-07-01 | Stop reason: HOSPADM

## 2021-07-01 RX ORDER — PROMETHAZINE HYDROCHLORIDE 25 MG/ML
6.25 INJECTION, SOLUTION INTRAMUSCULAR; INTRAVENOUS EVERY 10 MIN PRN
Status: DISCONTINUED | OUTPATIENT
Start: 2021-07-01 | End: 2021-07-01 | Stop reason: HOSPADM

## 2021-07-01 RX ORDER — HYDROCODONE BITARTRATE AND ACETAMINOPHEN 5; 325 MG/1; MG/1
1 TABLET ORAL PRN
Status: DISCONTINUED | OUTPATIENT
Start: 2021-07-01 | End: 2021-07-01 | Stop reason: HOSPADM

## 2021-07-01 RX ORDER — ONDANSETRON 2 MG/ML
INJECTION INTRAMUSCULAR; INTRAVENOUS PRN
Status: DISCONTINUED | OUTPATIENT
Start: 2021-07-01 | End: 2021-07-01 | Stop reason: SDUPTHER

## 2021-07-01 RX ORDER — HYDROCODONE BITARTRATE AND ACETAMINOPHEN 5; 325 MG/1; MG/1
2 TABLET ORAL PRN
Status: DISCONTINUED | OUTPATIENT
Start: 2021-07-01 | End: 2021-07-01 | Stop reason: HOSPADM

## 2021-07-01 RX ORDER — DEXAMETHASONE SODIUM PHOSPHATE 10 MG/ML
INJECTION, SOLUTION INTRAMUSCULAR; INTRAVENOUS PRN
Status: DISCONTINUED | OUTPATIENT
Start: 2021-07-01 | End: 2021-07-01 | Stop reason: SDUPTHER

## 2021-07-01 RX ORDER — MORPHINE SULFATE 2 MG/ML
1 INJECTION, SOLUTION INTRAMUSCULAR; INTRAVENOUS EVERY 5 MIN PRN
Status: DISCONTINUED | OUTPATIENT
Start: 2021-07-01 | End: 2021-07-01 | Stop reason: HOSPADM

## 2021-07-01 RX ORDER — SODIUM CHLORIDE 9 MG/ML
INJECTION, SOLUTION INTRAVENOUS CONTINUOUS PRN
Status: DISCONTINUED | OUTPATIENT
Start: 2021-07-01 | End: 2021-07-01 | Stop reason: SDUPTHER

## 2021-07-01 RX ORDER — IPRATROPIUM BROMIDE AND ALBUTEROL SULFATE 2.5; .5 MG/3ML; MG/3ML
1 SOLUTION RESPIRATORY (INHALATION)
Status: COMPLETED | OUTPATIENT
Start: 2021-07-01 | End: 2021-07-01

## 2021-07-01 RX ORDER — SUCCINYLCHOLINE/SOD CL,ISO/PF 200MG/10ML
SYRINGE (ML) INTRAVENOUS PRN
Status: DISCONTINUED | OUTPATIENT
Start: 2021-07-01 | End: 2021-07-01 | Stop reason: SDUPTHER

## 2021-07-01 RX ORDER — LIDOCAINE HYDROCHLORIDE 20 MG/ML
INJECTION, SOLUTION INTRAVENOUS PRN
Status: DISCONTINUED | OUTPATIENT
Start: 2021-07-01 | End: 2021-07-01 | Stop reason: SDUPTHER

## 2021-07-01 RX ADMIN — DORZOLAMIDE HYDROCHLORIDE 1 DROP: 20 SOLUTION/ DROPS OPHTHALMIC at 09:00

## 2021-07-01 RX ADMIN — BRIMONIDINE TARTRATE 1 DROP: 2 SOLUTION OPHTHALMIC at 20:39

## 2021-07-01 RX ADMIN — PROPOFOL 200 MG: 10 INJECTION, EMULSION INTRAVENOUS at 14:22

## 2021-07-01 RX ADMIN — DORZOLAMIDE HYDROCHLORIDE 1 DROP: 20 SOLUTION/ DROPS OPHTHALMIC at 20:39

## 2021-07-01 RX ADMIN — ONDANSETRON HYDROCHLORIDE 4 MG: 2 INJECTION, SOLUTION INTRAMUSCULAR; INTRAVENOUS at 14:26

## 2021-07-01 RX ADMIN — METFORMIN HYDROCHLORIDE 500 MG: 500 TABLET ORAL at 20:38

## 2021-07-01 RX ADMIN — Medication 120 MG: at 14:22

## 2021-07-01 RX ADMIN — DEXAMETHASONE SODIUM PHOSPHATE 10 MG: 10 INJECTION INTRAMUSCULAR; INTRAVENOUS at 14:26

## 2021-07-01 RX ADMIN — AMLODIPINE BESYLATE 2.5 MG: 2.5 TABLET ORAL at 20:38

## 2021-07-01 RX ADMIN — PREDNISOLONE ACETATE 1 DROP: 10 SUSPENSION/ DROPS OPHTHALMIC at 09:00

## 2021-07-01 RX ADMIN — BRIMONIDINE TARTRATE 1 DROP: 2 SOLUTION OPHTHALMIC at 09:00

## 2021-07-01 RX ADMIN — PREDNISOLONE ACETATE 1 DROP: 10 SUSPENSION/ DROPS OPHTHALMIC at 20:39

## 2021-07-01 RX ADMIN — MEROPENEM 1000 MG: 1 INJECTION, POWDER, FOR SOLUTION INTRAVENOUS at 17:08

## 2021-07-01 RX ADMIN — IPRATROPIUM BROMIDE AND ALBUTEROL SULFATE 1 AMPULE: .5; 2.5 SOLUTION RESPIRATORY (INHALATION) at 15:08

## 2021-07-01 RX ADMIN — LIDOCAINE HYDROCHLORIDE 80 MG: 20 INJECTION, SOLUTION INTRAVENOUS at 14:22

## 2021-07-01 RX ADMIN — MEROPENEM 1000 MG: 1 INJECTION, POWDER, FOR SOLUTION INTRAVENOUS at 03:36

## 2021-07-01 RX ADMIN — ACETAMINOPHEN 650 MG: 325 TABLET ORAL at 20:31

## 2021-07-01 RX ADMIN — SODIUM CHLORIDE: 9 INJECTION, SOLUTION INTRAVENOUS at 14:12

## 2021-07-01 ASSESSMENT — PULMONARY FUNCTION TESTS
PIF_VALUE: 0
PIF_VALUE: 0
PIF_VALUE: 25
PIF_VALUE: 22
PIF_VALUE: 22
PIF_VALUE: 23
PIF_VALUE: 0
PIF_VALUE: 1
PIF_VALUE: 25
PIF_VALUE: 0
PIF_VALUE: 15
PIF_VALUE: 22
PIF_VALUE: 25
PIF_VALUE: 0
PIF_VALUE: 2
PIF_VALUE: 20
PIF_VALUE: 2
PIF_VALUE: 0
PIF_VALUE: 20
PIF_VALUE: 22
PIF_VALUE: 2
PIF_VALUE: 23
PIF_VALUE: 13
PIF_VALUE: 21
PIF_VALUE: 23
PIF_VALUE: 1
PIF_VALUE: 5
PIF_VALUE: 0
PIF_VALUE: 22
PIF_VALUE: 0
PIF_VALUE: 23
PIF_VALUE: 0
PIF_VALUE: 1
PIF_VALUE: 20

## 2021-07-01 ASSESSMENT — PAIN SCALES - GENERAL
PAINLEVEL_OUTOF10: 0
PAINLEVEL_OUTOF10: 6

## 2021-07-01 ASSESSMENT — PAIN DESCRIPTION - PROGRESSION: CLINICAL_PROGRESSION: NOT CHANGED

## 2021-07-01 ASSESSMENT — PAIN DESCRIPTION - PAIN TYPE: TYPE: ACUTE PAIN;CHRONIC PAIN

## 2021-07-01 ASSESSMENT — PAIN DESCRIPTION - ORIENTATION: ORIENTATION: RIGHT

## 2021-07-01 ASSESSMENT — PAIN DESCRIPTION - DESCRIPTORS: DESCRIPTORS: CONSTANT

## 2021-07-01 ASSESSMENT — PAIN DESCRIPTION - LOCATION: LOCATION: KNEE

## 2021-07-01 NOTE — PROGRESS NOTES
Physical Therapy  Physical Therapy Initial Assessment     Name: Linn Fajardo  : 1935  MRN: [id number]      Date of Service: 2021    Evaluating PT:  Caitlin Ashford, PT, DPT [id number]    Room #:  5149/8848-E  Diagnosis:  Pneumonia [J18.9]  CAP (community acquired pneumonia) due to Pneumococcus ([address] [J13]  PMHx/PSHx:  Dm, PNA, syncope, arthritis, hx of falls  Precautions:  Fall Risk, o2 via NC, bed alarm, TAPS, Rampart      SUBJECTIVE:    Pt lives with son, had intermittent assist at home, completing tx and amb with ww and no physical assist. Pt did not elaborate on home setup regarding steps. OBJECTIVE:   Initial Evaluation  Date: 21 Treatment Short Term/ Long Term   Goals   AM-PAC 6 Clicks      Was pt agreeable to Eval/treatment? yes     Does pt have pain? no     Bed Mobility  Rolling: modA  Supine to sit: moda  Sit to supine: modA  Scooting: modA  Rolling: sup  Supine to sit: sup  Sit to supine: sup  Scooting: sup   Transfers Sit to stand: Rodolfo  Stand to sit: Rodolfo  Stand pivot: NT  Sit to stand: sup  Stand to sit: sup  Stand pivot: sup   Ambulation   NT  50 feet with sup using ww    Stair negotiation: ascended and descended  NT  * unclear if pt completing at baseline, will follow up to confirm*  Please set once confirmed if pt completing at baseline   ROM BUE:  Refer to OT note  BLE:  WNL     Strength BUE:  Refer to OT note  BLE:  WFL  N/A   Balance Sitting EOB:  SBA-CGA  Dynamic Standing:  Rodolfo  Sitting EOB:  sup  Dynamic Standing:  sup     Pt is A & O x x3, oriented to person, place, and time. Pt does demo anxiety and fear avoidance during tx and EOB sitting, fearful of falling, yelling and screaming at therapist with poor reception to cueing and support. Sensation:  Pt denies numbness and tingling to extremities    Vitals:  supine BP: 151/70 mmHg HR: 78 BPM   sit BP: 164/84 mmHg HR: 92 BPM   Stand BP: 175/81 mmHg HR: 103 BPM       Patient education  Pt educated on role of PT, tx. balance, current status and POC, d/c planning, safety awareness. Patient response to education:   Pt verbalized understanding Pt demonstrated skill Pt requires further education in this area   yes Partial skills demonstarted All areas     ASSESSMENT:    Conditions Requiring Skilled Therapeutic Intervention:    [x]Decreased strength     []Decreased ROM  [x]Decreased functional mobility  [x]Decreased balance   [x]Decreased endurance   [x]Decreased posture  []Decreased sensation  []Decreased coordination   []Decreased vision  [x]Decreased safety awareness   []Increased pain       Comments:  Rn clearance prior to session. Pt presents supine in bed, requiring verbal encouragement to participate but eventually agreeable to functional assessment. Pt required verbal cueing for transfer techniques, deescalation techniques provided as pt becoming very anxious and fearful of falling, yelling and screaming with minimal reception to verbal cueing and encouragement. Pt requiring physical assist to complete bed mobility and STS tx, tolerated standing for ~ 30 seconds with UE supported at ww, but standing time limited due to deficits in muscular strength and endurance. Pt returned to supine with physical assist and positioned to comfort with all needs met and call light in hand. RN notified of performance this session. Orthostatic vitals taken during session and communicated findings with RN, pt asymptomatic throughout. Treatment:  Patient practiced and was instructed in the following treatment:     Bed mobility- Physical assist and verbal cueing provided for safe technique. Increased time and effort required to complete.  Sit to stand-  Physical assist and verbal cueing provided for safe technique. Increased time and effort required to complete.     Standing balance- 30 sec at ww, focus of standing endurance while obtaining orthostatic vitals   Vital and symptoms assessment- Skilled monitoring and assessment of education on plan of care and goals.     CPT codes:  [] Low Complexity PT evaluation 37331  [x] Moderate Complexity PT evaluation 11249  [] High Complexity PT evaluation 21705  [] PT Re-evaluation 75263  [] Gait training 73740 0 minutes  [] Manual therapy 59028 0 minutes  [x] Therapeutic activities 99558 14 minutes  [] Therapeutic exercises 60046 0 minutes  [] Neuromuscular reeducation 69653 0 minutes     Mathew Marcano, PT, DPT  [id number]

## 2021-07-01 NOTE — PROGRESS NOTES
Hospitalist Progress Note      Synopsis: Patient admitted on 6/28/2021 for a suspected mechanical fall in the setting of AMS and hypotension. Imaging concerning for community acquired pneumonia and extensive lung mass, now on IV antibiotics with pulmonology consult. Subjective    Pt lacks insight  More confused today   Mostly upset that she is npo for bronchoscopy    Exam:  /65   Pulse 86   Temp 98.8 °F (37.1 °C) (Temporal)   Resp 20   Ht 5' 2\" (1.575 m)   Wt 178 lb 9.2 oz (81 kg)   SpO2 92%   BMI 32.66 kg/m²   General appearance: No apparent distress, appears stated age and cooperative. Obese   HEENT: Pupils equal, round, and reactive to light. Conjunctivae/corneas clear. Neck: Supple. No jugular venous distention. Trachea midline. Respiratory:  Normal respiratory effort. Clear to auscultation, bilaterally without Rales/Wheezes/Rhonchi. Cardiovascular: Regular rate and rhythm with normal S1/S2 without murmurs, rubs or gallops. Abdomen: Soft, non-tender, non-distended with normal bowel sounds. Musculoskeletal: No clubbing, cyanosis or edema bilaterally. Brisk capillary refill. 2+ lower extremity pulses (dorsalis pedis).    Skin:  No rashes    Neurologic: awake, alert and following commands; oriented only to self    Medications:  Reviewed    Infusion Medications   Scheduled Medications    meropenem  1,000 mg Intravenous Q12H    prednisoLONE acetate  1 drop Left Eye TID    brimonidine  1 drop Left Eye BID    dorzolamide  1 drop Left Eye TID    atorvastatin  10 mg Oral Daily    amLODIPine  2.5 mg Oral BID    aspirin  81 mg Oral Daily    metFORMIN  500 mg Oral BID    Vitamin D  1,000 Units Oral Daily     PRN Meds: meperidine, morphine, morphine, HYDROmorphone, HYDROmorphone, HYDROcodone 5 mg - acetaminophen **OR** HYDROcodone 5 mg - acetaminophen, promethazine, acetaminophen    I/O    Intake/Output Summary (Last 24 hours) at 7/1/2021 1616  Last data filed at 7/1/2021 1451  Gross per 24 hour   Intake 500 ml   Output 1525 ml   Net -1025 ml       Labs:   Recent Labs     06/30/21  0620 07/01/21  0510   WBC 20.5* 15.1*   HGB 9.0* 9.0*   HCT 28.1* 28.2*    329       Recent Labs     06/30/21  0620 07/01/21  0510    139   K 3.7 3.6    104   CO2 22 26   BUN 26* 25*   CREATININE 1.1* 1.1*   CALCIUM 9.2 9.1       Recent Labs     06/30/21  0620 07/01/21  0510   PROT 6.1* 6.1*   ALKPHOS 79 79   ALT 25 24   AST 28 22   BILITOT 0.3 0.3       No results for input(s): INR in the last 72 hours. No results for input(s): Ellie Fabricio in the last 72 hours. Chronic labs:  Lab Results   Component Value Date    CHOL 164 03/02/2021    TRIG 50 03/02/2021    HDL 77 03/02/2021    LDLCALC 77 03/02/2021    TSH 1.600 02/17/2020    INR 1.0 10/04/2013    LABA1C 7.1 (H) 03/02/2021       Radiology:  Imaging studies reviewed today. ASSESSMENT:  Fall - likely multifactorial  Recent Community acquired pneumonia  Metabolic encephalopathy  Lung mass   Mediastinal lymph nodes  Adrenal mass   HTN  DM2  HLD  Obesity  Paroxysmal atrial fibrillation       PLAN:  Orthostatics  PT/OT  Continue home meds as ordered  Rocephin switched to meropenem per ID  Pulmonary consulted given lung mass on CT imaging of chest; for bronch with pulm today  Encourage oral intake   Strict I/O  ECHO ordered and pending    Diet: Diet NPO  Code Status: Prior  PT/OT Eval Status:   ordered  DVT Prophylaxis:   scd  Recommended disposition at discharge:  pending pt/ot eval     +++++++++++++++++++++++++++++++++++++++++++++++++  Ankita Duong MD   [facility].  +++++++++++++++++++++++++++++++++++++++++++++++++  NOTE: This report was transcribed using voice recognition software. Every effort was made to ensure accuracy; however, inadvertent computerized transcription errors may be present.

## 2021-07-01 NOTE — CARE COORDINATION
Care Coordination-  PT [facility] 11/24 and she did not ambulated ot [facility] 13/24 and she is for a bronch at some point. The daughter did speak to Dr Desir Apo this am and I told the daughter we need some choices for skilled care and she said she will talk to her Dr and call me with choices. Im awaiting a call. The patient is on iv Merrum iv q12 hrs. I will follow     Addendum- The daughter  Justin Barreraas me skilled care choices 1st choice is [facility] referral made to [facility] and I will await if accepted. 2nd choice is [facility].

## 2021-07-01 NOTE — PROGRESS NOTES
Pulmonary [facility]                             Pulmonary Consult/Progress Note :            Reason for Consultation:  CC : SOB and cough   HPI:   She has been doing  betetr today  No fever or chills  No chest pain  S/P bronch     PHYSICAL EXAMINATION:     VITAL SIGNS:  /62   Pulse 85   Temp 97 °F (36.1 °C) (Temporal)   Resp 20   Ht 5' 2\" (1.575 m)   Wt 178 lb 9.2 oz (81 kg)   SpO2 91% Comment: Sleeping  BMI 32.66 kg/m²   Wt Readings from Last 3 Encounters:   06/28/21 178 lb 9.2 oz (81 kg)   03/15/21 178 lb (80.7 kg)   03/13/21 172 lb (78 kg)     Temp Readings from Last 3 Encounters:   07/01/21 97 °F (36.1 °C) (Temporal)   03/15/21 97.8 °F (36.6 °C)   03/13/21 97.1 °F (36.2 °C) (Infrared)     TMAX:  BP Readings from Last 3 Encounters:   07/01/21 123/62   03/15/21 (!) 146/70   03/13/21 (!) 160/76     Pulse Readings from Last 3 Encounters:   07/01/21 85   03/15/21 75   03/13/21 83           INTAKE/OUTPUTS:  I/O last 3 completed shifts:  In: -   Out: 1175 [Urine:1175]    Intake/Output Summary (Last 24 hours) at 7/1/2021 1451  Last data filed at 7/1/2021 1403  Gross per 24 hour   Intake 0 ml   Output 1525 ml   Net -1525 ml       General Appearance: alert and oriented to person, place and time, well-developed and   well-nourished, in no acute distress   Eyes: pupils equal, round, and reactive to light, extraocular eye movements intact, conjunctivae normal and sclera anicteric   Neck: neck supple and non tender without mass, no thyromegaly, no thyroid nodules and no cervical adenopathy   Pulmonary/Chest:Rhonchi *   Cardiovascular: normal rate, regular rhythm, normal S1 and S2, no murmurs, rubs, clicks or gallops, distal pulses intact, no carotid bruits, no murmurs, no gallops, no carotid bruits and no JVD   Abdomen: obese, soft, non-tender, non-distended, normal bowel sounds, no masses or organomegaly   Extremities:mild edema*  Musculoskeletal: normal range of motion, no joint swelling, deformity or tenderness   Neurologic: reflexes normal and symmetric, no cranial nerve deficit noted    LABS/IMAGING:    CBC:  Lab Results   Component Value Date    WBC 15.1 (H) 07/01/2021    HGB 9.0 (L) 07/01/2021    HCT 28.2 (L) 07/01/2021    MCV 91.9 07/01/2021     07/01/2021    LYMPHOPCT 1.7 (L) 06/28/2021    RBC 3.07 (L) 07/01/2021    MCH 29.3 07/01/2021    MCHC 31.9 (L) 07/01/2021    RDW 14.3 07/01/2021    NEUTOPHILPCT 95.7 (H) 06/28/2021    MONOPCT 2.6 06/28/2021    BASOPCT 0.2 06/28/2021    NEUTROABS 14.50 (H) 06/28/2021    LYMPHSABS 0.30 (L) 06/28/2021    MONOSABS 0.45 06/28/2021    EOSABS 0.00 (L) 06/28/2021    BASOSABS 0.00 06/28/2021       Recent Labs     07/01/21  0510 06/30/21  0620 06/28/21  0718   WBC 15.1* 20.5* 15.1*   HGB 9.0* 9.0* 9.4*   HCT 28.2* 28.1* 29.7*   MCV 91.9 91.2 93.7    306 256       BMP:   Recent Labs     06/30/21  0620 07/01/21  0510    139   K 3.7 3.6    104   CO2 22 26   BUN 26* 25*   CREATININE 1.1* 1.1*       MG:   Lab Results   Component Value Date    MG 1.5 06/28/2021     Ca/Phos:   Lab Results   Component Value Date    CALCIUM 9.1 07/01/2021     Amylase: No results found for: AMYLASE  Lipase: No results found for: LIPASE  LIVER PROFILE:   Recent Labs     06/30/21  0620 07/01/21  0510   AST 28 22   ALT 25 24   BILITOT 0.3 0.3   ALKPHOS 79 79       PT/INR: No results for input(s): PROTIME, INR in the last 72 hours. APTT: No results for input(s): APTT in the last 72 hours.     Cardiac Enzymes:  Lab Results   Component Value Date    CKTOTAL 179 03/02/2021    TROPONINI <0.01 03/02/2021               MICROBIOLOGY:      CXR:    PFTs:    2D Echocardiogram:    CT Chest:    PROBLEM LIST:  Patient Active Problem List   Diagnosis    Type 2 diabetes mellitus with complication, without long-term current use of insulin (HCC)    Bilateral foot pain    Essential hypertension    Mixed hyperlipidemia    Age-related osteoporosis without current pathological fracture    Primary osteoarthritis involving multiple joints    Class 1 obesity due to excess calories with serious comorbidity and body mass index (BMI) of 33.0 to 33.9 in adult    PVD (peripheral vascular disease) ([facility] [address]    Ambulatory dysfunction    Ischemic heart disease    Visual disturbance    Syncope and collapse    Scalp laceration    Compression fracture of L2 vertebra (HCC)    Compression fracture of body of thoracic vertebra (HCC)    Bradycardia    Recurrent falls    Syncope    Bilateral impacted cerumen    Pneumonia    CAP (community acquired pneumonia) due to Pneumococcus [facility])               ASSESSMENT:  1.) Large Left side pneumonia  2.)Fall   3.)Possible apsiration  4- Moderate hypoxia     PLAN:  *-Large Pneumonia as I see air bronchogram ,more than mass ,also Bronchoscopy did not show any endobronchial lesion ,with mass with air bronchoscgram,will treat and repeat CT in 6-8 weeks as OP  *-ID requested Bronch,follow culture   *-IV abx a per I  *- wean O2  Sputum culture and work up for pneumonia/legionella    Follow up CT in 6 weeks      Thank you very much for allowing me to participate in the care of this pleasant patient , should you have any questions ,please do not hesitate to contact me      Tracy Giles MD,[facility]  Pulmonary&Critical Care Medicine   Director of [address] [place] [address] Director of [address] [place] [address]    Nahomy Salvador    NOTE: This report was transcribed using voice recognition software. Every effort was made to ensure accuracy; however, inadvertent computerized transcription errors may be present.

## 2021-07-01 NOTE — PROCEDURES
[address] [facility] NOTE    DATE OF PROCEDURE:7 / 1 / 2021    INDICATIONS & HISTORY:  Pneumonia   PREOPERATIVE DIAGNOSIS:    Same   POSTOPERATIVE DIAGNOSES:  Pneumonia       PROCEDURE PERFORMED:   1-Diagnotic, Fiberoptic Bronchoscopy  2-Bronchial alveolar lavage from PAUL            SURGEON:    Tracy Giles     ASSISTANT:   Bronchoscopy Nursing/Technical Team/OR Team    SEDATION:  propofol   Regional Anesthesia,       ANESTHESIA:    Lidocaine 2% ,       ANESTHESIOLOGIST:  Per EPIC Note    SPECIMENS:  [x] Bronchial sample(s) for      Fungal smear & culture,   Acid-fast bacillus Smear and Culture,    Gram stain, C&S,    PCP               Cytology               BD glucan              Galactomanin      Description of Procedure: The patient was taken to the endoscopy suite, Richard Manzanares  and the procedure verified as Flexible Fiberoptic Bronchoscopy with BAL         After the patient was controlled with sedation bronchoscope was introduced throughET without difficulty. The scope was then passed into the trachea. Additional 2% lidocaine was used topically within the airway. Careful inspection of the tracheal lumen was accomplished. The scope was sequentially passed into all bronchial airways.            Endobronchial findings:   Vocal cord not seen   Trachea:  Normal mucosa, he the part seen outside the ET tube  Shi  Normal mucosa     Right Main Stem Bronchus  Normal mucosa  Right Upper Lobe Bronchi Normal mucosa  Right Middle Lobe Bronchi  Normal mucosa  Right Lower Lobe Bronchi (including the Superior segment)  Normal mucosa     Left Main Stem Bronchus Normal mucosa  Left Upper Lobe Bronchus, Upper Division Normal mucosa  Left Upper Lobe Bronchus, Lingula  Normal mucosa  Left Lower Lobe Bronchus (including the Superior segment)             BAL :PAUL     COMPLICATIONS:  Estimated blood loss less than-0 CC    IMPRESSIONS:   Pneumonia       RECOMMENDATIONS:   The

## 2021-07-01 NOTE — PROGRESS NOTES
bruising. MUSCULOSKELETAL:  Denies leg pain , joint pain , joint swelling  NEUROLOGICAL:  Falls, weakness       PHYSICAL EXAM:      Vitals:     Vitals:    07/01/21 1715   BP: 126/60   Pulse: 85   Resp: 21   Temp: 97.8 °F (36.6 °C)   SpO2: 92%       General Appearance:    Awake, alert , no acute distress. Head:    Normocephalic, atraumatic   Eyes:    No pallor, no icterus,   Ears:    No obvious deformity or drainage.    Nose:   No nasal drainage   Throat:   Dry oral mucosa    Neck:   Supple, no lymphadenopathy   Lungs:     Bilateral rhonchi    Heart:    Regular rate and rhythm, no murmur   Abdomen:     Soft, non-tender, bowel sounds present    Extremities:   No edema, no cyanosis   Pulses:   Dorsalis pedis palpable    Skin:   no rashes or lesions     CBC with Differential:      Lab Results   Component Value Date    WBC 15.1 07/01/2021    RBC 3.07 07/01/2021    HGB 9.0 07/01/2021    HCT 28.2 07/01/2021     07/01/2021    MCV 91.9 07/01/2021    MCH 29.3 07/01/2021    MCHC 31.9 07/01/2021    RDW 14.3 07/01/2021    LYMPHOPCT 1.7 06/28/2021    MONOPCT 2.6 06/28/2021    BASOPCT 0.2 06/28/2021    MONOSABS 0.45 06/28/2021    LYMPHSABS 0.30 06/28/2021    EOSABS 0.00 06/28/2021    BASOSABS 0.00 06/28/2021       CMP     Lab Results   Component Value Date     07/01/2021    K 3.6 07/01/2021    K 3.5 06/28/2021     07/01/2021    CO2 26 07/01/2021    BUN 25 07/01/2021    CREATININE 1.1 07/01/2021    GFRAA 57 07/01/2021    LABGLOM 47 07/01/2021    GLUCOSE 212 07/01/2021    PROT 6.1 07/01/2021    LABALBU 2.3 07/01/2021    CALCIUM 9.1 07/01/2021    BILITOT 0.3 07/01/2021    ALKPHOS 79 07/01/2021    AST 22 07/01/2021    ALT 24 07/01/2021         Hepatic Function Panel:    Lab Results   Component Value Date    ALKPHOS 79 07/01/2021    ALT 24 07/01/2021    AST 22 07/01/2021    PROT 6.1 07/01/2021    BILITOT 0.3 07/01/2021    BILIDIR 0.2 10/04/2013    LABALBU 2.3 07/01/2021       PT/INR:    Lab Results   Component Value Date    PROTIME 10.9 10/04/2013    INR 1.0 10/04/2013       TSH:    Lab Results   Component Value Date    TSH 1.600 02/17/2020       U/A:    Lab Results   Component Value Date    COLORU Yellow 06/28/2021    PHUR 6.0 06/28/2021    WBCUA 0-1 06/28/2021    RBCUA 1-3 06/28/2021    BACTERIA MANY 06/28/2021    CLARITYU SL CLOUDY 06/28/2021    SPECGRAV >=1.030 06/28/2021    LEUKOCYTESUR Negative 06/28/2021    UROBILINOGEN 1.0 06/28/2021    BILIRUBINUR Negative 06/28/2021    BLOODU LARGE 06/28/2021    GLUCOSEU 500 06/28/2021       ABG:  No results found for: NBO0UUR, BEART, Z5UKEXXX, PHART, THGBART, YHR1IVB, PO2ART, SBS6DTP    MICROBIOLOGY:    Blood culture - neg to date       Radiology :    Chest X ray    CT scan of chest -  1. Large, 9.1 x 4.9 x 5.5 cm masslike consolidative opacity in the left upper   lobe, abutting the mediastinal pleural reflection.  The opacity may be   infectious, inflammatory or neoplastic in etiology. 2. Mildly prominent mediastinal lymph nodes, which may be reactive or   metastatic.   3. 1.9 x 1.4 cm nodule in the body of the left adrenal gland of unclear   etiology. 4. Age indeterminate anterior wedge compression deformity of T12.   5. Sclerosis along the superior endplate of the T3 vertebral body of unclear   etiology. 6. Especially if the biopsy proves the left upper lobe opacity to be   neoplastic in etiology, PET scan would be recommended for further evaluation       IMPRESSION:       1. Left lung mass  S/P bronchoscopy ( 7/1)   2. Pneumonia, leukocytosis, elevated procalcitonin     RECOMMENDATIONS:      1. Meropenem 1 gram IV q 12 hrs    2.  Check cx

## 2021-07-01 NOTE — PROGRESS NOTES
Occupational Therapy     Date:2021  Patient Name: Janee Gonzalez  MRN: [id number]  : 1935  Room: [facility] POOL ROOM/NONE     Completed chart review & attempted to see pt with pt off the unit for testing. Will attempt to see pt at another time. Esther Hammond.  [address] [place] [address], [address] [place] [address]

## 2021-07-01 NOTE — ACP (ADVANCE CARE PLANNING)
ADVANCED CARE PLANNING    Patient Name: César Batista       YOB: 1935              MRN:    [id number]  Admission Date:  6/28/2021  6:38 AM    Active Diagnoses: Active Problems:    Pneumonia    CAP (community acquired pneumonia) due to Pneumococcus [facility])  Resolved Problems:    * No resolved hospital problems. *      These active diagnoses are of sufficient risk that focused discussion on advanced care planning is indicated in order to allow the patient to thoughtfully consider personal goals of care; and, if situations arise that prevent the patient to personally give input, to ensure appropriate representation of their personal desire for different levels and levels of care. Persons present in discussion: Shane Gifford MD, Family members:Daughter Ivon Dye/DPOA on phone    Discussion: I reviewed her admission for AMS and shortness of breath and her desires for ongoing aggressive care, including potential intubation and mechanical ventilation; also discussed who would speak on her behalf should she be unable to do so, her daughter Ivon and discussed what conversations she has had with her family so they understand her desires if such a situation occurred now or in the future. I presented and explained the availability of our palliative care team to her, including the availability of advanced directives forms which she can review with her family and then fill out if they so wish. At this time pt is confused. Ivon would like to speak with family to reassess code status but considering transitioning to limited code. Time Spent on Advanced Planning Documents: 16 minutes    Electronically signed by Koby Connell MD on 7/1/2021 at 4:17 PM    NOTE: This report was transcribed using voice recognition software. Every effort was made to ensure accuracy; however, inadvertent computerized transcription errors may be present.

## 2021-07-01 NOTE — ANESTHESIA POSTPROCEDURE EVALUATION
Department of Anesthesiology  Postprocedure Note    Patient: Jeannie Arita  MRN: [id number]  YOB: 1935  Date of evaluation: 7/1/2021  Time:  3:36 PM     Procedure Summary     Date: 07/01/21 Room / Location: [facility] 03 / [facility]    Anesthesia Start:  Anesthesia Stop:     Procedure: BRONCHOSCOPY DIAGNOSTIC OR CELL [address] (N/A ) Diagnosis: (.)    Surgeons: Bhupendra Kathleen MD Responsible Provider:     Anesthesia Type: general ASA Status: 3          Anesthesia Type: general    Arlette Phase I: Arlette Score: 9    Arlette Phase II:      Last vitals: Reviewed and per EMR flowsheets.        Anesthesia Post Evaluation    Patient location during evaluation: PACU  Patient participation: complete - patient participated  Level of consciousness: awake  Pain score: 3  Airway patency: patent  Nausea & Vomiting: no nausea and no vomiting  Complications: no  Cardiovascular status: blood pressure returned to baseline  Respiratory status: acceptable  Hydration status: euvolemic

## 2021-07-02 LAB
ALBUMIN SERPL-MCNC: 2.3 G/DL (ref 3.5–5.2)
ALP BLD-CCNC: 75 U/L (ref 35–104)
ALT SERPL-CCNC: 23 U/L (ref 0–32)
ANION GAP SERPL CALCULATED.3IONS-SCNC: 11 MMOL/L (ref 7–16)
APPEARANCE FLUID: NORMAL
AST SERPL-CCNC: 18 U/L (ref 0–31)
BASO FLUID: 0 %
BILIRUB SERPL-MCNC: 0.2 MG/DL (ref 0–1.2)
BUN BLDV-MCNC: 31 MG/DL (ref 6–23)
CALCIUM SERPL-MCNC: 9.3 MG/DL (ref 8.6–10.2)
CELL COUNT FLUID TYPE: NORMAL
CHLORIDE BLD-SCNC: 108 MMOL/L (ref 98–107)
CO2: 24 MMOL/L (ref 22–29)
COLOR FLUID: NORMAL
CREAT SERPL-MCNC: 1 MG/DL (ref 0.5–1)
EOSINOPHIL FLUID: 0 %
GFR AFRICAN AMERICAN: >60
GFR NON-AFRICAN AMERICAN: 53 ML/MIN/1.73
GLUCOSE BLD-MCNC: 320 MG/DL (ref 74–99)
GRAM STAIN ORDERABLE: NORMAL
HBA1C MFR BLD: 7.3 % (ref 4–5.6)
HCT VFR BLD CALC: 28.8 % (ref 34–48)
HEMOGLOBIN: 8.9 G/DL (ref 11.5–15.5)
LYMPHOCYTES, BODY FLUID: 24 %
MCH RBC QN AUTO: 29 PG (ref 26–35)
MCHC RBC AUTO-ENTMCNC: 30.9 % (ref 32–34.5)
MCV RBC AUTO: 93.8 FL (ref 80–99.9)
METER GLUCOSE: 263 MG/DL (ref 74–99)
METER GLUCOSE: 361 MG/DL (ref 74–99)
METER GLUCOSE: 363 MG/DL (ref 74–99)
MONOCYTE, FLUID: 54 %
NEUTROPHIL, FLUID: 22 %
NUCLEATED CELLS FLUID: 1500 /UL
PDW BLD-RTO: 14.9 FL (ref 11.5–15)
PLATELET # BLD: 347 E9/L (ref 130–450)
PMV BLD AUTO: 9.5 FL (ref 7–12)
POTASSIUM SERPL-SCNC: 4.3 MMOL/L (ref 3.5–5)
PROCALCITONIN: 1.46 NG/ML (ref 0–0.08)
RBC # BLD: 3.07 E12/L (ref 3.5–5.5)
RBC FLUID: NORMAL /UL
SARS-COV-2, NAAT: NOT DETECTED
SODIUM BLD-SCNC: 143 MMOL/L (ref 132–146)
TOTAL PROTEIN: 6.1 G/DL (ref 6.4–8.3)
WBC # BLD: 11.4 E9/L (ref 4.5–11.5)

## 2021-07-02 PROCEDURE — 2060000000 HC ICU INTERMEDIATE R&B

## 2021-07-02 PROCEDURE — 80053 COMPREHEN METABOLIC PANEL: CPT

## 2021-07-02 PROCEDURE — 82962 GLUCOSE BLOOD TEST: CPT

## 2021-07-02 PROCEDURE — 6360000002 HC RX W HCPCS: Performed by: INTERNAL MEDICINE

## 2021-07-02 PROCEDURE — 87635 SARS-COV-2 COVID-19 AMP PRB: CPT

## 2021-07-02 PROCEDURE — 85027 COMPLETE CBC AUTOMATED: CPT

## 2021-07-02 PROCEDURE — 6370000000 HC RX 637 (ALT 250 FOR IP): Performed by: HOSPITALIST

## 2021-07-02 PROCEDURE — 6370000000 HC RX 637 (ALT 250 FOR IP): Performed by: INTERNAL MEDICINE

## 2021-07-02 PROCEDURE — 2700000000 HC OXYGEN THERAPY PER DAY

## 2021-07-02 PROCEDURE — 36415 COLL VENOUS BLD VENIPUNCTURE: CPT

## 2021-07-02 PROCEDURE — 83036 HEMOGLOBIN GLYCOSYLATED A1C: CPT

## 2021-07-02 PROCEDURE — 6370000000 HC RX 637 (ALT 250 FOR IP): Performed by: FAMILY MEDICINE

## 2021-07-02 PROCEDURE — 84145 PROCALCITONIN (PCT): CPT

## 2021-07-02 PROCEDURE — 2580000003 HC RX 258: Performed by: INTERNAL MEDICINE

## 2021-07-02 RX ORDER — DEXTROSE MONOHYDRATE 50 MG/ML
100 INJECTION, SOLUTION INTRAVENOUS PRN
Status: DISCONTINUED | OUTPATIENT
Start: 2021-07-02 | End: 2021-07-06 | Stop reason: HOSPADM

## 2021-07-02 RX ORDER — DEXTROSE MONOHYDRATE 25 G/50ML
12.5 INJECTION, SOLUTION INTRAVENOUS PRN
Status: DISCONTINUED | OUTPATIENT
Start: 2021-07-02 | End: 2021-07-06 | Stop reason: HOSPADM

## 2021-07-02 RX ORDER — INSULIN GLARGINE 100 [IU]/ML
15 INJECTION, SOLUTION SUBCUTANEOUS NIGHTLY
Status: DISCONTINUED | OUTPATIENT
Start: 2021-07-02 | End: 2021-07-06 | Stop reason: HOSPADM

## 2021-07-02 RX ORDER — NICOTINE POLACRILEX 4 MG
15 LOZENGE BUCCAL PRN
Status: DISCONTINUED | OUTPATIENT
Start: 2021-07-02 | End: 2021-07-06 | Stop reason: HOSPADM

## 2021-07-02 RX ADMIN — INSULIN LISPRO 10 UNITS: 100 INJECTION, SOLUTION INTRAVENOUS; SUBCUTANEOUS at 16:34

## 2021-07-02 RX ADMIN — INSULIN LISPRO 10 UNITS: 100 INJECTION, SOLUTION INTRAVENOUS; SUBCUTANEOUS at 14:11

## 2021-07-02 RX ADMIN — AMLODIPINE BESYLATE 2.5 MG: 2.5 TABLET ORAL at 08:11

## 2021-07-02 RX ADMIN — BRIMONIDINE TARTRATE 1 DROP: 2 SOLUTION OPHTHALMIC at 19:58

## 2021-07-02 RX ADMIN — ASPIRIN 81 MG: 81 TABLET, COATED ORAL at 08:10

## 2021-07-02 RX ADMIN — AMLODIPINE BESYLATE 2.5 MG: 2.5 TABLET ORAL at 19:55

## 2021-07-02 RX ADMIN — MEROPENEM 1000 MG: 1 INJECTION, POWDER, FOR SOLUTION INTRAVENOUS at 04:06

## 2021-07-02 RX ADMIN — PREDNISOLONE ACETATE 1 DROP: 10 SUSPENSION/ DROPS OPHTHALMIC at 08:11

## 2021-07-02 RX ADMIN — DORZOLAMIDE HYDROCHLORIDE 1 DROP: 20 SOLUTION/ DROPS OPHTHALMIC at 19:55

## 2021-07-02 RX ADMIN — METFORMIN HYDROCHLORIDE 500 MG: 500 TABLET ORAL at 08:10

## 2021-07-02 RX ADMIN — PREDNISOLONE ACETATE 1 DROP: 10 SUSPENSION/ DROPS OPHTHALMIC at 19:57

## 2021-07-02 RX ADMIN — Medication 1000 UNITS: at 08:10

## 2021-07-02 RX ADMIN — DORZOLAMIDE HYDROCHLORIDE 1 DROP: 20 SOLUTION/ DROPS OPHTHALMIC at 08:15

## 2021-07-02 RX ADMIN — ACETAMINOPHEN 650 MG: 325 TABLET ORAL at 19:54

## 2021-07-02 RX ADMIN — INSULIN GLARGINE 15 UNITS: 100 INJECTION, SOLUTION SUBCUTANEOUS at 21:57

## 2021-07-02 RX ADMIN — PREDNISOLONE ACETATE 1 DROP: 10 SUSPENSION/ DROPS OPHTHALMIC at 13:00

## 2021-07-02 RX ADMIN — INSULIN LISPRO 3 UNITS: 100 INJECTION, SOLUTION INTRAVENOUS; SUBCUTANEOUS at 21:57

## 2021-07-02 RX ADMIN — ATORVASTATIN CALCIUM 10 MG: 10 TABLET, FILM COATED ORAL at 08:11

## 2021-07-02 RX ADMIN — METFORMIN HYDROCHLORIDE 500 MG: 500 TABLET ORAL at 19:55

## 2021-07-02 RX ADMIN — MEROPENEM 1000 MG: 1 INJECTION, POWDER, FOR SOLUTION INTRAVENOUS at 16:27

## 2021-07-02 RX ADMIN — ACETAMINOPHEN 650 MG: 325 TABLET ORAL at 08:10

## 2021-07-02 RX ADMIN — BRIMONIDINE TARTRATE 1 DROP: 2 SOLUTION OPHTHALMIC at 08:16

## 2021-07-02 RX ADMIN — DORZOLAMIDE HYDROCHLORIDE 1 DROP: 20 SOLUTION/ DROPS OPHTHALMIC at 13:00

## 2021-07-02 ASSESSMENT — PAIN SCALES - GENERAL
PAINLEVEL_OUTOF10: 0
PAINLEVEL_OUTOF10: 6
PAINLEVEL_OUTOF10: 4
PAINLEVEL_OUTOF10: 0

## 2021-07-02 ASSESSMENT — PAIN DESCRIPTION - PROGRESSION: CLINICAL_PROGRESSION: NOT CHANGED

## 2021-07-02 ASSESSMENT — PAIN DESCRIPTION - LOCATION: LOCATION: KNEE

## 2021-07-02 ASSESSMENT — PAIN DESCRIPTION - PAIN TYPE: TYPE: CHRONIC PAIN

## 2021-07-02 NOTE — PROGRESS NOTES
Physician Progress Note      PATIENT:               BEBO CAMACHO  CSN #:                  [id number]  :                       1935  ADMIT DATE:       2021 6:38 AM  DISCH DATE:  RESPONDING  PROVIDER #:        Rosemary Hickman MD          QUERY TEXT:    Pt with documented Pneumonia and Metabolic Encephalopathy noted to have WBC   20.5, Procalcitonin 3.81, Temp 101.5, on continuous oxygen. If possible,   please document in the progress notes and discharge summary if you are   evaluating and /or treating any of the following: The medical record reflects the following:  Risk Factors: Pneumonia  Clinical Indicators: Documented Pneumonia and metabolic encephalopathy per   notes. Noted to have the following labs and vitals: WBC 15.1, 20.5, 15.1. Procalcitonin 3.81, 2.35, 1.46. Lactic Acid 1.0. Temp 101.5 on admission, HR   into 90's and low 100s. RR into 30s on continuous oxygen. Treatment: Rocephin and Vibramycin switched to Merrem, labs and vitals    Thank you,  Rush Segura RN, BSN, CCDS, Clinical Documentation Improvement  [phone]  Options provided:  -- Sepsis, present on admission  -- Sepsis, not present on admission  -- Pneumonia without Sepsis  -- Other - I will add my own diagnosis  -- Disagree - Not applicable / Not valid  -- Disagree - Clinically unable to determine / Unknown  -- Refer to Clinical Documentation Reviewer    PROVIDER RESPONSE TEXT:    This patient has sepsis which was present on admission. Query created by: Linda Murray on 2021 12:05 PM      QUERY TEXT:    Pt with pneumonia noted to be on continuous oxygen. Documented saturation of   88%, RR into 30s, with labored/tachypneic breathing per nursing notes. If   possible, please document in the progress notes and discharge summary if you   are evaluating and/or treating any of the following: The medical record reflects the following:  Risk Factors: Pneumonia  Clinical Indicators: Patient noted to be on continuous oxygen.  Per vital sign   flowsheets 7/1/21 around 1500, 88% on unknown amount of oxygen, with RR into   30s and HR into 90s at that time. Also noted to be tachypneic with labored   breathing during that time per nursing flowsheets. Patient then 92% on 5L NC   and now 94% on 4L NC. No noted home O2 at baseline. Treatment: Continuous oxygen inpatient    Thank you,  Trish Bethea RN, BSN, CCDS, Clinical Documentation Improvement  [phone]  Options provided:  -- Acute respiratory failure with hypoxia  -- Acute respiratory failure with hypercapnia  -- Acute respiratory insufficiency, not in failure  -- Other - I will add my own diagnosis  -- Disagree - Not applicable / Not valid  -- Disagree - Clinically unable to determine / Unknown  -- Refer to Clinical Documentation Reviewer    PROVIDER RESPONSE TEXT:    This patient is in acute respiratory failure with hypoxia.     Query created by: Zhang Fraga on 7/2/2021 12:20 PM      Electronically signed by:  Dom Brito MD 7/2/2021 3:01 PM

## 2021-07-02 NOTE — PROGRESS NOTES
[facility]  [address] [facility], [address]  [facility], [address] [facility]      Pulmonary Progress Note      SUBJECTIVE:  Pt being seen for Dr Baldo Clark   On 4 li NC, claims doing some better with breathing, still with some cough/sputum production.     OBJECTIVE    Medications    Continuous Infusions:    Scheduled Meds:   meropenem  1,000 mg Intravenous Q12H    prednisoLONE acetate  1 drop Left Eye TID    brimonidine  1 drop Left Eye BID    dorzolamide  1 drop Left Eye TID    atorvastatin  10 mg Oral Daily    amLODIPine  2.5 mg Oral BID    aspirin  81 mg Oral Daily    metFORMIN  500 mg Oral BID    Vitamin D  1,000 Units Oral Daily       PRN Meds:acetaminophen    Physical    VITALS:  /72   Pulse 54   Temp 98.5 °F (36.9 °C) (Temporal)   Resp 20   Ht 5' 2\" (1.575 m)   Wt 178 lb 9.2 oz (81 kg)   SpO2 94%   BMI 32.66 kg/m²     24HR INTAKE/OUTPUT:      Intake/Output Summary (Last 24 hours) at 2021 1257  Last data filed at 2021 1057  Gross per 24 hour   Intake 580 ml   Output 1100 ml   Net -520 ml       24HR PULSE OXIMETRY RANGE:    SpO2  Av.8 %  Min: 82 %  Max: 100 %    General appearance: alert, appears stated age and cooperative  Lungs: rhonchi bibasilar with cough  Heart: regular rate and rhythm, S1, S2 normal, no murmur, click, rub or gallop  Abdomen: soft, non-tender; bowel sounds normal; no masses,  no organomegaly  Extremities: extremities normal, atraumatic, no cyanosis or edema  Neurologic: Mental status: Alert, oriented, thought content appropriate    Data    CBC:   Recent Labs     21  0620 21  0510 21  0603   WBC 20.5* 15.1* 11.4   HGB 9.0* 9.0* 8.9*   HCT 28.1* 28.2* 28.8*   MCV 91.2 91.9 93.8    329 347       BMP:  Recent Labs     21  0620 21  0510 21  0603    139 143   K 3.7 3.6 4.3    104 108*   CO2 22 26 24   BUN 26* 25* 31*   CREATININE 1.1* 1.1* 1.0 ALB:3,BILIDIR:3,BILITOT:3,ALKPHOS:3)@    PT/INR: No results for input(s): PROTIME, INR in the last 72 hours. ABG:   No results for input(s): PH, PO2, PCO2, HCO3, BE, O2SAT, METHB, O2HB, COHB, O2CON, HHB, THB in the last 72 hours. FiO2 : 40 %       Radiology/Other tests reviewed: none    Assessment:     Active Problems:    Pneumonia    CAP (community acquired pneumonia) due to Pneumococcus [facility])  Resolved Problems:    * No resolved hospital problems. *      Plan:       1. Cont with antibiotics as per ID  2. Await cultures from bronchoscopy  3. Cont with oxygen, taper as tolerated  4. Not on lung medications, observe  5. PT/OT      Time at the bedside, reviewing labs and radiographs, reviewing notes and consultations, discussing with staff and family was more than 35 minutes. Thanks for letting us see this patient in consultation. Please contact us with any questions. Office [phone] or after hours through [employer], x [phone].

## 2021-07-02 NOTE — CARE COORDINATION
Care Coordination -  The patient was accepted to go to [facility] and I notified the daughter. I asked Molina Guerrero the liason to start the precert today. Will do a covid test today as well as she had not had her vaccination as yet. [facility] ambulance envelope done.  I will follow

## 2021-07-02 NOTE — PROGRESS NOTES
[facility] [address] [facility]  [address] [facility], [address]  [facility], [address] [place] [address]      Pulmonary Progress Note      SUBJECTIVE:  On 4 li NC, claims doing some better with breathing, still with some cough/sputum production.     OBJECTIVE    Medications    Continuous Infusions:    Scheduled Meds:   meropenem  1,000 mg Intravenous Q12H    prednisoLONE acetate  1 drop Left Eye TID    brimonidine  1 drop Left Eye BID    dorzolamide  1 drop Left Eye TID    atorvastatin  10 mg Oral Daily    amLODIPine  2.5 mg Oral BID    aspirin  81 mg Oral Daily    metFORMIN  500 mg Oral BID    Vitamin D  1,000 Units Oral Daily       PRN Meds:acetaminophen    Physical    VITALS:  /72   Pulse 54   Temp 98.5 °F (36.9 °C) (Temporal)   Resp 20   Ht 5' 2\" (1.575 m)   Wt 178 lb 9.2 oz (81 kg)   SpO2 94%   BMI 32.66 kg/m²     24HR INTAKE/OUTPUT:      Intake/Output Summary (Last 24 hours) at 2021 1049  Last data filed at 2021 0535  Gross per 24 hour   Intake 580 ml   Output 950 ml   Net -370 ml       24HR PULSE OXIMETRY RANGE:    SpO2  Av.8 %  Min: 82 %  Max: 100 %    General appearance: alert, appears stated age and cooperative  Lungs: rhonchi bibasilar with cough  Heart: regular rate and rhythm, S1, S2 normal, no murmur, click, rub or gallop  Abdomen: soft, non-tender; bowel sounds normal; no masses,  no organomegaly  Extremities: extremities normal, atraumatic, no cyanosis or edema  Neurologic: Mental status: Alert, oriented, thought content appropriate    Data    CBC:   Recent Labs     21  0620 21  0510 21  0603   WBC 20.5* 15.1* 11.4   HGB 9.0* 9.0* 8.9*   HCT 28.1* 28.2* 28.8*   MCV 91.2 91.9 93.8    329 347       BMP:  Recent Labs     21  0620 21  0510 21  0603    139 143   K 3.7 3.6 4.3    104 108*   CO2 22 26 24   BUN 26* 25* 31*   CREATININE 1.1* 1.1* 1.0 ALB:3,BILIDIR:3,BILITOT:3,ALKPHOS:3)@    PT/INR: No results for input(s): PROTIME, INR in the last 72 hours. ABG:   No results for input(s): PH, PO2, PCO2, HCO3, BE, O2SAT, METHB, O2HB, COHB, O2CON, HHB, THB in the last 72 hours. FiO2 : 40 %       Radiology/Other tests reviewed: none    Assessment:     Active Problems:    Pneumonia    CAP (community acquired pneumonia) due to Pneumococcus [facility])  Resolved Problems:    * No resolved hospital problems. *      Plan:       1. Cont with antibiotics as per ID  2. Await cultures from bronchoscopy  3. Cont with oxygen, taper as tolerated  4. Not on lung medications, observe  5. PT/OT      Time at the bedside, reviewing labs and radiographs, reviewing notes and consultations, discussing with staff and family was more than 35 minutes. Thanks for letting us see this patient in consultation. Please contact us with any questions. Office [phone] or after hours through [employer], x [phone].

## 2021-07-02 NOTE — PROGRESS NOTES
Hospitalist Progress Note      Synopsis: Patient admitted on 6/28/2021 for a suspected mechanical fall in the setting of AMS and hypotension. Imaging concerning for community acquired pneumonia and extensive lung mass, now on IV antibiotics with pulmonology consult. Underwent bronch with brushings and cultures sent; to follow up with pulmonary outpatient and if not improved will need repeat bronch with biopsy. ID has pt on IV antibiotics pending final cultures     Subjective    Pt reports eating a good breakfast  She is in better spirits today     Exam:  /72   Pulse 54   Temp 98.5 °F (36.9 °C) (Temporal)   Resp 20   Ht 5' 2\" (1.575 m)   Wt 178 lb 9.2 oz (81 kg)   SpO2 94%   BMI 32.66 kg/m²   General appearance: No apparent distress, appears stated age and cooperative. Obese   HEENT: Pupils equal, round, and reactive to light. Conjunctivae/corneas clear. Neck: Supple. No jugular venous distention. Trachea midline. Respiratory:  Normal respiratory effort. Clear to auscultation, bilaterally without Rales/Wheezes/Rhonchi. Cardiovascular: Regular rate and rhythm with normal S1/S2 without murmurs, rubs or gallops. Abdomen: Soft, non-tender, non-distended with normal bowel sounds. Musculoskeletal: No clubbing, cyanosis or edema bilaterally. Brisk capillary refill. 2+ lower extremity pulses (dorsalis pedis).    Skin:  No rashes    Neurologic: awake, alert and following commands; oriented only to self    Medications:  Reviewed    Infusion Medications    dextrose       Scheduled Medications    insulin lispro  0-12 Units Subcutaneous TID     insulin lispro  0-6 Units Subcutaneous Nightly    insulin glargine  15 Units Subcutaneous Nightly    meropenem  1,000 mg Intravenous Q12H    prednisoLONE acetate  1 drop Left Eye TID    brimonidine  1 drop Left Eye BID    dorzolamide  1 drop Left Eye TID    atorvastatin  10 mg Oral Daily    amLODIPine  2.5 mg Oral BID    aspirin  81 mg Oral Daily    metFORMIN  500 mg Oral BID    Vitamin D  1,000 Units Oral Daily     PRN Meds: glucose, dextrose, glucagon (rDNA), dextrose, acetaminophen    I/O    Intake/Output Summary (Last 24 hours) at 7/2/2021 1507  Last data filed at 7/2/2021 1057  Gross per 24 hour   Intake 80 ml   Output 750 ml   Net -670 ml       Labs:   Recent Labs     06/30/21  0620 07/01/21  0510 07/02/21  0603   WBC 20.5* 15.1* 11.4   HGB 9.0* 9.0* 8.9*   HCT 28.1* 28.2* 28.8*    329 347       Recent Labs     06/30/21  0620 07/01/21  0510 07/02/21  0603    139 143   K 3.7 3.6 4.3    104 108*   CO2 22 26 24   BUN 26* 25* 31*   CREATININE 1.1* 1.1* 1.0   CALCIUM 9.2 9.1 9.3       Recent Labs     06/30/21 0620 07/01/21  0510 07/02/21  0603   PROT 6.1* 6.1* 6.1*   ALKPHOS 79 79 75   ALT 25 24 23   AST 28 22 18   BILITOT 0.3 0.3 0.2       No results for input(s): INR in the last 72 hours. No results for input(s): Jadene Moh in the last 72 hours. Chronic labs:  Lab Results   Component Value Date    CHOL 164 03/02/2021    TRIG 50 03/02/2021    HDL 77 03/02/2021    LDLCALC 77 03/02/2021    TSH 1.600 02/17/2020    INR 1.0 10/04/2013    LABA1C 7.3 (H) 07/02/2021       Radiology:  Imaging studies reviewed today. ASSESSMENT:  Fall - likely multifactorial  Recent Community acquired pneumonia  Metabolic encephalopathy  Lung mass   Mediastinal lymph nodes  Adrenal mass   HTN  DM2  HLD  Obesity  Paroxysmal atrial fibrillation       PLAN:  Orthostatics  PT/OT  Continue home meds as ordered  Rocephin switched to meropenem per ID; follow cultures   Will need close outpatient follow up with pulmonary   Discussed removal of alvarez with nursing   Encourage oral intake   Strict I/O    Diet: ADULT DIET;  Regular; 3 carb choices (45 gm/meal)  Code Status: Prior  PT/OT Eval Status:   ordered  DVT Prophylaxis:   scd  Recommended disposition at discharge:  pending pt/ot eval     +++++++++++++++++++++++++++++++++++++++++++++++++  Edison Bennett MD [facility].  +++++++++++++++++++++++++++++++++++++++++++++++++  NOTE: This report was transcribed using voice recognition software. Every effort was made to ensure accuracy; however, inadvertent computerized transcription errors may be present.

## 2021-07-02 NOTE — PROGRESS NOTES
Department of Internal Medicine  Infectious Diseases   Progress  Note      C/C   Pneumonia, Leukocytosis       Pt is awake and alert  Denies fever and chills  Denies SOB,   Afebrile         Current Facility-Administered Medications   Medication Dose Route Frequency Provider Last Rate Last Admin    meropenem (MERREM) 1,000 mg in sodium chloride 0.9 % 100 mL IVPB (mini-bag)  1,000 mg Intravenous Q12H Edgar Irene MD   Stopped at 07/02/21 0801    prednisoLONE acetate (PRED FORTE) 1 % ophthalmic suspension 1 drop  1 drop Left Eye TID Asia Coleman MD   1 drop at 07/02/21 0811    brimonidine (ALPHAGAN) 0.2 % ophthalmic solution 1 drop  1 drop Left Eye BID Asia Coleman MD   1 drop at 07/02/21 0816    dorzolamide (TRUSOPT) 2 % ophthalmic solution 1 drop  1 drop Left Eye TID Asia Coleman MD   1 drop at 07/02/21 0815    atorvastatin (LIPITOR) tablet 10 mg  10 mg Oral Daily Asia Coleman MD   10 mg at 07/02/21 0811    amLODIPine (NORVASC) tablet 2.5 mg  2.5 mg Oral BID Asia Coleman MD   2.5 mg at 07/02/21 4998    aspirin EC tablet 81 mg  81 mg Oral Daily Asia Coleman MD   81 mg at 07/02/21 0810    metFORMIN (GLUCOPHAGE) tablet 500 mg  500 mg Oral BID Asia Coleman MD   500 mg at 07/02/21 0810    Vitamin D (CHOLECALCIFEROL) tablet 1,000 Units  1,000 Units Oral Daily Asia Coleman MD   1,000 Units at 07/02/21 0810    acetaminophen (TYLENOL) tablet 650 mg  650 mg Oral Q4H PRN Rachell Stringer MD   650 mg at 07/02/21 0810       REVIEW OF SYSTEMS:    CONSTITUTIONAL: Denies fever    HEENT: denies blurring of vision or double vision, denies hearing problem  RESPIRATORY: SOB   CARDIOVASCULAR:  Denies palpitation  GASTROINTESTINAL:  Denies abdomen pain, diarrhea or constipation. GENITOURINARY:  Denies burning urination or frequency of urination  INTEGUMENT: denies wound , rash  HEMATOLOGIC/LYMPHATIC:  Denies lymph node swelling, gum bleeding or easy bruising.   MUSCULOSKELETAL:  Denies leg pain , joint pain , PROTIME 10.9 10/04/2013    INR 1.0 10/04/2013       TSH:    Lab Results   Component Value Date    TSH 1.600 02/17/2020       U/A:    Lab Results   Component Value Date    COLORU Yellow 06/28/2021    PHUR 6.0 06/28/2021    WBCUA 0-1 06/28/2021    RBCUA 1-3 06/28/2021    BACTERIA MANY 06/28/2021    CLARITYU SL CLOUDY 06/28/2021    SPECGRAV >=1.030 06/28/2021    LEUKOCYTESUR Negative 06/28/2021    UROBILINOGEN 1.0 06/28/2021    BILIRUBINUR Negative 06/28/2021    BLOODU LARGE 06/28/2021    GLUCOSEU 500 06/28/2021       ABG:  No results found for: WCR3QTR, BEART, X7CKBTFD, PHART, THGBART, PYV7QUP, PO2ART, JTT4GZH    MICROBIOLOGY:    Blood culture - neg to date       Radiology :    Chest X ray    CT scan of chest -  1. Large, 9.1 x 4.9 x 5.5 cm masslike consolidative opacity in the left upper   lobe, abutting the mediastinal pleural reflection.  The opacity may be   infectious, inflammatory or neoplastic in etiology. 2. Mildly prominent mediastinal lymph nodes, which may be reactive or   metastatic.   3. 1.9 x 1.4 cm nodule in the body of the left adrenal gland of unclear   etiology. 4. Age indeterminate anterior wedge compression deformity of T12.   5. Sclerosis along the superior endplate of the T3 vertebral body of unclear   etiology. 6. Especially if the biopsy proves the left upper lobe opacity to be   neoplastic in etiology, PET scan would be recommended for further evaluation       IMPRESSION:       1. Left lung mass  S/P bronchoscopy ( 7/1)   2. Pneumonia, leukocytosis, elevated procalcitonin - trending down    RECOMMENDATIONS:      1. Meropenem 1 gram IV q 12 hrs    2.  Check cx

## 2021-07-03 LAB
ALBUMIN SERPL-MCNC: 2.3 G/DL (ref 3.5–5.2)
ALP BLD-CCNC: 89 U/L (ref 35–104)
ALT SERPL-CCNC: 26 U/L (ref 0–32)
ANION GAP SERPL CALCULATED.3IONS-SCNC: 6 MMOL/L (ref 7–16)
AST SERPL-CCNC: 23 U/L (ref 0–31)
BILIRUB SERPL-MCNC: 0.3 MG/DL (ref 0–1.2)
BLOOD CULTURE, ROUTINE: NORMAL
BUN BLDV-MCNC: 42 MG/DL (ref 6–23)
CALCIUM SERPL-MCNC: 9.2 MG/DL (ref 8.6–10.2)
CHLORIDE BLD-SCNC: 105 MMOL/L (ref 98–107)
CO2: 25 MMOL/L (ref 22–29)
CREAT SERPL-MCNC: 0.9 MG/DL (ref 0.5–1)
CULTURE, BLOOD 2: NORMAL
CULTURE, RESPIRATORY: NORMAL
GFR AFRICAN AMERICAN: >60
GFR NON-AFRICAN AMERICAN: 60 ML/MIN/1.73
GLUCOSE BLD-MCNC: 203 MG/DL (ref 74–99)
HCT VFR BLD CALC: 29 % (ref 34–48)
HEMOGLOBIN: 9.2 G/DL (ref 11.5–15.5)
MCH RBC QN AUTO: 29.2 PG (ref 26–35)
MCHC RBC AUTO-ENTMCNC: 31.7 % (ref 32–34.5)
MCV RBC AUTO: 92.1 FL (ref 80–99.9)
METER GLUCOSE: 171 MG/DL (ref 74–99)
METER GLUCOSE: 180 MG/DL (ref 74–99)
METER GLUCOSE: 191 MG/DL (ref 74–99)
METER GLUCOSE: 194 MG/DL (ref 74–99)
METER GLUCOSE: 200 MG/DL (ref 74–99)
METER GLUCOSE: 246 MG/DL (ref 74–99)
PDW BLD-RTO: 15 FL (ref 11.5–15)
PLATELET # BLD: 425 E9/L (ref 130–450)
PMV BLD AUTO: 9.2 FL (ref 7–12)
POTASSIUM SERPL-SCNC: 4.7 MMOL/L (ref 3.5–5)
PROCALCITONIN: 1.04 NG/ML (ref 0–0.08)
RBC # BLD: 3.15 E12/L (ref 3.5–5.5)
SMEAR, RESPIRATORY: NORMAL
SODIUM BLD-SCNC: 136 MMOL/L (ref 132–146)
TOTAL PROTEIN: 5.6 G/DL (ref 6.4–8.3)
WBC # BLD: 12.9 E9/L (ref 4.5–11.5)

## 2021-07-03 PROCEDURE — 6370000000 HC RX 637 (ALT 250 FOR IP): Performed by: HOSPITALIST

## 2021-07-03 PROCEDURE — 36415 COLL VENOUS BLD VENIPUNCTURE: CPT

## 2021-07-03 PROCEDURE — 80053 COMPREHEN METABOLIC PANEL: CPT

## 2021-07-03 PROCEDURE — 6360000002 HC RX W HCPCS: Performed by: INTERNAL MEDICINE

## 2021-07-03 PROCEDURE — 85027 COMPLETE CBC AUTOMATED: CPT

## 2021-07-03 PROCEDURE — 2580000003 HC RX 258: Performed by: INTERNAL MEDICINE

## 2021-07-03 PROCEDURE — 82962 GLUCOSE BLOOD TEST: CPT

## 2021-07-03 PROCEDURE — 6370000000 HC RX 637 (ALT 250 FOR IP): Performed by: INTERNAL MEDICINE

## 2021-07-03 PROCEDURE — 2700000000 HC OXYGEN THERAPY PER DAY

## 2021-07-03 PROCEDURE — 2060000000 HC ICU INTERMEDIATE R&B

## 2021-07-03 PROCEDURE — 6370000000 HC RX 637 (ALT 250 FOR IP): Performed by: FAMILY MEDICINE

## 2021-07-03 PROCEDURE — 84145 PROCALCITONIN (PCT): CPT

## 2021-07-03 RX ADMIN — DORZOLAMIDE HYDROCHLORIDE 1 DROP: 20 SOLUTION/ DROPS OPHTHALMIC at 20:23

## 2021-07-03 RX ADMIN — MEROPENEM 1000 MG: 1 INJECTION, POWDER, FOR SOLUTION INTRAVENOUS at 03:34

## 2021-07-03 RX ADMIN — AMLODIPINE BESYLATE 2.5 MG: 2.5 TABLET ORAL at 20:24

## 2021-07-03 RX ADMIN — BRIMONIDINE TARTRATE 1 DROP: 2 SOLUTION OPHTHALMIC at 09:57

## 2021-07-03 RX ADMIN — PREDNISOLONE ACETATE 1 DROP: 10 SUSPENSION/ DROPS OPHTHALMIC at 09:57

## 2021-07-03 RX ADMIN — METFORMIN HYDROCHLORIDE 500 MG: 500 TABLET ORAL at 09:57

## 2021-07-03 RX ADMIN — ATORVASTATIN CALCIUM 10 MG: 10 TABLET, FILM COATED ORAL at 09:57

## 2021-07-03 RX ADMIN — Medication 1000 UNITS: at 09:57

## 2021-07-03 RX ADMIN — METFORMIN HYDROCHLORIDE 500 MG: 500 TABLET ORAL at 20:24

## 2021-07-03 RX ADMIN — ASPIRIN 81 MG: 81 TABLET, COATED ORAL at 09:57

## 2021-07-03 RX ADMIN — INSULIN LISPRO 4 UNITS: 100 INJECTION, SOLUTION INTRAVENOUS; SUBCUTANEOUS at 13:23

## 2021-07-03 RX ADMIN — PREDNISOLONE ACETATE 1 DROP: 10 SUSPENSION/ DROPS OPHTHALMIC at 17:39

## 2021-07-03 RX ADMIN — INSULIN GLARGINE 15 UNITS: 100 INJECTION, SOLUTION SUBCUTANEOUS at 20:25

## 2021-07-03 RX ADMIN — DORZOLAMIDE HYDROCHLORIDE 1 DROP: 20 SOLUTION/ DROPS OPHTHALMIC at 17:38

## 2021-07-03 RX ADMIN — BRIMONIDINE TARTRATE 1 DROP: 2 SOLUTION OPHTHALMIC at 20:23

## 2021-07-03 RX ADMIN — PREDNISOLONE ACETATE 1 DROP: 10 SUSPENSION/ DROPS OPHTHALMIC at 20:23

## 2021-07-03 RX ADMIN — AMLODIPINE BESYLATE 2.5 MG: 2.5 TABLET ORAL at 09:57

## 2021-07-03 RX ADMIN — INSULIN LISPRO 2 UNITS: 100 INJECTION, SOLUTION INTRAVENOUS; SUBCUTANEOUS at 09:58

## 2021-07-03 RX ADMIN — DORZOLAMIDE HYDROCHLORIDE 1 DROP: 20 SOLUTION/ DROPS OPHTHALMIC at 09:57

## 2021-07-03 RX ADMIN — INSULIN LISPRO 1 UNITS: 100 INJECTION, SOLUTION INTRAVENOUS; SUBCUTANEOUS at 20:25

## 2021-07-03 RX ADMIN — MEROPENEM 1000 MG: 1 INJECTION, POWDER, FOR SOLUTION INTRAVENOUS at 17:37

## 2021-07-03 RX ADMIN — ACETAMINOPHEN 650 MG: 325 TABLET ORAL at 20:33

## 2021-07-03 ASSESSMENT — PAIN SCALES - GENERAL
PAINLEVEL_OUTOF10: 0
PAINLEVEL_OUTOF10: 5
PAINLEVEL_OUTOF10: 5

## 2021-07-03 NOTE — PROGRESS NOTES
[facility]  [address], [address]  [facility], [address] [facility]      Pulmonary Progress Note      SUBJECTIVE:  Pt being seen for Dr Damion Chisholm   On 2 li NC, claims stable with breathing, still with some cough/sputum production though less and (?) not with po intake.     OBJECTIVE    Medications    Continuous Infusions:   dextrose         Scheduled Meds:   insulin lispro  0-12 Units Subcutaneous TID WC    insulin lispro  0-6 Units Subcutaneous Nightly    insulin glargine  15 Units Subcutaneous Nightly    meropenem  1,000 mg Intravenous Q12H    prednisoLONE acetate  1 drop Left Eye TID    brimonidine  1 drop Left Eye BID    dorzolamide  1 drop Left Eye TID    atorvastatin  10 mg Oral Daily    amLODIPine  2.5 mg Oral BID    aspirin  81 mg Oral Daily    metFORMIN  500 mg Oral BID    Vitamin D  1,000 Units Oral Daily       PRN Meds:glucose, dextrose, glucagon (rDNA), dextrose, acetaminophen    Physical    VITALS:  /83   Pulse 74   Temp 97.6 °F (36.4 °C) (Oral)   Resp 18   Ht 5' 2\" (1.575 m)   Wt 178 lb 9.2 oz (81 kg)   SpO2 91%   BMI 32.66 kg/m²     24HR INTAKE/OUTPUT:      Intake/Output Summary (Last 24 hours) at 7/3/2021 1607  Last data filed at 7/3/2021 1447  Gross per 24 hour   Intake 840 ml   Output 250 ml   Net 590 ml       24HR PULSE OXIMETRY RANGE:    SpO2  Av.3 %  Min: 91 %  Max: 96 %    General appearance: alert, appears stated age and cooperative  Lungs: rhonchi bibasilar with cough  Heart: regular rate and rhythm, S1, S2 normal, no murmur, click, rub or gallop  Abdomen: soft, non-tender; bowel sounds normal; no masses,  no organomegaly  Extremities: extremities normal, atraumatic, no cyanosis or edema  Neurologic: Mental status: Alert, oriented, thought content appropriate    Data    CBC:   Recent Labs     21  0510 21  0603 21  0637   WBC 15.1* 11.4 12.9*   HGB 9.0* 8.9* 9.2*   HCT 28.2* 28.8* 29.0* MCV 91.9 93.8 92.1    347 425       BMP:  Recent Labs     07/01/21  0510 07/02/21  0603 07/03/21  0637    143 136   K 3.6 4.3 4.7    108* 105   CO2 26 24 25   BUN 25* 31* 42*   CREATININE 1.1* 1.0 0.9    ALB:3,BILIDIR:3,BILITOT:3,ALKPHOS:3)@    PT/INR: No results for input(s): PROTIME, INR in the last 72 hours. ABG:   No results for input(s): PH, PO2, PCO2, HCO3, BE, O2SAT, METHB, O2HB, COHB, O2CON, HHB, THB in the last 72 hours. FiO2 : 40 %       Radiology/Other tests reviewed: none    Assessment:     Active Problems:    Pneumonia    CAP (community acquired pneumonia) due to Pneumococcus [facility])  Resolved Problems:    * No resolved hospital problems. *      Plan:       1. Cont with antibiotics as per ID  2. Await cultures from bronchoscopy  3. Cont with oxygen, taper as tolerated  4. Not on lung medications, observe  5. Mention by family of cough with po intake which pt denies, for swallowing evaluation, as per PCP  6. PT/OT      Time at the bedside, reviewing labs and radiographs, reviewing notes and consultations, discussing with staff and family was more than 35 minutes. Thanks for letting us see this patient in consultation. Please contact us with any questions. Office [phone] or after hours through [employer], x [phone].

## 2021-07-03 NOTE — PROGRESS NOTES
Department of Internal Medicine  Infectious Diseases   Progress  Note      C/C   Pneumonia, Leukocytosis       Pt is awake and alert  Denies fever and chills  Denies SOB,  Reports cough   Feels better  Afebrile        Current Facility-Administered Medications   Medication Dose Route Frequency Provider Last Rate Last Admin    insulin lispro (HUMALOG) injection vial 0-12 Units  0-12 Units Subcutaneous TID WC Ankita Cunningham MD   2 Units at 07/03/21 0958    insulin lispro (HUMALOG) injection vial 0-6 Units  0-6 Units Subcutaneous Nightly Ankita Cunningham MD   3 Units at 07/02/21 2157    glucose (GLUTOSE) 40 % oral gel 15 g  15 g Oral PRN Ankita Cunningham MD        dextrose 50 % IV solution  12.5 g Intravenous PRN Ankita Cunningham MD        glucagon (rDNA) injection 1 mg  1 mg Intramuscular PRN Ankita Cunningham MD        dextrose 5 % solution  100 mL/hr Intravenous PRN Ankita Cunningham MD        insulin glargine (LANTUS) injection vial 15 Units  15 Units Subcutaneous Nightly Ankita Cunningham MD   15 Units at 07/02/21 2157    meropenem (MERREM) 1,000 mg in sodium chloride 0.9 % 100 mL IVPB (mini-bag)  1,000 mg Intravenous Q12H Benpepe Sheffield MD   Stopped at 07/03/21 0627    prednisoLONE acetate (PRED FORTE) 1 % ophthalmic suspension 1 drop  1 drop Left Eye TID Lu Barrera MD   1 drop at 07/03/21 0957    brimonidine (ALPHAGAN) 0.2 % ophthalmic solution 1 drop  1 drop Left Eye BID Lu Barrera MD   1 drop at 07/03/21 0957    dorzolamide (TRUSOPT) 2 % ophthalmic solution 1 drop  1 drop Left Eye TID Lu Barrera MD   1 drop at 07/03/21 0957    atorvastatin (LIPITOR) tablet 10 mg  10 mg Oral Daily Lu Barrera MD   10 mg at 07/03/21 0957    amLODIPine (NORVASC) tablet 2.5 mg  2.5 mg Oral BID Lu Barrera MD   2.5 mg at 07/03/21 0957    aspirin EC tablet 81 mg  81 mg Oral Daily Lu Barrera MD   81 mg at 07/03/21 0957    metFORMIN (GLUCOPHAGE) tablet 500 mg  500 mg Oral BID Lu Barrera MD   500 mg at 07/03/21 0908    07/03/2021    K 4.7 07/03/2021    K 3.5 06/28/2021     07/03/2021    CO2 25 07/03/2021    BUN 42 07/03/2021    CREATININE 0.9 07/03/2021    GFRAA >60 07/03/2021    LABGLOM 60 07/03/2021    GLUCOSE 203 07/03/2021    PROT 5.6 07/03/2021    LABALBU 2.3 07/03/2021    CALCIUM 9.2 07/03/2021    BILITOT 0.3 07/03/2021    ALKPHOS 89 07/03/2021    AST 23 07/03/2021    ALT 26 07/03/2021         Hepatic Function Panel:    Lab Results   Component Value Date    ALKPHOS 89 07/03/2021    ALT 26 07/03/2021    AST 23 07/03/2021    PROT 5.6 07/03/2021    BILITOT 0.3 07/03/2021    BILIDIR 0.2 10/04/2013    LABALBU 2.3 07/03/2021       PT/INR:    Lab Results   Component Value Date    PROTIME 10.9 10/04/2013    INR 1.0 10/04/2013       TSH:    Lab Results   Component Value Date    TSH 1.600 02/17/2020       U/A:    Lab Results   Component Value Date    COLORU Yellow 06/28/2021    PHUR 6.0 06/28/2021    WBCUA 0-1 06/28/2021    RBCUA 1-3 06/28/2021    BACTERIA MANY 06/28/2021    CLARITYU SL CLOUDY 06/28/2021    SPECGRAV >=1.030 06/28/2021    LEUKOCYTESUR Negative 06/28/2021    UROBILINOGEN 1.0 06/28/2021    BILIRUBINUR Negative 06/28/2021    BLOODU LARGE 06/28/2021    GLUCOSEU 500 06/28/2021       ABG:  No results found for: GBL4UZY, BEART, I4YLQUBN, PHART, THGBART, HCN0FNX, PO2ART, XTV7TIW    MICROBIOLOGY:    Blood culture - neg to date      Specimen: Respiratory from BAL- Bronch. Lavage Updated: 07/02/21 1242    CULTURE, RESPIRATORY --    Growth not present, incubation continues   Oral Pharyngeal Monet absent     Smear, Respiratory Refer to ordered Gram stain for results   Narrative:     Source: BAL       Site: Respiratory&Respiratory             Gram Stain [[id number]] Collected: 07/01/21 1429   Order Status: Completed Specimen: Respiratory from BAL- Bronch.  Lavage Updated: 07/02/21 0829    Gram Stain Orderable --    Group 6: <25 PMN's/LPF and <25 Epithelial cells/LPF   Rare Polymorphonuclear leukocytes   Epithelial cells not

## 2021-07-03 NOTE — PROGRESS NOTES
Hospitalist Progress Note      Synopsis: Patient admitted on 6/28/2021 for a suspected mechanical fall in the setting of AMS and hypotension. Imaging concerning for community acquired pneumonia and extensive lung mass, now on IV antibiotics with pulmonology consult. Underwent bronch with brushings and cultures sent; to follow up with pulmonary outpatient and if not improved will need repeat bronch with biopsy. ID has pt on IV antibiotics pending final cultures     Subjective    Pt reports eating a good breakfast  Continues to cough up sputum which she reports makes her feel better  Continues to be in good spirits    Exam:  /83   Pulse 74   Temp 97.6 °F (36.4 °C) (Oral)   Resp 18   Ht 5' 2\" (1.575 m)   Wt 178 lb 9.2 oz (81 kg)   SpO2 91%   BMI 32.66 kg/m²   General appearance: No apparent distress, appears stated age and cooperative. Obese   HEENT: Pupils equal, round, and reactive to light. Conjunctivae/corneas clear. Neck: Supple. No jugular venous distention. Trachea midline. Respiratory:  Normal respiratory effort. Clear to auscultation, bilaterally without Rales/Wheezes/Rhonchi. Cardiovascular: Regular rate and rhythm with normal S1/S2 without murmurs, rubs or gallops. Abdomen: Soft, non-tender, non-distended with normal bowel sounds. Musculoskeletal: No clubbing, cyanosis or edema bilaterally. Brisk capillary refill. 2+ lower extremity pulses (dorsalis pedis).    Skin:  No rashes    Neurologic: awake, alert and following commands; oriented only to self    Medications:  Reviewed    Infusion Medications    dextrose       Scheduled Medications    insulin lispro  0-12 Units Subcutaneous TID     insulin lispro  0-6 Units Subcutaneous Nightly    insulin glargine  15 Units Subcutaneous Nightly    meropenem  1,000 mg Intravenous Q12H    prednisoLONE acetate  1 drop Left Eye TID    brimonidine  1 drop Left Eye BID    dorzolamide  1 drop Left Eye TID    atorvastatin  10 mg Oral Daily    amLODIPine  2.5 mg Oral BID    aspirin  81 mg Oral Daily    metFORMIN  500 mg Oral BID    Vitamin D  1,000 Units Oral Daily     PRN Meds: glucose, dextrose, glucagon (rDNA), dextrose, acetaminophen    I/O    Intake/Output Summary (Last 24 hours) at 7/3/2021 0956  Last data filed at 7/2/2021 2213  Gross per 24 hour   Intake --   Output 525 ml   Net -525 ml       Labs:   Recent Labs     07/01/21  0510 07/02/21  0603 07/03/21  0637   WBC 15.1* 11.4 12.9*   HGB 9.0* 8.9* 9.2*   HCT 28.2* 28.8* 29.0*    347 425       Recent Labs     07/01/21 0510 07/02/21  0603 07/03/21  0637    143 136   K 3.6 4.3 4.7    108* 105   CO2 26 24 25   BUN 25* 31* 42*   CREATININE 1.1* 1.0 0.9   CALCIUM 9.1 9.3 9.2       Recent Labs     07/01/21 0510 07/02/21  0603 07/03/21  0637   PROT 6.1* 6.1* 5.6*   ALKPHOS 79 75 89   ALT 24 23 26   AST 22 18 23   BILITOT 0.3 0.2 0.3       No results for input(s): INR in the last 72 hours. No results for input(s): Ellie Fabricio in the last 72 hours. Chronic labs:  Lab Results   Component Value Date    CHOL 164 03/02/2021    TRIG 50 03/02/2021    HDL 77 03/02/2021    LDLCALC 77 03/02/2021    TSH 1.600 02/17/2020    INR 1.0 10/04/2013    LABA1C 7.3 (H) 07/02/2021       Radiology:  Imaging studies reviewed today. ASSESSMENT:  Fall - likely multifactorial  Recent Community acquired pneumonia  Metabolic encephalopathy  Lung mass   Mediastinal lymph nodes  Adrenal mass   HTN  DM2  HLD  Obesity  Paroxysmal atrial fibrillation       PLAN:  Orthostatics  PT/OT  Continue home meds as ordered  Rocephin switched to meropenem per ID; follow cultures - final antibiotic recs? Will need close outpatient follow up with pulmonary   Discussed removal of alvarez with nursing   Encourage oral intake   Strict I/O    Diet: ADULT DIET;  Regular; 3 carb choices (45 gm/meal)  Code Status: Prior  PT/OT Eval Status:   ordered  DVT Prophylaxis:   scd  Recommended disposition at discharge:  pending

## 2021-07-04 LAB
ALBUMIN SERPL-MCNC: 2.1 G/DL (ref 3.5–5.2)
ALP BLD-CCNC: 83 U/L (ref 35–104)
ALT SERPL-CCNC: 25 U/L (ref 0–32)
ANION GAP SERPL CALCULATED.3IONS-SCNC: 7 MMOL/L (ref 7–16)
AST SERPL-CCNC: 21 U/L (ref 0–31)
BILIRUB SERPL-MCNC: 0.4 MG/DL (ref 0–1.2)
BUN BLDV-MCNC: 35 MG/DL (ref 6–23)
CALCIUM SERPL-MCNC: 8.6 MG/DL (ref 8.6–10.2)
CHLORIDE BLD-SCNC: 106 MMOL/L (ref 98–107)
CO2: 26 MMOL/L (ref 22–29)
CREAT SERPL-MCNC: 0.8 MG/DL (ref 0.5–1)
GFR AFRICAN AMERICAN: >60
GFR NON-AFRICAN AMERICAN: >60 ML/MIN/1.73
GLUCOSE BLD-MCNC: 128 MG/DL (ref 74–99)
HCT VFR BLD CALC: 30.4 % (ref 34–48)
HEMOGLOBIN: 9.5 G/DL (ref 11.5–15.5)
MCH RBC QN AUTO: 28.9 PG (ref 26–35)
MCHC RBC AUTO-ENTMCNC: 31.3 % (ref 32–34.5)
MCV RBC AUTO: 92.4 FL (ref 80–99.9)
METER GLUCOSE: 124 MG/DL (ref 74–99)
METER GLUCOSE: 172 MG/DL (ref 74–99)
METER GLUCOSE: 184 MG/DL (ref 74–99)
METER GLUCOSE: 225 MG/DL (ref 74–99)
PDW BLD-RTO: 15 FL (ref 11.5–15)
PLATELET # BLD: 444 E9/L (ref 130–450)
PMV BLD AUTO: 9.2 FL (ref 7–12)
POTASSIUM SERPL-SCNC: 4 MMOL/L (ref 3.5–5)
PROCALCITONIN: 0.61 NG/ML (ref 0–0.08)
RBC # BLD: 3.29 E12/L (ref 3.5–5.5)
SODIUM BLD-SCNC: 139 MMOL/L (ref 132–146)
TOTAL PROTEIN: 5.3 G/DL (ref 6.4–8.3)
WBC # BLD: 11.2 E9/L (ref 4.5–11.5)

## 2021-07-04 PROCEDURE — 82962 GLUCOSE BLOOD TEST: CPT

## 2021-07-04 PROCEDURE — 6360000002 HC RX W HCPCS: Performed by: INTERNAL MEDICINE

## 2021-07-04 PROCEDURE — 84145 PROCALCITONIN (PCT): CPT

## 2021-07-04 PROCEDURE — 85027 COMPLETE CBC AUTOMATED: CPT

## 2021-07-04 PROCEDURE — 36415 COLL VENOUS BLD VENIPUNCTURE: CPT

## 2021-07-04 PROCEDURE — 2580000003 HC RX 258: Performed by: INTERNAL MEDICINE

## 2021-07-04 PROCEDURE — 6370000000 HC RX 637 (ALT 250 FOR IP): Performed by: FAMILY MEDICINE

## 2021-07-04 PROCEDURE — 6370000000 HC RX 637 (ALT 250 FOR IP): Performed by: HOSPITALIST

## 2021-07-04 PROCEDURE — 6370000000 HC RX 637 (ALT 250 FOR IP): Performed by: INTERNAL MEDICINE

## 2021-07-04 PROCEDURE — 80053 COMPREHEN METABOLIC PANEL: CPT

## 2021-07-04 PROCEDURE — 2700000000 HC OXYGEN THERAPY PER DAY

## 2021-07-04 PROCEDURE — 2060000000 HC ICU INTERMEDIATE R&B

## 2021-07-04 RX ADMIN — AMLODIPINE BESYLATE 2.5 MG: 2.5 TABLET ORAL at 20:28

## 2021-07-04 RX ADMIN — AMLODIPINE BESYLATE 2.5 MG: 2.5 TABLET ORAL at 08:03

## 2021-07-04 RX ADMIN — ASPIRIN 81 MG: 81 TABLET, COATED ORAL at 08:03

## 2021-07-04 RX ADMIN — METFORMIN HYDROCHLORIDE 500 MG: 500 TABLET ORAL at 20:28

## 2021-07-04 RX ADMIN — INSULIN LISPRO 4 UNITS: 100 INJECTION, SOLUTION INTRAVENOUS; SUBCUTANEOUS at 11:56

## 2021-07-04 RX ADMIN — PREDNISOLONE ACETATE 1 DROP: 10 SUSPENSION/ DROPS OPHTHALMIC at 20:29

## 2021-07-04 RX ADMIN — DORZOLAMIDE HYDROCHLORIDE 1 DROP: 20 SOLUTION/ DROPS OPHTHALMIC at 08:04

## 2021-07-04 RX ADMIN — INSULIN LISPRO 1 UNITS: 100 INJECTION, SOLUTION INTRAVENOUS; SUBCUTANEOUS at 20:28

## 2021-07-04 RX ADMIN — INSULIN GLARGINE 15 UNITS: 100 INJECTION, SOLUTION SUBCUTANEOUS at 20:48

## 2021-07-04 RX ADMIN — BRIMONIDINE TARTRATE 1 DROP: 2 SOLUTION OPHTHALMIC at 20:29

## 2021-07-04 RX ADMIN — MEROPENEM 1000 MG: 1 INJECTION, POWDER, FOR SOLUTION INTRAVENOUS at 03:00

## 2021-07-04 RX ADMIN — METFORMIN HYDROCHLORIDE 500 MG: 500 TABLET ORAL at 08:03

## 2021-07-04 RX ADMIN — DORZOLAMIDE HYDROCHLORIDE 1 DROP: 20 SOLUTION/ DROPS OPHTHALMIC at 15:50

## 2021-07-04 RX ADMIN — PREDNISOLONE ACETATE 1 DROP: 10 SUSPENSION/ DROPS OPHTHALMIC at 08:04

## 2021-07-04 RX ADMIN — ACETAMINOPHEN 650 MG: 325 TABLET ORAL at 20:28

## 2021-07-04 RX ADMIN — MEROPENEM 1000 MG: 1 INJECTION, POWDER, FOR SOLUTION INTRAVENOUS at 15:57

## 2021-07-04 RX ADMIN — INSULIN LISPRO 2 UNITS: 100 INJECTION, SOLUTION INTRAVENOUS; SUBCUTANEOUS at 17:19

## 2021-07-04 RX ADMIN — Medication 1000 UNITS: at 08:03

## 2021-07-04 RX ADMIN — PREDNISOLONE ACETATE 1 DROP: 10 SUSPENSION/ DROPS OPHTHALMIC at 15:50

## 2021-07-04 RX ADMIN — BRIMONIDINE TARTRATE 1 DROP: 2 SOLUTION OPHTHALMIC at 08:04

## 2021-07-04 RX ADMIN — DORZOLAMIDE HYDROCHLORIDE 1 DROP: 20 SOLUTION/ DROPS OPHTHALMIC at 20:29

## 2021-07-04 RX ADMIN — ATORVASTATIN CALCIUM 10 MG: 10 TABLET, FILM COATED ORAL at 08:03

## 2021-07-04 ASSESSMENT — PAIN SCALES - GENERAL
PAINLEVEL_OUTOF10: 0
PAINLEVEL_OUTOF10: 5
PAINLEVEL_OUTOF10: 0
PAINLEVEL_OUTOF10: 0

## 2021-07-04 ASSESSMENT — PAIN DESCRIPTION - PROGRESSION: CLINICAL_PROGRESSION: NOT CHANGED

## 2021-07-04 NOTE — PROGRESS NOTES
Department of Internal Medicine  Infectious Diseases   Progress  Note      C/C   Pneumonia, Leukocytosis       Pt is awake and alert  Denies fever and chills  Denies SOB,  Reports cough   Feels better  Afebrile        Current Facility-Administered Medications   Medication Dose Route Frequency Provider Last Rate Last Admin    insulin lispro (HUMALOG) injection vial 0-12 Units  0-12 Units Subcutaneous TID WC Ankita Cunningham MD   4 Units at 07/04/21 1156    insulin lispro (HUMALOG) injection vial 0-6 Units  0-6 Units Subcutaneous Nightly Ankita Cunningham MD   1 Units at 07/03/21 2025    glucose (GLUTOSE) 40 % oral gel 15 g  15 g Oral PRN Ankita Cunningham MD        dextrose 50 % IV solution  12.5 g Intravenous PRN Ankita Cunningham MD        glucagon (rDNA) injection 1 mg  1 mg Intramuscular PRN Ankita Cunningham MD        dextrose 5 % solution  100 mL/hr Intravenous PRN Ankita Cunningham MD        insulin glargine (LANTUS) injection vial 15 Units  15 Units Subcutaneous Nightly Ankita Cunningham MD   15 Units at 07/03/21 2025    meropenem (MERREM) 1,000 mg in sodium chloride 0.9 % 100 mL IVPB (mini-bag)  1,000 mg Intravenous Q12H Edgar Irene MD 33.3 mL/hr at 07/04/21 1557 1,000 mg at 07/04/21 1557    prednisoLONE acetate (PRED FORTE) 1 % ophthalmic suspension 1 drop  1 drop Left Eye TID Joesph Hernandez MD   1 drop at 07/04/21 1550    brimonidine (ALPHAGAN) 0.2 % ophthalmic solution 1 drop  1 drop Left Eye BID Joesph Hernandez MD   1 drop at 07/04/21 0804    dorzolamide (TRUSOPT) 2 % ophthalmic solution 1 drop  1 drop Left Eye TID Joesph Hernandez MD   1 drop at 07/04/21 1550    atorvastatin (LIPITOR) tablet 10 mg  10 mg Oral Daily Joesph Hernandez MD   10 mg at 07/04/21 0803    amLODIPine (NORVASC) tablet 2.5 mg  2.5 mg Oral BID Joesph Hernandez MD   2.5 mg at 07/04/21 0803    aspirin EC tablet 81 mg  81 mg Oral Daily Joesph Hernandez MD   81 mg at 07/04/21 0803    metFORMIN (GLUCOPHAGE) tablet 500 mg  500 mg Oral BID Joesph Hernandez MD   500 mg at 07/04/21 0803    Vitamin D (CHOLECALCIFEROL) tablet 1,000 Units  1,000 Units Oral Daily Ewa Villa MD   1,000 Units at 07/04/21 0803    acetaminophen (TYLENOL) tablet 650 mg  650 mg Oral Q4H PRN Elissa Ashley MD   650 mg at 07/03/21 2033       REVIEW OF SYSTEMS:    CONSTITUTIONAL: Denies fever    HEENT: denies blurring of vision or double vision, denies hearing problem  RESPIRATORY: SOB   CARDIOVASCULAR:  Denies palpitation  GASTROINTESTINAL:  Denies abdomen pain, diarrhea or constipation. GENITOURINARY:  Denies burning urination or frequency of urination  INTEGUMENT: denies wound , rash  HEMATOLOGIC/LYMPHATIC:  Denies lymph node swelling, gum bleeding or easy bruising. MUSCULOSKELETAL:  Denies leg pain , joint pain , joint swelling  NEUROLOGICAL:  Falls, weakness       PHYSICAL EXAM:      Vitals:     Vitals:    07/04/21 1615   BP: (!) 108/57   Pulse: 79   Resp: 16   Temp: 97 °F (36.1 °C)   SpO2: 93%       General Appearance:    Awake, alert , no acute distress. Head:    Normocephalic, atraumatic   Eyes:    No pallor, no icterus,   Ears:    No obvious deformity or drainage.    Nose:   No nasal drainage   Throat:   Dry oral mucosa    Neck:   Supple, no lymphadenopathy   Lungs:     Bilateral rhonchi    Heart:    Regular rate and rhythm, no murmur   Abdomen:     Soft, non-tender, bowel sounds present    Extremities:   No edema, no cyanosis   Pulses:   Dorsalis pedis palpable    Skin:   no rashes or lesions     CBC with Differential:      Lab Results   Component Value Date    WBC 11.2 07/04/2021    RBC 3.29 07/04/2021    HGB 9.5 07/04/2021    HCT 30.4 07/04/2021     07/04/2021    MCV 92.4 07/04/2021    MCH 28.9 07/04/2021    MCHC 31.3 07/04/2021    RDW 15.0 07/04/2021    LYMPHOPCT 1.7 06/28/2021    MONOPCT 2.6 06/28/2021    BASOPCT 0.2 06/28/2021    MONOSABS 0.45 06/28/2021    LYMPHSABS 0.30 06/28/2021    EOSABS 0.00 06/28/2021    BASOSABS 0.00 06/28/2021       CMP     Lab Results   Component Value Date     07/04/2021    K 4.0 07/04/2021    K 3.5 06/28/2021     07/04/2021    CO2 26 07/04/2021    BUN 35 07/04/2021    CREATININE 0.8 07/04/2021    GFRAA >60 07/04/2021    LABGLOM >60 07/04/2021    GLUCOSE 128 07/04/2021    PROT 5.3 07/04/2021    LABALBU 2.1 07/04/2021    CALCIUM 8.6 07/04/2021    BILITOT 0.4 07/04/2021    ALKPHOS 83 07/04/2021    AST 21 07/04/2021    ALT 25 07/04/2021         Hepatic Function Panel:    Lab Results   Component Value Date    ALKPHOS 83 07/04/2021    ALT 25 07/04/2021    AST 21 07/04/2021    PROT 5.3 07/04/2021    BILITOT 0.4 07/04/2021    BILIDIR 0.2 10/04/2013    LABALBU 2.1 07/04/2021       PT/INR:    Lab Results   Component Value Date    PROTIME 10.9 10/04/2013    INR 1.0 10/04/2013       TSH:    Lab Results   Component Value Date    TSH 1.600 02/17/2020       U/A:    Lab Results   Component Value Date    COLORU Yellow 06/28/2021    PHUR 6.0 06/28/2021    WBCUA 0-1 06/28/2021    RBCUA 1-3 06/28/2021    BACTERIA MANY 06/28/2021    CLARITYU SL CLOUDY 06/28/2021    SPECGRAV >=1.030 06/28/2021    LEUKOCYTESUR Negative 06/28/2021    UROBILINOGEN 1.0 06/28/2021    BILIRUBINUR Negative 06/28/2021    BLOODU LARGE 06/28/2021    GLUCOSEU 500 06/28/2021       ABG:  No results found for: NTD2VPA, BEART, P2IWTFYA, PHART, THGBART, WEA8TTU, PO2ART, MMT0QKL    MICROBIOLOGY:    Blood culture - neg to date      Specimen: Respiratory from BAL- Bronch. Lavage Updated: 07/02/21 1242    CULTURE, RESPIRATORY --    Growth not present, incubation continues   Oral Pharyngeal Monet absent     Smear, Respiratory Refer to ordered Gram stain for results   Narrative:     Source: BAL       Site: Respiratory&Respiratory             Gram Stain [[id number]] Collected: 07/01/21 1429   Order Status: Completed Specimen: Respiratory from BAL- Bronch.  Lavage Updated: 07/02/21 0829    Gram Stain Orderable --    Group 6: <25 PMN's/LPF and <25 Epithelial cells/LPF   Rare Polymorphonuclear leukocytes Epithelial cells not seen   No organisms seen          Radiology :    Chest X ray    CT scan of chest -  1. Large, 9.1 x 4.9 x 5.5 cm masslike consolidative opacity in the left upper   lobe, abutting the mediastinal pleural reflection.  The opacity may be   infectious, inflammatory or neoplastic in etiology. 2. Mildly prominent mediastinal lymph nodes, which may be reactive or   metastatic.   3. 1.9 x 1.4 cm nodule in the body of the left adrenal gland of unclear   etiology. 4. Age indeterminate anterior wedge compression deformity of T12.   5. Sclerosis along the superior endplate of the T3 vertebral body of unclear   etiology. 6. Especially if the biopsy proves the left upper lobe opacity to be   neoplastic in etiology, PET scan would be recommended for further evaluation       IMPRESSION:       1. Left lung mass  S/P bronchoscopy ( 7/1)   2. Pneumonia, leukocytosis, elevated procalcitonin     RECOMMENDATIONS:      1. Meropenem 1 gram IV q 12 hrs    2.  Follow up cx

## 2021-07-04 NOTE — PLAN OF CARE
Problem: Falls - Risk of:  Goal: Will remain free from falls  Description: Will remain free from falls  7/4/2021 0027 by Ulices Hassan RN  Outcome: Ongoing  7/3/2021 1854 by Leyla Starks RN  Outcome: Met This Shift  Goal: Absence of physical injury  Description: Absence of physical injury  7/4/2021 0027 by Ulices Hassan RN  Outcome: Ongoing  7/3/2021 1854 by Leyla Starks RN  Outcome: Met This Shift     Problem: Skin Integrity:  Goal: Will show no infection signs and symptoms  Description: Will show no infection signs and symptoms  7/4/2021 0027 by Ulices Hassan RN  Outcome: Ongoing  7/3/2021 1854 by Leyla Starks RN  Outcome: Met This Shift  Goal: Absence of new skin breakdown  Description: Absence of new skin breakdown  7/4/2021 0027 by Ulices Hassan RN  Outcome: Ongoing  7/3/2021 1854 by Leyla Starks RN  Outcome: Met This Shift     Problem: Pain:  Goal: Pain level will decrease  Description: Pain level will decrease  7/4/2021 0027 by Ulices Hassan RN  Outcome: Ongoing  7/3/2021 1854 by Leyla Starks RN  Outcome: Met This Shift  Goal: Control of acute pain  Description: Control of acute pain  7/4/2021 0027 by Ulices Hassan RN  Outcome: Ongoing  7/3/2021 1854 by Leyla Starks RN  Outcome: Met This Shift  Goal: Control of chronic pain  Description: Control of chronic pain  7/4/2021 0027 by Ulices Hassan RN  Outcome: Ongoing  7/3/2021 1854 by Leyla Starks RN  Outcome: Met This Shift

## 2021-07-04 NOTE — PROGRESS NOTES
Hospitalist Progress Note      Synopsis: Patient admitted on 6/28/2021 for a suspected mechanical fall in the setting of AMS and hypotension. Imaging concerning for community acquired pneumonia and extensive lung mass, now on IV antibiotics with pulmonology consult. Underwent bronch with brushings and cultures sent; to follow up with pulmonary outpatient and if not improved will need repeat bronch with biopsy. ID has pt on IV antibiotics pending final cultures     Subjective    Pt reports eating a good breakfast  Continues to cough up sputum which she reports makes her feel better  Continues to be in good spirits    Exam:  /66   Pulse 82   Temp 97.1 °F (36.2 °C) (Temporal)   Resp 16   Ht 5' 2\" (1.575 m)   Wt 178 lb 9.2 oz (81 kg)   SpO2 95%   BMI 32.66 kg/m²   General appearance: No apparent distress, appears stated age and cooperative. Obese   HEENT: Pupils equal, round, and reactive to light. Conjunctivae/corneas clear. Neck: Supple. No jugular venous distention. Trachea midline. Respiratory:  Normal respiratory effort. Clear to auscultation, bilaterally without Rales/Wheezes/Rhonchi. Cardiovascular: Regular rate and rhythm with normal S1/S2 without murmurs, rubs or gallops. Abdomen: Soft, non-tender, non-distended with normal bowel sounds. Musculoskeletal: No clubbing, cyanosis or edema bilaterally. Brisk capillary refill. 2+ lower extremity pulses (dorsalis pedis).    Skin:  No rashes    Neurologic: awake, alert and following commands; oriented only to self    Medications:  Reviewed    Infusion Medications    dextrose       Scheduled Medications    insulin lispro  0-12 Units Subcutaneous TID     insulin lispro  0-6 Units Subcutaneous Nightly    insulin glargine  15 Units Subcutaneous Nightly    meropenem  1,000 mg Intravenous Q12H    prednisoLONE acetate  1 drop Left Eye TID    brimonidine  1 drop Left Eye BID    dorzolamide  1 drop Left Eye TID    atorvastatin  10 mg Oral Daily scd  Recommended disposition at discharge:  pending final antibiotic recs by ID and precert to [facility]     +++++++++++++++++++++++++++++++++++++++++++++++++  Ankita Espinoza MD   [facility].  +++++++++++++++++++++++++++++++++++++++++++++++++  NOTE: This report was transcribed using voice recognition software. Every effort was made to ensure accuracy; however, inadvertent computerized transcription errors may be present.

## 2021-07-05 LAB
ALBUMIN SERPL-MCNC: 2.2 G/DL (ref 3.5–5.2)
ALP BLD-CCNC: 74 U/L (ref 35–104)
ALT SERPL-CCNC: 24 U/L (ref 0–32)
ANION GAP SERPL CALCULATED.3IONS-SCNC: 10 MMOL/L (ref 7–16)
AST SERPL-CCNC: 19 U/L (ref 0–31)
BILIRUB SERPL-MCNC: 0.4 MG/DL (ref 0–1.2)
BUN BLDV-MCNC: 25 MG/DL (ref 6–23)
CALCIUM SERPL-MCNC: 8.9 MG/DL (ref 8.6–10.2)
CHLORIDE BLD-SCNC: 106 MMOL/L (ref 98–107)
CO2: 27 MMOL/L (ref 22–29)
CREAT SERPL-MCNC: 0.8 MG/DL (ref 0.5–1)
GFR AFRICAN AMERICAN: >60
GFR NON-AFRICAN AMERICAN: >60 ML/MIN/1.73
GLUCOSE BLD-MCNC: 104 MG/DL (ref 74–99)
HCT VFR BLD CALC: 32.2 % (ref 34–48)
HEMOGLOBIN: 9.9 G/DL (ref 11.5–15.5)
MCH RBC QN AUTO: 28.8 PG (ref 26–35)
MCHC RBC AUTO-ENTMCNC: 30.7 % (ref 32–34.5)
MCV RBC AUTO: 93.6 FL (ref 80–99.9)
METER GLUCOSE: 100 MG/DL (ref 74–99)
METER GLUCOSE: 103 MG/DL (ref 74–99)
METER GLUCOSE: 145 MG/DL (ref 74–99)
METER GLUCOSE: 168 MG/DL (ref 74–99)
METER GLUCOSE: 173 MG/DL (ref 74–99)
PDW BLD-RTO: 15.2 FL (ref 11.5–15)
PLATELET # BLD: 462 E9/L (ref 130–450)
PMV BLD AUTO: 9.1 FL (ref 7–12)
POTASSIUM SERPL-SCNC: 4.2 MMOL/L (ref 3.5–5)
PROCALCITONIN: 0.35 NG/ML (ref 0–0.08)
RBC # BLD: 3.44 E12/L (ref 3.5–5.5)
SODIUM BLD-SCNC: 143 MMOL/L (ref 132–146)
TOTAL PROTEIN: 5.5 G/DL (ref 6.4–8.3)
WBC # BLD: 12.3 E9/L (ref 4.5–11.5)

## 2021-07-05 PROCEDURE — 36415 COLL VENOUS BLD VENIPUNCTURE: CPT

## 2021-07-05 PROCEDURE — 85027 COMPLETE CBC AUTOMATED: CPT

## 2021-07-05 PROCEDURE — 84145 PROCALCITONIN (PCT): CPT

## 2021-07-05 PROCEDURE — 6370000000 HC RX 637 (ALT 250 FOR IP): Performed by: INTERNAL MEDICINE

## 2021-07-05 PROCEDURE — 82962 GLUCOSE BLOOD TEST: CPT

## 2021-07-05 PROCEDURE — 97535 SELF CARE MNGMENT TRAINING: CPT

## 2021-07-05 PROCEDURE — 2580000003 HC RX 258: Performed by: INTERNAL MEDICINE

## 2021-07-05 PROCEDURE — 6370000000 HC RX 637 (ALT 250 FOR IP): Performed by: HOSPITALIST

## 2021-07-05 PROCEDURE — 80053 COMPREHEN METABOLIC PANEL: CPT

## 2021-07-05 PROCEDURE — 6360000002 HC RX W HCPCS: Performed by: INTERNAL MEDICINE

## 2021-07-05 PROCEDURE — 97530 THERAPEUTIC ACTIVITIES: CPT

## 2021-07-05 PROCEDURE — 92610 EVALUATE SWALLOWING FUNCTION: CPT

## 2021-07-05 PROCEDURE — 1200000000 HC SEMI PRIVATE

## 2021-07-05 PROCEDURE — 2700000000 HC OXYGEN THERAPY PER DAY

## 2021-07-05 RX ADMIN — Medication 1000 UNITS: at 08:30

## 2021-07-05 RX ADMIN — DORZOLAMIDE HYDROCHLORIDE 1 DROP: 20 SOLUTION/ DROPS OPHTHALMIC at 20:37

## 2021-07-05 RX ADMIN — PREDNISOLONE ACETATE 1 DROP: 10 SUSPENSION/ DROPS OPHTHALMIC at 14:57

## 2021-07-05 RX ADMIN — AMLODIPINE BESYLATE 2.5 MG: 2.5 TABLET ORAL at 20:37

## 2021-07-05 RX ADMIN — INSULIN LISPRO 1 UNITS: 100 INJECTION, SOLUTION INTRAVENOUS; SUBCUTANEOUS at 20:39

## 2021-07-05 RX ADMIN — MEROPENEM 1000 MG: 1 INJECTION, POWDER, FOR SOLUTION INTRAVENOUS at 16:57

## 2021-07-05 RX ADMIN — METFORMIN HYDROCHLORIDE 500 MG: 500 TABLET ORAL at 20:37

## 2021-07-05 RX ADMIN — METFORMIN HYDROCHLORIDE 500 MG: 500 TABLET ORAL at 08:30

## 2021-07-05 RX ADMIN — MEROPENEM 1000 MG: 1 INJECTION, POWDER, FOR SOLUTION INTRAVENOUS at 03:53

## 2021-07-05 RX ADMIN — BRIMONIDINE TARTRATE 1 DROP: 2 SOLUTION OPHTHALMIC at 08:30

## 2021-07-05 RX ADMIN — INSULIN LISPRO 2 UNITS: 100 INJECTION, SOLUTION INTRAVENOUS; SUBCUTANEOUS at 16:58

## 2021-07-05 RX ADMIN — PREDNISOLONE ACETATE 1 DROP: 10 SUSPENSION/ DROPS OPHTHALMIC at 20:37

## 2021-07-05 RX ADMIN — PREDNISOLONE ACETATE 1 DROP: 10 SUSPENSION/ DROPS OPHTHALMIC at 08:30

## 2021-07-05 RX ADMIN — INSULIN LISPRO 2 UNITS: 100 INJECTION, SOLUTION INTRAVENOUS; SUBCUTANEOUS at 11:54

## 2021-07-05 RX ADMIN — BRIMONIDINE TARTRATE 1 DROP: 2 SOLUTION OPHTHALMIC at 20:38

## 2021-07-05 RX ADMIN — ASPIRIN 81 MG: 81 TABLET, COATED ORAL at 08:30

## 2021-07-05 RX ADMIN — AMLODIPINE BESYLATE 2.5 MG: 2.5 TABLET ORAL at 08:29

## 2021-07-05 RX ADMIN — INSULIN GLARGINE 15 UNITS: 100 INJECTION, SOLUTION SUBCUTANEOUS at 20:38

## 2021-07-05 RX ADMIN — DORZOLAMIDE HYDROCHLORIDE 1 DROP: 20 SOLUTION/ DROPS OPHTHALMIC at 14:57

## 2021-07-05 RX ADMIN — DORZOLAMIDE HYDROCHLORIDE 1 DROP: 20 SOLUTION/ DROPS OPHTHALMIC at 08:30

## 2021-07-05 RX ADMIN — ATORVASTATIN CALCIUM 10 MG: 10 TABLET, FILM COATED ORAL at 08:30

## 2021-07-05 ASSESSMENT — PAIN SCALES - WONG BAKER
WONGBAKER_NUMERICALRESPONSE: 0
WONGBAKER_NUMERICALRESPONSE: 0

## 2021-07-05 ASSESSMENT — PAIN SCALES - GENERAL
PAINLEVEL_OUTOF10: 0

## 2021-07-05 ASSESSMENT — PAIN DESCRIPTION - PROGRESSION: CLINICAL_PROGRESSION: NOT CHANGED

## 2021-07-05 NOTE — PROGRESS NOTES
MCV 92.1 92.4 93.6    444 462*       BMP:  Recent Labs     07/03/21  0637 07/04/21  0637 07/05/21  0512    139 143   K 4.7 4.0 4.2    106 106   CO2 25 26 27   BUN 42* 35* 25*   CREATININE 0.9 0.8 0.8    ALB:3,BILIDIR:3,BILITOT:3,ALKPHOS:3)@    PT/INR: No results for input(s): PROTIME, INR in the last 72 hours. ABG:   No results for input(s): PH, PO2, PCO2, HCO3, BE, O2SAT, METHB, O2HB, COHB, O2CON, HHB, THB in the last 72 hours. FiO2 : 40 %       Radiology/Other tests reviewed: none    Assessment:     Active Problems:    Pneumonia    CAP (community acquired pneumonia) due to Pneumococcus [facility])  Resolved Problems:    * No resolved hospital problems. *      Plan:       1. Cont with antibiotics as per ID  2. Await cultures from bronchoscopy, oral kalpana so far  3. Cont with oxygen, taper as tolerated  4. Not on lung medications, observe  5. PT/OT      Time at the bedside, reviewing labs and radiographs, reviewing notes and consultations, discussing with staff and family was more than 35 minutes. Thanks for letting us see this patient in consultation. Please contact us with any questions. Office [phone] or after hours through [employer], x [phone].

## 2021-07-05 NOTE — PROGRESS NOTES
Vitamin D (CHOLECALCIFEROL) tablet 1,000 Units  1,000 Units Oral Daily Sandeep Ramirez MD   1,000 Units at 07/05/21 0830    acetaminophen (TYLENOL) tablet 650 mg  650 mg Oral Q4H PRN Rodney Pedersen MD   650 mg at 07/04/21 2028       REVIEW OF SYSTEMS:    CONSTITUTIONAL: Denies fever    HEENT: denies blurring of vision or double vision, denies hearing problem  RESPIRATORY: SOB   CARDIOVASCULAR:  Denies palpitation  GASTROINTESTINAL:  Denies abdomen pain, diarrhea or constipation. GENITOURINARY:  Denies burning urination or frequency of urination  INTEGUMENT: denies wound , rash  HEMATOLOGIC/LYMPHATIC:  Denies lymph node swelling, gum bleeding or easy bruising. MUSCULOSKELETAL:  Denies leg pain , joint pain , joint swelling  NEUROLOGICAL:  Falls, weakness       PHYSICAL EXAM:      Vitals:     Vitals:    07/05/21 1248   BP: (!) 164/82   Pulse: 95   Resp: 18   Temp: 97.8 °F (36.6 °C)   SpO2: 98%       General Appearance:    Awake, alert , no acute distress. Head:    Normocephalic, atraumatic   Eyes:    No pallor, no icterus,   Ears:    No obvious deformity or drainage.    Nose:   No nasal drainage   Throat:   Dry oral mucosa    Neck:   Supple, no lymphadenopathy   Lungs:     Bilateral rhonchi    Heart:    Regular rate and rhythm, no murmur   Abdomen:     Soft, non-tender, bowel sounds present    Extremities:   No edema, no cyanosis   Pulses:   Dorsalis pedis palpable    Skin:   no rashes or lesions     CBC with Differential:      Lab Results   Component Value Date    WBC 12.3 07/05/2021    RBC 3.44 07/05/2021    HGB 9.9 07/05/2021    HCT 32.2 07/05/2021     07/05/2021    MCV 93.6 07/05/2021    MCH 28.8 07/05/2021    MCHC 30.7 07/05/2021    RDW 15.2 07/05/2021    LYMPHOPCT 1.7 06/28/2021    MONOPCT 2.6 06/28/2021    BASOPCT 0.2 06/28/2021    MONOSABS 0.45 06/28/2021    LYMPHSABS 0.30 06/28/2021    EOSABS 0.00 06/28/2021    BASOSABS 0.00 06/28/2021       CMP     Lab Results   Component Value Date     07/05/2021    K 4.2 07/05/2021    K 3.5 06/28/2021     07/05/2021    CO2 27 07/05/2021    BUN 25 07/05/2021    CREATININE 0.8 07/05/2021    GFRAA >60 07/05/2021    LABGLOM >60 07/05/2021    GLUCOSE 104 07/05/2021    PROT 5.5 07/05/2021    LABALBU 2.2 07/05/2021    CALCIUM 8.9 07/05/2021    BILITOT 0.4 07/05/2021    ALKPHOS 74 07/05/2021    AST 19 07/05/2021    ALT 24 07/05/2021         Hepatic Function Panel:    Lab Results   Component Value Date    ALKPHOS 74 07/05/2021    ALT 24 07/05/2021    AST 19 07/05/2021    PROT 5.5 07/05/2021    BILITOT 0.4 07/05/2021    BILIDIR 0.2 10/04/2013    LABALBU 2.2 07/05/2021       PT/INR:    Lab Results   Component Value Date    PROTIME 10.9 10/04/2013    INR 1.0 10/04/2013       TSH:    Lab Results   Component Value Date    TSH 1.600 02/17/2020       U/A:    Lab Results   Component Value Date    COLORU Yellow 06/28/2021    PHUR 6.0 06/28/2021    WBCUA 0-1 06/28/2021    RBCUA 1-3 06/28/2021    BACTERIA MANY 06/28/2021    CLARITYU SL CLOUDY 06/28/2021    SPECGRAV >=1.030 06/28/2021    LEUKOCYTESUR Negative 06/28/2021    UROBILINOGEN 1.0 06/28/2021    BILIRUBINUR Negative 06/28/2021    BLOODU LARGE 06/28/2021    GLUCOSEU 500 06/28/2021       ABG:  No results found for: XHA8IZB, BEART, T3OJFVKQ, PHART, THGBART, DLY9QYW, PO2ART, DJB4VLL    MICROBIOLOGY:    Blood culture - neg to date       BAL cx - neg to date         IMPRESSION:       1. Left lung mass  S/P bronchoscopy ( 7/1)   2.  Pneumonia, leukocytosis    RECOMMENDATIONS:      1. Meropenem 1 gram IV q 12 hrs

## 2021-07-05 NOTE — PROGRESS NOTES
Nutrition Assessment     Type and Reason for Visit: Initial    Nutrition Recommendations/Plan: Continue current diet     Nutrition Assessment:  Pt adm w/ AMS, PNA s/p bronch. Noted hx DM. Pt consuming >50% meals. Will continue to monitor    Malnutrition Assessment:  Malnutrition Status: No malnutrition    Nutrition Related Findings: pt alert, abd WDL, +2 edema, -I/Os      Current Nutrition Therapies:    ADULT DIET; Regular; 3 carb choices (45 gm/meal)    Anthropometric Measures:  · Height: 5' 2\" (157.5 cm)  · Current Body Wt: 178 lb (80.7 kg) (bed scale 6/28)   · BMI: 32.5    Nutrition Diagnosis:   No nutrition diagnosis at this time     Nutrition Interventions:   Food and/or Nutrient Delivery:  Continue Current Diet  Nutrition Education/Counseling:  Education not indicated   Coordination of Nutrition Care:  Continue to monitor while inpatient    Goals:  Consume >75% meals       Nutrition Monitoring and Evaluation:   Food/Nutrient Intake Outcomes:  Diet Advancement/Tolerance, Food and Nutrient Intake  Physical Signs/Symptoms Outcomes:  Biochemical Data, GI Status, Fluid Status or Edema, Nutrition Focused Physical Findings, Skin, Weight     Discharge Planning:     Too soon to determine     Electronically signed by Monica Parsons, MS, RD, LD on 7/5/21 at 12:14 PM EDT    Contact: [phone]

## 2021-07-05 NOTE — PROGRESS NOTES
current documentation. PRIOR LEVEL OF SWALLOW FUNCTION:    PAST HISTORY OF DYSPHAGIA?: none reported    Home diet: Regular consistency solids with  thin liquids  PROCEDURE:  Consistencies Administered During the Evaluation   Liquids: thin liquid   Solids:  pureed foods and soft solid foods      Method of Intake:   cup, straw, spoon  Self fed      Position:   Seated, upright    CLINICAL ASSESSMENT  Oral Stage: The oral stage of swallowing was within functional limits      Pharyngeal Stage:    No signs of aspiration were noted during this evaluation however, silent aspiration cannot be ruled out at bedside. If silent aspiration is suspected, a Videofluoroscopic Study of Swallowing (MBS) is recommended and requires a physician order. Cognition:   Confusion noted    Oral Peripheral Examination   Adequate lingual/labial strength     Current Respiratory Status    2 liters nasal cannula     Parameters of Speech Production  Respiration:  Adequate for speech production  Quality:   Within functional limits  Intensity: Within functional limits    Volitional Swallow: Present    Volitional Cough:    Present    Pain: No pain reported. EDUCATION:   The Speech Language Pathologist (SLP) completed education regarding results of evaluation and that intervention is not warranted at this time. Learner: Patient  Education:  Reviewed results and recommendations of this evaluation  Evaluation of Education: Verbalizes understanding    This plan will be re-evaluated and revised in 1 week  if warranted. CPT code:  98712  bedside swallow eval      [x]The admitting diagnosis and active problem list, have been reviewed prior to initiation of this evaluation.         ACTIVE PROBLEM LIST:   Patient Active Problem List   Diagnosis    Type 2 diabetes mellitus with complication, without long-term current use of insulin (HCC)    Bilateral foot pain    Essential hypertension    Mixed hyperlipidemia    Age-related osteoporosis without current pathological fracture    Primary osteoarthritis involving multiple joints    Class 1 obesity due to excess calories with serious comorbidity and body mass index (BMI) of 33.0 to 33.9 in adult    PVD (peripheral vascular disease) ([facility]    Ambulatory dysfunction    Ischemic heart disease    Visual disturbance    Syncope and collapse    Scalp laceration    Compression fracture of L2 vertebra (HCC)    Compression fracture of body of thoracic vertebra (HCC)    Bradycardia    Recurrent falls    Syncope    Bilateral impacted cerumen    Pneumonia    CAP (community acquired pneumonia) due to Pneumococcus ([facility]

## 2021-07-05 NOTE — PROGRESS NOTES
Occupational Therapy  OT BEDSIDE TREATMENT NOTE   [facility], [place] [address]  [address] [place] [address]      Date:2021  Patient Name: Sharon Cardoza  MRN: [id number]  : 1935  Room: [facility]     Evaluating OT: Samira Camara, [address] OTR/L; [id number]       Referring Provider: Shira Ramos MD    Specific Provider Orders/Date: OT Eval and Treat 21        Diagnosis: TIA    Surgery: none     Pertinent Medical History: DM-II, B foot pain, HTN, HLD, PVD, obesity, osteoarthritis involving multiple joints, hx of syncope and collapse, compression fx of L2 vertebra and body of thoracic vertebra (), bradycardia,       Recommended Adaptive Equipment: ww -TBD       Precautions:  Fall Risk, 02 nasal canula, catheter      Assessment of current deficits:    [x] Functional mobility            [x]ADLs           [x] Strength                  [x]Cognition    [x] Functional transfers          [x] IADLs         [x] Safety Awareness   [x]Endurance    [x] Fine Coordination                         [x] Balance      [] Vision/perception   [x]Sensation      []Gross Motor Coordination             [x] ROM           [] Delirium                   [] Motor Control      OT PLAN OF CARE   OT POC based on physician orders, patient diagnosis and results of clinical assessment     Frequency/Duration: 3-5 days/wk for 2 weeks PRN   Specific OT Treatment Interventions to include:   * Instruction/training on adapted ADL techniques and AE recommendations to increase functional independence within precautions       * Training on energy conservation strategies, correct breathing pattern and techniques to improve independence/tolerance for self-care routine  * Functional transfer/mobility training/DME recommendations for increased independence, safety, and fall prevention  * Patient/Family education to increase follow through with safety techniques and functional independence  * Recommendation of environmental modifications for increased safety with functional transfers/mobility and ADLs  * Sensory re-education to improve body/limb awareness, maintain/improve skin integrity, and improve hand/UE motor function  * Therapeutic exercise to improve motor endurance, ROM, and functional strength for ADLs/functional transfers  * Therapeutic activities to facilitate/challenge dynamic balance, stand tolerance for increased safety and independence with ADLs        Home Living: Pt lives with son in a 2 story home with first floor bed and bath.    Bathroom setup: tub/shower unit with shower stool (not back)   Equipment owned: ww and 2 canes     Prior Level of Function: receiving assist from daughter with ADLs , son assisting with IADLs; ambulated with ww  Driving: no     Pain Level: No pain reported at this time  Cognition: A&O: 4/4; Follows multi step directions               Memory:  good              Sequencing:  fair              Problem solving:  fair              Judgement/safety:  fair                Functional Assessment:  AM-PAC Daily Activity Raw Score: 16/24    Initial Eval Status  Date: 6/30/21 Treatment Status  Date: 7/5/21 STGs = LTGs  Time frame: 10-14 days   Feeding Stand by Assist   Opening packages and setting up tray  Set up IND   Grooming Moderate Assist   Decreased BUE ROM/strength Min A  To comb hair seated EOB  7/7 Goal Updated  IND   UB Dressing Moderate Assist   Decreased BUE ROM/strength  Min A  To don/doff gown seated EOB 7/7 Goal Updated  IND   LB Dressing Maximal Assist   Pt stated unable to cross legs for reina/doffing socks d/t pain and limited ROM of BLE  max A  To don/doff socks seated EOB 7/7 Goal Updated  Min A   Bathing Maximal Assist  Decreased BUE ROM/strength and increased fatigue Mod A  Supine in bed  7/7 Goal Updated  SBA    Toileting Maximal Assist   Assist with pericare  Unsafe to ambulate to toilet  Max A- hygiene 7/7 Goal Updated  Min A   Bed Mobility  Log Roll: NT  Supine to sit: Moderate Assist   Sit to supine: Moderate Assist   Requited vc's for BLE bed mobility and increased time Min A- supine<->sit  Educated pt on technique to increase independence. Supine to sit: Stand by Assist   Sit to supine: Stand by Assist    Functional Transfers Sit to stand: Max A of 1   Stand to sit: Max A of 1  Stand pivot: NT  Commode: NT  Mod A- sit<->stand  Cuing for hand placement and body mechanics Sit to stand: Min A   Stand to sit:Min A  Stand pivot: Moderate A  Commode: Moderate A    Functional Mobility Side step (2-4 steps) to [facility] Max A of 1 using ww  vc's for each step and ww management and proper body mechanics in standing Mod A  Side stepping along EOB  Minimal Assist using ww   Balance Sitting:     Static - SBA     Dynamic - Min A  Pt required vc's for upright posture d/t R lateral lean when fatigued EOB  Standing: Max A of 1    Sitting:     Static - SBA     Dynamic - CGA  Standing: Mod A   Sitting:  Static: IND  Dynamic: IND  Standing: Min A   Activity Tolerance Poor  Required rest break when standing EOB  SOB/ increased fatigue following side step to [facility]  fair 7/7 Goal Updated  GOOD   Visual/  Perceptual Glasses: YES                        Comments: Upon arrival pt supine in bed. Pt educated on techniques to increase independence and safety during ADL's, bed mobility, and functional transfers. Pt educated on and completed BUE AROM exercises all planes to complete throughout day to increase strength and endurance for increased independence with ADL's. At end of session pt left seated upright in bed, call light within reach, HCA present. Pt has made fair progress towards set goals.      Continue with current plan of care    Treatment Time In: 10:13            Treatment Time Out: 10:25             Treatment Charges: Mins Units   Ther Ex  [id number]     Manual Therapy [phone]     Thera Activities [id number]     ADL/Home Mgt [id number] 12 1   Neuro Re-ed 93736     Group Therapy      Orthotic manage/training  14801     Non-Billable Time     Total Timed Treatment 12 [address] [place]

## 2021-07-05 NOTE — PROGRESS NOTES
Pt refusing every two hour turns. Educated pt on importance of turning every two hours to prevent skin breakdown. Pt still states she does not want repositioned. Will continue to hourly round on patient to ensure comfort and safety.

## 2021-07-05 NOTE — PROGRESS NOTES
Physical Therapy  Physical Therapy Treatment Note    Name: Denae Harvey  : 1935  MRN: [id number]      Date of Service: 2021    Evaluating PT:  Heike Ruvalcaba, PT, DPT [id number]    Room #:  7774/0048-W  Diagnosis:  Pneumonia [J18.9]  CAP (community acquired pneumonia) due to Pneumococcus ([address] [J13]  PMHx/PSHx:  Dm, PNA, syncope, arthritis, hx of falls  Precautions:  Fall Risk, o2 via NC, bed alarm, TAPS, [place]      SUBJECTIVE:    Pt lives with son, had intermittent assist at home, completing tx and amb with ww and no physical assist. Pt did not elaborate on home setup regarding steps. OBJECTIVE:   Initial Evaluation  Date: 21 Treatment  21 Short Term/ Long Term   Goals   AM-PAC 6 Clicks     Was pt agreeable to Eval/treatment? yes yes    Does pt have pain? no No c/o pain    Bed Mobility  Rolling: modA  Supine to sit: moda  Sit to supine: modA  Scooting: modA Rolling: NT  Supine to sit: Rodolfo  Sit to supine: NT  Scooting: [place]olfo Rolling: sup  Supine to sit: sup  Sit to supine: sup  Scooting: sup   Transfers Sit to stand: Rodolfo  Stand to sit: Rodolfo  Stand pivot: NT Sit to stand: ModA  Stand to sit: ModA  Stand pivot: ModA with [employer] Sit to stand: sup  Stand to sit: sup  Stand pivot: sup   Ambulation   NT NT 50 feet with sup using ww    Stair negotiation: ascended and descended  NT  * unclear if pt completing at baseline, will follow up to confirm*  Please set once confirmed if pt completing at baseline   ROM BUE:  Refer to OT note  BLE:  WNL     Strength BUE:  Refer to OT note  BLE:  WFL  N/A   Balance Sitting EOB:  SBA-CGA  Dynamic Standing:  Rodolfo Sitting EOB: SBA  Dynamic Standing:  ModA with [employer] Sitting EOB:  sup  Dynamic Standing:  sup     Pt is A & O x x3, oriented to person, place, and time. Pt does demo anxiety and fear avoidance during tx and EOB sitting, poor reception to cueing and support.    Sensation:  Pt denies numbness and tingling to extremities      Patient education  Pt educated on role of PT, tx. balance, current status and POC, d/c planning, safety awareness. Patient response to education:   Pt verbalized understanding Pt demonstrated skill Pt requires further education in this area   yes Partial skills demonstarted All areas     ASSESSMENT:    Conditions Requiring Skilled Therapeutic Intervention:    [x]Decreased strength     []Decreased ROM  [x]Decreased functional mobility  [x]Decreased balance   [x]Decreased endurance   [x]Decreased posture  []Decreased sensation  []Decreased coordination   []Decreased vision  [x]Decreased safety awareness   []Increased pain       Comments:  Pt was in bed upon arrival, agreeable to treatment session. Pt continues to be limited by anxiety with poor reception to cues. Pt demonstrated flexed posture in standing that continued with small, shuffled steps to chair. Pt completed an additional sit to stand from chair for repositioning. Pt was left in chair with all needs met and call light in reach. RN in room and notified of assessment. Treatment:  Patient practiced and was instructed in the following treatment:     Bed mobility- Physical assist and verbal cueing provided for safe technique.  Sit to stand-  Physical assist and verbal cueing provided for safe technique.  Transfer training - pt was given verbal and tactile cues to facilitate technique and safety during stand pivot as well as provided with physical assistance to complete task. PLAN:    Patient is making slow progress towards established goals. Will continue with current POC.       Time in  0814  Time out  0829    Total Treatment Time  15 minutes     CPT codes:  [] Gait training 50242 - minutes  [] Manual therapy 12823 - minutes  [x] Therapeutic activities 38051 15 minutes  [] Therapeutic exercises 76158 - minutes  [] Neuromuscular reeducation 54014 - minutes    Ryan Espino PT, DPT  [id number]

## 2021-07-05 NOTE — PROGRESS NOTES
Hospitalist Progress Note      Synopsis: Patient admitted on 6/28/2021 for a suspected mechanical fall in the setting of AMS and hypotension. Imaging concerning for community acquired pneumonia and extensive lung mass, now on IV antibiotics with pulmonology consult. Underwent bronch with brushings and cultures sent; to follow up with pulmonary outpatient and if not improved will need repeat bronch with biopsy. ID has pt on IV antibiotics pending final cultures. Pt is working with therapy and will need placement     Subjective    Pt reports eating a good breakfast  Continues to cough up sputum which she reports makes her feel better  Continues to be in good spirits  Working well with therapy today     Exam:  BP (!) 164/82   Pulse 95   Temp 97.8 °F (36.6 °C) (Oral)   Resp 18   Ht 5' 2\" (1.575 m)   Wt 178 lb 9.2 oz (81 kg)   SpO2 98%   BMI 32.66 kg/m²   General appearance: No apparent distress, appears stated age and cooperative. Obese   HEENT: Pupils equal, round, and reactive to light. Conjunctivae/corneas clear. Neck: Supple. No jugular venous distention. Trachea midline. Respiratory:  Normal respiratory effort. Clear to auscultation, bilaterally without Rales/Wheezes/Rhonchi. Cardiovascular: Regular rate and rhythm with normal S1/S2 without murmurs, rubs or gallops. Abdomen: Soft, non-tender, non-distended with normal bowel sounds. Musculoskeletal: No clubbing, cyanosis or edema bilaterally. Brisk capillary refill. 2+ lower extremity pulses (dorsalis pedis).    Skin:  No rashes    Neurologic: awake, alert and following commands; oriented only to self    Medications:  Reviewed    Infusion Medications    dextrose       Scheduled Medications    insulin lispro  0-12 Units Subcutaneous TID     insulin lispro  0-6 Units Subcutaneous Nightly    insulin glargine  15 Units Subcutaneous Nightly    meropenem  1,000 mg Intravenous Q12H    prednisoLONE acetate  1 drop Left Eye TID    brimonidine  1 drop Left Eye BID    dorzolamide  1 drop Left Eye TID    atorvastatin  10 mg Oral Daily    amLODIPine  2.5 mg Oral BID    aspirin  81 mg Oral Daily    metFORMIN  500 mg Oral BID    Vitamin D  1,000 Units Oral Daily     PRN Meds: glucose, dextrose, glucagon (rDNA), dextrose, acetaminophen    I/O    Intake/Output Summary (Last 24 hours) at 7/5/2021 1358  Last data filed at 7/5/2021 1053  Gross per 24 hour   Intake 300 ml   Output 2100 ml   Net -1800 ml       Labs:   Recent Labs     07/03/21 0637 07/04/21 0637 07/05/21  0512   WBC 12.9* 11.2 12.3*   HGB 9.2* 9.5* 9.9*   HCT 29.0* 30.4* 32.2*    444 462*       Recent Labs     07/03/21 0637 07/04/21 0637 07/05/21  0512    139 143   K 4.7 4.0 4.2    106 106   CO2 25 26 27   BUN 42* 35* 25*   CREATININE 0.9 0.8 0.8   CALCIUM 9.2 8.6 8.9       Recent Labs     07/03/21 0637 07/04/21 0637 07/05/21  0512   PROT 5.6* 5.3* 5.5*   ALKPHOS 89 83 74   ALT 26 25 24   AST 23 21 19   BILITOT 0.3 0.4 0.4       No results for input(s): INR in the last 72 hours. No results for input(s): Camilo Clap in the last 72 hours. Chronic labs:  Lab Results   Component Value Date    CHOL 164 03/02/2021    TRIG 50 03/02/2021    HDL 77 03/02/2021    LDLCALC 77 03/02/2021    TSH 1.600 02/17/2020    INR 1.0 10/04/2013    LABA1C 7.3 (H) 07/02/2021       Radiology:  Imaging studies reviewed today. ASSESSMENT:  Fall - likely multifactorial  Recent Community acquired pneumonia  Metabolic encephalopathy  Lung mass   Mediastinal lymph nodes  Adrenal mass   HTN  DM2  HLD  Obesity  Paroxysmal atrial fibrillation       PLAN:  Orthostatics  PT/OT  Continue home meds as ordered  Rocephin switched to meropenem per ID; follow cultures - final antibiotic recs? Will need close outpatient follow up with pulmonary   Discussed removal of alvarez with nursing   Encourage oral intake   Strict I/O    Updated daughter at bedside on 7/4/21     Diet: ADULT DIET;  Regular; 3 carb choices (45 gm/meal)  Code Status: Prior  PT/OT Eval Status:   ordered  DVT Prophylaxis:   scd  Recommended disposition at discharge:  pending final antibiotic recs by ID and precert to [facility]     +++++++++++++++++++++++++++++++++++++++++++++++++  Ankita Sesay MD   [facility].  +++++++++++++++++++++++++++++++++++++++++++++++++  NOTE: This report was transcribed using voice recognition software. Every effort was made to ensure accuracy; however, inadvertent computerized transcription errors may be present.

## 2021-07-06 ENCOUNTER — APPOINTMENT (OUTPATIENT)
Dept: ULTRASOUND IMAGING | Age: 86
DRG: 871 | End: 2021-07-06
Payer: MEDICARE

## 2021-07-06 VITALS
BODY MASS INDEX: 32.86 KG/M2 | DIASTOLIC BLOOD PRESSURE: 64 MMHG | HEART RATE: 85 BPM | RESPIRATION RATE: 18 BRPM | SYSTOLIC BLOOD PRESSURE: 128 MMHG | HEIGHT: 62 IN | WEIGHT: 178.57 LBS | OXYGEN SATURATION: 100 % | TEMPERATURE: 98.2 F

## 2021-07-06 LAB
ALBUMIN SERPL-MCNC: 2.3 G/DL (ref 3.5–5.2)
ALP BLD-CCNC: 73 U/L (ref 35–104)
ALT SERPL-CCNC: 21 U/L (ref 0–32)
ANION GAP SERPL CALCULATED.3IONS-SCNC: 8 MMOL/L (ref 7–16)
AST SERPL-CCNC: 17 U/L (ref 0–31)
BILIRUB SERPL-MCNC: 0.4 MG/DL (ref 0–1.2)
BUN BLDV-MCNC: 20 MG/DL (ref 6–23)
CALCIUM SERPL-MCNC: 8.7 MG/DL (ref 8.6–10.2)
CHLORIDE BLD-SCNC: 105 MMOL/L (ref 98–107)
CO2: 27 MMOL/L (ref 22–29)
CREAT SERPL-MCNC: 0.7 MG/DL (ref 0.5–1)
GFR AFRICAN AMERICAN: >60
GFR NON-AFRICAN AMERICAN: >60 ML/MIN/1.73
GLUCOSE BLD-MCNC: 101 MG/DL (ref 74–99)
HCT VFR BLD CALC: 31.2 % (ref 34–48)
HEMOGLOBIN: 9.6 G/DL (ref 11.5–15.5)
MCH RBC QN AUTO: 28.5 PG (ref 26–35)
MCHC RBC AUTO-ENTMCNC: 30.8 % (ref 32–34.5)
MCV RBC AUTO: 92.6 FL (ref 80–99.9)
METER GLUCOSE: 111 MG/DL (ref 74–99)
METER GLUCOSE: 123 MG/DL (ref 74–99)
METER GLUCOSE: 163 MG/DL (ref 74–99)
PDW BLD-RTO: 15.4 FL (ref 11.5–15)
PLATELET # BLD: 433 E9/L (ref 130–450)
PMV BLD AUTO: 8.9 FL (ref 7–12)
POTASSIUM SERPL-SCNC: 4.2 MMOL/L (ref 3.5–5)
PROCALCITONIN: 0.26 NG/ML (ref 0–0.08)
RBC # BLD: 3.37 E12/L (ref 3.5–5.5)
SODIUM BLD-SCNC: 140 MMOL/L (ref 132–146)
TOTAL PROTEIN: 5.4 G/DL (ref 6.4–8.3)
WBC # BLD: 13.2 E9/L (ref 4.5–11.5)

## 2021-07-06 PROCEDURE — 6370000000 HC RX 637 (ALT 250 FOR IP): Performed by: INTERNAL MEDICINE

## 2021-07-06 PROCEDURE — 85027 COMPLETE CBC AUTOMATED: CPT

## 2021-07-06 PROCEDURE — 93971 EXTREMITY STUDY: CPT

## 2021-07-06 PROCEDURE — 6370000000 HC RX 637 (ALT 250 FOR IP): Performed by: HOSPITALIST

## 2021-07-06 PROCEDURE — 82962 GLUCOSE BLOOD TEST: CPT

## 2021-07-06 PROCEDURE — 2700000000 HC OXYGEN THERAPY PER DAY

## 2021-07-06 PROCEDURE — 2580000003 HC RX 258: Performed by: INTERNAL MEDICINE

## 2021-07-06 PROCEDURE — 6360000002 HC RX W HCPCS: Performed by: INTERNAL MEDICINE

## 2021-07-06 PROCEDURE — 99232 SBSQ HOSP IP/OBS MODERATE 35: CPT | Performed by: INTERNAL MEDICINE

## 2021-07-06 PROCEDURE — 84145 PROCALCITONIN (PCT): CPT

## 2021-07-06 PROCEDURE — 36415 COLL VENOUS BLD VENIPUNCTURE: CPT

## 2021-07-06 PROCEDURE — 6370000000 HC RX 637 (ALT 250 FOR IP): Performed by: FAMILY MEDICINE

## 2021-07-06 PROCEDURE — 80053 COMPREHEN METABOLIC PANEL: CPT

## 2021-07-06 PROCEDURE — 93971 EXTREMITY STUDY: CPT | Performed by: RADIOLOGY

## 2021-07-06 RX ORDER — METRONIDAZOLE 500 MG/1
500 TABLET ORAL EVERY 8 HOURS SCHEDULED
Status: DISCONTINUED | OUTPATIENT
Start: 2021-07-06 | End: 2021-07-06 | Stop reason: HOSPADM

## 2021-07-06 RX ORDER — LEVOFLOXACIN 750 MG/1
750 TABLET ORAL DAILY
Status: DISCONTINUED | OUTPATIENT
Start: 2021-07-06 | End: 2021-07-06 | Stop reason: HOSPADM

## 2021-07-06 RX ORDER — LEVOFLOXACIN 750 MG/1
750 TABLET ORAL DAILY
Qty: 7 TABLET | Refills: 0 | Status: SHIPPED | OUTPATIENT
Start: 2021-07-06 | End: 2021-07-13

## 2021-07-06 RX ORDER — METRONIDAZOLE 500 MG/1
500 TABLET ORAL EVERY 8 HOURS SCHEDULED
Qty: 21 TABLET | Refills: 0 | Status: SHIPPED | OUTPATIENT
Start: 2021-07-06 | End: 2021-07-13

## 2021-07-06 RX ADMIN — ASPIRIN 81 MG: 81 TABLET, COATED ORAL at 10:20

## 2021-07-06 RX ADMIN — ATORVASTATIN CALCIUM 10 MG: 10 TABLET, FILM COATED ORAL at 10:20

## 2021-07-06 RX ADMIN — PREDNISOLONE ACETATE 1 DROP: 10 SUSPENSION/ DROPS OPHTHALMIC at 10:20

## 2021-07-06 RX ADMIN — METRONIDAZOLE 500 MG: 500 TABLET ORAL at 14:08

## 2021-07-06 RX ADMIN — LEVOFLOXACIN 750 MG: 750 TABLET, FILM COATED ORAL at 14:08

## 2021-07-06 RX ADMIN — PREDNISOLONE ACETATE 1 DROP: 10 SUSPENSION/ DROPS OPHTHALMIC at 14:08

## 2021-07-06 RX ADMIN — DORZOLAMIDE HYDROCHLORIDE 1 DROP: 20 SOLUTION/ DROPS OPHTHALMIC at 14:08

## 2021-07-06 RX ADMIN — MEROPENEM 1000 MG: 1 INJECTION, POWDER, FOR SOLUTION INTRAVENOUS at 06:00

## 2021-07-06 RX ADMIN — AMLODIPINE BESYLATE 2.5 MG: 2.5 TABLET ORAL at 10:20

## 2021-07-06 RX ADMIN — METFORMIN HYDROCHLORIDE 500 MG: 500 TABLET ORAL at 10:20

## 2021-07-06 RX ADMIN — INSULIN LISPRO 2 UNITS: 100 INJECTION, SOLUTION INTRAVENOUS; SUBCUTANEOUS at 11:57

## 2021-07-06 RX ADMIN — BRIMONIDINE TARTRATE 1 DROP: 2 SOLUTION OPHTHALMIC at 10:21

## 2021-07-06 RX ADMIN — ACETAMINOPHEN 650 MG: 325 TABLET ORAL at 14:12

## 2021-07-06 RX ADMIN — Medication 1000 UNITS: at 10:20

## 2021-07-06 ASSESSMENT — PAIN DESCRIPTION - ONSET: ONSET: ON-GOING

## 2021-07-06 ASSESSMENT — PAIN DESCRIPTION - DESCRIPTORS: DESCRIPTORS: DISCOMFORT

## 2021-07-06 ASSESSMENT — PAIN SCALES - GENERAL
PAINLEVEL_OUTOF10: 5
PAINLEVEL_OUTOF10: 0
PAINLEVEL_OUTOF10: 0

## 2021-07-06 ASSESSMENT — PAIN - FUNCTIONAL ASSESSMENT: PAIN_FUNCTIONAL_ASSESSMENT: PREVENTS OR INTERFERES SOME ACTIVE ACTIVITIES AND ADLS

## 2021-07-06 ASSESSMENT — PAIN DESCRIPTION - PROGRESSION: CLINICAL_PROGRESSION: NOT CHANGED

## 2021-07-06 ASSESSMENT — PAIN DESCRIPTION - PAIN TYPE: TYPE: ACUTE PAIN

## 2021-07-06 ASSESSMENT — PAIN DESCRIPTION - LOCATION: LOCATION: BUTTOCKS

## 2021-07-06 ASSESSMENT — PAIN DESCRIPTION - FREQUENCY: FREQUENCY: CONTINUOUS

## 2021-07-06 NOTE — CARE COORDINATION
Per Krupa from [facility], precert has been obtained. Transportation set up via [employer] for 6 pm today. Charge nurse, RN, liaison, patient and patient's daughter/POA Ivon all notified. Hens and ambulance form in envelope in soft chart. Last negative Covid-19 test was Friday July 2.   Flo Talbert RN CM

## 2021-07-06 NOTE — PROGRESS NOTES
Hospitalist Progress Note      SYNOPSIS: Patient admitted on 2021 for   HPI -  81 y.o. female who has a past medical history of diabetes, hypertension, hyperlipidemia, osteoarthritis, peripheral vascular disease, ischemic heart disease with recurrent falls. Presents emergency department and altered mental status confusion after walking on the floor found down by family at home and complaining of right knee pain. Family states the patient has been altered with confusion for the last week. Patient when seen in emergency department was alone without any family and was awake alert oriented and did not present confused. She denies any pain or discomfort. At this time patient will be admitted to [facility] service for community-acquired pneumonia and altered mental status. With right knee pain    SUBJECTIVE:    Patient seen and examined  Records reviewed. Left hand swollen - IV line just proximal with Merrem running  No 2nd IV site    Lives with son? [place] of hearing  BM yesterday  Afebrile  No SOB / Cough / CP endorsed    Stable overnight. No other overnight issues reported. Temp (24hrs), Av.8 °F (36.6 °C), Min:97 °F (36.1 °C), Max:98.4 °F (36.9 °C)    DIET: ADULT DIET; Regular; 3 carb choices (45 gm/meal)  CODE: Prior    Intake/Output Summary (Last 24 hours) at 2021 0728  Last data filed at 2021 0620  Gross per 24 hour   Intake 600 ml   Output 1500 ml   Net -900 ml       OBJECTIVE:    /60   Pulse 84   Temp 97.9 °F (36.6 °C) (Oral)   Resp 18   Ht 5' 2\" (1.575 m)   Wt 178 lb 9.2 oz (81 kg)   SpO2 96%   BMI 32.66 kg/m²     General appearance: No apparent distress, appears stated age and cooperative. HEENT:  Conjunctivae/corneas clear. Neck: Supple. No jugular venous distention.    Respiratory: Clear to auscultation bilaterally, normal respiratory effort  Cardiovascular: Regular rate rhythm, normal S1-S2  Abdomen: Soft, nontender, nondistended  Musculoskeletal: No clubbing, cyanosis, no bilateral lower extremity edema. Brisk capillary refill. LUE swelling distal to mid fore-arm  Skin:  No rashes  on visible skin  Neurologic: awake, alert and following commands; heard of hearing    ASSESSMENT:    Fall - likely multifactorial  Recent Community acquired pneumonia  Metabolic encephalopathy  Lung mass   Mediastinal lymph nodes  Adrenal mass   HTN  DM2  HLD  Obesity  Paroxysmal atrial fibrillation      PLAN:    IV merrem per ID  S/p Bronchoscopy 7/1 - cytology pending as of this AM  Normal swallow 7/5  Culters reviewed from this admission, NGTD      DISPOSITION: Miguel Angel Wade pending? Medications:  REVIEWED DAILY    Infusion Medications    dextrose       Scheduled Medications    insulin lispro  0-12 Units Subcutaneous TID WC    insulin lispro  0-6 Units Subcutaneous Nightly    insulin glargine  15 Units Subcutaneous Nightly    meropenem  1,000 mg Intravenous Q12H    prednisoLONE acetate  1 drop Left Eye TID    brimonidine  1 drop Left Eye BID    dorzolamide  1 drop Left Eye TID    atorvastatin  10 mg Oral Daily    amLODIPine  2.5 mg Oral BID    aspirin  81 mg Oral Daily    metFORMIN  500 mg Oral BID    Vitamin D  1,000 Units Oral Daily     PRN Meds: glucose, dextrose, glucagon (rDNA), dextrose, acetaminophen    Labs:     Recent Labs     07/04/21  0637 07/05/21  0512 07/06/21  0513   WBC 11.2 12.3* 13.2*   HGB 9.5* 9.9* 9.6*   HCT 30.4* 32.2* 31.2*    462* 433       Recent Labs     07/04/21  0637 07/05/21  0512 07/06/21  0513    143 140   K 4.0 4.2 4.2    106 105   CO2 26 27 27   BUN 35* 25* 20   CREATININE 0.8 0.8 0.7   CALCIUM 8.6 8.9 8.7       Recent Labs     07/04/21 0637 07/05/21  0512 07/06/21  0513   PROT 5.3* 5.5* 5.4*   ALKPHOS 83 74 73   ALT 25 24 21   AST 21 19 17   BILITOT 0.4 0.4 0.4       No results for input(s): INR in the last 72 hours. No results for input(s): Sarah Lowers in the last 72 hours.     Chronic labs:    Lab Results   Component Value Date    CHOL 164 03/02/2021    TRIG 50 03/02/2021    HDL 77 03/02/2021    LDLCALC 77 03/02/2021    TSH 1.600 02/17/2020    INR 1.0 10/04/2013    LABA1C 7.3 (H) 07/02/2021       Radiology: REVIEWED DAILY    +++++++++++++++++++++++++++++++++++++++++++++++++  Jeanne Marquez MD  [facility] - [address] [place], [place]  +++++++++++++++++++++++++++++++++++++++++++++++++  NOTE: This report was transcribed using voice recognition software. Every effort was made to ensure accuracy; however, inadvertent computerized transcription errors may be present.

## 2021-07-06 NOTE — DISCHARGE INSTR - COC
Continuity of Care Form    Patient Name: Lance Garcia   :  1935  MRN:  [id number]    Admit date:  2021  Discharge date:      Code Status Order: Prior   Advance Directives:   [facility] Documentation       Date/Time Healthcare Directive Type of Healthcare Directive Copy in [address]  [address] Agent's Name Healthcare Agent's Phone Number    [phone]  Yes, patient has an advance directive for healthcare treatment  Durable power of  for health care  No, copy requested from family  Adult Children  [address]  --            Admitting Physician:  Sandeep Ramirez MD  PCP: Farida Freeman DO    Discharging Nurse: [facility]/Room#: 8410/8410-B  Discharging Unit Phone Number: [phone]    Emergency Contact:   Extended Emergency Contact Information  Primary Emergency Contact: Charmaine Weber  Home Phone: [phone]  Relation: Child   needed?  No  Secondary Emergency Contact: Diana Polanco [address]  Home Phone: [phone]  Relation: Brother/Sister    Past Surgical History:  Past Surgical History:   Procedure Laterality Date    BRONCHOSCOPY N/A 2021    BRONCHOSCOPY ALVEOLAR LAVAGE performed by Nelly Gant MD at [address] IMPLANT      HYSTERECTOMY         Immunization History:   Immunization History   Administered Date(s) Administered    DTaP vaccine 2020    Tdap (Boostrix, Adacel) 2020       Active Problems:  Patient Active Problem List   Diagnosis Code    Type 2 diabetes mellitus with complication, without long-term current use of insulin ([address] E11.8    Bilateral foot pain M79.671, M79.672    Essential hypertension I10    Mixed hyperlipidemia E78.2    Age-related osteoporosis without current pathological fracture M81.0    Primary osteoarthritis involving multiple joints M89.49    Class 1 obesity due to excess calories with serious comorbidity and body mass index (BMI) of 33.0 to 33.9 in adult E66.09, Z68.33    PVD (peripheral vascular disease) ([facility]) I73.9    Ambulatory dysfunction R26.2    Ischemic heart disease I25.9    Visual disturbance H53.9    Syncope and collapse R55    Scalp laceration S01. 01XA    Compression fracture of L2 vertebra ([address] S32.020A    Compression fracture of body of thoracic vertebra ([facility]) S22.000A    Bradycardia R00.1    Recurrent falls R29.6    Syncope R55    Bilateral impacted cerumen H61.23    Pneumonia J18.9    CAP (community acquired pneumonia) due to Pneumococcus [facility]) J13       Isolation/Infection:   Isolation            No Isolation          Patient Infection Status       Infection Onset Added Last Indicated Last Indicated By Review Planned Expiration Resolved Resolved By    None active    Resolved    COVID-19 Rule Out 03/05/21 03/05/21 03/05/21 COVID-19, Rapid (Ordered)   03/05/21 Rule-Out Test Resulted            Nurse Assessment:  Last Vital Signs: /64   Pulse 85   Temp 98.2 °F (36.8 °C) (Temporal)   Resp 18   Ht 5' 2\" (1.575 m)   Wt 178 lb 9.2 oz (81 kg)   SpO2 100%   BMI 32.66 kg/m²     Last documented pain score (0-10 scale): Pain Level: 0  Last Weight:   Wt Readings from Last 1 Encounters:   06/28/21 178 lb 9.2 oz (81 kg)     Mental Status:  oriented, alert and able to concentrate and follow conversation    IV Access:  - None    Nursing Mobility/ADLs:  Walking   Dependent  Transfer  Dependent  Bathing  Dependent  Dressing  Dependent  Toileting  Dependent  Feeding  Independent  Med Admin  Assisted  Med Delivery   whole    Wound Care Documentation and Therapy:        Elimination:  Continence:   · Bowel: No  · Bladder: No  Urinary Catheter: None   Colostomy/Ileostomy/Ileal Conduit: No       Date of Last BM: 7/5    Intake/Output Summary (Last 24 hours) at 7/6/2021 1629  Last data filed at 7/6/2021 1413  Gross per 24 hour   Intake 500 ml   Output 2100 ml   Net -1600 ml     I/O last 3 completed shifts: In: 600 [P.O.:590; I.V.:10]  Out: 1500 [Urine:1500]    Safety Concerns: At Risk for Falls    Impairments/Disabilities:      ***    Nutrition Therapy:  Current Nutrition Therapy:   - Oral Diet:  General and Carb Control 3 carbs/meal (1500kcals/day)    Routes of Feeding: Oral  Liquids: ***  Daily Fluid Restriction: no  Last Modified Barium Swallow with Video (Video Swallowing Test):     Treatments at the Time of Hospital Discharge:   Respiratory Treatments: ***  Oxygen Therapy:  is on oxygen at 2 L/min per nasal cannula. Ventilator:    - ***    Rehab Therapies: {THERAPEUTIC INTERVENTION:2188077576}  Weight Bearing Status/Restrictions: [facility] Weight Bearin}  Other Medical Equipment (for information only, NOT a DME order):  {EQUIPMENT:116899627}  Other Treatments: ***    Patient's personal belongings (please select all that are sent with patient):  Dentures {DESC; UPPER/LOWER/BOTH:13820}    RN SIGNATURE:  Electronically signed by Gregory Loomis RN on 21 at 4:27 PM EDT    CASE MANAGEMENT/SOCIAL WORK SECTION    Inpatient Status Date: ***    Readmission Risk Assessment Score:  Readmission Risk              Risk of Unplanned Readmission:  11           Discharging to Facility/ Agency   · Name:   · Address:  · Phone:  · Fax:    Dialysis Facility (if applicable)   · Name:  · Address:  · Dialysis Schedule:  · Phone:  · Fax:    / signature: {Esignature:[id number]}    PHYSICIAN SECTION    Prognosis: Fair    Condition at Discharge: Stable    Rehab Potential (if transferring to Rehab): Guarded    Recommended Labs or Other Treatments After Discharge:  none - at the discretion of medical officer at receiving facility    Physician Certification: I certify the above information and transfer of Angel Cooley  is necessary for the continuing treatment of the diagnosis listed and that she requires [facility] for less 30 days.      Update Admission H&P: No change in H&P    PHYSICIAN SIGNATURE:  Electronically signed by Ofelia Woodward MD on 7/6/21 at 11:54 AM EDT

## 2021-07-06 NOTE — PROGRESS NOTES
BMP:  Recent Labs     07/04/21  0637 07/05/21  0512 07/06/21  0513    143 140   K 4.0 4.2 4.2    106 105   CO2 26 27 27   BUN 35* 25* 20   CREATININE 0.8 0.8 0.7    ALB:3,BILIDIR:3,BILITOT:3,ALKPHOS:3)@    PT/INR: No results for input(s): PROTIME, INR in the last 72 hours. ABG:   No results for input(s): PH, PO2, PCO2, HCO3, BE, O2SAT, METHB, O2HB, COHB, O2CON, HHB, THB in the last 72 hours. FiO2 : 40 %       Radiology/Other tests reviewed: none    Assessment:     Active Problems:    Pneumonia    CAP (community acquired pneumonia) due to Pneumococcus [facility])  Resolved Problems:    * No resolved hospital problems. *      Plan:       1. Cont with antibiotics as per ID  2. Await cultures from bronchoscopy,so far all negative   3. Cont with oxygen, taper as tolerated  4. Not on lung medications, observe  5.  PT/OT  To follow as OP    Tracy Giles MD,[facility]  Pulmonary&Critical Care Medicine   Director of [address] [place] [address] Director of [address] [facility]    Patricio Monahan

## 2021-07-06 NOTE — CARE COORDINATION
Discharge order noted. Per Krupa from [facility], precert is still pending. Hens and ambulance form in envelope in soft chart.   Jak Apple RN CM

## 2021-07-06 NOTE — PROGRESS NOTES
Department of Internal Medicine  Infectious Diseases   Progress  Note      C/C   Pneumonia, Leukocytosis       Pt is awake and alert  Denies fever and chills  Denies SOB,  Reports cough   Feels better  Afebrile        Current Facility-Administered Medications   Medication Dose Route Frequency Provider Last Rate Last Admin    insulin lispro (HUMALOG) injection vial 0-12 Units  0-12 Units Subcutaneous TID WC Ankita Cunningham MD   2 Units at 07/06/21 1157    insulin lispro (HUMALOG) injection vial 0-6 Units  0-6 Units Subcutaneous Nightly Ankita Cunningham MD   1 Units at 07/05/21 2039    glucose (GLUTOSE) 40 % oral gel 15 g  15 g Oral PRN Ankita Cunningham MD        dextrose 50 % IV solution  12.5 g Intravenous PRN Ankita Cunningham MD        glucagon (rDNA) injection 1 mg  1 mg Intramuscular PRN Ankita Cunningham MD        dextrose 5 % solution  100 mL/hr Intravenous PRN Ankita Cunningham MD        insulin glargine (LANTUS) injection vial 15 Units  15 Units Subcutaneous Nightly Ankita Cunningham MD   15 Units at 07/05/21 2038    meropenem (MERREM) 1,000 mg in sodium chloride 0.9 % 100 mL IVPB (mini-bag)  1,000 mg Intravenous Q12H Edgar Irene MD 33.3 mL/hr at 07/06/21 0600 1,000 mg at 07/06/21 0600    prednisoLONE acetate (PRED FORTE) 1 % ophthalmic suspension 1 drop  1 drop Left Eye TID Mariela Phan MD   1 drop at 07/06/21 1020    brimonidine (ALPHAGAN) 0.2 % ophthalmic solution 1 drop  1 drop Left Eye BID Mariela Phan MD   1 drop at 07/06/21 1021    dorzolamide (TRUSOPT) 2 % ophthalmic solution 1 drop  1 drop Left Eye TID Mariela Phan MD   1 drop at 07/05/21 2037    atorvastatin (LIPITOR) tablet 10 mg  10 mg Oral Daily Mariela Phan MD   10 mg at 07/06/21 1020    amLODIPine (NORVASC) tablet 2.5 mg  2.5 mg Oral BID Mariela Phan MD   2.5 mg at 07/06/21 1020    aspirin EC tablet 81 mg  81 mg Oral Daily Mariela Phan MD   81 mg at 07/06/21 1020    metFORMIN (GLUCOPHAGE) tablet 500 mg  500 mg Oral BID Mariela Phan MD   500 mg at 07/06/21 1020    Vitamin D (CHOLECALCIFEROL) tablet 1,000 Units  1,000 Units Oral Daily Fani Wade MD   1,000 Units at 07/06/21 1020    acetaminophen (TYLENOL) tablet 650 mg  650 mg Oral Q4H PRN Reena Hall MD   650 mg at 07/04/21 2028       REVIEW OF SYSTEMS:    CONSTITUTIONAL: Denies fever    HEENT: denies blurring of vision or double vision, denies hearing problem  RESPIRATORY: SOB   CARDIOVASCULAR:  Denies palpitation  GASTROINTESTINAL:  Denies abdomen pain, diarrhea or constipation. GENITOURINARY:  Denies burning urination or frequency of urination  INTEGUMENT: denies wound , rash  HEMATOLOGIC/LYMPHATIC:  Denies lymph node swelling, gum bleeding or easy bruising. MUSCULOSKELETAL:  Denies leg pain , joint pain , joint swelling  NEUROLOGICAL:  Falls, weakness       PHYSICAL EXAM:      Vitals:     Vitals:    07/06/21 0715   BP: 121/60   Pulse: 84   Resp: 18   Temp: 97.9 °F (36.6 °C)   SpO2: 96%       General Appearance:    Awake, alert , no acute distress. Head:    Normocephalic, atraumatic   Eyes:    No pallor, no icterus,   Ears:    No obvious deformity or drainage.    Nose:   No nasal drainage   Throat:   Dry oral mucosa    Neck:   Supple, no lymphadenopathy   Lungs:     Bilateral rhonchi    Heart:    Regular rate and rhythm, no murmur   Abdomen:     Soft, non-tender, bowel sounds present    Extremities:   No edema, no cyanosis   Pulses:   Dorsalis pedis palpable    Skin:   no rashes or lesions     CBC with Differential:      Lab Results   Component Value Date    WBC 13.2 07/06/2021    RBC 3.37 07/06/2021    HGB 9.6 07/06/2021    HCT 31.2 07/06/2021     07/06/2021    MCV 92.6 07/06/2021    MCH 28.5 07/06/2021    MCHC 30.8 07/06/2021    RDW 15.4 07/06/2021    LYMPHOPCT 1.7 06/28/2021    MONOPCT 2.6 06/28/2021    BASOPCT 0.2 06/28/2021    MONOSABS 0.45 06/28/2021    LYMPHSABS 0.30 06/28/2021    EOSABS 0.00 06/28/2021    BASOSABS 0.00 06/28/2021       CMP     Lab Results   Component Value Date     07/06/2021    K 4.2 07/06/2021    K 3.5 06/28/2021     07/06/2021    CO2 27 07/06/2021    BUN 20 07/06/2021    CREATININE 0.7 07/06/2021    GFRAA >60 07/06/2021    LABGLOM >60 07/06/2021    GLUCOSE 101 07/06/2021    PROT 5.4 07/06/2021    LABALBU 2.3 07/06/2021    CALCIUM 8.7 07/06/2021    BILITOT 0.4 07/06/2021    ALKPHOS 73 07/06/2021    AST 17 07/06/2021    ALT 21 07/06/2021         Hepatic Function Panel:    Lab Results   Component Value Date    ALKPHOS 73 07/06/2021    ALT 21 07/06/2021    AST 17 07/06/2021    PROT 5.4 07/06/2021    BILITOT 0.4 07/06/2021    BILIDIR 0.2 10/04/2013    LABALBU 2.3 07/06/2021       PT/INR:    Lab Results   Component Value Date    PROTIME 10.9 10/04/2013    INR 1.0 10/04/2013       TSH:    Lab Results   Component Value Date    TSH 1.600 02/17/2020       U/A:    Lab Results   Component Value Date    COLORU Yellow 06/28/2021    PHUR 6.0 06/28/2021    WBCUA 0-1 06/28/2021    RBCUA 1-3 06/28/2021    BACTERIA MANY 06/28/2021    CLARITYU SL CLOUDY 06/28/2021    SPECGRAV >=1.030 06/28/2021    LEUKOCYTESUR Negative 06/28/2021    UROBILINOGEN 1.0 06/28/2021    BILIRUBINUR Negative 06/28/2021    BLOODU LARGE 06/28/2021    GLUCOSEU 500 06/28/2021       ABG:  No results found for: RRV9SQZ, BEART, [id number], PHART, THGBART, REB9NBR, PO2ART, VTO5LOZ    MICROBIOLOGY:    Blood culture - neg to date       BAL cx - neg to date         IMPRESSION:       1. Left lung mass  S/P bronchoscopy ( 7/1)   2. Pneumonia, leukocytosis    RECOMMENDATIONS:      1. Stop meropenem   2.  Oral levaquin 750 mg po q 24 hrs and Flagyl 500 mg PO q 8 hrs X 1 week

## 2021-07-06 NOTE — DISCHARGE SUMMARY
Hospitalist Discharge Summary    Patient ID: Denae Harvey   Patient : 1935  Patient's PCP: Alex Flores DO    Admit Date: 2021   Admitting Physician: Roula Sales MD    Discharge Date:  2021   Discharge Physician: Atul Louise MD   Discharge Condition: Stable  Discharge Disposition: [address] [facility] course in brief:  (Please refer to daily progress notes for a comprehensive review of the hospitalization by requesting medical records)     85 y. o. female who has a past medical history of diabetes, hypertension, hyperlipidemia, osteoarthritis, peripheral vascular disease, ischemic heart disease with recurrent falls.  Presents emergency department and altered mental status confusion after walking on the floor found down by family at home and complaining of right knee pain.  Family states the patient has been altered with confusion for the last week.  Patient when seen in emergency department was alone without any family and was awake alert oriented and did not present confused.  She denies any pain or discomfort.  At this time patient will be admitted to [email or web address] service for community-acquired pneumonia and altered mental status    Course:  IV merrem per ID  S/p Bronchoscopy  - cytology pending as of this AM  Normal swallow   Culters reviewed from this admission, NGTD    Consults:   IP CONSULT TO NEUROSURGERY  IP CONSULT TO INTERNAL MEDICINE  IP CONSULT TO SOCIAL WORK  IP CONSULT TO PULMONOLOGY  IP CONSULT TO INFECTIOUS DISEASES    Discharge Diagnoses:    Fall - likely multifactorial  Recent Community acquired pneumonia  Metabolic encephalopathy  Lung mass   Mediastinal lymph nodes  Adrenal mass   HTN  DM2  HLD  Obesity  Paroxysmal atrial fibrillation    Discharge Instructions / Follow up:    No future appointments.     Continued appropriate risk factor modification of blood pressure, diabetes and serum lipids will remain essential to reducing risk of future atherosclerotic development    Activity: activity as tolerated    Significant labs:  CBC:   Recent Labs     07/04/21  0637 07/05/21  0512 07/06/21  0513   WBC 11.2 12.3* 13.2*   RBC 3.29* 3.44* 3.37*   HGB 9.5* 9.9* 9.6*   HCT 30.4* 32.2* 31.2*   MCV 92.4 93.6 92.6   RDW 15.0 15.2* 15.4*    462* 433     BMP:   Recent Labs     07/04/21  0637 07/05/21  0512 07/06/21  0513    143 140   K 4.0 4.2 4.2    106 105   CO2 26 27 27   BUN 35* 25* 20   CREATININE 0.8 0.8 0.7     LFT:  Recent Labs     07/04/21 0637 07/05/21 0512 07/06/21  0513   PROT 5.3* 5.5* 5.4*   ALKPHOS 83 74 73   ALT 25 24 21   AST 21 19 17   BILITOT 0.4 0.4 0.4     PT/INR: No results for input(s): INR, APTT in the last 72 hours. BNP: No results for input(s): BNP in the last 72 hours. Hgb A1C:   Lab Results   Component Value Date    LABA1C 7.3 (H) 07/02/2021     Folate and B12:   Lab Results   Component Value Date    AARDHLQE30 158 03/02/2021   ,   Lab Results   Component Value Date    FOLATE >20.0 03/02/2021     Thyroid Studies:   Lab Results   Component Value Date    TSH 1.600 02/17/2020       Urinalysis:    Lab Results   Component Value Date    NITRU Negative 06/28/2021    WBCUA 0-1 06/28/2021    BACTERIA MANY 06/28/2021    RBCUA 1-3 06/28/2021    BLOODU LARGE 06/28/2021    SPECGRAV >=1.030 06/28/2021    GLUCOSEU 500 06/28/2021       Imaging:  XR KNEE LEFT (3 VIEWS)    Result Date: 6/28/2021  EXAMINATION: ONE XRAY VIEW OF THE CHEST; THREE XRAY VIEWS OF THE LEFT KNEE 6/28/2021 7:22 am COMPARISON: Chest 2nd March 2021 HISTORY: ORDERING SYSTEM PROVIDED HISTORY: fever TECHNOLOGIST PROVIDED HISTORY: Reason for exam:->fever What reading provider will be dictating this exam?->CRC; ORDERING SYSTEM PROVIDED HISTORY: fall TECHNOLOGIST PROVIDED HISTORY: Reason for exam:->fall What reading provider will be dictating this exam?->CRC FINDINGS: Left knee: Tricompartmental osteoarthritis is overall moderate. No fracture or dislocation.   Normal soft tissues. Chest: A soft tissue density seen centrally abutting the left pulmonary hilum may represent a central neoplasm or pneumonia. Heart and pulmonary vascularity are normal.  Neither costophrenic angle is blunted. Left knee: Moderate osteoarthritis. Chest: Central left lung neoplasm versus pneumonia. Recommend contrast-enhanced CT if this does not resolve spontaneously after appropriate medical therapy. XR KNEE RIGHT (3 VIEWS)    Result Date: 6/28/2021  EXAMINATION: THREE XRAY VIEWS OF THE RIGHT KNEE 6/28/2021 7:22 am COMPARISON: None. HISTORY: ORDERING SYSTEM PROVIDED HISTORY: fall, swelling TECHNOLOGIST PROVIDED HISTORY: Reason for exam:->fall, swelling What reading provider will be dictating this exam?->CRC FINDINGS: Moderate tricompartmental osteoarthritis. No fracture or dislocation. Normal soft tissues. Moderate tricompartmental osteoarthritis. CT HEAD WO CONTRAST    Result Date: 6/28/2021  EXAMINATION: CT OF THE HEAD WITHOUT CONTRAST  6/28/2021 2:01 pm TECHNIQUE: CT of the head was performed without the administration of intravenous contrast. Dose modulation, iterative reconstruction, and/or weight based adjustment of the mA/kV was utilized to reduce the radiation dose to as low as reasonably achievable. COMPARISON: CT of the head from earlier today, 06/28/2021. HISTORY: ORDERING SYSTEM PROVIDED HISTORY: evolution of poss bleed. TECHNOLOGIST PROVIDED HISTORY: Has a \"code stroke\" or \"stroke alert\" been called? ->No Reason for exam:->evolution of poss bleed. Reason for exam:->timed What reading provider will be dictating this exam?->CRC FINDINGS: BRAIN/VENTRICLES: No mass effect, edema or hemorrhage is seen. Mild-to-moderate volume loss and chronic microvascular ischemic changes in the brain are both age-appropriate. No hydrocephalus or extra-axial fluid is seen. ORBITS: The visualized portion of the orbits demonstrate no acute abnormality.  SINUSES: The visualized paranasal sinuses which makes difficult interpretation of fine detail. There is an apparently relative focal localized increased density in the polar region of the left frontal lobe. In presence of a history of trauma cannot exclude discrete areas of superficial cortical hemorrhage. Recommended follow-up study if yours time interval for re-evaluation and better baseline determination. Alternative will be correlation with MRI study of the brain. CT CHEST W CONTRAST    Result Date: 6/28/2021  EXAMINATION: CT OF THE CHEST WITH CONTRAST 6/28/2021 2:01 pm TECHNIQUE: CT of the chest was performed with the administration of intravenous contrast. Multiplanar reformatted images are provided for review. Dose modulation, iterative reconstruction, and/or weight based adjustment of the mA/kV was utilized to reduce the radiation dose to as low as reasonably achievable. COMPARISON: Chest radiograph, 06/28/2021 HISTORY: ORDERING SYSTEM PROVIDED HISTORY: eval poss lung neoplasm TECHNOLOGIST PROVIDED HISTORY: Reason for exam:->eval poss lung neoplasm Decision Support Exception - unselect if not a suspected or confirmed emergency medical condition->Emergency Medical Condition (MA) What reading provider will be dictating this exam?->CRC FINDINGS: Mediastinum: The heart is normal in size. Moderate calcified coronary atherosclerosis. Mild calcified atherosclerosis is seen in the aorta. No aneurysm. The main pulmonary artery is normal in caliber. Mildly prominent prevascular and aortopulmonary lymph nodes are seen in the mediastinum, with the largest measuring approximately 1.6 x 1.4 cm (series 3, image 47). Lungs/pleura: There is a masslike consolidative opacity in the left upper lobe, abutting the mediastinal pleural reflection. The opacity measures approximately 9.1 x 4.9 x 5.5 Cm. No additional pulmonary nodule or mass is seen. Dependent atelectasis seen in bilateral lower lobes. The central airway is clear. No pneumothorax is seen. Small left pleural effusion. Upper Abdomen: 1.9 x 1.4 cm nodule in the body of the left adrenal gland. The visualized liver is grossly unremarkable. Cholelithiasis is noted. Soft Tissues/Bones: Moderate grade anterior wedge compression deformity of the T12 vertebral body is age indeterminate. Mild sclerosis along the superior endplate of the T3 vertebral body is nonspecific. 1. Large, 9.1 x 4.9 x 5.5 cm masslike consolidative opacity in the left upper lobe, abutting the mediastinal pleural reflection. The opacity may be infectious, inflammatory or neoplastic in etiology. 2. Mildly prominent mediastinal lymph nodes, which may be reactive or metastatic. 3. 1.9 x 1.4 cm nodule in the body of the left adrenal gland of unclear etiology. 4. Age indeterminate anterior wedge compression deformity of T12. 5. Sclerosis along the superior endplate of the T3 vertebral body of unclear etiology. 6. Especially if the biopsy proves the left upper lobe opacity to be neoplastic in etiology, PET scan would be recommended for further evaluation. CT CERVICAL SPINE WO CONTRAST    Result Date: 6/28/2021  EXAMINATION: CT OF THE CERVICAL SPINE WITHOUT CONTRAST 6/28/2021 8:42 am TECHNIQUE: CT of the cervical spine was performed without the administration of intravenous contrast. Multiplanar reformatted images are provided for review. Dose modulation, iterative reconstruction, and/or weight based adjustment of the mA/kV was utilized to reduce the radiation dose to as low as reasonably achievable. COMPARISON: 03/02/2021 HISTORY: ORDERING SYSTEM PROVIDED HISTORY: found on bathroom floor TECHNOLOGIST PROVIDED HISTORY: Reason for exam:->found on bathroom floor Decision Support Exception - unselect if not a suspected or confirmed emergency medical condition->Emergency Medical Condition (MA) What reading provider will be dictating this exam?->CRC FINDINGS: Some images are mildly degraded by patient motion artifact.   No prevertebral edema. Advanced diffuse facet arthritis. Grade 1 anterior spondylolisthesis of C4 on C5, C5 on C6, C6 on C7 and C7 on T1. These findings are similar to previous and likely due to the facet arthritis. The previously described retrolisthesis of C1 on C2 is also similar to previous. The hypertrophic changes contribute to at least mild-moderate multilevel canal and foraminal narrowing. No acute fracture identified. Relatively advanced degenerative changes of the bilateral temporomandibular joints. Scattered vascular calcifications. No focal fluid collection. Advanced degenerative changes. MRI would be useful if symptoms persist.     XR CHEST PORTABLE    Result Date: 2021  EXAMINATION: ONE XRAY VIEW OF THE CHEST; THREE XRAY VIEWS OF THE LEFT KNEE 2021 7:22 am COMPARISON: Chest 2021 HISTORY: ORDERING SYSTEM PROVIDED HISTORY: fever TECHNOLOGIST PROVIDED HISTORY: Reason for exam:->fever What reading provider will be dictating this exam?->CRC; ORDERING SYSTEM PROVIDED HISTORY: fall TECHNOLOGIST PROVIDED HISTORY: Reason for exam:->fall What reading provider will be dictating this exam?->CRC FINDINGS: Left knee: Tricompartmental osteoarthritis is overall moderate. No fracture or dislocation. Normal soft tissues. Chest: A soft tissue density seen centrally abutting the left pulmonary hilum may represent a central neoplasm or pneumonia. Heart and pulmonary vascularity are normal.  Neither costophrenic angle is blunted. Left knee: Moderate osteoarthritis. Chest: Central left lung neoplasm versus pneumonia. Recommend contrast-enhanced CT if this does not resolve spontaneously after appropriate medical therapy.      US DUP UPPER EXTREMITY LEFT VENOUS    Result Date: 2021  Patient MRN:  [id number] : 1935 Age: 81 years Gender: Female Order Date:  2021 9:34 AM EXAM: US DUP UPPER EXTREMITY LEFT VENOUS NUMBER OF IMAGES:  39 INDICATION:  r/o DVT; swelling distal to IV line r/o DVT; swelling distal to IV line What reading provider will be dictating this exam?->[facility] COMPARISON: None Within the visualized vessels, there is no evidence for deep venous thrombosis There is good compressibility, there is good augmentation, there is good color flow. Within the visualized vessels there is no evidence for deep venous thrombosis       Discharge Medications:      Medication List      ASK your doctor about these medications    Accu-Chek FastClix Lancets Misc  1 each by Does not apply route daily     acetaminophen 325 MG tablet  Commonly known as: TYLENOL     amLODIPine 2.5 MG tablet  Commonly known as: NORVASC  TAKE 1 TABLET BY MOUTH TWICE A DAY     aspirin 81 MG tablet     atorvastatin 10 MG tablet  Commonly known as: LIPITOR  TAKE 1 TABLET BY MOUTH EVERY DAY     blood glucose test strips strip  Commonly known as: Accu-Chek Eileen  1 each by In Vitro route daily As needed. brimonidine 0.2 % ophthalmic solution  Commonly known as: ALPHAGAN     Caduet 5-40 MG per tablet  Generic drug: amLODIPine-atorvastatatin     dorzolamide 2 % ophthalmic solution  Commonly known as: TRUSOPT  Place 1 drop into the left eye 3 times daily     ibuprofen 200 MG tablet  Commonly known as: ADVIL;MOTRIN     metFORMIN 500 MG tablet  Commonly known as: GLUCOPHAGE  TAKE 1 TABLET BY MOUTH TWICE A DAY     prednisoLONE acetate 1 % ophthalmic suspension  Commonly known as: PRED FORTE     Vitamin D (Cholecalciferol) 25 MCG (1000 UT) Tabs  TAKE 1 TABLET BY MOUTH EVERY DAY            Time Spent on discharge is more than 45 minutes in the examination, evaluation, counseling and review of medications and discharge plan.    +++++++++++++++++++++++++++++++++++++++++++++++++  MD ALVERTO Alonzo/ Rambo Duran [address] [place] [address]  +++++++++++++++++++++++++++++++++++++++++++++++++  NOTE: This report was transcribed using voice recognition software.  Every effort was made to ensure accuracy; however, inadvertent computerized transcription errors may be present.

## 2021-07-06 NOTE — PLAN OF CARE
Problem: Falls - Risk of:  Goal: Will remain free from falls  Description: Will remain free from falls  7/5/2021 2334 by Benton Sandoval RN  Outcome: Met This Shift  7/5/2021 1319 by Augusta Espinoza RN  Outcome: Met This Shift  Goal: Absence of physical injury  Description: Absence of physical injury  7/5/2021 2334 by Benton Sandoval RN  Outcome: Met This Shift  7/5/2021 1319 by Augusta Espinoza RN  Outcome: Met This Shift

## 2021-07-06 NOTE — PROGRESS NOTES
Patient refusing turn at this time. Patient was instructed about benefits of turns.  Patient continued to refuse

## 2021-07-09 PROBLEM — S06.5XAA SUBDURAL HEMATOMA (HCC): Status: ACTIVE | Noted: 2021-07-09

## 2021-07-15 RX ORDER — AMLODIPINE BESYLATE 2.5 MG/1
TABLET ORAL
Qty: 60 TABLET | Refills: 1 | Status: SHIPPED
Start: 2021-07-15 | End: 2021-08-20 | Stop reason: SDUPTHER

## 2021-07-20 ENCOUNTER — TELEPHONE (OUTPATIENT)
Dept: FAMILY MEDICINE CLINIC | Age: 86
End: 2021-07-20

## 2021-07-20 NOTE — TELEPHONE ENCOUNTER
[facility]  She is calling to ask if you will follow orders for home care for this patient coming out of [facility] following a hospital stay for pneumonia? She was at [facility] for a couple days, then on to [facility]. They will be seeing her for Skilled Nursing, [address] and an aide.

## 2021-07-20 NOTE — TELEPHONE ENCOUNTER
Jesus Alberto Dawn wanted to add that they could not follow until the patient is seen by her PCP so they can start sooner.    I did advise that you have openings in [facility] sooner and they are going to reach out to the family to see if they are able to bring her in sooner

## 2021-07-23 ENCOUNTER — OFFICE VISIT (OUTPATIENT)
Dept: PRIMARY CARE CLINIC | Age: 86
End: 2021-07-23
Payer: MEDICARE

## 2021-07-23 VITALS
HEIGHT: 62 IN | TEMPERATURE: 98.2 F | SYSTOLIC BLOOD PRESSURE: 126 MMHG | DIASTOLIC BLOOD PRESSURE: 80 MMHG | HEART RATE: 86 BPM | OXYGEN SATURATION: 98 % | BODY MASS INDEX: 32.66 KG/M2

## 2021-07-23 DIAGNOSIS — E66.09 CLASS 1 OBESITY DUE TO EXCESS CALORIES WITH SERIOUS COMORBIDITY AND BODY MASS INDEX (BMI) OF 33.0 TO 33.9 IN ADULT: ICD-10-CM

## 2021-07-23 DIAGNOSIS — H53.9 VISUAL DISTURBANCE: ICD-10-CM

## 2021-07-23 DIAGNOSIS — M79.672 BILATERAL FOOT PAIN: ICD-10-CM

## 2021-07-23 DIAGNOSIS — E53.8 B12 DEFICIENCY: ICD-10-CM

## 2021-07-23 DIAGNOSIS — E11.8 TYPE 2 DIABETES MELLITUS WITH COMPLICATION, WITHOUT LONG-TERM CURRENT USE OF INSULIN (HCC): ICD-10-CM

## 2021-07-23 DIAGNOSIS — E78.2 MIXED HYPERLIPIDEMIA: ICD-10-CM

## 2021-07-23 DIAGNOSIS — R26.2 AMBULATORY DYSFUNCTION: ICD-10-CM

## 2021-07-23 DIAGNOSIS — J13 CAP (COMMUNITY ACQUIRED PNEUMONIA) DUE TO PNEUMOCOCCUS (HCC): Primary | ICD-10-CM

## 2021-07-23 DIAGNOSIS — I73.9 PVD (PERIPHERAL VASCULAR DISEASE) (HCC): ICD-10-CM

## 2021-07-23 DIAGNOSIS — R73.03 PREDIABETES: ICD-10-CM

## 2021-07-23 DIAGNOSIS — M79.671 BILATERAL FOOT PAIN: ICD-10-CM

## 2021-07-23 DIAGNOSIS — I25.9 ISCHEMIC HEART DISEASE: ICD-10-CM

## 2021-07-23 DIAGNOSIS — M81.0 AGE-RELATED OSTEOPOROSIS WITHOUT CURRENT PATHOLOGICAL FRACTURE: ICD-10-CM

## 2021-07-23 DIAGNOSIS — I10 ESSENTIAL HYPERTENSION: ICD-10-CM

## 2021-07-23 DIAGNOSIS — S51.811A SKIN TEAR OF RIGHT FOREARM WITHOUT COMPLICATION, INITIAL ENCOUNTER: ICD-10-CM

## 2021-07-23 DIAGNOSIS — R29.6 RECURRENT FALLS: ICD-10-CM

## 2021-07-23 DIAGNOSIS — S22.000A COMPRESSION FRACTURE OF BODY OF THORACIC VERTEBRA (HCC): ICD-10-CM

## 2021-07-23 DIAGNOSIS — S32.020S COMPRESSION FRACTURE OF L2 VERTEBRA, SEQUELA: ICD-10-CM

## 2021-07-23 DIAGNOSIS — M15.9 PRIMARY OSTEOARTHRITIS INVOLVING MULTIPLE JOINTS: ICD-10-CM

## 2021-07-23 DIAGNOSIS — R06.01 ORTHOPNEA: ICD-10-CM

## 2021-07-23 DIAGNOSIS — R91.8 MASS OF UPPER LOBE OF LEFT LUNG: ICD-10-CM

## 2021-07-23 DIAGNOSIS — R79.89 OTHER SPECIFIED ABNORMAL FINDINGS OF BLOOD CHEMISTRY: ICD-10-CM

## 2021-07-23 PROBLEM — S06.5XAA SUBDURAL HEMATOMA (HCC): Status: RESOLVED | Noted: 2021-07-09 | Resolved: 2021-07-23

## 2021-07-23 LAB
ALBUMIN SERPL-MCNC: 3.2 G/DL (ref 3.5–5.2)
ALP BLD-CCNC: 99 U/L (ref 35–104)
ALT SERPL-CCNC: 11 U/L (ref 0–32)
ANION GAP SERPL CALCULATED.3IONS-SCNC: 15 MMOL/L (ref 7–16)
AST SERPL-CCNC: 13 U/L (ref 0–31)
BASOPHILS ABSOLUTE: 0.02 E9/L (ref 0–0.2)
BASOPHILS RELATIVE PERCENT: 0.4 % (ref 0–2)
BILIRUB SERPL-MCNC: 0.4 MG/DL (ref 0–1.2)
BILIRUBIN DIRECT: <0.2 MG/DL (ref 0–0.3)
BILIRUBIN, INDIRECT: ABNORMAL MG/DL (ref 0–1)
BUN BLDV-MCNC: 14 MG/DL (ref 6–23)
CALCIUM SERPL-MCNC: 9.6 MG/DL (ref 8.6–10.2)
CHLORIDE BLD-SCNC: 106 MMOL/L (ref 98–107)
CHOLESTEROL, TOTAL: 136 MG/DL (ref 0–199)
CO2: 28 MMOL/L (ref 22–29)
CREAT SERPL-MCNC: 0.7 MG/DL (ref 0.5–1)
EOSINOPHILS ABSOLUTE: 0.03 E9/L (ref 0.05–0.5)
EOSINOPHILS RELATIVE PERCENT: 0.6 % (ref 0–6)
GFR AFRICAN AMERICAN: >60
GFR NON-AFRICAN AMERICAN: >60 ML/MIN/1.73
GLUCOSE BLD-MCNC: 196 MG/DL (ref 74–99)
HBA1C MFR BLD: 6.4 % (ref 4–5.6)
HCT VFR BLD CALC: 30.9 % (ref 34–48)
HDLC SERPL-MCNC: 57 MG/DL
HEMOGLOBIN: 9.3 G/DL (ref 11.5–15.5)
IMMATURE GRANULOCYTES #: 0.03 E9/L
IMMATURE GRANULOCYTES %: 0.6 % (ref 0–5)
LDL CHOLESTEROL CALCULATED: 67 MG/DL (ref 0–99)
LYMPHOCYTES ABSOLUTE: 1.15 E9/L (ref 1.5–4)
LYMPHOCYTES RELATIVE PERCENT: 24.9 % (ref 20–42)
MCH RBC QN AUTO: 29.1 PG (ref 26–35)
MCHC RBC AUTO-ENTMCNC: 30.1 % (ref 32–34.5)
MCV RBC AUTO: 96.6 FL (ref 80–99.9)
MONOCYTES ABSOLUTE: 0.41 E9/L (ref 0.1–0.95)
MONOCYTES RELATIVE PERCENT: 8.9 % (ref 2–12)
NEUTROPHILS ABSOLUTE: 2.98 E9/L (ref 1.8–7.3)
NEUTROPHILS RELATIVE PERCENT: 64.6 % (ref 43–80)
PDW BLD-RTO: 15.7 FL (ref 11.5–15)
PLATELET # BLD: 324 E9/L (ref 130–450)
PMV BLD AUTO: 9.3 FL (ref 7–12)
POTASSIUM SERPL-SCNC: 4.4 MMOL/L (ref 3.5–5)
RBC # BLD: 3.2 E12/L (ref 3.5–5.5)
SODIUM BLD-SCNC: 149 MMOL/L (ref 132–146)
TOTAL PROTEIN: 6.5 G/DL (ref 6.4–8.3)
TRIGL SERPL-MCNC: 62 MG/DL (ref 0–149)
TSH SERPL DL<=0.05 MIU/L-ACNC: 0.84 UIU/ML (ref 0.27–4.2)
URIC ACID, SERUM: 3.7 MG/DL (ref 2.4–5.7)
VITAMIN D 25-HYDROXY: 32 NG/ML (ref 30–100)
VLDLC SERPL CALC-MCNC: 12 MG/DL
WBC # BLD: 4.6 E9/L (ref 4.5–11.5)

## 2021-07-23 PROCEDURE — 4040F PNEUMOC VAC/ADMIN/RCVD: CPT | Performed by: FAMILY MEDICINE

## 2021-07-23 PROCEDURE — G8400 PT W/DXA NO RESULTS DOC: HCPCS | Performed by: FAMILY MEDICINE

## 2021-07-23 PROCEDURE — G8427 DOCREV CUR MEDS BY ELIG CLIN: HCPCS | Performed by: FAMILY MEDICINE

## 2021-07-23 PROCEDURE — 1111F DSCHRG MED/CURRENT MED MERGE: CPT | Performed by: FAMILY MEDICINE

## 2021-07-23 PROCEDURE — 1090F PRES/ABSN URINE INCON ASSESS: CPT | Performed by: FAMILY MEDICINE

## 2021-07-23 PROCEDURE — 1036F TOBACCO NON-USER: CPT | Performed by: FAMILY MEDICINE

## 2021-07-23 PROCEDURE — G8417 CALC BMI ABV UP PARAM F/U: HCPCS | Performed by: FAMILY MEDICINE

## 2021-07-23 PROCEDURE — 96372 THER/PROPH/DIAG INJ SC/IM: CPT | Performed by: FAMILY MEDICINE

## 2021-07-23 PROCEDURE — 3051F HG A1C>EQUAL 7.0%<8.0%: CPT | Performed by: FAMILY MEDICINE

## 2021-07-23 PROCEDURE — 99214 OFFICE O/P EST MOD 30 MIN: CPT | Performed by: FAMILY MEDICINE

## 2021-07-23 PROCEDURE — 1123F ACP DISCUSS/DSCN MKR DOCD: CPT | Performed by: FAMILY MEDICINE

## 2021-07-23 RX ORDER — CYANOCOBALAMIN 1000 UG/ML
1000 INJECTION INTRAMUSCULAR; SUBCUTANEOUS ONCE
Status: COMPLETED | OUTPATIENT
Start: 2021-07-23 | End: 2021-07-23

## 2021-07-23 RX ADMIN — CYANOCOBALAMIN 1000 MCG: 1000 INJECTION INTRAMUSCULAR; SUBCUTANEOUS at 10:15

## 2021-07-23 ASSESSMENT — ENCOUNTER SYMPTOMS
PHOTOPHOBIA: 0
CONSTIPATION: 0
VOMITING: 0
SORE THROAT: 0
DIARRHEA: 0
ABDOMINAL PAIN: 0
BLOOD IN STOOL: 0
SHORTNESS OF BREATH: 0
NAUSEA: 0
BACK PAIN: 1
COUGH: 1

## 2021-07-23 NOTE — PROGRESS NOTES
Post-Discharge Transitional Care Management Services or Hospital Follow Up      Domingo Go   YOB: 1935    Date of Office Visit:  7/23/2021  Date of Hospital Admission: 6/28/21  Date of Hospital Discharge: 7/6/21  Readmission Risk Score(high >=14%.  Medium >=10%):Readmission Risk Score: 15      Care management risk score Rising risk (score 2-5) and Complex Care (Scores >=6): 1     Non face to face  following discharge, date last encounter closed (first attempt may have been earlier): *No documented post hospital discharge outreach found in the last 14 days *No documented post hospital discharge outreach found in the last 14 days    Call initiated 2 business days of discharge: *No response recorded in the last 14 days     Patient Active Problem List   Diagnosis    Type 2 diabetes mellitus with complication, without long-term current use of insulin ([facility]    Bilateral foot pain    Essential hypertension    Mixed hyperlipidemia    Age-related osteoporosis without current pathological fracture    Primary osteoarthritis involving multiple joints    Class 1 obesity due to excess calories with serious comorbidity and body mass index (BMI) of 33.0 to 33.9 in adult    PVD (peripheral vascular disease) ([facility]    Ambulatory dysfunction    Ischemic heart disease    Visual disturbance    Syncope and collapse    Scalp laceration    Compression fracture of L2 vertebra (HCC)    Compression fracture of body of thoracic vertebra (HCC)    Bradycardia    Recurrent falls    Syncope    Bilateral impacted cerumen    Pneumonia    CAP (community acquired pneumonia) due to Pneumococcus ([facility]    Subdural hematoma (HCC)       Allergies   Allergen Reactions    Penicillins        Medications listed as ordered at the time of discharge from hospital   [address] Medication Instructions RENALDO:    Printed on:07/23/21 3310   Medication Information                      acetaminophen (TYLENOL) 325 MG tablet  Take 650 mg by mouth every 6 hours as needed for Pain             amLODIPine (NORVASC) 2.5 MG tablet  TAKE 1 TABLET BY MOUTH TWICE A DAY             aspirin 81 MG tablet  Take 81 mg by mouth daily. atorvastatin (LIPITOR) 10 MG tablet  TAKE 1 TABLET BY MOUTH EVERY DAY             blood glucose test strips (ACCU-CHEK ARA) strip  1 each by In Vitro route daily As needed. brimonidine (ALPHAGAN) 0.2 % ophthalmic solution  Place 1 drop into the left eye 2 times daily              dorzolamide (TRUSOPT) 2 % ophthalmic solution  Place 1 drop into the left eye 3 times daily             metFORMIN (GLUCOPHAGE) 500 MG tablet  TAKE 1 TABLET BY MOUTH TWICE A DAY             prednisoLONE acetate (PRED FORTE) 1 % ophthalmic suspension  Place 1 drop into the left eye 3 times daily              Vitamin D, Cholecalciferol, 25 MCG (1000 UT) TABS  TAKE 1 TABLET BY MOUTH EVERY DAY                   Medications marked \"taking\" at this time  Outpatient Medications Marked as Taking for the 7/23/21 encounter (Office Visit) with Ana Chamorro, DO   Medication Sig Dispense Refill    amLODIPine (NORVASC) 2.5 MG tablet TAKE 1 TABLET BY MOUTH TWICE A DAY 60 tablet 1    acetaminophen (TYLENOL) 325 MG tablet Take 650 mg by mouth every 6 hours as needed for Pain      Vitamin D, Cholecalciferol, 25 MCG (1000 UT) TABS TAKE 1 TABLET BY MOUTH EVERY DAY 30 tablet 1    metFORMIN (GLUCOPHAGE) 500 MG tablet TAKE 1 TABLET BY MOUTH TWICE A DAY 60 tablet 3    atorvastatin (LIPITOR) 10 MG tablet TAKE 1 TABLET BY MOUTH EVERY DAY 30 tablet 3    blood glucose test strips (ACCU-CHEK ARA) strip 1 each by In Vitro route daily As needed.  100 each 3    dorzolamide (TRUSOPT) 2 % ophthalmic solution Place 1 drop into the left eye 3 times daily 10 mL 1    brimonidine (ALPHAGAN) 0.2 % ophthalmic solution Place 1 drop into the left eye 2 times daily       prednisoLONE acetate (PRED FORTE) 1 % ophthalmic suspension Place 1 drop into the left eye 3 times daily   3    aspirin 81 MG tablet Take 81 mg by mouth daily. Medications patient taking as of now reconciled against medications ordered at time of hospital discharge: Yes    Chief Complaint   Patient presents with   [address] from Hospital     discharged from [facility] 07/22/2021. admitted on 07/07/2021    Fall       HPI    Inpatient course: Discharge summary reviewed- see chart. Interval history/Current status: Patient states she has been doing well since discharge from the hospital.  She went in for altered mental status and community-acquired pneumonia. Went to [facility] for several weeks and has been home for several days. Her only concern at this time is a skin tear to her right lateral/posterior forearm which occurred on a door frame. No signs of infection at this time. No other concerns or complaints. CT scan during inpatient showed  1. Large, 9.1 x 4.9 x 5.5 cm masslike consolidative opacity in the left upper   lobe, abutting the mediastinal pleural reflection.  The opacity may be   infectious, inflammatory or neoplastic in etiology. 2. Mildly prominent mediastinal lymph nodes, which may be reactive or   metastatic.   3. 1.9 x 1.4 cm nodule in the body of the left adrenal gland of unclear   etiology. 4. Age indeterminate anterior wedge compression deformity of T12.   5. Sclerosis along the superior endplate of the T3 vertebral body of unclear   etiology. 6. Especially if the biopsy proves the left upper lobe opacity to be   neoplastic in etiology, PET scan would be recommended for further evaluation. Bronchoscopy samples were all negative for malignant cells. Will need follow-up with pulmonology for further evaluation and possible CT-guided biopsy versus endoscopy. Patient states she is doing well at this time however. Review of Systems   Constitutional: Negative for chills, fatigue and fever.    HENT: Negative for congestion, hearing loss, nosebleeds and sore throat. Eyes: Negative for photophobia. Respiratory: Positive for cough. Negative for shortness of breath. Cardiovascular: Negative for chest pain, palpitations and leg swelling. Gastrointestinal: Negative for abdominal pain, blood in stool, constipation, diarrhea, nausea and vomiting. Endocrine: Negative for polydipsia. Genitourinary: Negative for dysuria, frequency, hematuria and urgency. Musculoskeletal: Positive for arthralgias, back pain, gait problem, joint swelling, myalgias and neck pain. Skin: Positive for wound. Neurological: Positive for syncope (Prior to recent hospital visit). Negative for dizziness, tremors, weakness and headaches. Hematological: Does not bruise/bleed easily. Psychiatric/Behavioral: Positive for dysphoric mood and sleep disturbance. Negative for hallucinations, self-injury and suicidal ideas. All other systems reviewed and are negative. Vitals:    07/23/21 0925   BP: 126/80   Pulse: 86   Temp: 98.2 °F (36.8 °C)   SpO2: 98%   Height: 5' 2\" (1.575 m)     Body mass index is 32.66 kg/m². Wt Readings from Last 3 Encounters:   06/28/21 178 lb 9.2 oz (81 kg)   03/15/21 178 lb (80.7 kg)   03/13/21 172 lb (78 kg)     BP Readings from Last 3 Encounters:   07/23/21 126/80   07/06/21 128/64   07/01/21 (!) 152/65       Physical Exam  Vitals reviewed. HENT:      Head: Normocephalic and atraumatic. Left Ear: There is no impacted cerumen. Eyes:      General: No scleral icterus. Conjunctiva/sclera: Conjunctivae normal.      Pupils: Pupils are equal, round, and reactive to light. Comments: She can only see shapes with her left eye secondary to previous surgery. Neck:      Thyroid: No thyromegaly. Cardiovascular:      Rate and Rhythm: Normal rate and regular rhythm. Heart sounds: Murmur heard. Systolic murmur is present with a grade of 4/6.      Pulmonary:      Effort: Pulmonary effort is normal.      Breath sounds: Decreased breath sounds present. No rales. Abdominal:      General: Bowel sounds are normal. There is no distension. Palpations: Abdomen is soft. Tenderness: There is no abdominal tenderness. Musculoskeletal:         General: Tenderness and deformity present. Right hand: Deformity, tenderness and bony tenderness present. Decreased range of motion. Left hand: Deformity, tenderness and bony tenderness present. Decreased range of motion. Cervical back: Neck supple. Lumbar back: Spasms present. Lymphadenopathy:      Cervical: No cervical adenopathy. Skin:     General: Skin is warm and dry. Findings: Erythema present. No rash. Neurological:      Mental Status: She is alert and oriented to person, place, and time. Cranial Nerves: No cranial nerve deficit. Gait: Gait abnormal.      Comments: Walks with rolling walker   Psychiatric:         Mood and Affect: Mood normal.         Thought Content: Thought content normal.         Judgment: Judgment normal.               Assessment/Plan:   Diagnosis Orders   1. CAP (community acquired pneumonia) due to Pneumococcus ([facility]  LA DISCHARGE MEDS RECONCILED W/ CURRENT OUTPATIENT MED LIST    CBC Auto Differential    Brain Natriuretic Peptide    T4, Free    Hepatic Function Panel    Basic Metabolic Panel    Lipid Panel    VITAMIN B12 & FOLATE    C-REACTIVE PROTEIN    Bhavani Flores MD, Pulmonary, [facility]   2. Orthopnea   Brain Natriuretic Peptide    T4, Free    Lipid Panel   3. Prediabetes   Brain Natriuretic Peptide    T4, Free    Lipid Panel   4. Other specified abnormal findings of blood chemistry   Lipid Panel   5. B12 deficiency  cyanocobalamin injection 1,000 mcg   6. Type 2 diabetes mellitus with complication, without long-term current use of insulin (HCC)     7. Class 1 obesity due to excess calories with serious comorbidity and body mass index (BMI) of 33.0 to 33.9 in adult     8.  PVD (peripheral vascular disease) ([facility]     9. Compression fracture of L2 vertebra, sequela     10. Compression fracture of body of thoracic vertebra (HCC)     11. Recurrent falls     12. Skin tear of right forearm without complication, initial encounter     13. 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 Current samples taken during hospital stay were all negative. Baseline labs ordered today to evaluate for deficiencies noted on hospital discharge paperwork. See her back in 2 months or sooner based on clinical response and possible work-up by pulmonology. Tan Whitlock D.O.   11:54 AM  7/23/2021       This document may have been prepared at least partially through the use of voice recognition software. Although effort is taken to assure the accuracy of this document, it is possible that grammatical, syntax,  or spelling errors may occur.     Medical Decision Making: moderate complexity

## 2021-07-24 LAB
C-REACTIVE PROTEIN: 0.6 MG/DL (ref 0–0.4)
PRO-BNP: 610 PG/ML (ref 0–450)
T4 FREE: 1.24 NG/DL (ref 0.93–1.7)

## 2021-07-29 ENCOUNTER — TELEPHONE (OUTPATIENT)
Dept: FAMILY MEDICINE CLINIC | Age: 86
End: 2021-07-29

## 2021-07-29 RX ORDER — FUROSEMIDE 20 MG/1
20 TABLET ORAL DAILY
Qty: 60 TABLET | Refills: 3 | Status: SHIPPED | OUTPATIENT
Start: 2021-07-29

## 2021-07-29 NOTE — TELEPHONE ENCOUNTER
[facility]     She is calling to report 1+ & 2+ swelling in the legs from the knees down. This has been building for the past 2 days. If you want her to be on a water pill, you will need to send it to [facility] because she has none in the home since January.

## 2021-07-30 ENCOUNTER — TELEPHONE (OUTPATIENT)
Dept: PULMONOLOGY | Age: 86
End: 2021-07-30

## 2021-07-30 DIAGNOSIS — J18.9 PNEUMONIA DUE TO INFECTIOUS ORGANISM, UNSPECIFIED LATERALITY, UNSPECIFIED PART OF LUNG: Primary | ICD-10-CM

## 2021-07-30 NOTE — TELEPHONE ENCOUNTER
This office attempted to notify the patient that she has been referred to Dr. Karel Castro by Dr. Latesha Casey for a follow up from her hospital stay. Patient stated that she doesn't remember Dr. Karel Castro and was insistent that he did not see her. I reminded her that Dr. Karel Castro did a bronchoscopy procedure on her and she again was insistent, raising her voice, that she did not have any procedures done. She stated that all the doctors did was come into her room and look at her and then leave. Patient is refusing to have a CT Chest imaging done siting that it was \"Not Necessary\"  She stated that she has someone coming in to the house every other day to check on her and that was enough. Dr. Karel Castro informed of the conversation.

## 2021-08-02 LAB
FUNGUS (MYCOLOGY) CULTURE: NORMAL
FUNGUS STAIN: NORMAL

## 2021-08-17 LAB
AFB CULTURE (MYCOBACTERIA): NORMAL
AFB SMEAR: NORMAL

## 2021-08-18 RX ORDER — LANCETS
1 EACH MISCELLANEOUS DAILY
Qty: 100 EACH | Refills: 5 | Status: SHIPPED
Start: 2021-08-18 | End: 2022-09-30

## 2021-08-20 RX ORDER — AMLODIPINE BESYLATE 2.5 MG/1
TABLET ORAL
Qty: 60 TABLET | Refills: 2 | Status: SHIPPED
Start: 2021-08-20 | End: 2022-06-06

## 2021-09-17 RX ORDER — ATORVASTATIN CALCIUM 10 MG/1
TABLET, FILM COATED ORAL
Qty: 30 TABLET | Refills: 3 | Status: SHIPPED
Start: 2021-09-17 | End: 2022-03-02

## 2021-10-11 ENCOUNTER — TELEPHONE (OUTPATIENT)
Dept: PRIMARY CARE CLINIC | Age: 86
End: 2021-10-11

## 2021-10-11 DIAGNOSIS — M79.671 PAIN IN BOTH FEET: Primary | ICD-10-CM

## 2021-10-11 DIAGNOSIS — M79.672 PAIN IN BOTH FEET: Primary | ICD-10-CM

## 2021-10-11 NOTE — TELEPHONE ENCOUNTER
----- Message from [facility] sent at 10/11/2021  2:54 PM EDT -----  Subject: Referral Request    QUESTIONS   Reason for referral request? patient would like a referral for a few foot   doctors in the area. She is having some issues with her toenails and would   like to see a foot doctor. Has the physician seen you for this condition before? No   Preferred Specialist (if applicable)? Do you already have an appointment scheduled? Additional Information for Provider?   ---------------------------------------------------------------------------  --------------  CALL BACK INFO  What is the best way for the office to contact you? OK to leave message on   voicemail  Preferred Call Back Phone Number?  [phone]

## 2021-11-03 ENCOUNTER — TELEPHONE (OUTPATIENT)
Dept: PRIMARY CARE CLINIC | Age: 86
End: 2021-11-03

## 2021-11-03 NOTE — TELEPHONE ENCOUNTER
Called patient to reschedule AWV. She states she is unable to reschedule right now due to transportation. She stated she will call when available.

## 2021-12-13 RX ORDER — VITAMIN B COMPLEX
TABLET ORAL
Qty: 30 TABLET | Refills: 1 | Status: SHIPPED
Start: 2021-12-13 | End: 2022-02-14

## 2022-02-14 RX ORDER — VITAMIN B COMPLEX
TABLET ORAL
Qty: 30 TABLET | Refills: 1 | Status: SHIPPED
Start: 2022-02-14 | End: 2022-06-17

## 2022-03-02 RX ORDER — ATORVASTATIN CALCIUM 10 MG/1
TABLET, FILM COATED ORAL
Qty: 30 TABLET | Refills: 3 | Status: SHIPPED
Start: 2022-03-02 | End: 2022-08-02

## 2022-06-06 RX ORDER — AMLODIPINE BESYLATE 2.5 MG/1
TABLET ORAL
Qty: 60 TABLET | Refills: 2 | Status: SHIPPED | OUTPATIENT
Start: 2022-06-06

## 2022-06-17 RX ORDER — MULTIVIT-MIN/IRON/FOLIC ACID/K 18-600-40
CAPSULE ORAL
Qty: 30 TABLET | Refills: 1 | Status: SHIPPED
Start: 2022-06-17 | End: 2022-09-22

## 2022-08-02 RX ORDER — ATORVASTATIN CALCIUM 10 MG/1
TABLET, FILM COATED ORAL
Qty: 30 TABLET | Refills: 3 | Status: SHIPPED | OUTPATIENT
Start: 2022-08-02

## 2022-09-22 RX ORDER — MULTIVIT-MIN/IRON/FOLIC ACID/K 18-600-40
CAPSULE ORAL
Qty: 30 TABLET | Refills: 1 | Status: SHIPPED | OUTPATIENT
Start: 2022-09-22

## 2022-09-26 RX ORDER — BLOOD SUGAR DIAGNOSTIC
STRIP MISCELLANEOUS
Qty: 100 STRIP | Refills: 3 | Status: SHIPPED | OUTPATIENT
Start: 2022-09-26

## 2022-09-30 RX ORDER — LANCETS
EACH MISCELLANEOUS
Qty: 102 EACH | Refills: 5 | Status: SHIPPED | OUTPATIENT
Start: 2022-09-30

## 2022-11-18 RX ORDER — AMLODIPINE BESYLATE 2.5 MG/1
TABLET ORAL
Qty: 60 TABLET | Refills: 2 | Status: SHIPPED | OUTPATIENT
Start: 2022-11-18

## 2022-11-25 NOTE — TELEPHONE ENCOUNTER
Patient states unable to come into the office for an appointment. She states she is unable to leave/return to her home due to her health. She stated that it is very hard for her to walk. Please advise.

## 2022-12-08 RX ORDER — ATORVASTATIN CALCIUM 10 MG/1
TABLET, FILM COATED ORAL
Qty: 30 TABLET | Refills: 3 | Status: SHIPPED | OUTPATIENT
Start: 2022-12-08

## 2023-01-03 RX ORDER — MULTIVIT-MIN/IRON/FOLIC ACID/K 18-600-40
CAPSULE ORAL
Qty: 30 TABLET | Refills: 1 | Status: SHIPPED | OUTPATIENT
Start: 2023-01-03

## 2023-05-13 RX ORDER — MULTIVIT-MIN/IRON/FOLIC ACID/K 18-600-40
CAPSULE ORAL
Qty: 30 TABLET | Refills: 1 | Status: SHIPPED | OUTPATIENT
Start: 2023-05-13

## 2023-06-15 ENCOUNTER — APPOINTMENT (OUTPATIENT)
Dept: ULTRASOUND IMAGING | Age: 88
End: 2023-06-15
Payer: MEDICARE

## 2023-06-15 ENCOUNTER — APPOINTMENT (OUTPATIENT)
Dept: GENERAL RADIOLOGY | Age: 88
End: 2023-06-15
Payer: MEDICARE

## 2023-06-15 ENCOUNTER — HOSPITAL ENCOUNTER (OUTPATIENT)
Age: 88
Setting detail: OBSERVATION
Discharge: HOME | End: 2023-06-20
Attending: STUDENT IN AN ORGANIZED HEALTH CARE EDUCATION/TRAINING PROGRAM | Admitting: HOSPITALIST
Payer: MEDICARE

## 2023-06-15 ENCOUNTER — APPOINTMENT (OUTPATIENT)
Dept: CT IMAGING | Age: 88
End: 2023-06-15
Payer: MEDICARE

## 2023-06-15 DIAGNOSIS — R22.41 MASS OF RIGHT LOWER EXTREMITY: ICD-10-CM

## 2023-06-15 DIAGNOSIS — S80.00XA CONTUSION OF KNEE, UNSPECIFIED LATERALITY, INITIAL ENCOUNTER: ICD-10-CM
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
## 2023-06-15 PROBLEM — M25.569 KNEE PAIN: Status: ACTIVE | Noted: 2023-06-15

## 2023-06-15 LAB
ANION GAP SERPL CALCULATED.3IONS-SCNC: 10 MMOL/L (ref 7–16)
BASOPHILS # BLD: 0.03 E9/L (ref 0–0.2)
BASOPHILS NFR BLD: 0.5 % (ref 0–2)
BUN SERPL-MCNC: 20 MG/DL (ref 6–23)
CALCIUM SERPL-MCNC: 9.2 MG/DL (ref 8.6–10.2)
CHLORIDE SERPL-SCNC: 108 MMOL/L (ref 98–107)
CO2 SERPL-SCNC: 24 MMOL/L (ref 22–29)
CREAT SERPL-MCNC: 0.9 MG/DL (ref 0.5–1)
EOSINOPHIL # BLD: 0.01 E9/L (ref 0.05–0.5)
EOSINOPHIL NFR BLD: 0.2 % (ref 0–6)
ERYTHROCYTE [DISTWIDTH] IN BLOOD BY AUTOMATED COUNT: 13.8 FL (ref 11.5–15)
FOLATE SERPL-MCNC: >20 NG/ML (ref 4.8–24.2)
GLUCOSE SERPL-MCNC: 203 MG/DL (ref 74–99)
HCT VFR BLD AUTO: 36.4 % (ref 34–48)
HGB BLD-MCNC: 11.1 G/DL (ref 11.5–15.5)
IMM GRANULOCYTES # BLD: 0.03 E9/L
IMM GRANULOCYTES NFR BLD: 0.5 % (ref 0–5)
LYMPHOCYTES # BLD: 1.42 E9/L (ref 1.5–4)
LYMPHOCYTES NFR BLD: 22.6 % (ref 20–42)
MCH RBC QN AUTO: 29.5 PG (ref 26–35)
MCHC RBC AUTO-ENTMCNC: 30.5 % (ref 32–34.5)
MCV RBC AUTO: 96.8 FL (ref 80–99.9)
METER GLUCOSE: 137 MG/DL (ref 74–99)
METER GLUCOSE: 148 MG/DL (ref 74–99)
MONOCYTES # BLD: 0.44 E9/L (ref 0.1–0.95)
MONOCYTES NFR BLD: 7 % (ref 2–12)
NEUTROPHILS # BLD: 4.36 E9/L (ref 1.8–7.3)
NEUTS SEG NFR BLD: 69.2 % (ref 43–80)
PLATELET # BLD AUTO: 313 E9/L (ref 130–450)
PMV BLD AUTO: 9.1 FL (ref 7–12)
POTASSIUM SERPL-SCNC: 4.1 MMOL/L (ref 3.5–5)
RBC # BLD AUTO: 3.76 E12/L (ref 3.5–5.5)
SODIUM SERPL-SCNC: 142 MMOL/L (ref 132–146)
TSH SERPL-MCNC: 1.09 UIU/ML (ref 0.27–4.2)
VIT B12 SERPL-MCNC: 261 PG/ML (ref 211–946)
WBC # BLD: 6.3 E9/L (ref 4.5–11.5)

## 2023-06-15 PROCEDURE — 85025 COMPLETE CBC W/AUTO DIFF WBC: CPT

## 2023-06-15 PROCEDURE — 2580000003 HC RX 258: Performed by: HOSPITALIST

## 2023-06-15 PROCEDURE — 73564 X-RAY EXAM KNEE 4 OR MORE: CPT

## 2023-06-15 PROCEDURE — 6370000000 HC RX 637 (ALT 250 FOR IP): Performed by: HOSPITALIST

## 2023-06-15 PROCEDURE — 82746 ASSAY OF FOLIC ACID SERUM: CPT

## 2023-06-15 PROCEDURE — G0378 HOSPITAL OBSERVATION PER HR: HCPCS

## 2023-06-15 PROCEDURE — 82962 GLUCOSE BLOOD TEST: CPT

## 2023-06-15 PROCEDURE — 82607 VITAMIN B-12: CPT

## 2023-06-15 PROCEDURE — 93971 EXTREMITY STUDY: CPT

## 2023-06-15 PROCEDURE — 73502 X-RAY EXAM HIP UNI 2-3 VIEWS: CPT

## 2023-06-15 PROCEDURE — 73610 X-RAY EXAM OF ANKLE: CPT

## 2023-06-15 PROCEDURE — 6360000002 HC RX W HCPCS: Performed by: HOSPITALIST

## 2023-06-15 PROCEDURE — 99285 EMERGENCY DEPT VISIT HI MDM: CPT

## 2023-06-15 PROCEDURE — 71045 X-RAY EXAM CHEST 1 VIEW: CPT

## 2023-06-15 PROCEDURE — 80048 BASIC METABOLIC PNL TOTAL CA: CPT

## 2023-06-15 PROCEDURE — 84443 ASSAY THYROID STIM HORMONE: CPT

## 2023-06-15 PROCEDURE — 73700 CT LOWER EXTREMITY W/O DYE: CPT

## 2023-06-15 RX ORDER — ONDANSETRON 4 MG/1
4 TABLET, ORALLY DISINTEGRATING ORAL EVERY 8 HOURS PRN
Status: DISCONTINUED | OUTPATIENT
Start: 2023-06-15 | End: 2023-06-20 | Stop reason: HOSPADM

## 2023-06-15 RX ORDER — ACETAMINOPHEN 325 MG/1
650 TABLET ORAL EVERY 6 HOURS PRN
Status: DISCONTINUED | OUTPATIENT
Start: 2023-06-15 | End: 2023-06-20 | Stop reason: HOSPADM

## 2023-06-15 RX ORDER — ATORVASTATIN CALCIUM 10 MG/1
10 TABLET, FILM COATED ORAL DAILY
Status: DISCONTINUED | OUTPATIENT
Start: 2023-06-15 | End: 2023-06-20 | Stop reason: HOSPADM

## 2023-06-15 RX ORDER — ACETAMINOPHEN 325 MG/1
650 TABLET ORAL EVERY 4 HOURS PRN
Status: DISCONTINUED | OUTPATIENT
Start: 2023-06-15 | End: 2023-06-15 | Stop reason: SDUPTHER

## 2023-06-15 RX ORDER — POLYETHYLENE GLYCOL 3350 17 G/17G
17 POWDER, FOR SOLUTION ORAL DAILY PRN
Status: DISCONTINUED | OUTPATIENT
Start: 2023-06-15 | End: 2023-06-20 | Stop reason: HOSPADM

## 2023-06-15 RX ORDER — BRIMONIDINE TARTRATE 2 MG/ML
1 SOLUTION/ DROPS OPHTHALMIC 2 TIMES DAILY
Status: DISCONTINUED | OUTPATIENT
Start: 2023-06-15 | End: 2023-06-20 | Stop reason: HOSPADM

## 2023-06-15 RX ORDER — INSULIN LISPRO 100 [IU]/ML
0-4 INJECTION, SOLUTION INTRAVENOUS; SUBCUTANEOUS NIGHTLY
Status: DISCONTINUED | OUTPATIENT
Start: 2023-06-15 | End: 2023-06-20 | Stop reason: HOSPADM

## 2023-06-15 RX ORDER — INSULIN LISPRO 100 [IU]/ML
0-4 INJECTION, SOLUTION INTRAVENOUS; SUBCUTANEOUS
Status: DISCONTINUED | OUTPATIENT
Start: 2023-06-15 | End: 2023-06-20 | Stop reason: HOSPADM

## 2023-06-15 RX ORDER — TRAMADOL HYDROCHLORIDE 50 MG/1
50 TABLET ORAL EVERY 6 HOURS PRN
Status: DISCONTINUED | OUTPATIENT
Start: 2023-06-15 | End: 2023-06-20 | Stop reason: HOSPADM

## 2023-06-15 RX ORDER — SODIUM CHLORIDE 0.9 % (FLUSH) 0.9 %
5-40 SYRINGE (ML) INJECTION EVERY 12 HOURS SCHEDULED
Status: DISCONTINUED | OUTPATIENT
Start: 2023-06-15 | End: 2023-06-20 | Stop reason: HOSPADM

## 2023-06-15 RX ORDER — DEXTROSE MONOHYDRATE 100 MG/ML
INJECTION, SOLUTION INTRAVENOUS CONTINUOUS PRN
Status: DISCONTINUED | OUTPATIENT
Start: 2023-06-15 | End: 2023-06-20 | Stop reason: HOSPADM

## 2023-06-15 RX ORDER — SODIUM CHLORIDE 0.9 % (FLUSH) 0.9 %
5-40 SYRINGE (ML) INJECTION PRN
Status: DISCONTINUED | OUTPATIENT
Start: 2023-06-15 | End: 2023-06-20 | Stop reason: HOSPADM

## 2023-06-15 RX ORDER — ACETAMINOPHEN 650 MG/1
650 SUPPOSITORY RECTAL EVERY 6 HOURS PRN
Status: DISCONTINUED | OUTPATIENT
Start: 2023-06-15 | End: 2023-06-20 | Stop reason: HOSPADM

## 2023-06-15 RX ORDER — AMLODIPINE BESYLATE 5 MG/1
2.5 TABLET ORAL 2 TIMES DAILY
Status: DISCONTINUED | OUTPATIENT
Start: 2023-06-15 | End: 2023-06-20 | Stop reason: HOSPADM

## 2023-06-15 RX ORDER — SODIUM CHLORIDE 9 MG/ML
INJECTION, SOLUTION INTRAVENOUS PRN
Status: DISCONTINUED | OUTPATIENT
Start: 2023-06-15 | End: 2023-06-20 | Stop reason: HOSPADM

## 2023-06-15 RX ORDER — VITAMIN B COMPLEX
1000 TABLET ORAL DAILY
Status: DISCONTINUED | OUTPATIENT
Start: 2023-06-15 | End: 2023-06-20 | Stop reason: HOSPADM

## 2023-06-15 RX ORDER — ENOXAPARIN SODIUM 100 MG/ML
40 INJECTION SUBCUTANEOUS EVERY EVENING
Status: DISCONTINUED | OUTPATIENT
Start: 2023-06-15 | End: 2023-06-20 | Stop reason: HOSPADM

## 2023-06-15 RX ORDER — PREDNISOLONE ACETATE 10 MG/ML
1 SUSPENSION/ DROPS OPHTHALMIC 3 TIMES DAILY
Status: DISCONTINUED | OUTPATIENT
Start: 2023-06-15 | End: 2023-06-20 | Stop reason: HOSPADM

## 2023-06-15 RX ORDER — ASPIRIN 81 MG/1
81 TABLET ORAL DAILY
Status: DISCONTINUED | OUTPATIENT
Start: 2023-06-16 | End: 2023-06-20 | Stop reason: HOSPADM

## 2023-06-15 RX ORDER — ONDANSETRON 2 MG/ML
4 INJECTION INTRAMUSCULAR; INTRAVENOUS EVERY 6 HOURS PRN
Status: DISCONTINUED | OUTPATIENT
Start: 2023-06-15 | End: 2023-06-20 | Stop reason: HOSPADM

## 2023-06-15 RX ORDER — DORZOLAMIDE HCL 20 MG/ML
1 SOLUTION/ DROPS OPHTHALMIC 3 TIMES DAILY
Status: DISCONTINUED | OUTPATIENT
Start: 2023-06-15 | End: 2023-06-20 | Stop reason: HOSPADM

## 2023-06-15 RX ADMIN — PREDNISOLONE ACETATE 1 DROP: 10 SUSPENSION/ DROPS OPHTHALMIC at 21:34

## 2023-06-15 RX ADMIN — AMLODIPINE BESYLATE 2.5 MG: 5 TABLET ORAL at 20:36

## 2023-06-15 RX ADMIN — BRIMONIDINE TARTRATE 1 DROP: 2 SOLUTION/ DROPS OPHTHALMIC at 21:34

## 2023-06-15 RX ADMIN — SODIUM CHLORIDE, PRESERVATIVE FREE 10 ML: 5 INJECTION INTRAVENOUS at 20:36

## 2023-06-15 RX ADMIN — ENOXAPARIN SODIUM 40 MG: 100 INJECTION SUBCUTANEOUS at 20:35

## 2023-06-15 RX ADMIN — DORZOLAMIDE HCL 1 DROP: 20 SOLUTION/ DROPS OPHTHALMIC at 21:34

## 2023-06-15 RX ADMIN — Medication 1000 UNITS: at 20:36

## 2023-06-15 RX ADMIN — ATORVASTATIN CALCIUM 10 MG: 10 TABLET, FILM COATED ORAL at 20:36

## 2023-06-15 ASSESSMENT — ENCOUNTER SYMPTOMS
DIARRHEA: 0
COUGH: 0
PHOTOPHOBIA: 0
ABDOMINAL PAIN: 0
CHEST TIGHTNESS: 0
VOMITING: 0
ABDOMINAL DISTENTION: 0
SHORTNESS OF BREATH: 0
NAUSEA: 0

## 2023-06-15 ASSESSMENT — PAIN SCALES - GENERAL: PAINLEVEL_OUTOF10: 5

## 2023-06-15 ASSESSMENT — PAIN - FUNCTIONAL ASSESSMENT: PAIN_FUNCTIONAL_ASSESSMENT: 0-10

## 2023-06-15 NOTE — ED PROVIDER NOTES
Sundar Wright is an 81year old female who presented to ED with concern for fall. Patient had a mechanical fall at home. Patient presents emergency department after patient had a mechanical fall at home patient arrived by EMS. Patient had tripped and fallen hitting both of her knees. Patient does have increasing swelling to the right lower extremity. Patient was unable to ambulate after the fall. Patient denies head injury. Patient is not on anticoagulation. Patient fell on both of her knees. The history is provided by the patient and medical records. Review of Systems   Constitutional:  Negative for chills, diaphoresis, fatigue and fever. Eyes:  Negative for photophobia and visual disturbance. Respiratory:  Negative for cough, chest tightness and shortness of breath. Cardiovascular:  Negative for chest pain, palpitations and leg swelling. Gastrointestinal:  Negative for abdominal distention, abdominal pain, diarrhea, nausea and vomiting. Genitourinary:  Negative for dysuria. Musculoskeletal:  Negative for neck pain and neck stiffness. Skin:  Negative for pallor and rash. Neurological:  Negative for headaches. Psychiatric/Behavioral:  Negative for confusion. Physical Exam  Vitals and nursing note reviewed. Constitutional:       General: She is not in acute distress. Appearance: Normal appearance. She is not ill-appearing. HENT:      Head: Normocephalic and atraumatic. Eyes:      General: No scleral icterus. Conjunctiva/sclera: Conjunctivae normal.      Pupils: Pupils are equal, round, and reactive to light. Cardiovascular:      Rate and Rhythm: Normal rate and regular rhythm. Comments: Intact distal pulses  Pulmonary:      Effort: Pulmonary effort is normal.      Breath sounds: Normal breath sounds. Abdominal:      General: Bowel sounds are normal. There is no distension. Palpations: Abdomen is soft. Tenderness: There is no abdominal tenderness.

## 2023-06-15 NOTE — ED NOTES
Patient from home via EMS, fall this morning with bilateral knee pain.  EMS stated they couldn't get in touch with anyone so they brought her to the ER to get checked     Pedrito Gill RN  06/15/23 1012

## 2023-06-15 NOTE — H&P
Hospitalist History & Physical      PCP: Gilmer Munroe DO    Date of Admission: 6/15/2023    Date of Service: Pt seen/examined on 6/15/2023 and is  Placed in Observation. Chief Complaint:  had concerns including Fall (Patient had a fall at home this am. Denies hitting head/LOC. C/O bilateral knee pain ). History Of Present Illness:    Ms. John Romero, a 81y.o. year old female  who  has a past medical history of Arthritis, Cataract, Diabetes mellitus ([address] Fall from standing, Injury of left upper arm, Pneumonia, Subdural hematoma ([address] Syncope and collapse, and Uncontrolled type 2 diabetes mellitus ([address] 81year old female who presented to ED with concern for fall. Patient had a mechanical fall at home. Patient presents emergency department after patient had a mechanical fall at home patient arrived by EMS. Patient had tripped and fallen hitting both of her knees. Patient does have increasing swelling to the right lower extremity. Patient was unable to ambulate after the fall. No cp/ct/orthopnea  No fever or chills  No ha/visual changes - no loc      Past Medical History:        Diagnosis Date    Arthritis     Cataract     Diabetes mellitus ([address]     Fall from standing 2/17/2020    Injury of left upper arm 2/17/2020    Pneumonia     Subdural hematoma ([address] 7/9/2021    Syncope and collapse 10/17/2016    Uncontrolled type 2 diabetes mellitus ([address] 8/17/2010       Past Surgical History:        Procedure Laterality Date    BRONCHOSCOPY N/A 7/1/2021    BRONCHOSCOPY ALVEOLAR LAVAGE performed by Kristina Garcia MD at [address] [facility]         Medications Prior to Admission:      Prior to Admission medications    Medication Sig Start Date End Date Taking?  Authorizing Provider   D3-1000 25 MCG (1000 UT) TABS tablet TAKE 1 TABLET BY MOUTH EVERY DAY 5/13/23   Chandan Chamorro DO   atorvastatin (LIPITOR) 10 MG tablet TAKE 1

## 2023-06-15 NOTE — PROGRESS NOTES
4 Eyes Skin Assessment     NAME:  Miller Deras  YOB: 1935  MEDICAL RECORD NUMBER:  [id number]    The patient is being assessed for  Admission    I agree that at least one RN has performed a thorough Head to Toe Skin Assessment on the patient. ALL assessment sites listed below have been assessed. Areas assessed by both nurses:    Head, Face, Ears, Shoulders, Back, Chest, Arms, Elbows, Hands, Sacrum. Buttock, Coccyx, Ischium, and Legs. Feet and Heels        Does the Patient have a Wound?  No noted wound(s)       John Prevention initiated by RN: Yes  Wound Care Orders initiated by RN: Yes    Pressure Injury (Stage 3,4, Unstageable, DTI, NWPT, and Complex wounds) if present, place Wound referral order by RN under : No    New Ostomies, if present place, Ostomy referral order under : No     Nurse 1 eSignature: Electronically signed by Willa Chavez RN on 6/15/23 at 4:23 PM EDT    **SHARE this note so that the co-signing nurse can place an eSignature**    Nurse 2 eSignature: Electronically signed by Fela Reyna RN on 6/15/23 at 4:24 PM EDT

## 2023-06-15 NOTE — ED NOTES
Pt Pauma and confused, states she is unable to ambulate and refused to try at this time         Regan Bruce RN  06/15/23 2957

## 2023-06-15 NOTE — PROGRESS NOTES
Daughter and POA of patient would like to speak with case management and  tomorrow, she could be reach at [phone].

## 2023-06-15 NOTE — CONSULTS
Department of Orthopedic Surgery  Resident Consult Note          Reason for Consult:  b/l knee pain    HISTORY OF PRESENT ILLNESS:       Patient is a 81 y.o. female who presents with bilateral knee pain after she had a mechanical fall at home. Patient's daughter is at bedside and was able to provide some additional history. She has a history of significant bilateral knee arthritis and did previously have an arthroscopic procedure on the right knee several years ago. Patient lives at home with her son, though he is not always home and she is having increasing difficulty taking care of herself, both physically and cognitively per her daughter. She walks with a rolling walker at baseline. States she tripped and fell onto both knees. Has been having difficulty ambulating since the fall and states that both knees hurt about the same. Denies numbness/tingling/paresthesias. Denies any other orthopedic complaints at this time.       Past Medical History:        Diagnosis Date    Arthritis     Cataract     Diabetes mellitus ([facility]     Fall from standing 2/17/2020    Injury of left upper arm 2/17/2020    Pneumonia     Subdural hematoma ([facility] 7/9/2021    Syncope and collapse 10/17/2016    Uncontrolled type 2 diabetes mellitus ([address] 8/17/2010     Past Surgical History:        Procedure Laterality Date    BRONCHOSCOPY N/A 7/1/2021    BRONCHOSCOPY ALVEOLAR LAVAGE performed by Loel Galeazzi, MD at [facility]       Current Medications:   Current Facility-Administered Medications: amLODIPine (NORVASC) tablet 2.5 mg, 2.5 mg, Oral, BID  aspirin tablet 81 mg, 81 mg, Oral, Daily  atorvastatin (LIPITOR) tablet 10 mg, 10 mg, Oral, Daily  brimonidine (ALPHAGAN) 0.2 % ophthalmic solution 1 drop, 1 drop, Left Eye, BID  vitamin D (CHOLECALCIFEROL) tablet 1,000 Units, 1 tablet, Oral, Daily  dorzolamide (TRUSOPT) 2 % ophthalmic solution 1 drop, 1 drop, Left Eye,

## 2023-06-16 LAB
METER GLUCOSE: 109 MG/DL (ref 74–99)
METER GLUCOSE: 165 MG/DL (ref 74–99)
METER GLUCOSE: 168 MG/DL (ref 74–99)
METER GLUCOSE: 170 MG/DL (ref 74–99)

## 2023-06-16 PROCEDURE — 97530 THERAPEUTIC ACTIVITIES: CPT

## 2023-06-16 PROCEDURE — G0378 HOSPITAL OBSERVATION PER HR: HCPCS

## 2023-06-16 PROCEDURE — 97161 PT EVAL LOW COMPLEX 20 MIN: CPT

## 2023-06-16 PROCEDURE — 82962 GLUCOSE BLOOD TEST: CPT

## 2023-06-16 PROCEDURE — 6360000002 HC RX W HCPCS: Performed by: HOSPITALIST

## 2023-06-16 PROCEDURE — 2580000003 HC RX 258: Performed by: HOSPITALIST

## 2023-06-16 PROCEDURE — 6370000000 HC RX 637 (ALT 250 FOR IP): Performed by: FAMILY MEDICINE

## 2023-06-16 PROCEDURE — 97165 OT EVAL LOW COMPLEX 30 MIN: CPT

## 2023-06-16 PROCEDURE — 6370000000 HC RX 637 (ALT 250 FOR IP): Performed by: HOSPITALIST

## 2023-06-16 RX ADMIN — SODIUM CHLORIDE, PRESERVATIVE FREE 10 ML: 5 INJECTION INTRAVENOUS at 20:01

## 2023-06-16 RX ADMIN — DORZOLAMIDE HCL 1 DROP: 20 SOLUTION/ DROPS OPHTHALMIC at 13:57

## 2023-06-16 RX ADMIN — ATORVASTATIN CALCIUM 10 MG: 10 TABLET, FILM COATED ORAL at 19:59

## 2023-06-16 RX ADMIN — DORZOLAMIDE HCL 1 DROP: 20 SOLUTION/ DROPS OPHTHALMIC at 20:00

## 2023-06-16 RX ADMIN — AMLODIPINE BESYLATE 2.5 MG: 5 TABLET ORAL at 19:59

## 2023-06-16 RX ADMIN — ENOXAPARIN SODIUM 40 MG: 100 INJECTION SUBCUTANEOUS at 20:01

## 2023-06-16 RX ADMIN — TRAMADOL HYDROCHLORIDE 50 MG: 50 TABLET, COATED ORAL at 06:58

## 2023-06-16 RX ADMIN — BRIMONIDINE TARTRATE 1 DROP: 2 SOLUTION/ DROPS OPHTHALMIC at 08:52

## 2023-06-16 RX ADMIN — BRIMONIDINE TARTRATE 1 DROP: 2 SOLUTION/ DROPS OPHTHALMIC at 20:01

## 2023-06-16 RX ADMIN — PREDNISOLONE ACETATE 1 DROP: 10 SUSPENSION/ DROPS OPHTHALMIC at 20:00

## 2023-06-16 RX ADMIN — DORZOLAMIDE HCL 1 DROP: 20 SOLUTION/ DROPS OPHTHALMIC at 08:52

## 2023-06-16 RX ADMIN — Medication 1000 UNITS: at 08:50

## 2023-06-16 RX ADMIN — ASPIRIN 81 MG: 81 TABLET, COATED ORAL at 08:50

## 2023-06-16 ASSESSMENT — PAIN DESCRIPTION - ORIENTATION: ORIENTATION: RIGHT

## 2023-06-16 ASSESSMENT — PAIN SCALES - GENERAL: PAINLEVEL_OUTOF10: 7

## 2023-06-16 ASSESSMENT — PAIN DESCRIPTION - DESCRIPTORS: DESCRIPTORS: ACHING;DISCOMFORT;SORE

## 2023-06-16 ASSESSMENT — PAIN DESCRIPTION - LOCATION: LOCATION: KNEE;OTHER (COMMENT)

## 2023-06-16 NOTE — PROGRESS NOTES
Occupational Therapy          OCCUPATIONAL THERAPY INITIAL EVALUATION    [address] [address] [facility]  [address] [facility], [address] [place] [address]      Date:2023                 Patient Name: Cynthia Guerrero  MRN: [id number]  : 1935  Room: [address] [place] [address]-A    Referring Provider: Jacqueline Coto DO  Specific Provider Orders/Date: OT evaluation and treat 6/15/23    Evaluating OT: Ulices Bennett. Marques, OTR/L #[id number]    Diagnosis: Knee pain [M25.569]  Fall, initial encounter [W19. XXXA]  Contusion of knee, unspecified laterality, initial encounter [S80.00XA]  Mass of right lower extremity [R22.41]      Surgery:   Past Surgical History:   Procedure Laterality Date    BRONCHOSCOPY N/A 2021    BRONCHOSCOPY ALVEOLAR LAVAGE performed by Angel Dang MD at [address] [place] [address] EXTRACTION W/  INTRAOCULAR LENS IMPLANT      HYSTERECTOMY            Pertinent Medical History:  has a past medical history of Arthritis, Cataract, Diabetes mellitus ([address] Fall from standing, Injury of left upper arm, Pneumonia, Subdural hematoma ([address] Syncope and collapse, and Uncontrolled type 2 diabetes mellitus ([address]      Precautions:  Fall Risk, safety , [place]- used stethoscope, incontintent of urine- purr wick, L eye blind    Assessment of current deficits   [x] Functional mobility  [x]ADLs  [x] Strength               []Cognition   [x] Functional transfers   [x] IADLs         [x] Safety Awareness   [x]Endurance   [] Fine Coordination              [] Balance      [x] Vision/perception   [x]Sensation    []Gross Motor Coordination  [x] ROM  [] Delirium                   [] Motor Control     OT PLAN OF CARE   OT POC based on physician orders, patient diagnosis and results of clinical assessment    Frequency/Duration   2-4 days/wk for 1 week PRN   Specific OT Treatment Interventions to include:   * Instruction/training on adapted ADL techniques and AE recommendations to increase functional independence

## 2023-06-16 NOTE — CARE COORDINATION
6/16. Spoke with the pt's daughter, Yumiko Nur, to discuss transition of care. The pt lives with her son, who is not with her at all times. She will need rehab before she returns home. [facility]. Referral made. Will follow. Arely Galdamez RN  The Plan for Transition of Care is related to the following treatment goals: to be able to return to [facility]    The Patient and/or patient representative, daughter, Srikanth was provided with a choice of provider and agrees   with the discharge plan. [x] Yes [] No    Freedom of choice list was provided with basic dialogue that supports the patient's individualized plan of care/goals, treatment preferences and shares the quality data associated with the providers.  [x] Yes [] No

## 2023-06-16 NOTE — PROGRESS NOTES
Physical Therapy  Physical Therapy Initial Assessment     Name: Antoinette Asif  : 1935  MRN: [id number]      Date of Service: 2023    Evaluating PT:  Sue Boone PT, DPT [id number]    Room #:  1728/4240-S  Diagnosis:  Knee pain [M25.569]  Fall, initial encounter [W19. XXXA]  Contusion of knee, unspecified laterality, initial encounter [S80.00XA]  Mass of right lower extremity [R22.41]  PMHx/PSHx:    Past Medical History:   Diagnosis Date    Arthritis     Cataract     Diabetes mellitus ([facility] [address]     Fall from standing 2020    Injury of left upper arm 2020    Pneumonia     Subdural hematoma ([facility] [address] 2021    Syncope and collapse 10/17/2016    Uncontrolled type 2 diabetes mellitus ([facility] [address] 2010      Procedure/Surgery:    Past Surgical History:   Procedure Laterality Date    BRONCHOSCOPY N/A 2021    BRONCHOSCOPY ALVEOLAR LAVAGE performed by Hernán Foss MD at [facility]        Precautions:  Falls, severe [place] -- communication via stethoscope, WBAT BLEs, L eye blind   Equipment Needs:  TBD    SUBJECTIVE:    Pt lives with son in a 2 story home with 1 stairs to enter and no rail. Bed is on 1st floor and bath is on 1st floor. Pt ambulated with [address] PTA. OBJECTIVE:   Initial Evaluation  Date: 23 Treatment Short Term/ Long Term   Goals   AM-PAC 6 Clicks      Was pt agreeable to Eval/treatment? yes     Does pt have pain? B knee pain L worse than R     Bed Mobility  Rolling: SBA  Supine to sit: Kamran  Sit to supine: Kamran  Scooting: aKmran  Rolling: Independent   Supine to sit:  Independent   Sit to supine: Independent   Scooting: Independent    Transfers Sit to stand: attempted, unable  Stand to sit: NT  Stand pivot: NT  Sit to stand: Kamran  Stand to sit: Kamran  Stand pivot: Kamran with [address]   Ambulation    NT  50 feet with [address] Kamran   Stair negotiation: ascended and descended  NT  1 steps with no rail SBA   ROM BUE:  Defer

## 2023-06-16 NOTE — PROGRESS NOTES
CT scan of the right knee reviewed. Significant osteoarthritic changes, tricompartmental.  Multiple loose bodies and large osteophytes with chronic joint space narrowing. No acute fractures or dislocations noted. There is a large joint effusion. Plan:  Weightbearing as tolerated bilateral lower extremities  Continue PT/OT. Patient still has continued difficulty ambulating with her walker, would recommend evaluation for placement in a SNF for continued rehab. Pain control  Ice, compressive wraps, heat may all be useful  Patient also states that she uses Aspercreme at home which helps her arthritis pain. Topical Aspercreme may also be helpful for her if available  Orthopedics will sign off at this time. Please contact us with any further questions or concerns regarding the care of Ms. Olson

## 2023-06-17 LAB
METER GLUCOSE: 130 MG/DL (ref 74–99)
METER GLUCOSE: 182 MG/DL (ref 74–99)
METER GLUCOSE: 183 MG/DL (ref 74–99)
METER GLUCOSE: 295 MG/DL (ref 74–99)
METER GLUCOSE: >500 MG/DL (ref 74–99)

## 2023-06-17 PROCEDURE — 6370000000 HC RX 637 (ALT 250 FOR IP): Performed by: HOSPITALIST

## 2023-06-17 PROCEDURE — G0378 HOSPITAL OBSERVATION PER HR: HCPCS

## 2023-06-17 PROCEDURE — 82962 GLUCOSE BLOOD TEST: CPT

## 2023-06-17 PROCEDURE — 2580000003 HC RX 258: Performed by: HOSPITALIST

## 2023-06-17 PROCEDURE — 6370000000 HC RX 637 (ALT 250 FOR IP): Performed by: FAMILY MEDICINE

## 2023-06-17 PROCEDURE — 6360000002 HC RX W HCPCS: Performed by: HOSPITALIST

## 2023-06-17 RX ADMIN — ATORVASTATIN CALCIUM 10 MG: 10 TABLET, FILM COATED ORAL at 08:10

## 2023-06-17 RX ADMIN — Medication 1000 UNITS: at 08:10

## 2023-06-17 RX ADMIN — BRIMONIDINE TARTRATE 1 DROP: 2 SOLUTION/ DROPS OPHTHALMIC at 20:49

## 2023-06-17 RX ADMIN — DORZOLAMIDE HCL 1 DROP: 20 SOLUTION/ DROPS OPHTHALMIC at 20:49

## 2023-06-17 RX ADMIN — AMLODIPINE BESYLATE 2.5 MG: 5 TABLET ORAL at 08:09

## 2023-06-17 RX ADMIN — INSULIN LISPRO 2 UNITS: 100 INJECTION, SOLUTION INTRAVENOUS; SUBCUTANEOUS at 12:22

## 2023-06-17 RX ADMIN — PREDNISOLONE ACETATE 1 DROP: 10 SUSPENSION/ DROPS OPHTHALMIC at 20:49

## 2023-06-17 RX ADMIN — TRAMADOL HYDROCHLORIDE 50 MG: 50 TABLET, COATED ORAL at 12:21

## 2023-06-17 RX ADMIN — DORZOLAMIDE HCL 1 DROP: 20 SOLUTION/ DROPS OPHTHALMIC at 08:09

## 2023-06-17 RX ADMIN — ASPIRIN 81 MG: 81 TABLET, COATED ORAL at 08:09

## 2023-06-17 RX ADMIN — ACETAMINOPHEN 650 MG: 325 TABLET ORAL at 12:21

## 2023-06-17 RX ADMIN — PREDNISOLONE ACETATE 1 DROP: 10 SUSPENSION/ DROPS OPHTHALMIC at 08:09

## 2023-06-17 RX ADMIN — TRAMADOL HYDROCHLORIDE 50 MG: 50 TABLET, COATED ORAL at 20:48

## 2023-06-17 RX ADMIN — ACETAMINOPHEN 650 MG: 325 TABLET ORAL at 06:23

## 2023-06-17 RX ADMIN — ENOXAPARIN SODIUM 40 MG: 100 INJECTION SUBCUTANEOUS at 17:37

## 2023-06-17 RX ADMIN — BRIMONIDINE TARTRATE 1 DROP: 2 SOLUTION/ DROPS OPHTHALMIC at 08:09

## 2023-06-17 RX ADMIN — SODIUM CHLORIDE, PRESERVATIVE FREE 10 ML: 5 INJECTION INTRAVENOUS at 08:10

## 2023-06-17 RX ADMIN — AMLODIPINE BESYLATE 2.5 MG: 5 TABLET ORAL at 20:48

## 2023-06-17 RX ADMIN — SODIUM CHLORIDE, PRESERVATIVE FREE 10 ML: 5 INJECTION INTRAVENOUS at 20:49

## 2023-06-17 ASSESSMENT — PAIN SCALES - GENERAL
PAINLEVEL_OUTOF10: 5
PAINLEVEL_OUTOF10: 0
PAINLEVEL_OUTOF10: 7
PAINLEVEL_OUTOF10: 7
PAINLEVEL_OUTOF10: 5

## 2023-06-17 ASSESSMENT — PAIN DESCRIPTION - LOCATION
LOCATION: SHOULDER

## 2023-06-17 ASSESSMENT — PAIN DESCRIPTION - ONSET
ONSET: GRADUAL
ONSET: GRADUAL

## 2023-06-17 ASSESSMENT — PAIN - FUNCTIONAL ASSESSMENT
PAIN_FUNCTIONAL_ASSESSMENT: PREVENTS OR INTERFERES SOME ACTIVE ACTIVITIES AND ADLS
PAIN_FUNCTIONAL_ASSESSMENT: PREVENTS OR INTERFERES SOME ACTIVE ACTIVITIES AND ADLS

## 2023-06-17 ASSESSMENT — PAIN DESCRIPTION - ORIENTATION
ORIENTATION: RIGHT;LEFT
ORIENTATION: LEFT
ORIENTATION: RIGHT

## 2023-06-17 ASSESSMENT — PAIN DESCRIPTION - PAIN TYPE
TYPE: ACUTE PAIN
TYPE: CHRONIC PAIN

## 2023-06-17 ASSESSMENT — PAIN DESCRIPTION - FREQUENCY
FREQUENCY: INTERMITTENT
FREQUENCY: INTERMITTENT

## 2023-06-17 ASSESSMENT — PAIN DESCRIPTION - DESCRIPTORS
DESCRIPTORS: ACHING;DISCOMFORT;SORE

## 2023-06-17 NOTE — PROGRESS NOTES
Asked Dr Jennifer Mohamud for \"asper cream\" for her joints that hurt.  Pt currently complaining of her right shoulder hurting at this time

## 2023-06-17 NOTE — PROGRESS NOTES
Hospitalist Progress Note      Synopsis: Patient admitted on 6/15/2023   Patient presented to the emergency after mechanical fall. Work-up negative for fracture. Seen by orthopedic for bilateral knee pain. Imaging showed severe bilateral osteoarthritis. Orthopedic recommended weightbearing as tolerated pain medications physical therapy. Subjective    No acute events overnight. Complaining of shoulder pain this morning. Exam:  /78   Pulse 80   Temp 97.9 °F (36.6 °C) (Temporal)   Resp 18   Ht 5' 2\" (1.575 m)   Wt 172 lb (78 kg)   SpO2 99%   BMI 31.46 kg/m²   General appearance: No apparent distress, appears stated age and cooperative. HEENT: Pupils equal, round, and reactive to light. Conjunctivae/corneas clear. Neck: Supple, with full range of motion. No jugular venous distention. Trachea midline. Respiratory: Clear to auscultation bilaterally  Cardiovascular: RRR. Heart sounds 1 and 2 only  Abdomen: Soft, non-tender, non-distended with normal bowel sounds. Musculoskeletal: Left shoulder tenderness. Skin: Skin color, texture, turgor normal.  No rashes or lesions. Neurologic: No focal deficits noted.   Psychiatric: Alert and oriented, thought content appropriate, normal insight    Medications:  Reviewed    Infusion Medications    sodium chloride      dextrose       Scheduled Medications    amLODIPine  2.5 mg Oral BID    aspirin  81 mg Oral Daily    atorvastatin  10 mg Oral Daily    brimonidine  1 drop Left Eye BID    Vitamin D  1,000 Units Oral Daily    dorzolamide  1 drop Left Eye TID    prednisoLONE acetate  1 drop Left Eye TID    sodium chloride flush  5-40 mL IntraVENous 2 times per day    enoxaparin  40 mg SubCUTAneous QPM    insulin lispro  0-4 Units SubCUTAneous TID WC    insulin lispro  0-4 Units SubCUTAneous Nightly     PRN Meds: mentholatum, menthol, sodium chloride flush, sodium chloride, ondansetron **OR** ondansetron, polyethylene glycol, acetaminophen **OR**

## 2023-06-18 LAB
ANION GAP SERPL CALCULATED.3IONS-SCNC: 9 MMOL/L (ref 7–16)
BASOPHILS # BLD: 0.02 E9/L (ref 0–0.2)
BASOPHILS NFR BLD: 0.2 % (ref 0–2)
BUN SERPL-MCNC: 17 MG/DL (ref 6–23)
CALCIUM SERPL-MCNC: 9.2 MG/DL (ref 8.6–10.2)
CHLORIDE SERPL-SCNC: 100 MMOL/L (ref 98–107)
CO2 SERPL-SCNC: 26 MMOL/L (ref 22–29)
CREAT SERPL-MCNC: 0.8 MG/DL (ref 0.5–1)
EOSINOPHIL # BLD: 0.02 E9/L (ref 0.05–0.5)
EOSINOPHIL NFR BLD: 0.2 % (ref 0–6)
ERYTHROCYTE [DISTWIDTH] IN BLOOD BY AUTOMATED COUNT: 13.3 FL (ref 11.5–15)
GLUCOSE SERPL-MCNC: 120 MG/DL (ref 74–99)
HCT VFR BLD AUTO: 37.1 % (ref 34–48)
HGB BLD-MCNC: 11.6 G/DL (ref 11.5–15.5)
IMM GRANULOCYTES # BLD: 0.03 E9/L
IMM GRANULOCYTES NFR BLD: 0.4 % (ref 0–5)
LYMPHOCYTES # BLD: 1.53 E9/L (ref 1.5–4)
LYMPHOCYTES NFR BLD: 18.3 % (ref 20–42)
MCH RBC QN AUTO: 29.4 PG (ref 26–35)
MCHC RBC AUTO-ENTMCNC: 31.3 % (ref 32–34.5)
MCV RBC AUTO: 94.2 FL (ref 80–99.9)
METER GLUCOSE: 112 MG/DL (ref 74–99)
METER GLUCOSE: 136 MG/DL (ref 74–99)
METER GLUCOSE: 167 MG/DL (ref 74–99)
METER GLUCOSE: 172 MG/DL (ref 74–99)
MONOCYTES # BLD: 0.78 E9/L (ref 0.1–0.95)
MONOCYTES NFR BLD: 9.4 % (ref 2–12)
NEUTROPHILS # BLD: 5.96 E9/L (ref 1.8–7.3)
NEUTS SEG NFR BLD: 71.5 % (ref 43–80)
PLATELET # BLD AUTO: 309 E9/L (ref 130–450)
PMV BLD AUTO: 9.2 FL (ref 7–12)
POTASSIUM SERPL-SCNC: 4.2 MMOL/L (ref 3.5–5)
RBC # BLD AUTO: 3.94 E12/L (ref 3.5–5.5)
SODIUM SERPL-SCNC: 135 MMOL/L (ref 132–146)
WBC # BLD: 8.3 E9/L (ref 4.5–11.5)

## 2023-06-18 PROCEDURE — 80048 BASIC METABOLIC PNL TOTAL CA: CPT

## 2023-06-18 PROCEDURE — 6370000000 HC RX 637 (ALT 250 FOR IP): Performed by: HOSPITALIST

## 2023-06-18 PROCEDURE — G0378 HOSPITAL OBSERVATION PER HR: HCPCS

## 2023-06-18 PROCEDURE — 6360000002 HC RX W HCPCS: Performed by: HOSPITALIST

## 2023-06-18 PROCEDURE — 85025 COMPLETE CBC W/AUTO DIFF WBC: CPT

## 2023-06-18 PROCEDURE — 6370000000 HC RX 637 (ALT 250 FOR IP): Performed by: FAMILY MEDICINE

## 2023-06-18 PROCEDURE — 82962 GLUCOSE BLOOD TEST: CPT

## 2023-06-18 PROCEDURE — 2580000003 HC RX 258: Performed by: HOSPITALIST

## 2023-06-18 PROCEDURE — 36415 COLL VENOUS BLD VENIPUNCTURE: CPT

## 2023-06-18 RX ADMIN — BRIMONIDINE TARTRATE 1 DROP: 2 SOLUTION/ DROPS OPHTHALMIC at 08:30

## 2023-06-18 RX ADMIN — ENOXAPARIN SODIUM 40 MG: 100 INJECTION SUBCUTANEOUS at 18:06

## 2023-06-18 RX ADMIN — SODIUM CHLORIDE, PRESERVATIVE FREE 10 ML: 5 INJECTION INTRAVENOUS at 20:40

## 2023-06-18 RX ADMIN — DORZOLAMIDE HCL 1 DROP: 20 SOLUTION/ DROPS OPHTHALMIC at 08:30

## 2023-06-18 RX ADMIN — Medication 1000 UNITS: at 08:30

## 2023-06-18 RX ADMIN — ATORVASTATIN CALCIUM 10 MG: 10 TABLET, FILM COATED ORAL at 08:30

## 2023-06-18 RX ADMIN — PREDNISOLONE ACETATE 1 DROP: 10 SUSPENSION/ DROPS OPHTHALMIC at 20:40

## 2023-06-18 RX ADMIN — AMLODIPINE BESYLATE 2.5 MG: 5 TABLET ORAL at 08:30

## 2023-06-18 RX ADMIN — AMLODIPINE BESYLATE 2.5 MG: 5 TABLET ORAL at 20:43

## 2023-06-18 RX ADMIN — SODIUM CHLORIDE, PRESERVATIVE FREE 10 ML: 5 INJECTION INTRAVENOUS at 08:30

## 2023-06-18 RX ADMIN — DORZOLAMIDE HCL 1 DROP: 20 SOLUTION/ DROPS OPHTHALMIC at 20:40

## 2023-06-18 RX ADMIN — PREDNISOLONE ACETATE 1 DROP: 10 SUSPENSION/ DROPS OPHTHALMIC at 16:46

## 2023-06-18 RX ADMIN — TRAMADOL HYDROCHLORIDE 50 MG: 50 TABLET, COATED ORAL at 20:42

## 2023-06-18 RX ADMIN — ASPIRIN 81 MG: 81 TABLET, COATED ORAL at 08:30

## 2023-06-18 RX ADMIN — PREDNISOLONE ACETATE 1 DROP: 10 SUSPENSION/ DROPS OPHTHALMIC at 08:30

## 2023-06-18 RX ADMIN — BRIMONIDINE TARTRATE 1 DROP: 2 SOLUTION/ DROPS OPHTHALMIC at 20:40

## 2023-06-18 RX ADMIN — DORZOLAMIDE HCL 1 DROP: 20 SOLUTION/ DROPS OPHTHALMIC at 16:46

## 2023-06-18 ASSESSMENT — PAIN DESCRIPTION - ONSET: ONSET: GRADUAL

## 2023-06-18 ASSESSMENT — PAIN SCALES - GENERAL
PAINLEVEL_OUTOF10: 6
PAINLEVEL_OUTOF10: 4
PAINLEVEL_OUTOF10: 2

## 2023-06-18 ASSESSMENT — PAIN DESCRIPTION - DESCRIPTORS
DESCRIPTORS: ACHING;DISCOMFORT;SORE
DESCRIPTORS: ACHING;DISCOMFORT;SORE

## 2023-06-18 ASSESSMENT — PAIN DESCRIPTION - ORIENTATION
ORIENTATION: LEFT
ORIENTATION: RIGHT

## 2023-06-18 ASSESSMENT — PAIN DESCRIPTION - LOCATION
LOCATION: KNEE;SHOULDER
LOCATION: SHOULDER

## 2023-06-18 ASSESSMENT — PAIN DESCRIPTION - FREQUENCY
FREQUENCY: INTERMITTENT
FREQUENCY: INTERMITTENT

## 2023-06-18 ASSESSMENT — PAIN DESCRIPTION - PAIN TYPE
TYPE: CHRONIC PAIN
TYPE: CHRONIC PAIN

## 2023-06-19 LAB
METER GLUCOSE: 132 MG/DL (ref 74–99)
METER GLUCOSE: 145 MG/DL (ref 74–99)
METER GLUCOSE: 198 MG/DL (ref 74–99)
METER GLUCOSE: 223 MG/DL (ref 74–99)

## 2023-06-19 PROCEDURE — G0378 HOSPITAL OBSERVATION PER HR: HCPCS

## 2023-06-19 PROCEDURE — 82962 GLUCOSE BLOOD TEST: CPT

## 2023-06-19 PROCEDURE — 97535 SELF CARE MNGMENT TRAINING: CPT

## 2023-06-19 PROCEDURE — 2580000003 HC RX 258: Performed by: HOSPITALIST

## 2023-06-19 PROCEDURE — 6360000002 HC RX W HCPCS: Performed by: HOSPITALIST

## 2023-06-19 PROCEDURE — 97530 THERAPEUTIC ACTIVITIES: CPT

## 2023-06-19 PROCEDURE — 6370000000 HC RX 637 (ALT 250 FOR IP): Performed by: HOSPITALIST

## 2023-06-19 RX ADMIN — Medication 7.5 MG: at 20:39

## 2023-06-19 RX ADMIN — Medication 1000 UNITS: at 08:54

## 2023-06-19 RX ADMIN — INSULIN LISPRO 1 UNITS: 100 INJECTION, SOLUTION INTRAVENOUS; SUBCUTANEOUS at 13:20

## 2023-06-19 RX ADMIN — DORZOLAMIDE HCL 1 DROP: 20 SOLUTION/ DROPS OPHTHALMIC at 20:39

## 2023-06-19 RX ADMIN — BRIMONIDINE TARTRATE 1 DROP: 2 SOLUTION/ DROPS OPHTHALMIC at 20:39

## 2023-06-19 RX ADMIN — PREDNISOLONE ACETATE 1 DROP: 10 SUSPENSION/ DROPS OPHTHALMIC at 08:54

## 2023-06-19 RX ADMIN — AMLODIPINE BESYLATE 2.5 MG: 5 TABLET ORAL at 20:38

## 2023-06-19 RX ADMIN — DORZOLAMIDE HCL 1 DROP: 20 SOLUTION/ DROPS OPHTHALMIC at 13:21

## 2023-06-19 RX ADMIN — ENOXAPARIN SODIUM 40 MG: 100 INJECTION SUBCUTANEOUS at 18:41

## 2023-06-19 RX ADMIN — BRIMONIDINE TARTRATE 1 DROP: 2 SOLUTION/ DROPS OPHTHALMIC at 08:54

## 2023-06-19 RX ADMIN — ATORVASTATIN CALCIUM 10 MG: 10 TABLET, FILM COATED ORAL at 08:56

## 2023-06-19 RX ADMIN — PREDNISOLONE ACETATE 1 DROP: 10 SUSPENSION/ DROPS OPHTHALMIC at 20:42

## 2023-06-19 RX ADMIN — ASPIRIN 81 MG: 81 TABLET, COATED ORAL at 08:54

## 2023-06-19 RX ADMIN — AMLODIPINE BESYLATE 2.5 MG: 5 TABLET ORAL at 08:55

## 2023-06-19 RX ADMIN — Medication 7.5 MG: at 08:55

## 2023-06-19 RX ADMIN — DORZOLAMIDE HCL 1 DROP: 20 SOLUTION/ DROPS OPHTHALMIC at 08:54

## 2023-06-19 RX ADMIN — SODIUM CHLORIDE, PRESERVATIVE FREE 10 ML: 5 INJECTION INTRAVENOUS at 08:54

## 2023-06-19 RX ADMIN — PREDNISOLONE ACETATE 1 DROP: 10 SUSPENSION/ DROPS OPHTHALMIC at 13:21

## 2023-06-19 ASSESSMENT — PAIN DESCRIPTION - LOCATION
LOCATION: THROAT
LOCATION: THROAT

## 2023-06-19 ASSESSMENT — PAIN DESCRIPTION - DESCRIPTORS
DESCRIPTORS: ACHING;BURNING;SORE
DESCRIPTORS: SORE

## 2023-06-19 ASSESSMENT — PAIN SCALES - GENERAL
PAINLEVEL_OUTOF10: 3
PAINLEVEL_OUTOF10: 0
PAINLEVEL_OUTOF10: 1
PAINLEVEL_OUTOF10: 0

## 2023-06-19 NOTE — PROGRESS NOTES
Physical Therapy  Physical Therapy rx     Name: Dolores Lal  : 1935  MRN: [id number]      Date of Service: 2023    Evaluating PT:  Rich Calderon PT, DPT [id number]    Room #:  8373/2171-X  Diagnosis:  Knee pain [M25.569]  Fall, initial encounter [W19. XXXA]  Contusion of knee, unspecified laterality, initial encounter [S80.00XA]  Mass of right lower extremity [R22.41]  PMHx/PSHx:    Past Medical History:   Diagnosis Date    Arthritis     Cataract     Diabetes mellitus ([facility] [address]     Fall from standing 2020    Injury of left upper arm 2020    Pneumonia     Subdural hematoma ([facility] [address] 2021    Syncope and collapse 10/17/2016    Uncontrolled type 2 diabetes mellitus ([facility] [address] 2010      Procedure/Surgery:    Past Surgical History:   Procedure Laterality Date    BRONCHOSCOPY N/A 2021    BRONCHOSCOPY ALVEOLAR LAVAGE performed by Marissa Mead MD at [address] [facility]        Precautions:  Falls, severe [place] -- communication via stethoscope, WBAT BLEs, L eye blind   Equipment Needs:  TBD    SUBJECTIVE:    Pt lives with son in a 2 story home with 1 stairs to enter and no rail. Bed is on 1st floor and bath is on 1st floor. Pt ambulated with [employer] PTA. OBJECTIVE:   Initial Evaluation  Date: 23 Treatment Short Term/ Long Term   Goals   AM-PAC 6 Clicks 66/59 10    Was pt agreeable to Eval/treatment? yes Yes     Does pt have pain? B knee pain L worse than R B knee pain    Bed Mobility  Rolling: SBA  Supine to sit: Kamran  Sit to supine: Kamran  Scooting: Kamran Sit to supine mod scooting nt Rolling: Independent   Supine to sit:  Independent   Sit to supine: Independent   Scooting: Independent    Transfers Sit to stand: attempted, unable  Stand to sit: NT  Stand pivot: NT Sit to stand chair to ww mod assist  Stand to sit mod  Stand pivot ww mod  Sit to stand: Kamran  Stand to sit: Kamran  Stand pivot: Kmaran with [employer]   Ambulation    NT NT 50

## 2023-06-19 NOTE — CARE COORDINATION
6/19. [facility] is able to accept the pt. Updated PT/OT requested. Will need insurance precert. PASSR completed. Cortez Varma RN  [id number]. Insurance approval received for [employer]. The pt who is occupying the bed where this pt will be, has not discharged yet. He won't be leaving until later tonight. They can admit the pt tomorrow, 6/20. PASSR,envelope completed.  Cortez Varma RN

## 2023-06-19 NOTE — ACP (ADVANCE CARE PLANNING)
Advance Care Planning     General Advance Care Planning (ACP) Conversation    Date of Conversation: 6/15/2023  Conducted with: Patient with [address]: Named in Advance Directive or Healthcare Power of Johnny (name) Berna Little  [phone]    Healthcare Decision Maker:    Primary Decision Maker: Moni Hair - Child - [phone]    Secondary Decision Maker: Armin Burton - Child - [phone]  Click here to complete Healthcare Decision Makers including selection of the Healthcare Decision Maker Relationship (ie \"Primary\"). Today we documented Decision Maker(s) consistent with ACP documents on file. Content/Action Overview:   Has ACP document(s) on file - reflects the patient's care preferences  Reviewed DNR/DNI and patient elects Full Code (Attempt Resuscitation)        Length of Voluntary ACP Conversation in minutes:      Thelma Stone RN

## 2023-06-19 NOTE — PROGRESS NOTES
Hospitalist Progress Note      Synopsis: Patient admitted on 6/15/2023   Patient presented to the emergency after mechanical fall. Work-up negative for fracture. Seen by orthopedic for bilateral knee pain. Imaging showed severe bilateral osteoarthritis. Orthopedic recommended weightbearing as tolerated pain medications physical therapy. Subjective  And examined chart reviewed discussed with case management  Overnight events reported patient still complains of pain  No chest pain no shortness of breath  Awaiting insurance Auth/placement  Exam:  BP (!) 142/62   Pulse 69   Temp 97 °F (36.1 °C) (Temporal)   Resp 16   Ht 5' 2\" (1.575 m)   Wt 172 lb (78 kg)   SpO2 100%   BMI 31.46 kg/m²   General appearance: No apparent distress, appears stated age and cooperative. HEENT: Pupils equal, round, and reactive to light. Conjunctivae/corneas clear. Neck: Supple, with full range of motion. No jugular venous distention. Trachea midline. Respiratory: Clear to auscultation bilaterally  Cardiovascular: RRR. Heart sounds 1 and 2 only  Abdomen: Soft, non-tender, non-distended with normal bowel sounds. Musculoskeletal: No joint tenderness. Skin: Skin color, texture, turgor normal.  No rashes or lesions. Neurologic: No focal deficits noted.   Psychiatric: Alert and oriented, thought content appropriate, normal insight    Medications:  Reviewed    Infusion Medications    sodium chloride      dextrose       Scheduled Medications    amLODIPine  2.5 mg Oral BID    aspirin  81 mg Oral Daily    atorvastatin  10 mg Oral Daily    brimonidine  1 drop Left Eye BID    Vitamin D  1,000 Units Oral Daily    dorzolamide  1 drop Left Eye TID    prednisoLONE acetate  1 drop Left Eye TID    sodium chloride flush  5-40 mL IntraVENous 2 times per day    enoxaparin  40 mg SubCUTAneous QPM    insulin lispro  0-4 Units SubCUTAneous TID WC    insulin lispro  0-4 Units SubCUTAneous Nightly     PRN Meds: mentholatum, menthol, sodium

## 2023-06-19 NOTE — DISCHARGE INSTR - COC
Modified Barium Swallow with Video (Video Swallowing Test): not done    Treatments at the Time of Hospital Discharge:   Respiratory Treatments: ***  Oxygen Therapy:  is not on home oxygen therapy. Ventilator:    - No ventilator support    Rehab Therapies: Physical Therapy and Occupational Therapy  Weight Bearing Status/Restrictions: No weight bearing restrictions  Other Medical Equipment (for information only, NOT a DME order):  walker  Other Treatments: ***    Patient's personal belongings (please select all that are sent with patient):  Dentures upper    RN SIGNATURE:  Electronically signed by Sydney Fortune RN on 6/20/23 at 9:22 AM EDT    CASE MANAGEMENT/SOCIAL WORK SECTION    Inpatient Status Date: ***    Readmission Risk Assessment Score:  Readmission Risk              Risk of Unplanned Readmission:  0           Discharging to Facility/ Agency   Name: [facility]  Address:  Phone:  Fax:    Dialysis Facility (if applicable)   Name:  Address:  Dialysis Schedule:  Phone:  Fax:    / signature: Electronically signed by Columba Camarillo RN on 6/19/23 at 4:19 PM EDT    PHYSICIAN SECTION    Prognosis: {Prognosis:4281058916}    Condition at Discharge: [address] Patient Condition:428886321}    Rehab Potential (if transferring to Rehab): {Prognosis:7272413598}    Recommended Labs or Other Treatments After Discharge: ***    Physician Certification: I certify the above information and transfer of Yesi Ayala  is necessary for the continuing treatment of the diagnosis listed and that she requires Matthew Castaneda for {GREATER/LESS:210562208} 30 days.      Update Admission H&P: {CHP DME Changes in NPMMZ:875969914}    PHYSICIAN SIGNATURE:  {Esignature:[id number]}

## 2023-06-20 VITALS
HEART RATE: 73 BPM | HEIGHT: 62 IN | DIASTOLIC BLOOD PRESSURE: 69 MMHG | WEIGHT: 172 LBS | RESPIRATION RATE: 16 BRPM | BODY MASS INDEX: 31.65 KG/M2 | TEMPERATURE: 97.3 F | OXYGEN SATURATION: 97 % | SYSTOLIC BLOOD PRESSURE: 132 MMHG

## 2023-06-20 LAB — METER GLUCOSE: 139 MG/DL (ref 74–99)

## 2023-06-20 PROCEDURE — 6370000000 HC RX 637 (ALT 250 FOR IP): Performed by: HOSPITALIST

## 2023-06-20 PROCEDURE — 82962 GLUCOSE BLOOD TEST: CPT

## 2023-06-20 PROCEDURE — 2580000003 HC RX 258: Performed by: HOSPITALIST

## 2023-06-20 PROCEDURE — G0378 HOSPITAL OBSERVATION PER HR: HCPCS

## 2023-06-20 RX ORDER — POLYETHYLENE GLYCOL 3350 17 G/17G
17 POWDER, FOR SOLUTION ORAL DAILY PRN
Qty: 30 EACH | Refills: 0 | DISCHARGE
Start: 2023-06-20 | End: 2023-07-20

## 2023-06-20 RX ADMIN — Medication 1000 UNITS: at 08:13

## 2023-06-20 RX ADMIN — BRIMONIDINE TARTRATE 1 DROP: 2 SOLUTION/ DROPS OPHTHALMIC at 08:17

## 2023-06-20 RX ADMIN — DORZOLAMIDE HCL 1 DROP: 20 SOLUTION/ DROPS OPHTHALMIC at 08:18

## 2023-06-20 RX ADMIN — PREDNISOLONE ACETATE 1 DROP: 10 SUSPENSION/ DROPS OPHTHALMIC at 08:17

## 2023-06-20 RX ADMIN — ACETAMINOPHEN 650 MG: 325 TABLET ORAL at 08:14

## 2023-06-20 RX ADMIN — SODIUM CHLORIDE, PRESERVATIVE FREE 10 ML: 5 INJECTION INTRAVENOUS at 08:18

## 2023-06-20 RX ADMIN — ASPIRIN 81 MG: 81 TABLET, COATED ORAL at 08:13

## 2023-06-20 NOTE — DISCHARGE SUMMARY
Hospitalist Discharge Summary    Patient ID: Nicol Abdul   Patient : 1935  Patient's PCP: Carmella Wilson DO    Admit Date: 6/15/2023   Admitting Physician: Germain Small DO    Discharge Date:  2023   Discharge Physician: Stephy Branch MD   Discharge Condition: Stable  Discharge Disposition: [facility] course in brief:  (Please refer to daily progress notes for a comprehensive review of the hospitalization by requesting medical records)  This is an 15-year-old lady with PMH bilateral osteoarthritis, type II DM, HTN, HLD, osteoporosis, obesity, peripheral vascular disease, recurrent falls, presented with mechanical fall and knee pain. Work-up was negative for any fractures. Orthopedic team was consulted for bilateral knee pain and was recommended weightbearing as tolerated, pain medication and physical therapy. Consults:   IP CONSULT TO INTERNAL MEDICINE  IP CONSULT TO SOCIAL WORK  IP CONSULT TO ORTHOPEDIC SURGERY    Discharge Diagnoses:  Principal Problem:    Knee pain  Resolved Problems:    * No resolved hospital problems. *  Mechanical fall  Type 2 diabetes mellitus  HTN  Osteoporosis  HLD  PVD          Discharge Instructions / Follow up: Follow-up with PCP within 1 week of discharge. Follow-up with consultants as indicated by them. Compliance with medications as prescribed on discharge. No future appointments. The patient's condition is stable. At this time the patient is without objective evidence of an acute process requiring continuing hospitalization or inpatient management. They are stable for discharge with outpatient follow-up. I have spoken with the patient and discussed the results of the current hospitalization, in addition to providing specific details for the plan of care and counseling regarding the diagnosis and prognosis.   The plan has been discussed in detail and they are aware of the specific conditions for emergent return, as

## 2023-06-20 NOTE — CARE COORDINATION
6/20/2023 social work transition of care planning. Latha set up [facility] ambulance for 10:30 am to [facility]. Latha notified Nursing,pt's dtr-Ivon,and facility liaison.   Electronically signed by MIKE Marinelli on 6/20/2023 at 8:37 AM

## 2023-06-21 LAB
ALBUMIN SERPL-MCNC: 2.8 G/DL (ref 3.5–5.2)
ALP SERPL-CCNC: 71 U/L (ref 35–104)
ALT SERPL-CCNC: 9 U/L (ref 0–32)
ANION GAP SERPL CALCULATED.3IONS-SCNC: 12 MMOL/L (ref 7–16)
AST SERPL-CCNC: 11 U/L (ref 0–31)
BILIRUB SERPL-MCNC: <0.2 MG/DL (ref 0–1.2)
BUN SERPL-MCNC: 27 MG/DL (ref 6–23)
CALCIUM SERPL-MCNC: 8.9 MG/DL (ref 8.6–10.2)
CHLORIDE SERPL-SCNC: 109 MMOL/L (ref 98–107)
CHOLESTEROL, TOTAL: 137 MG/DL (ref 0–199)
CO2 SERPL-SCNC: 22 MMOL/L (ref 22–29)
CREAT SERPL-MCNC: 0.8 MG/DL (ref 0.5–1)
ERYTHROCYTE [DISTWIDTH] IN BLOOD BY AUTOMATED COUNT: 13.4 FL (ref 11.5–15)
GLUCOSE SERPL-MCNC: 130 MG/DL (ref 74–99)
HBA1C MFR BLD: 6.9 % (ref 4–5.6)
HCT VFR BLD AUTO: 34.7 % (ref 34–48)
HDLC SERPL-MCNC: 54 MG/DL
HGB BLD-MCNC: 10.7 G/DL (ref 11.5–15.5)
LDLC SERPL CALC-MCNC: 74 MG/DL (ref 0–99)
MCH RBC QN AUTO: 29.3 PG (ref 26–35)
MCHC RBC AUTO-ENTMCNC: 30.8 % (ref 32–34.5)
MCV RBC AUTO: 95.1 FL (ref 80–99.9)
PLATELET # BLD AUTO: 376 E9/L (ref 130–450)
PMV BLD AUTO: 9.3 FL (ref 7–12)
POTASSIUM SERPL-SCNC: 3.8 MMOL/L (ref 3.5–5)
PROT SERPL-MCNC: 6.1 G/DL (ref 6.4–8.3)
RBC # BLD AUTO: 3.65 E12/L (ref 3.5–5.5)
SODIUM SERPL-SCNC: 143 MMOL/L (ref 132–146)
TRIGL SERPL-MCNC: 43 MG/DL (ref 0–149)
VITAMIN D 25-HYDROXY: 26 NG/ML (ref 30–100)
VLDLC SERPL CALC-MCNC: 9 MG/DL
WBC # BLD: 5.7 E9/L (ref 4.5–11.5)

## 2023-06-22 ENCOUNTER — TELEPHONE (OUTPATIENT)
Dept: PRIMARY CARE CLINIC | Age: 88
End: 2023-06-22

## 2023-06-22 NOTE — TELEPHONE ENCOUNTER
Care Transitions Initial Follow Up Call    Outreach made within 2 business days of discharge: Yes    Patient: Filippo Damon Patient : 1935   MRN: [id number]  Reason for Admission: There are no discharge diagnoses documented for the most recent discharge. Discharge Date: 23       Spoke with: Son Dylan Hyman. Patient was discharged to rehab center for therapy after being discharged from hospital.  Advised for them to follow up with Dr Will Franklin after patient released from rehab.

## 2023-06-28 LAB
ALBUMIN SERPL-MCNC: 3.2 G/DL (ref 3.5–5.2)
ALP SERPL-CCNC: 85 U/L (ref 35–104)
ALT SERPL-CCNC: 9 U/L (ref 0–32)
ANION GAP SERPL CALCULATED.3IONS-SCNC: 13 MMOL/L (ref 7–16)
AST SERPL-CCNC: 9 U/L (ref 0–31)
BILIRUB SERPL-MCNC: 0.2 MG/DL (ref 0–1.2)
BUN SERPL-MCNC: 30 MG/DL (ref 6–23)
CALCIUM SERPL-MCNC: 9.3 MG/DL (ref 8.6–10.2)
CHLORIDE SERPL-SCNC: 107 MMOL/L (ref 98–107)
CO2 SERPL-SCNC: 24 MMOL/L (ref 22–29)
CREAT SERPL-MCNC: 0.9 MG/DL (ref 0.5–1)
ERYTHROCYTE [DISTWIDTH] IN BLOOD BY AUTOMATED COUNT: 13.4 FL (ref 11.5–15)
GLUCOSE SERPL-MCNC: 147 MG/DL (ref 74–99)
HCT VFR BLD AUTO: 36.9 % (ref 34–48)
HGB BLD-MCNC: 11.2 G/DL (ref 11.5–15.5)
MCH RBC QN AUTO: 28.9 PG (ref 26–35)
MCHC RBC AUTO-ENTMCNC: 30.4 % (ref 32–34.5)
MCV RBC AUTO: 95.3 FL (ref 80–99.9)
PLATELET # BLD AUTO: 385 E9/L (ref 130–450)
PMV BLD AUTO: 9 FL (ref 7–12)
POTASSIUM SERPL-SCNC: 4 MMOL/L (ref 3.5–5)
PROT SERPL-MCNC: 6.2 G/DL (ref 6.4–8.3)
RBC # BLD AUTO: 3.87 E12/L (ref 3.5–5.5)
SODIUM SERPL-SCNC: 144 MMOL/L (ref 132–146)
WBC # BLD: 6.6 E9/L (ref 4.5–11.5)

## 2023-07-05 ENCOUNTER — OUTSIDE SERVICES (OUTPATIENT)
Dept: PRIMARY CARE CLINIC | Age: 88
End: 2023-07-05

## 2023-07-05 DIAGNOSIS — J13 CAP (COMMUNITY ACQUIRED PNEUMONIA) DUE TO PNEUMOCOCCUS (HCC): Primary | ICD-10-CM

## 2023-07-05 DIAGNOSIS — R26.2 DIFFICULTY IN WALKING: ICD-10-CM

## 2023-07-05 DIAGNOSIS — E11.8 TYPE 2 DIABETES MELLITUS WITH COMPLICATION, WITHOUT LONG-TERM CURRENT USE OF INSULIN (HCC): ICD-10-CM

## 2023-07-05 NOTE — PROGRESS NOTES
Hanna Francis (:  1935) is a 81 y.o. female. Subjective   SUBJECTIVE/OBJECTIVE:  HPI    Patient is pleasant, cooperative, oriented female sitting in wheelchair in no acute distress. States that she is doing well overall. Denies any discomfort. Tolerating physical therapy services. Good appetite. Regular bowel and bladder habits. Nursing staff without concern. Location: [facility]  All nursing and progress notes reviewed. Past Medical History:   Diagnosis Date    Arthritis     Cataract     Diabetes mellitus ([address])     Fall from standing 2020    Injury of left upper arm 2020    Pneumonia     Subdural hematoma ([address]) 2021    Syncope and collapse 10/17/2016    Uncontrolled type 2 diabetes mellitus ([address]) 2010       Allergies   Allergen Reactions    Penicillins         Review of Systems     ROS negative. Objective   Physical Exam  Constitutional:       General: She is not in acute distress. Appearance: She is well-developed. She is not diaphoretic. Comments: Extremely [place]   HENT:      Head: Normocephalic and atraumatic. Right Ear: External ear normal.      Left Ear: External ear normal.      Nose: Nose normal.      Mouth/Throat:      Pharynx: No oropharyngeal exudate. Eyes:      General:         Right eye: No discharge. Left eye: No discharge. Conjunctiva/sclera: Conjunctivae normal.      Comments: L Cataract   Cardiovascular:      Rate and Rhythm: Normal rate and regular rhythm. Pulses: Normal pulses. Heart sounds: Normal heart sounds. No murmur heard. No friction rub. No gallop. Pulmonary:      Effort: Pulmonary effort is normal. No respiratory distress. Breath sounds: Normal breath sounds. No wheezing or rales. Abdominal:      General: Bowel sounds are normal. There is no distension. Palpations: Abdomen is soft. Tenderness: There is no abdominal tenderness. There is no guarding or rebound.    Musculoskeletal:

## 2023-07-06 LAB
ALBUMIN SERPL-MCNC: 3.1 G/DL (ref 3.5–5.2)
ALP SERPL-CCNC: 98 U/L (ref 35–104)
ALT SERPL-CCNC: 8 U/L (ref 0–32)
ANION GAP SERPL CALCULATED.3IONS-SCNC: 10 MMOL/L (ref 7–16)
AST SERPL-CCNC: 10 U/L (ref 0–31)
BILIRUB SERPL-MCNC: 0.2 MG/DL (ref 0–1.2)
BUN SERPL-MCNC: 22 MG/DL (ref 6–23)
CALCIUM SERPL-MCNC: 9.2 MG/DL (ref 8.6–10.2)
CHLORIDE SERPL-SCNC: 110 MMOL/L (ref 98–107)
CO2 SERPL-SCNC: 25 MMOL/L (ref 22–29)
CREAT SERPL-MCNC: 0.9 MG/DL (ref 0.5–1)
ERYTHROCYTE [DISTWIDTH] IN BLOOD BY AUTOMATED COUNT: 13.4 FL (ref 11.5–15)
GLUCOSE SERPL-MCNC: 129 MG/DL (ref 74–99)
HCT VFR BLD AUTO: 38.5 % (ref 34–48)
HGB BLD-MCNC: 11.6 G/DL (ref 11.5–15.5)
MCH RBC QN AUTO: 29.1 PG (ref 26–35)
MCHC RBC AUTO-ENTMCNC: 30.1 % (ref 32–34.5)
MCV RBC AUTO: 96.5 FL (ref 80–99.9)
PLATELET # BLD AUTO: 366 E9/L (ref 130–450)
PMV BLD AUTO: 9.3 FL (ref 7–12)
POTASSIUM SERPL-SCNC: 4.4 MMOL/L (ref 3.5–5)
PROT SERPL-MCNC: 6.3 G/DL (ref 6.4–8.3)
RBC # BLD AUTO: 3.99 E12/L (ref 3.5–5.5)
SODIUM SERPL-SCNC: 145 MMOL/L (ref 132–146)
WBC # BLD: 6.5 E9/L (ref 4.5–11.5)

## 2023-09-27 RX ORDER — BLOOD SUGAR DIAGNOSTIC
STRIP MISCELLANEOUS
Qty: 50 STRIP | Refills: 7 | Status: SHIPPED | OUTPATIENT
Start: 2023-09-27

## 2023-09-27 RX ORDER — BLOOD SUGAR DIAGNOSTIC
STRIP MISCELLANEOUS
Qty: 50 STRIP | Refills: 7 | OUTPATIENT
Start: 2023-09-27

## 2023-11-10 NOTE — PROGRESS NOTES
[address]        Date:2021                                                  Patient Name: Gabriel Mitchell    MRN: [id number]    : 1935    Room: [address] [place] [address]      Evaluating OT: Azam Craven, [address] OTR/L; [id number]      Referring Provider: Kelly Fernandez MD    Specific Provider Orders/Date: OT Eval and Treat 21        Diagnosis: TIA    Surgery: none     Pertinent Medical History: DM-II, B foot pain, HTN, HLD, PVD, obesity, osteoarthritis involving multiple joints, hx of syncope and collapse, compression fx of L2 vertebra and body of thoracic vertebra (), bradycardia,      Recommended Adaptive Equipment: ww -TBD      Precautions:  Fall Risk, 02 nasal canula, catheter     Assessment of current deficits:    [x] Functional mobility  [x]ADLs  [x] Strength               [x]Cognition    [x] Functional transfers   [x] IADLs         [x] Safety Awareness   [x]Endurance    [x] Fine Coordination              [x] Balance      [] Vision/perception   [x]Sensation     []Gross Motor Coordination  [x] ROM  [] Delirium                   [] Motor Control     OT PLAN OF CARE   OT POC based on physician orders, patient diagnosis and results of clinical assessment    Frequency/Duration: 3-5 days/wk for 2 weeks PRN   Specific OT Treatment Interventions to include:   * Instruction/training on adapted ADL techniques and AE recommendations to increase functional independence within precautions       * Training on energy conservation strategies, correct breathing pattern and techniques to improve independence/tolerance for self-care routine  * Functional transfer/mobility training/DME recommendations for increased independence, safety, and fall prevention  * Patient/Family education to increase follow through with safety techniques and functional independence  * Recommendation of environmental modifications for increased safety with functional transfers/mobility and ADLs  * Sensory re-education to improve body/limb awareness, maintain/improve skin integrity, and improve hand/UE motor function  * Therapeutic exercise to improve motor endurance, ROM, and functional strength for ADLs/functional transfers  * Therapeutic activities to facilitate/challenge dynamic balance, stand tolerance for increased safety and independence with ADLs      Home Living: Pt lives with son in a 2 story home with first floor bed and bath. Bathroom setup: tub/shower unit with shower stool (not back)   Equipment owned: ww and 2 canes    Prior Level of Function: receiving assist from daughter with ADLs , son assisting with IADLs; ambulated with ww  Driving: no    Pain Level: 6/10 - R LE  Cognition: A&O: 4/4; Follows multi step directions - requires repetition d/t [place]   Memory:  good   Sequencing:  fair   Problem solving:  fair   Judgement/safety:  fair     Functional Assessment:  AM-PAC Daily Activity Raw Score: 13/24   Initial Eval Status  Date: 6/30/21 Treatment Status  Date: STGs = LTGs  Time frame: 10-14 days   Feeding Stand by Assist   Opening packages and setting up tray  IND   Grooming Moderate Assist   Decreased BUE ROM/strength  Minimal Assist    UB Dressing Moderate Assist   Decreased BUE ROM/strength  Minimal Assist    LB Dressing Maximal Assist   Pt stated unable to cross legs for reina/doffing socks d/t pain and limited ROM of BLE  Moderate Assist    Bathing Maximal Assist  Decreased BUE ROM/strength and increased fatigue  Moderate Assist    Toileting Maximal Assist   Assist with pericare  Unsafe to ambulate to toilet  Moderate Assist    Bed Mobility  Log Roll: NT  Supine to sit: Moderate Assist   Sit to supine: Moderate Assist   Requited vc's for BLE bed mobility and increased time  Supine to sit: Stand by Assist   Sit to supine: Stand by Assist    Functional Transfers Sit to stand: Max A of 1   Stand to sit:  Max A of (4) rarely moist 1  Stand pivot: NT  Commode: NT  Sit to stand: Min A   Stand to sit:Min A  Stand pivot: Moderate A  Commode: Moderate A    Functional Mobility Side step (2-4 steps) to [facility] Max A of 1 using ww  vc's for each step and ww management and proper body mechanics in standing  Minimal Assist using ww   Balance Sitting:     Static - SBA     Dynamic - Min A  Pt required vc's for upright posture d/t R lateral lean when fatigued EOB  Standing: Max A of 1    Sitting:  Static: IND  Dynamic: IND  Standing: Min A   Activity Tolerance Poor  Required rest break when standing EOB  SOB/ increased fatigue following side step to [facility]  Fair   Visual/  Perceptual Glasses: YES               Hand Dominance:Right   AROM (PROM) Strength Additional Info:  Goal:   RUE  ~90 degress of shoulder flexion    AAROM of LUE for ~120 degress of shoulder flexion to reach top of head for functional tasks 2+/5 Poor  and wfl FMC/dexterity noted during ADL tasks    Pt stated arthritis and numbness/tingling in hands   Improve overall RUE strength to 3+/5 for participation in functional tasks       LUE ~90 degress of shoulder flexion    AAROM of RUE for ~120 degress of shoulder flexion to reach top of head for functional tasks 2+/5 Poor  and wfl FMC/dexterity noted during ADL tasks    Pt stated arthritis and numbness/tingling in hands Improve overall LUE strength to 3+/5 for participation in functional tasks       Hearing: [place]  Sensation:  c/o numbness or tingling in B hands chronic - L hand more often than R  Tone: WFL   Edema: B LE    Comment: Cleared by RN to see pt. Upon arrival patient lying supine in bed and agreeable to OT session. Pt required vc's for proper body mechanics/posture for bed mobility and BLE management to EOB, required increased time for trunk and vc's for proper EOB BUE support. Pt completed sit to stand with Max A of 1 and ww.  Pt educated on proper hand placement on EOB for sit to stand pt stated unable to push up from bed and need BUE on ww. Pt required rest break following ~30 seconds of standing with noted LOB and knee buckling. Pt side step to EOB with Max A of 1 educated on proper body mechanics in standing, required step by step cues for BLE management and ww management. Pt required monitoring of SpO2 during session,  96% seated EOB 92% following activity. Pt educated on deep breathing and energy conservation techniques. Pt appeared motivated/cooperative/pleasent. At end of session, patient lying supine with HOB elevated with call light and phone within reach, all lines and tubes intact. Overall patient demonstrated decreased independence and safety during completion of ADL/functional transfer/mobility tasks. Pt would benefit from continued skilled OT to increase safety and independence with completion of ADL/IADL tasks for functional independence and quality of life. Rehab Potential: Good for established goals     LTG: maximize independence with ADLs to return to PLOF    Patient and/or family were instructed on functional diagnosis, prognosis/goals and OT plan of care. Demonstrated fair understanding. [] Malnutrition indicators have been identified and nursing has been notified to ensure a dietitian consult is ordered. Eval Complexity: Low     Evaluation time includes thorough review of current medical information, gathering information on past medical & social history & PLOF, completion of standardized testing, informal observation of tasks, consultation with other medical professions/disciplines, assessment of data & development of POC/goals.      Time In: 0345  Time Out: 0400  Total Treatment Time: none    Min Units   OT Eval Low 00825 x     OT Eval Medium 95227      OT Eval High 87757      OT Re-Eval [id number]       Therapeutic Ex [id number]       Therapeutic Activities 22360       ADL/Self Care 72400       Orthotic Management 18613       Manual 04492     Neuro Re-Ed 21527       Non-Billable Time          Evaluation Time

## 2023-11-13 RX ORDER — LANCETS
EACH MISCELLANEOUS
Qty: 102 EACH | Refills: 5 | Status: SHIPPED | OUTPATIENT
Start: 2023-11-13

## 2023-12-25 ENCOUNTER — APPOINTMENT (OUTPATIENT)
Dept: GENERAL RADIOLOGY | Age: 88
DRG: 243 | End: 2023-12-25
Payer: MEDICARE

## 2023-12-25 ENCOUNTER — HOSPITAL ENCOUNTER (INPATIENT)
Age: 88
LOS: 2 days | Discharge: NURSING, REHAB OR LONG-TERM CARE | DRG: 243 | End: 2023-12-27
Attending: EMERGENCY MEDICINE | Admitting: INTERNAL MEDICINE
Payer: MEDICARE

## 2023-12-25 ENCOUNTER — APPOINTMENT (OUTPATIENT)
Dept: CT IMAGING | Age: 88
DRG: 243 | End: 2023-12-25
Attending: EMERGENCY MEDICINE
Payer: MEDICARE

## 2023-12-25 DIAGNOSIS — I21.3 STEMI (ST ELEVATION MYOCARDIAL INFARCTION) (HCC): ICD-10-CM

## 2023-12-25 DIAGNOSIS — I44.39 HIGH-GRADE ATRIOVENTRICULAR BLOCK: ICD-10-CM

## 2023-12-25 DIAGNOSIS — I44.2 COMPLETE HEART BLOCK (HCC): Primary | ICD-10-CM

## 2023-12-25 DIAGNOSIS — R00.1 BRADYCARDIA: ICD-10-CM

## 2023-12-25 DIAGNOSIS — R55 SYNCOPE AND COLLAPSE: ICD-10-CM

## 2023-12-25 LAB
ALBUMIN SERPL-MCNC: 3.7 G/DL (ref 3.5–5.2)
ALP SERPL-CCNC: 104 U/L (ref 35–104)
ALT SERPL-CCNC: 12 U/L (ref 0–32)
ANION GAP SERPL CALCULATED.3IONS-SCNC: 14 MMOL/L (ref 7–16)
AST SERPL-CCNC: 16 U/L (ref 0–31)
B PARAP IS1001 DNA NPH QL NAA+NON-PROBE: NOT DETECTED
B PERT DNA SPEC QL NAA+PROBE: NOT DETECTED
BACTERIA URNS QL MICRO: ABNORMAL
BASOPHILS # BLD: 0.02 K/UL (ref 0–0.2)
BASOPHILS NFR BLD: 0 % (ref 0–2)
BILIRUB SERPL-MCNC: 0.4 MG/DL (ref 0–1.2)
BILIRUB UR QL STRIP: NEGATIVE
BUN SERPL-MCNC: 22 MG/DL (ref 6–23)
C PNEUM DNA NPH QL NAA+NON-PROBE: NOT DETECTED
CALCIUM SERPL-MCNC: 9.1 MG/DL (ref 8.6–10.2)
CHLORIDE SERPL-SCNC: 104 MMOL/L (ref 98–107)
CLARITY UR: CLEAR
CO2 SERPL-SCNC: 22 MMOL/L (ref 22–29)
COLOR UR: YELLOW
CREAT SERPL-MCNC: 0.7 MG/DL (ref 0.5–1)
EOSINOPHIL # BLD: 0 K/UL (ref 0.05–0.5)
EOSINOPHILS RELATIVE PERCENT: 0 % (ref 0–6)
ERYTHROCYTE [DISTWIDTH] IN BLOOD BY AUTOMATED COUNT: 13.2 % (ref 11.5–15)
FLUAV RNA NPH QL NAA+NON-PROBE: NOT DETECTED
FLUBV RNA NPH QL NAA+NON-PROBE: NOT DETECTED
GFR SERPL CREATININE-BSD FRML MDRD: >60 ML/MIN/1.73M2
GLUCOSE BLD-MCNC: 176 MG/DL (ref 74–99)
GLUCOSE SERPL-MCNC: 204 MG/DL (ref 74–99)
GLUCOSE UR STRIP-MCNC: 250 MG/DL
HADV DNA NPH QL NAA+NON-PROBE: NOT DETECTED
HCOV 229E RNA NPH QL NAA+NON-PROBE: NOT DETECTED
HCOV HKU1 RNA NPH QL NAA+NON-PROBE: NOT DETECTED
HCOV NL63 RNA NPH QL NAA+NON-PROBE: NOT DETECTED
HCOV OC43 RNA NPH QL NAA+NON-PROBE: NOT DETECTED
HCT VFR BLD AUTO: 36.3 % (ref 34–48)
HGB BLD-MCNC: 11.5 G/DL (ref 11.5–15.5)
HGB UR QL STRIP.AUTO: NEGATIVE
HMPV RNA NPH QL NAA+NON-PROBE: NOT DETECTED
HPIV1 RNA NPH QL NAA+NON-PROBE: NOT DETECTED
HPIV2 RNA NPH QL NAA+NON-PROBE: NOT DETECTED
HPIV3 RNA NPH QL NAA+NON-PROBE: NOT DETECTED
HPIV4 RNA NPH QL NAA+NON-PROBE: NOT DETECTED
IMM GRANULOCYTES # BLD AUTO: 0.04 K/UL (ref 0–0.58)
IMM GRANULOCYTES NFR BLD: 0 % (ref 0–5)
INR PPP: 1
KETONES UR STRIP-MCNC: NEGATIVE MG/DL
LACTATE BLDV-SCNC: 1.6 MMOL/L (ref 0.5–2.2)
LACTATE BLDV-SCNC: 2.6 MMOL/L (ref 0.5–2.2)
LEUKOCYTE ESTERASE UR QL STRIP: NEGATIVE
LYMPHOCYTES NFR BLD: 0.73 K/UL (ref 1.5–4)
LYMPHOCYTES RELATIVE PERCENT: 8 % (ref 20–42)
M PNEUMO DNA NPH QL NAA+NON-PROBE: NOT DETECTED
MAGNESIUM SERPL-MCNC: 1.9 MG/DL (ref 1.6–2.6)
MAGNESIUM SERPL-MCNC: 2 MG/DL (ref 1.6–2.6)
MCH RBC QN AUTO: 29.5 PG (ref 26–35)
MCHC RBC AUTO-ENTMCNC: 31.7 G/DL (ref 32–34.5)
MCV RBC AUTO: 93.1 FL (ref 80–99.9)
MONOCYTES NFR BLD: 0.34 K/UL (ref 0.1–0.95)
MONOCYTES NFR BLD: 4 % (ref 2–12)
NEUTROPHILS NFR BLD: 88 % (ref 43–80)
NEUTS SEG NFR BLD: 7.89 K/UL (ref 1.8–7.3)
NITRITE UR QL STRIP: NEGATIVE
PH UR STRIP: 7 [PH] (ref 5–9)
PLATELET # BLD AUTO: 324 K/UL (ref 130–450)
PMV BLD AUTO: 8.9 FL (ref 7–12)
POTASSIUM SERPL-SCNC: 3.9 MMOL/L (ref 3.5–5)
PROT SERPL-MCNC: 6.9 G/DL (ref 6.4–8.3)
PROT UR STRIP-MCNC: NEGATIVE MG/DL
PROTHROMBIN TIME: 10.7 SEC (ref 9.3–12.4)
RBC # BLD AUTO: 3.9 M/UL (ref 3.5–5.5)
RBC #/AREA URNS HPF: ABNORMAL /HPF
RSV RNA NPH QL NAA+NON-PROBE: NOT DETECTED
RV+EV RNA NPH QL NAA+NON-PROBE: NOT DETECTED
SARS-COV-2 RNA NPH QL NAA+NON-PROBE: NOT DETECTED
SODIUM SERPL-SCNC: 140 MMOL/L (ref 132–146)
SP GR UR STRIP: 1.02 (ref 1–1.03)
SPECIMEN DESCRIPTION: NORMAL
TROPONIN I SERPL HS-MCNC: 21 NG/L (ref 0–9)
UROBILINOGEN UR STRIP-ACNC: 0.2 EU/DL (ref 0–1)
WBC #/AREA URNS HPF: ABNORMAL /HPF
WBC OTHER # BLD: 9 K/UL (ref 4.5–11.5)

## 2023-12-25 PROCEDURE — 83605 ASSAY OF LACTIC ACID: CPT

## 2023-12-25 PROCEDURE — 2709999900 HC NON-CHARGEABLE SUPPLY: Performed by: INTERNAL MEDICINE

## 2023-12-25 PROCEDURE — 2580000003 HC RX 258: Performed by: INTERNAL MEDICINE

## 2023-12-25 PROCEDURE — 80053 COMPREHEN METABOLIC PANEL: CPT

## 2023-12-25 PROCEDURE — 83735 ASSAY OF MAGNESIUM: CPT

## 2023-12-25 PROCEDURE — 93005 ELECTROCARDIOGRAM TRACING: CPT

## 2023-12-25 PROCEDURE — 6370000000 HC RX 637 (ALT 250 FOR IP): Performed by: INTERNAL MEDICINE

## 2023-12-25 PROCEDURE — 84484 ASSAY OF TROPONIN QUANT: CPT

## 2023-12-25 PROCEDURE — 82962 GLUCOSE BLOOD TEST: CPT

## 2023-12-25 PROCEDURE — C1894 INTRO/SHEATH, NON-LASER: HCPCS | Performed by: INTERNAL MEDICINE

## 2023-12-25 PROCEDURE — 99221 1ST HOSP IP/OBS SF/LOW 40: CPT | Performed by: INTERNAL MEDICINE

## 2023-12-25 PROCEDURE — 99285 EMERGENCY DEPT VISIT HI MDM: CPT

## 2023-12-25 PROCEDURE — 2580000003 HC RX 258: Performed by: EMERGENCY MEDICINE

## 2023-12-25 PROCEDURE — 85610 PROTHROMBIN TIME: CPT

## 2023-12-25 PROCEDURE — 02PA3MZ REMOVAL OF CARDIAC LEAD FROM HEART, PERCUTANEOUS APPROACH: ICD-10-PCS | Performed by: INTERNAL MEDICINE

## 2023-12-25 PROCEDURE — 71045 X-RAY EXAM CHEST 1 VIEW: CPT

## 2023-12-25 PROCEDURE — 85025 COMPLETE CBC W/AUTO DIFF WBC: CPT

## 2023-12-25 PROCEDURE — 33210 INSERT ELECTRD/PM CATH SNGL: CPT | Performed by: INTERNAL MEDICINE

## 2023-12-25 PROCEDURE — 81001 URINALYSIS AUTO W/SCOPE: CPT

## 2023-12-25 PROCEDURE — 5A1223Z PERFORMANCE OF CARDIAC PACING, CONTINUOUS: ICD-10-PCS | Performed by: INTERNAL MEDICINE

## 2023-12-25 PROCEDURE — 72125 CT NECK SPINE W/O DYE: CPT

## 2023-12-25 PROCEDURE — 70450 CT HEAD/BRAIN W/O DYE: CPT

## 2023-12-25 PROCEDURE — 2000000000 HC ICU R&B

## 2023-12-25 PROCEDURE — 73502 X-RAY EXAM HIP UNI 2-3 VIEWS: CPT

## 2023-12-25 PROCEDURE — 2500000003 HC RX 250 WO HCPCS: Performed by: INTERNAL MEDICINE

## 2023-12-25 PROCEDURE — 0202U NFCT DS 22 TRGT SARS-COV-2: CPT

## 2023-12-25 PROCEDURE — 6360000002 HC RX W HCPCS: Performed by: INTERNAL MEDICINE

## 2023-12-25 DEVICE — FLOW DIRECTED PACING CATHETER
Type: IMPLANTABLE DEVICE | Status: FUNCTIONAL
Brand: PACEL™

## 2023-12-25 RX ORDER — POTASSIUM CHLORIDE 7.45 MG/ML
10 INJECTION INTRAVENOUS PRN
Status: DISCONTINUED | OUTPATIENT
Start: 2023-12-25 | End: 2023-12-27 | Stop reason: HOSPADM

## 2023-12-25 RX ORDER — ACETAMINOPHEN 650 MG/1
650 SUPPOSITORY RECTAL EVERY 6 HOURS PRN
Status: DISCONTINUED | OUTPATIENT
Start: 2023-12-25 | End: 2023-12-27 | Stop reason: HOSPADM

## 2023-12-25 RX ORDER — DEXTROSE MONOHYDRATE 100 MG/ML
INJECTION, SOLUTION INTRAVENOUS CONTINUOUS PRN
Status: DISCONTINUED | OUTPATIENT
Start: 2023-12-25 | End: 2023-12-27 | Stop reason: HOSPADM

## 2023-12-25 RX ORDER — INSULIN LISPRO 100 [IU]/ML
0-4 INJECTION, SOLUTION INTRAVENOUS; SUBCUTANEOUS
Status: DISCONTINUED | OUTPATIENT
Start: 2023-12-26 | End: 2023-12-27 | Stop reason: HOSPADM

## 2023-12-25 RX ORDER — FENTANYL CITRATE 50 UG/ML
INJECTION, SOLUTION INTRAMUSCULAR; INTRAVENOUS PRN
Status: DISCONTINUED | OUTPATIENT
Start: 2023-12-25 | End: 2023-12-25 | Stop reason: HOSPADM

## 2023-12-25 RX ORDER — PREDNISOLONE ACETATE 10 MG/ML
1 SUSPENSION/ DROPS OPHTHALMIC 3 TIMES DAILY
Status: DISCONTINUED | OUTPATIENT
Start: 2023-12-26 | End: 2023-12-27 | Stop reason: HOSPADM

## 2023-12-25 RX ORDER — ATORVASTATIN CALCIUM 20 MG/1
10 TABLET, FILM COATED ORAL DAILY
Status: DISCONTINUED | OUTPATIENT
Start: 2023-12-26 | End: 2023-12-27 | Stop reason: HOSPADM

## 2023-12-25 RX ORDER — INSULIN LISPRO 100 [IU]/ML
0-4 INJECTION, SOLUTION INTRAVENOUS; SUBCUTANEOUS NIGHTLY
Status: DISCONTINUED | OUTPATIENT
Start: 2023-12-25 | End: 2023-12-27 | Stop reason: HOSPADM

## 2023-12-25 RX ORDER — DORZOLAMIDE HCL 20 MG/ML
1 SOLUTION/ DROPS OPHTHALMIC 3 TIMES DAILY
Status: DISCONTINUED | OUTPATIENT
Start: 2023-12-25 | End: 2023-12-27 | Stop reason: HOSPADM

## 2023-12-25 RX ORDER — ENOXAPARIN SODIUM 100 MG/ML
40 INJECTION SUBCUTANEOUS DAILY
Status: DISCONTINUED | OUTPATIENT
Start: 2023-12-26 | End: 2023-12-27 | Stop reason: HOSPADM

## 2023-12-25 RX ORDER — 0.9 % SODIUM CHLORIDE 0.9 %
1000 INTRAVENOUS SOLUTION INTRAVENOUS ONCE
Status: COMPLETED | OUTPATIENT
Start: 2023-12-25 | End: 2023-12-25

## 2023-12-25 RX ORDER — VITAMIN B COMPLEX
1 TABLET ORAL DAILY
Status: DISCONTINUED | OUTPATIENT
Start: 2023-12-26 | End: 2023-12-27 | Stop reason: HOSPADM

## 2023-12-25 RX ORDER — SODIUM CHLORIDE 9 MG/ML
INJECTION, SOLUTION INTRAVENOUS PRN
Status: DISCONTINUED | OUTPATIENT
Start: 2023-12-25 | End: 2023-12-27 | Stop reason: HOSPADM

## 2023-12-25 RX ORDER — ONDANSETRON 2 MG/ML
4 INJECTION INTRAMUSCULAR; INTRAVENOUS EVERY 6 HOURS PRN
Status: DISCONTINUED | OUTPATIENT
Start: 2023-12-25 | End: 2023-12-27 | Stop reason: HOSPADM

## 2023-12-25 RX ORDER — FUROSEMIDE 20 MG/1
20 TABLET ORAL DAILY
Status: DISCONTINUED | OUTPATIENT
Start: 2023-12-26 | End: 2023-12-27 | Stop reason: HOSPADM

## 2023-12-25 RX ORDER — SODIUM CHLORIDE 0.9 % (FLUSH) 0.9 %
5-40 SYRINGE (ML) INJECTION PRN
Status: DISCONTINUED | OUTPATIENT
Start: 2023-12-25 | End: 2023-12-27 | Stop reason: HOSPADM

## 2023-12-25 RX ORDER — ONDANSETRON 4 MG/1
4 TABLET, ORALLY DISINTEGRATING ORAL EVERY 8 HOURS PRN
Status: DISCONTINUED | OUTPATIENT
Start: 2023-12-25 | End: 2023-12-27 | Stop reason: HOSPADM

## 2023-12-25 RX ORDER — POTASSIUM CHLORIDE 20 MEQ/1
40 TABLET, EXTENDED RELEASE ORAL PRN
Status: DISCONTINUED | OUTPATIENT
Start: 2023-12-25 | End: 2023-12-27 | Stop reason: HOSPADM

## 2023-12-25 RX ORDER — ACETAMINOPHEN 325 MG/1
650 TABLET ORAL EVERY 6 HOURS PRN
Status: DISCONTINUED | OUTPATIENT
Start: 2023-12-25 | End: 2023-12-26 | Stop reason: SDUPTHER

## 2023-12-25 RX ORDER — SODIUM CHLORIDE 0.9 % (FLUSH) 0.9 %
5-40 SYRINGE (ML) INJECTION EVERY 12 HOURS SCHEDULED
Status: DISCONTINUED | OUTPATIENT
Start: 2023-12-25 | End: 2023-12-27 | Stop reason: HOSPADM

## 2023-12-25 RX ORDER — MIDAZOLAM HYDROCHLORIDE 1 MG/ML
INJECTION INTRAMUSCULAR; INTRAVENOUS PRN
Status: DISCONTINUED | OUTPATIENT
Start: 2023-12-25 | End: 2023-12-25 | Stop reason: HOSPADM

## 2023-12-25 RX ORDER — POLYETHYLENE GLYCOL 3350 17 G/17G
17 POWDER, FOR SOLUTION ORAL DAILY PRN
Status: DISCONTINUED | OUTPATIENT
Start: 2023-12-25 | End: 2023-12-27 | Stop reason: HOSPADM

## 2023-12-25 RX ORDER — ASPIRIN 81 MG/1
81 TABLET ORAL DAILY
Status: DISCONTINUED | OUTPATIENT
Start: 2023-12-26 | End: 2023-12-27 | Stop reason: HOSPADM

## 2023-12-25 RX ORDER — AMLODIPINE BESYLATE 5 MG/1
2.5 TABLET ORAL 2 TIMES DAILY
Status: DISCONTINUED | OUTPATIENT
Start: 2023-12-25 | End: 2023-12-27 | Stop reason: HOSPADM

## 2023-12-25 RX ORDER — MAGNESIUM SULFATE IN WATER 40 MG/ML
2000 INJECTION, SOLUTION INTRAVENOUS PRN
Status: DISCONTINUED | OUTPATIENT
Start: 2023-12-25 | End: 2023-12-27 | Stop reason: HOSPADM

## 2023-12-25 RX ORDER — BRIMONIDINE TARTRATE 2 MG/ML
1 SOLUTION/ DROPS OPHTHALMIC 2 TIMES DAILY
Status: DISCONTINUED | OUTPATIENT
Start: 2023-12-25 | End: 2023-12-27 | Stop reason: HOSPADM

## 2023-12-25 RX ADMIN — SODIUM CHLORIDE 1000 ML: 9 INJECTION, SOLUTION INTRAVENOUS at 17:25

## 2023-12-25 RX ADMIN — DORZOLAMIDE HYDROCHLORIDE 1 DROP: 20 SOLUTION/ DROPS OPHTHALMIC at 23:28

## 2023-12-25 RX ADMIN — AMLODIPINE BESYLATE 2.5 MG: 5 TABLET ORAL at 23:28

## 2023-12-25 RX ADMIN — SODIUM CHLORIDE, PRESERVATIVE FREE 10 ML: 5 INJECTION INTRAVENOUS at 22:00

## 2023-12-25 RX ADMIN — BRIMONIDINE TARTRATE 1 DROP: 2 SOLUTION OPHTHALMIC at 23:27

## 2023-12-25 ASSESSMENT — PAIN DESCRIPTION - DESCRIPTORS: DESCRIPTORS: ACHING

## 2023-12-25 ASSESSMENT — PAIN - FUNCTIONAL ASSESSMENT: PAIN_FUNCTIONAL_ASSESSMENT: 0-10

## 2023-12-25 ASSESSMENT — PAIN DESCRIPTION - LOCATION: LOCATION: HIP;BACK;HEAD

## 2023-12-25 ASSESSMENT — LIFESTYLE VARIABLES
HOW MANY STANDARD DRINKS CONTAINING ALCOHOL DO YOU HAVE ON A TYPICAL DAY: PATIENT DOES NOT DRINK
HOW OFTEN DO YOU HAVE A DRINK CONTAINING ALCOHOL: NEVER
HOW MANY STANDARD DRINKS CONTAINING ALCOHOL DO YOU HAVE ON A TYPICAL DAY: PATIENT DOES NOT DRINK

## 2023-12-25 ASSESSMENT — PAIN DESCRIPTION - ONSET: ONSET: ON-GOING

## 2023-12-25 ASSESSMENT — PAIN DESCRIPTION - FREQUENCY: FREQUENCY: CONTINUOUS

## 2023-12-25 ASSESSMENT — PAIN DESCRIPTION - ORIENTATION: ORIENTATION: RIGHT;LEFT

## 2023-12-25 ASSESSMENT — PAIN DESCRIPTION - PAIN TYPE: TYPE: ACUTE PAIN

## 2023-12-26 ENCOUNTER — ANESTHESIA EVENT (OUTPATIENT)
Age: 88
DRG: 260 | End: 2023-12-26
Payer: MEDICARE

## 2023-12-26 ENCOUNTER — APPOINTMENT (OUTPATIENT)
Age: 88
DRG: 243 | End: 2023-12-26
Attending: STUDENT IN AN ORGANIZED HEALTH CARE EDUCATION/TRAINING PROGRAM
Payer: MEDICARE

## 2023-12-26 ENCOUNTER — ANESTHESIA (OUTPATIENT)
Age: 88
DRG: 260 | End: 2023-12-26
Payer: MEDICARE

## 2023-12-26 ENCOUNTER — APPOINTMENT (OUTPATIENT)
Dept: GENERAL RADIOLOGY | Age: 88
DRG: 243 | End: 2023-12-26
Payer: MEDICARE

## 2023-12-26 LAB
ALBUMIN SERPL-MCNC: 3.4 G/DL (ref 3.5–5.2)
ALP SERPL-CCNC: 95 U/L (ref 35–104)
ALT SERPL-CCNC: 14 U/L (ref 0–32)
ANION GAP SERPL CALCULATED.3IONS-SCNC: 12 MMOL/L (ref 7–16)
AST SERPL-CCNC: 17 U/L (ref 0–31)
BILIRUB SERPL-MCNC: 0.4 MG/DL (ref 0–1.2)
BUN SERPL-MCNC: 18 MG/DL (ref 6–23)
CALCIUM SERPL-MCNC: 8.7 MG/DL (ref 8.6–10.2)
CHLORIDE SERPL-SCNC: 109 MMOL/L (ref 98–107)
CO2 SERPL-SCNC: 22 MMOL/L (ref 22–29)
CREAT SERPL-MCNC: 0.7 MG/DL (ref 0.5–1)
ECHO AO ASC DIAM: 3.3 CM
ECHO AO ROOT DIAM: 3.4 CM
ECHO EST RA PRESSURE: 3 MMHG
ECHO LA DIAMETER: 3.9 CM
ECHO LA TO AORTIC ROOT RATIO: 1.15
ECHO LA VOL A-L A2C: 38 ML (ref 22–52)
ECHO LA VOL A-L A4C: 39 ML (ref 22–52)
ECHO LA VOL MOD A2C: 37 ML (ref 22–52)
ECHO LA VOL MOD A4C: 38 ML (ref 22–52)
ECHO LA VOLUME AREA LENGTH: 39 ML
ECHO LV FRACTIONAL SHORTENING: 38 % (ref 28–44)
ECHO LV INTERNAL DIMENSION DIASTOLIC: 4.8 CM (ref 3.9–5.3)
ECHO LV INTERNAL DIMENSION SYSTOLIC: 3 CM
ECHO LV IVSD: 1.1 CM (ref 0.6–0.9)
ECHO LV MASS 2D: 170.2 G (ref 67–162)
ECHO LV POSTERIOR WALL DIASTOLIC: 0.9 CM (ref 0.6–0.9)
ECHO LV RELATIVE WALL THICKNESS RATIO: 0.38
ECHO LVOT AREA: 3.1 CM2
ECHO LVOT DIAM: 2 CM
ECHO MV A VELOCITY: 1.3 M/S
ECHO MV E DECELERATION TIME (DT): 231.4 MS
ECHO MV E VELOCITY: 1.18 M/S
ECHO MV E/A RATIO: 0.91
ECHO RIGHT VENTRICULAR SYSTOLIC PRESSURE (RVSP): 32 MMHG
ECHO RV INTERNAL DIMENSION: 2.6 CM
ECHO RV TAPSE: 2.2 CM (ref 1.7–?)
ECHO TV REGURGITANT MAX VELOCITY: 2.69 M/S
ECHO TV REGURGITANT PEAK GRADIENT: 29 MMHG
GFR SERPL CREATININE-BSD FRML MDRD: >60 ML/MIN/1.73M2
GLUCOSE BLD-MCNC: 119 MG/DL (ref 74–99)
GLUCOSE BLD-MCNC: 135 MG/DL (ref 74–99)
GLUCOSE BLD-MCNC: 138 MG/DL (ref 74–99)
GLUCOSE BLD-MCNC: 197 MG/DL (ref 74–99)
GLUCOSE SERPL-MCNC: 165 MG/DL (ref 74–99)
LACTATE BLDV-SCNC: 0.9 MMOL/L (ref 0.5–2.2)
LACTATE BLDV-SCNC: 1 MMOL/L (ref 0.5–2.2)
MAGNESIUM SERPL-MCNC: 1.8 MG/DL (ref 1.6–2.6)
PHOSPHATE SERPL-MCNC: 2.8 MG/DL (ref 2.5–4.5)
POTASSIUM SERPL-SCNC: 4.1 MMOL/L (ref 3.5–5)
PROT SERPL-MCNC: 6.1 G/DL (ref 6.4–8.3)
SODIUM SERPL-SCNC: 143 MMOL/L (ref 132–146)

## 2023-12-26 PROCEDURE — 33225 L VENTRIC PACING LEAD ADD-ON: CPT | Performed by: STUDENT IN AN ORGANIZED HEALTH CARE EDUCATION/TRAINING PROGRAM

## 2023-12-26 PROCEDURE — C1769 GUIDE WIRE: HCPCS | Performed by: STUDENT IN AN ORGANIZED HEALTH CARE EDUCATION/TRAINING PROGRAM

## 2023-12-26 PROCEDURE — 2580000003 HC RX 258: Performed by: INTERNAL MEDICINE

## 2023-12-26 PROCEDURE — 7100000001 HC PACU RECOVERY - ADDTL 15 MIN

## 2023-12-26 PROCEDURE — C1892 INTRO/SHEATH,FIXED,PEEL-AWAY: HCPCS | Performed by: STUDENT IN AN ORGANIZED HEALTH CARE EDUCATION/TRAINING PROGRAM

## 2023-12-26 PROCEDURE — 02H63JZ INSERTION OF PACEMAKER LEAD INTO RIGHT ATRIUM, PERCUTANEOUS APPROACH: ICD-10-PCS | Performed by: INTERNAL MEDICINE

## 2023-12-26 PROCEDURE — 80053 COMPREHEN METABOLIC PANEL: CPT

## 2023-12-26 PROCEDURE — 3700000000 HC ANESTHESIA ATTENDED CARE: Performed by: STUDENT IN AN ORGANIZED HEALTH CARE EDUCATION/TRAINING PROGRAM

## 2023-12-26 PROCEDURE — 2709999900 HC NON-CHARGEABLE SUPPLY: Performed by: STUDENT IN AN ORGANIZED HEALTH CARE EDUCATION/TRAINING PROGRAM

## 2023-12-26 PROCEDURE — 93321 DOPPLER ECHO F-UP/LMTD STD: CPT | Performed by: INTERNAL MEDICINE

## 2023-12-26 PROCEDURE — 3700000001 HC ADD 15 MINUTES (ANESTHESIA): Performed by: STUDENT IN AN ORGANIZED HEALTH CARE EDUCATION/TRAINING PROGRAM

## 2023-12-26 PROCEDURE — 33208 INSRT HEART PM ATRIAL & VENT: CPT | Performed by: STUDENT IN AN ORGANIZED HEALTH CARE EDUCATION/TRAINING PROGRAM

## 2023-12-26 PROCEDURE — 83735 ASSAY OF MAGNESIUM: CPT

## 2023-12-26 PROCEDURE — C1785 PMKR, DUAL, RATE-RESP: HCPCS | Performed by: STUDENT IN AN ORGANIZED HEALTH CARE EDUCATION/TRAINING PROGRAM

## 2023-12-26 PROCEDURE — 99195 PHLEBOTOMY: CPT

## 2023-12-26 PROCEDURE — C1889 IMPLANT/INSERT DEVICE, NOC: HCPCS | Performed by: STUDENT IN AN ORGANIZED HEALTH CARE EDUCATION/TRAINING PROGRAM

## 2023-12-26 PROCEDURE — 2000000000 HC ICU R&B

## 2023-12-26 PROCEDURE — 0JH606Z INSERTION OF PACEMAKER, DUAL CHAMBER INTO CHEST SUBCUTANEOUS TISSUE AND FASCIA, OPEN APPROACH: ICD-10-PCS | Performed by: INTERNAL MEDICINE

## 2023-12-26 PROCEDURE — 93325 DOPPLER ECHO COLOR FLOW MAPG: CPT | Performed by: INTERNAL MEDICINE

## 2023-12-26 PROCEDURE — 2580000003 HC RX 258: Performed by: STUDENT IN AN ORGANIZED HEALTH CARE EDUCATION/TRAINING PROGRAM

## 2023-12-26 PROCEDURE — 99232 SBSQ HOSP IP/OBS MODERATE 35: CPT | Performed by: INTERNAL MEDICINE

## 2023-12-26 PROCEDURE — 6360000002 HC RX W HCPCS: Performed by: NURSE ANESTHETIST, CERTIFIED REGISTERED

## 2023-12-26 PROCEDURE — 71045 X-RAY EXAM CHEST 1 VIEW: CPT

## 2023-12-26 PROCEDURE — 7100000010 HC PHASE II RECOVERY - FIRST 15 MIN: Performed by: STUDENT IN AN ORGANIZED HEALTH CARE EDUCATION/TRAINING PROGRAM

## 2023-12-26 PROCEDURE — 2580000003 HC RX 258: Performed by: NURSE ANESTHETIST, CERTIFIED REGISTERED

## 2023-12-26 PROCEDURE — 83605 ASSAY OF LACTIC ACID: CPT

## 2023-12-26 PROCEDURE — 02HK3JZ INSERTION OF PACEMAKER LEAD INTO RIGHT VENTRICLE, PERCUTANEOUS APPROACH: ICD-10-PCS | Performed by: INTERNAL MEDICINE

## 2023-12-26 PROCEDURE — 7100000000 HC PACU RECOVERY - FIRST 15 MIN

## 2023-12-26 PROCEDURE — 93005 ELECTROCARDIOGRAM TRACING: CPT | Performed by: STUDENT IN AN ORGANIZED HEALTH CARE EDUCATION/TRAINING PROGRAM

## 2023-12-26 PROCEDURE — 93308 TTE F-UP OR LMTD: CPT

## 2023-12-26 PROCEDURE — 99291 CRITICAL CARE FIRST HOUR: CPT | Performed by: STUDENT IN AN ORGANIZED HEALTH CARE EDUCATION/TRAINING PROGRAM

## 2023-12-26 PROCEDURE — 2500000003 HC RX 250 WO HCPCS: Performed by: STUDENT IN AN ORGANIZED HEALTH CARE EDUCATION/TRAINING PROGRAM

## 2023-12-26 PROCEDURE — 33206 INSERT HEART PM ATRIAL: CPT | Performed by: STUDENT IN AN ORGANIZED HEALTH CARE EDUCATION/TRAINING PROGRAM

## 2023-12-26 PROCEDURE — 93308 TTE F-UP OR LMTD: CPT | Performed by: INTERNAL MEDICINE

## 2023-12-26 PROCEDURE — C1898 LEAD, PMKR, OTHER THAN TRANS: HCPCS | Performed by: STUDENT IN AN ORGANIZED HEALTH CARE EDUCATION/TRAINING PROGRAM

## 2023-12-26 PROCEDURE — 82962 GLUCOSE BLOOD TEST: CPT

## 2023-12-26 PROCEDURE — 84100 ASSAY OF PHOSPHORUS: CPT

## 2023-12-26 PROCEDURE — 6370000000 HC RX 637 (ALT 250 FOR IP): Performed by: INTERNAL MEDICINE

## 2023-12-26 PROCEDURE — 2720000010 HC SURG SUPPLY STERILE: Performed by: STUDENT IN AN ORGANIZED HEALTH CARE EDUCATION/TRAINING PROGRAM

## 2023-12-26 DEVICE — IPG W1DR01 AZURE XT DR MRI USA
Type: IMPLANTABLE DEVICE | Site: CHEST | Status: FUNCTIONAL
Brand: AZURE™ XT DR MRI SURESCAN™

## 2023-12-26 DEVICE — LEAD 3830 US MKT/ 69CM MRI LBBAP
Type: IMPLANTABLE DEVICE | Site: CHEST | Status: FUNCTIONAL
Brand: SELECTSECURE™ MRI SURESCAN™

## 2023-12-26 DEVICE — LEAD 407652 CAPSUREFIX NOVUS US MRI
Type: IMPLANTABLE DEVICE | Site: CHEST | Status: FUNCTIONAL
Brand: CAPSUREFIX NOVUS MRI™ SURESCAN™

## 2023-12-26 DEVICE — ENVELOPE CMRM6122 ABSORB MED MR
Type: IMPLANTABLE DEVICE | Site: CHEST | Status: FUNCTIONAL
Brand: TYRX™

## 2023-12-26 RX ORDER — FENTANYL CITRATE 50 UG/ML
INJECTION, SOLUTION INTRAMUSCULAR; INTRAVENOUS PRN
Status: DISCONTINUED | OUTPATIENT
Start: 2023-12-26 | End: 2023-12-26 | Stop reason: SDUPTHER

## 2023-12-26 RX ORDER — ACETAMINOPHEN 325 MG/1
650 TABLET ORAL EVERY 4 HOURS PRN
Status: DISCONTINUED | OUTPATIENT
Start: 2023-12-26 | End: 2023-12-27 | Stop reason: HOSPADM

## 2023-12-26 RX ORDER — VANCOMYCIN HYDROCHLORIDE 1 G/20ML
INJECTION, POWDER, LYOPHILIZED, FOR SOLUTION INTRAVENOUS PRN
Status: DISCONTINUED | OUTPATIENT
Start: 2023-12-26 | End: 2023-12-26 | Stop reason: SDUPTHER

## 2023-12-26 RX ORDER — SODIUM CHLORIDE 0.9 % (FLUSH) 0.9 %
5-40 SYRINGE (ML) INJECTION PRN
Status: DISCONTINUED | OUTPATIENT
Start: 2023-12-26 | End: 2023-12-27 | Stop reason: HOSPADM

## 2023-12-26 RX ORDER — SODIUM CHLORIDE 9 MG/ML
INJECTION, SOLUTION INTRAVENOUS CONTINUOUS PRN
Status: DISCONTINUED | OUTPATIENT
Start: 2023-12-26 | End: 2023-12-26 | Stop reason: SDUPTHER

## 2023-12-26 RX ORDER — PROPOFOL 10 MG/ML
INJECTION, EMULSION INTRAVENOUS PRN
Status: DISCONTINUED | OUTPATIENT
Start: 2023-12-26 | End: 2023-12-26 | Stop reason: SDUPTHER

## 2023-12-26 RX ORDER — SODIUM CHLORIDE 9 MG/ML
INJECTION, SOLUTION INTRAVENOUS PRN
Status: DISCONTINUED | OUTPATIENT
Start: 2023-12-26 | End: 2023-12-27 | Stop reason: HOSPADM

## 2023-12-26 RX ORDER — SODIUM CHLORIDE 0.9 % (FLUSH) 0.9 %
5-40 SYRINGE (ML) INJECTION EVERY 12 HOURS SCHEDULED
Status: DISCONTINUED | OUTPATIENT
Start: 2023-12-26 | End: 2023-12-27 | Stop reason: HOSPADM

## 2023-12-26 RX ADMIN — SODIUM CHLORIDE: 9 INJECTION, SOLUTION INTRAVENOUS at 13:10

## 2023-12-26 RX ADMIN — DORZOLAMIDE HYDROCHLORIDE 1 DROP: 20 SOLUTION/ DROPS OPHTHALMIC at 08:18

## 2023-12-26 RX ADMIN — PROPOFOL 20 MG: 10 INJECTION, EMULSION INTRAVENOUS at 13:51

## 2023-12-26 RX ADMIN — FENTANYL CITRATE 25 MCG: 50 INJECTION, SOLUTION INTRAMUSCULAR; INTRAVENOUS at 13:27

## 2023-12-26 RX ADMIN — PROPOFOL 25 MCG/KG/MIN: 10 INJECTION, EMULSION INTRAVENOUS at 13:06

## 2023-12-26 RX ADMIN — ATORVASTATIN CALCIUM 10 MG: 20 TABLET, FILM COATED ORAL at 08:18

## 2023-12-26 RX ADMIN — AMLODIPINE BESYLATE 2.5 MG: 5 TABLET ORAL at 08:18

## 2023-12-26 RX ADMIN — PROPOFOL 20 MG: 10 INJECTION, EMULSION INTRAVENOUS at 13:40

## 2023-12-26 RX ADMIN — DORZOLAMIDE HYDROCHLORIDE 1 DROP: 20 SOLUTION/ DROPS OPHTHALMIC at 16:00

## 2023-12-26 RX ADMIN — PREDNISOLONE ACETATE 1 DROP: 10 SUSPENSION/ DROPS OPHTHALMIC at 20:21

## 2023-12-26 RX ADMIN — PROPOFOL 30 MG: 10 INJECTION, EMULSION INTRAVENOUS at 13:15

## 2023-12-26 RX ADMIN — SODIUM CHLORIDE, PRESERVATIVE FREE 10 ML: 5 INJECTION INTRAVENOUS at 20:22

## 2023-12-26 RX ADMIN — FENTANYL CITRATE 50 MCG: 50 INJECTION, SOLUTION INTRAMUSCULAR; INTRAVENOUS at 13:05

## 2023-12-26 RX ADMIN — PROPOFOL 50 MG: 10 INJECTION, EMULSION INTRAVENOUS at 13:05

## 2023-12-26 RX ADMIN — FENTANYL CITRATE 25 MCG: 50 INJECTION, SOLUTION INTRAMUSCULAR; INTRAVENOUS at 13:15

## 2023-12-26 RX ADMIN — SODIUM CHLORIDE, PRESERVATIVE FREE 10 ML: 5 INJECTION INTRAVENOUS at 08:18

## 2023-12-26 RX ADMIN — ASPIRIN 81 MG: 81 TABLET, COATED ORAL at 08:18

## 2023-12-26 RX ADMIN — FUROSEMIDE 20 MG: 20 TABLET ORAL at 08:18

## 2023-12-26 RX ADMIN — PROPOFOL 20 MG: 10 INJECTION, EMULSION INTRAVENOUS at 13:28

## 2023-12-26 RX ADMIN — BRIMONIDINE TARTRATE 1 DROP: 2 SOLUTION OPHTHALMIC at 20:22

## 2023-12-26 RX ADMIN — Medication 1000 UNITS: at 08:18

## 2023-12-26 RX ADMIN — PROPOFOL 20 MG: 10 INJECTION, EMULSION INTRAVENOUS at 14:07

## 2023-12-26 RX ADMIN — SODIUM CHLORIDE: 9 INJECTION, SOLUTION INTRAVENOUS at 12:55

## 2023-12-26 RX ADMIN — DORZOLAMIDE HYDROCHLORIDE 1 DROP: 20 SOLUTION/ DROPS OPHTHALMIC at 20:21

## 2023-12-26 RX ADMIN — BRIMONIDINE TARTRATE 1 DROP: 2 SOLUTION OPHTHALMIC at 08:18

## 2023-12-26 RX ADMIN — PREDNISOLONE ACETATE 1 DROP: 10 SUSPENSION/ DROPS OPHTHALMIC at 16:00

## 2023-12-26 RX ADMIN — VANCOMYCIN HYDROCHLORIDE 1000 MG: 1 INJECTION, POWDER, LYOPHILIZED, FOR SOLUTION INTRAVENOUS at 13:10

## 2023-12-26 RX ADMIN — AMLODIPINE BESYLATE 2.5 MG: 5 TABLET ORAL at 20:22

## 2023-12-26 RX ADMIN — PROPOFOL 20 MG: 10 INJECTION, EMULSION INTRAVENOUS at 14:00

## 2023-12-26 RX ADMIN — PROPOFOL 50 MG: 10 INJECTION, EMULSION INTRAVENOUS at 13:10

## 2023-12-26 RX ADMIN — FENTANYL CITRATE 25 MCG: 50 INJECTION, SOLUTION INTRAMUSCULAR; INTRAVENOUS at 13:51

## 2023-12-26 RX ADMIN — FENTANYL CITRATE 25 MCG: 50 INJECTION, SOLUTION INTRAMUSCULAR; INTRAVENOUS at 13:10

## 2023-12-26 ASSESSMENT — PAIN SCALES - GENERAL
PAINLEVEL_OUTOF10: 0

## 2023-12-26 NOTE — FLOWSHEET NOTE
Spoke with patients daughter Beatriz Yu requesting that mom go to [facility] for Rehab when discharged.

## 2023-12-26 NOTE — PROGRESS NOTES
4 Eyes Skin Assessment     NAME:  Shell Watkins  YOB: 1935  MEDICAL RECORD NUMBER:  [id number]    The patient is being assessed for  Admission    I agree that at least one RN has performed a thorough Head to Toe Skin Assessment on the patient. ALL assessment sites listed below have been assessed. Areas assessed by both nurses:    Head, Face, Ears, Shoulders, Back, Chest, Arms, Elbows, Hands, Sacrum. Buttock, Coccyx, Ischium, Legs. Feet and Heels, and Under Medical Devices         Does the Patient have a Wound?  No noted wound(s)       Bruise to left eye,   Blanchable redness bilateral buttocks  Healed wounds on buttocks, skin darkened  John Prevention initiated by RN: No  Wound Care Orders initiated by RN: No    Pressure Injury (Stage 3,4, Unstageable, DTI, NWPT, and Complex wounds) if present, place Wound referral order by RN under : No    New Ostomies, if present place, Ostomy referral order under : No     Nurse 1 eSignature: Electronically signed by Khalida Ashby RN on 12/26/23 at 2:21 AM EST    **SHARE this note so that the co-signing nurse can place an eSignature**    Nurse 2 eSignature: Electronically signed by Loco Beckwith RN on 12/26/23 at 2:54 AM EST

## 2023-12-26 NOTE — DISCHARGE INSTRUCTIONS
[facility] Pacemaker Instructions    Medications:  Resume all home medications    Incision Care:  Brown Aquacel dressing: Located over your incision. Remove in 1 week, Tuesday 1/2/24. Surgical glue: Located under the brown dressings and over your incision. This glue is there to prevent infection. Do NOT scrub, itch, pick, or remove the glue as this could lead to infection. The glue will fall off on its own over the next 6 weeks. Daily monitoring: Check incision sites on chest daily. Notify the office at [phone] if you develop any redness, swelling, drainage, warmth, or fever greater than 100 degrees. Bathing:  Keep the incision dry. You may shower tomorrow evening after you remove the white pressure dressing, but do NOT spray the water directly at the site or scrub at the site. Pat dry only with a clean towel. No soaking in a bathtub, hot tub, or pool for 6 weeks. Activity:  You may resume normal activity with left arm restrictions. Arm restrictions:  Arm immobilizer: Wear the arm immobilizer at all times for 48 hours except to shower, toilet, and eat. After 48 hours, wear the arm immobilizer at night for the next 4 weeks. After 4 weeks you do not need to wear the arm immobilizer anymore. The immobilizer should be loose, not tight in order to avoid restriction of blood flow. Avoid pulling yourself up with your arm, pushing or stretching motions. Do not raise left elbow higher than shoulder height and do not lift anything weighing more than 3 pounds for 6 weeks. Do not do any activities such as golfing, vacuuming, or mowing the lawn for 6 weeks. Prevent any hard blows to the pacemaker. Driving: You may drive, if you feel up to it, in 14 days, or after your follow up appointment. Start with local/short trips to familiar places. Avoid highway/ high-speed driving for the first few days after you resume driving.   DO NOT drive until you have stopped taking prescription pain medications.      What else do I need to know about my pacemaker?  Do not carry a cellular phone in a pocket within six inches of the pacemaker as this can affect the operation of the unit.  Hold the cell phone on the opposite ear as the pacemaker insertion site.  Do not walk through airport security systems as these devices can detect the metal in your pacemaker and possibly alarm. The new full body scanners are safe for both pacemakers.  Keep your pacemaker ID card with you at all times and ask security to clear you with a hand search only if a full body scanner is not available.  Do not let security use a hand-held wand.  Avoid passing through metal detectors (for example in shopping malls and schools). If you must pass through, walk quickly through. These detectors can interfere with the pacemaker function.   Do not touch the spark plug or distributor on running car or  - turn engine off first.  Avoid holding magnets near your pacemaker.      Special Instructions:  Let your dentist, doctor, or medical specialist know that you have a pacemaker so precautions can be taken to protect the device.    Your device is considered to be “MRI conditional”; meaning that under certain circumstances, MRI exams may be performed after 6 weeks post implantation  Your pacemaker may set off a metal detector, such as those used in airports and courtrooms.  Let security know that you have a pacemaker & show them your card.    Remote Monitor:  You may receive your monitor prior to leaving the hospital, if not a monitor can be mailed to your home. In some cases, an polina can be used with newer smartphones to provide monitoring services. Please discuss this with the device clinic at your follow up appointment in 2 weeks.     ID Card:  You will have a temporary ID card until a permanent card is sent to you by the device company.  The permanent card will look like a 's license or credit card and should arrive within 8

## 2023-12-26 NOTE — CARE COORDINATION
12/26 Care Coordination: Pt in ED for evaluation after multiple syncopal episodes. EP consult, TVP placed. Pt in CVL now for [employer]. Will revisit once back from CVL. CM/SW will continue to follow for discharge planning.    Maurisio Espinoza BSN,RN--BC  [phone]

## 2023-12-26 NOTE — PROGRESS NOTES
Patient arrived to CVICU s/p TVP. Patient connected to beside monitor, on room air. Patient arrived with no belongings.

## 2023-12-26 NOTE — PLAN OF CARE
Problem: Chronic Conditions and Co-morbidities  Goal: Patient's chronic conditions and co-morbidity symptoms are monitored and maintained or improved  [id number]/[id number] by Aishwarya Figueroa RN  Outcome: Progressing  Flowsheets (Taken 12/26/2023 0800)  Care Plan - Patient's Chronic Conditions and Co-Morbidity Symptoms are Monitored and Maintained or Improved:   Monitor and assess patient's chronic conditions and comorbid symptoms for stability, deterioration, or improvement   Collaborate with multidisciplinary team to address chronic and comorbid conditions and prevent exacerbation or deterioration  12/26/2023 0629 by Mukul Fall RN  Outcome: Progressing  Flowsheets (Taken 12/25/2023 2300)  Care Plan - Patient's Chronic Conditions and Co-Morbidity Symptoms are Monitored and Maintained or Improved:   Monitor and assess patient's chronic conditions and comorbid symptoms for stability, deterioration, or improvement   Collaborate with multidisciplinary team to address chronic and comorbid conditions and prevent exacerbation or deterioration   Update acute care plan with appropriate goals if chronic or comorbid symptoms are exacerbated and prevent overall improvement and discharge     Problem: Discharge Planning  Goal: Discharge to home or other facility with appropriate resources  Outcome: Progressing     Problem: Pain  Goal: Verbalizes/displays adequate comfort level or baseline comfort level  Outcome: Progressing

## 2023-12-26 NOTE — ANESTHESIA POSTPROCEDURE EVALUATION
Department of Anesthesiology  Postprocedure Note    Patient: Shyla Perry  MRN: [id number]  YOB: 1935  Date of evaluation: 12/26/2023    Procedure Summary       Date: 12/26/23 Room / Location: [facility] LAB 1 / [address] CATH LAB    Anesthesia Start: 1836 Anesthesia Stop:     Procedure: Insert PPM dual Diagnosis:       Bradycardia      (Bradycardia [R00.1])    Providers: Sebas Joyce DO Responsible Provider: Keon Carrera DO    Anesthesia Type: MAC ASA Status: 4            Anesthesia Type: No value filed. Arlette Phase I:      Arlette Phase II:      Anesthesia Post Evaluation    Patient location during evaluation: bedside  Patient participation: complete - patient cannot participate  Level of consciousness: awake and alert  Airway patency: patent  Nausea & Vomiting: no nausea and no vomiting  Cardiovascular status: blood pressure returned to baseline  Respiratory status: acceptable  Hydration status: euvolemic  Multimodal analgesia pain management approach    No notable events documented.

## 2023-12-26 NOTE — PROGRESS NOTES
Or    acetaminophen (TYLENOL) suppository 650 mg  650 mg Rectal Q6H PRN Triston Steele MD        glucose chewable tablet 16 g  4 tablet Oral PRN Triston Steele MD        dextrose bolus 10% 125 mL  125 mL IntraVENous PRN Triston Steele MD        Or    dextrose bolus 10% 250 mL  250 mL IntraVENous PRN Triston Steele MD        glucagon injection 1 mg  1 mg SubCUTAneous PRN Triston Steele MD        dextrose 10 % infusion   IntraVENous Continuous PRN Triston Steele MD        insulin lispro (HUMALOG) injection vial 0-4 Units  0-4 Units SubCUTAneous TID WC Triston Steele MD        insulin lispro (HUMALOG) injection vial 0-4 Units  0-4 Units SubCUTAneous Nightly Triston Steele MD          Allergies   Allergen Reactions    Penicillins      ROS:   Constitutional: Negative for fever, activity change and appetite change. HENT: Negative for epistaxis. Eyes: Negative for diploplia, blurred vision. Respiratory: Negative for cough, chest tightness, shortness of breath and wheezing. Cardiovascular: pertinent positives in HPI  Gastrointestinal: Negative for abdominal pain and blood in stool. Genitourinary: Negative for hematuria and difficulty urinating. Musculoskeletal: Negative for myalgias and gait problem. Skin: Negative for color change and rash. Neurological: Negative for syncope and light-headedness. Psychiatric/Behavioral: Negative for confusion and agitation. The patient is not nervous/anxious.   Heme: no bleeding disorders, no melena or hematochezia  All other review of systems are negative     PHYSICAL EXAM:  Constitutional   Vitals:    12/26/23 0500 12/26/23 0600 12/26/23 0630 12/26/23 0700   BP: (!) 129/58 (!) 124/58 (!) 149/69 (!) 130/56   Pulse: 62 65 63 55   Resp: 21 21 20 20   Temp:       TempSrc:       SpO2: 94% 96% 94% 96%   Weight:        Well-developed, no acute distress, well groomed  Eyes: conjunctivae normal, no xanthelasma   Ears, Nose, Throat: oral mucosa moist, no cyanosis   Neck: supple, no JVD, no bruits, no thyromegaly, TVP via right IJ  CV: normal rate, regular rhythm,  no murmurs, rubs, or gallops. PMI is nondisplaced, Peripheral pulses normal including carotid auscultation, no noted aortic bruit, bilateral femoral and pedal pulses are normal in quality  Lungs: clear to auscultation bilaterally, normal respiratory effort without used of accessory muscles, no wheezes  Abdomen: soft, non-tender, bowel sounds present, no masses or hepatomegaly   Extremities: no digital clubbing, no edema   Skin: warm, no rashes   Neuro/Psych: A&O x 3, normal mood and affect    Data:    Recent Labs     12/25/23  1730   WBC 9.0   HGB 11.5   HCT 36.3        Recent Labs     12/25/23  1730 12/26/23  0330    143   K 3.9 4.1    109*   CO2 22 22   BUN 22 18   CREATININE 0.7 0.7   CALCIUM 9.1 8.7     Recent Labs     12/25/23  1514   INR 1.0     No results for input(s): \"TSH\" in the last 72 hours.  Lab Results   Component Value Date/Time    MG 1.8 12/26/2023 03:30 AM     Telemetry: Sinus, intermittent RV pacing    Assessment/plan:  High-grade AV block  - TVP via right IJ placed 12/25/2023.  - TTE: LVEF > 50%.  - Continue bedrest/NPO.  - Avoid heparin products.  Use SCDs.  - TVP threshold = 1.0 mA. I programmed to output of 10 mA.  -- Recommend Medtronic dual chamber pacemaker. I reviewed indications, material risks, benefits, and alternatives with patient and via phone with patient's daughter (Ivon). Both are agreeable to proceed.    I spent 45 minutes of critical care time.    Thank you for allowing me to participate in your patient's care.  Please call me if there are any questions.      Rinku De Jesus, DO   Cardiac Electrophysiology  [facility]  [facility]

## 2023-12-26 NOTE — ED PROVIDER NOTES
MEDICATIONS:  Discontinued Medications    No medications on file              (Please note that portions of this note were completed with a voice recognition program.  Efforts were made to edit the dictations but occasionally words are mis-transcribed.)    Matthew Catherine DO (electronically signed)          Bianca Chawla DO  12/25/23 1949

## 2023-12-26 NOTE — ANESTHESIA PRE PROCEDURE
Department of Anesthesiology  Preprocedure Note       Name:  Jorge Rutherford   Age:  81 y.o.  :  1935                                          MRN:  [id number]         Date:  2023      Surgeon: Rosetta Mederos):  Celestina Hernandez DO    Procedure: Procedure(s): Insert PPM dual    Medications prior to admission:   Prior to Admission medications    Medication Sig Start Date End Date Taking?  Authorizing Provider   Accu-Chek FastClix Lancets MISC USE DAILY AS DIRECTED 23   Chandan Chamorro DO   ACCU-CHEK GUIDE strip 1 EACH BY IN VITRO ROUTE DAILY AS NEEDED. 23   Chandan Chamorro DO   menthol 7.5 MG lozenge Take 1 lozenge by mouth every 2 hours as needed (cough) 23   Silvestre Young MD   Camphor-Menthol (MENTHOLATUM) 9-1.3 % OINT ointment Apply 1 each topically 3 times daily as needed (joint pain) 23   Silvestre Young MD   D3-1000 25 MCG (1000 UT) TABS tablet TAKE 1 TABLET BY MOUTH EVERY DAY 23   Chandan Chamorro DO   atorvastatin (LIPITOR) 10 MG tablet TAKE 1 TABLET BY MOUTH EVERY DAY 23   Chandan Chamorro DO   metFORMIN (GLUCOPHAGE) 500 MG tablet TAKE 1 TABLET BY MOUTH TWICE A DAY 22   Chandan Chamorro DO   amLODIPine (NORVASC) 2.5 MG tablet TAKE 1 TABLET BY MOUTH TWICE A DAY 22   Chandan Chamorro DO   acetaminophen (TYLENOL) 325 MG tablet Take 2 tablets by mouth every 6 hours as needed for Pain    Rohan Nunes MD   dorzolamide (TRUSOPT) 2 % ophthalmic solution Place 1 drop into the left eye 3 times daily 3/5/21   Celia BREWSTER DO   brimonidine (ALPHAGAN) 0.2 % ophthalmic solution Place 1 drop into the left eye 2 times daily 20   Rohan Nunes MD   prednisoLONE acetate (PRED FORTE) 1 % ophthalmic suspension Place 1 drop into the left eye 3 times daily 19   Rohan Nuens MD   aspirin 81 MG tablet Take 1 tablet by mouth daily    Rohan Nunes MD       Current medications:    Current Facility-Administered Medications

## 2023-12-27 ENCOUNTER — TELEPHONE (OUTPATIENT)
Dept: NON INVASIVE DIAGNOSTICS | Age: 88
End: 2023-12-27

## 2023-12-27 ENCOUNTER — APPOINTMENT (OUTPATIENT)
Dept: GENERAL RADIOLOGY | Age: 88
DRG: 243 | End: 2023-12-27
Payer: MEDICARE

## 2023-12-27 VITALS
RESPIRATION RATE: 18 BRPM | DIASTOLIC BLOOD PRESSURE: 74 MMHG | WEIGHT: 158.95 LBS | BODY MASS INDEX: 29.07 KG/M2 | HEART RATE: 84 BPM | OXYGEN SATURATION: 96 % | SYSTOLIC BLOOD PRESSURE: 127 MMHG | TEMPERATURE: 98.3 F

## 2023-12-27 LAB
ALBUMIN SERPL-MCNC: 3.3 G/DL (ref 3.5–5.2)
ALP SERPL-CCNC: 87 U/L (ref 35–104)
ALT SERPL-CCNC: 13 U/L (ref 0–32)
ANION GAP SERPL CALCULATED.3IONS-SCNC: 11 MMOL/L (ref 7–16)
AST SERPL-CCNC: 19 U/L (ref 0–31)
BILIRUB SERPL-MCNC: 0.5 MG/DL (ref 0–1.2)
BUN SERPL-MCNC: 19 MG/DL (ref 6–23)
CALCIUM SERPL-MCNC: 8.4 MG/DL (ref 8.6–10.2)
CHLORIDE SERPL-SCNC: 109 MMOL/L (ref 98–107)
CO2 SERPL-SCNC: 22 MMOL/L (ref 22–29)
CREAT SERPL-MCNC: 0.8 MG/DL (ref 0.5–1)
GFR SERPL CREATININE-BSD FRML MDRD: >60 ML/MIN/1.73M2
GLUCOSE BLD-MCNC: 143 MG/DL (ref 74–99)
GLUCOSE BLD-MCNC: 173 MG/DL (ref 74–99)
GLUCOSE BLD-MCNC: 208 MG/DL (ref 74–99)
GLUCOSE SERPL-MCNC: 137 MG/DL (ref 74–99)
MAGNESIUM SERPL-MCNC: 1.8 MG/DL (ref 1.6–2.6)
PHOSPHATE SERPL-MCNC: 2.8 MG/DL (ref 2.5–4.5)
POTASSIUM SERPL-SCNC: 3.3 MMOL/L (ref 3.5–5)
PROT SERPL-MCNC: 6 G/DL (ref 6.4–8.3)
SODIUM SERPL-SCNC: 142 MMOL/L (ref 132–146)

## 2023-12-27 PROCEDURE — 80053 COMPREHEN METABOLIC PANEL: CPT

## 2023-12-27 PROCEDURE — 97162 PT EVAL MOD COMPLEX 30 MIN: CPT

## 2023-12-27 PROCEDURE — 2580000003 HC RX 258: Performed by: INTERNAL MEDICINE

## 2023-12-27 PROCEDURE — 2580000003 HC RX 258: Performed by: STUDENT IN AN ORGANIZED HEALTH CARE EDUCATION/TRAINING PROGRAM

## 2023-12-27 PROCEDURE — 97530 THERAPEUTIC ACTIVITIES: CPT

## 2023-12-27 PROCEDURE — 6370000000 HC RX 637 (ALT 250 FOR IP): Performed by: INTERNAL MEDICINE

## 2023-12-27 PROCEDURE — 97166 OT EVAL MOD COMPLEX 45 MIN: CPT

## 2023-12-27 PROCEDURE — 82962 GLUCOSE BLOOD TEST: CPT

## 2023-12-27 PROCEDURE — 99291 CRITICAL CARE FIRST HOUR: CPT | Performed by: STUDENT IN AN ORGANIZED HEALTH CARE EDUCATION/TRAINING PROGRAM

## 2023-12-27 PROCEDURE — 99239 HOSP IP/OBS DSCHRG MGMT >30: CPT | Performed by: INTERNAL MEDICINE

## 2023-12-27 PROCEDURE — 71045 X-RAY EXAM CHEST 1 VIEW: CPT

## 2023-12-27 PROCEDURE — 84100 ASSAY OF PHOSPHORUS: CPT

## 2023-12-27 PROCEDURE — 83735 ASSAY OF MAGNESIUM: CPT

## 2023-12-27 RX ADMIN — SODIUM CHLORIDE, PRESERVATIVE FREE 10 ML: 5 INJECTION INTRAVENOUS at 08:33

## 2023-12-27 RX ADMIN — INSULIN LISPRO 1 UNITS: 100 INJECTION, SOLUTION INTRAVENOUS; SUBCUTANEOUS at 16:18

## 2023-12-27 RX ADMIN — PREDNISOLONE ACETATE 1 DROP: 10 SUSPENSION/ DROPS OPHTHALMIC at 08:33

## 2023-12-27 RX ADMIN — PREDNISOLONE ACETATE 1 DROP: 10 SUSPENSION/ DROPS OPHTHALMIC at 15:03

## 2023-12-27 RX ADMIN — SODIUM CHLORIDE, PRESERVATIVE FREE 10 ML: 5 INJECTION INTRAVENOUS at 12:46

## 2023-12-27 RX ADMIN — POTASSIUM CHLORIDE 40 MEQ: 1500 TABLET, EXTENDED RELEASE ORAL at 08:33

## 2023-12-27 RX ADMIN — ASPIRIN 81 MG: 81 TABLET, COATED ORAL at 08:33

## 2023-12-27 RX ADMIN — FUROSEMIDE 20 MG: 20 TABLET ORAL at 08:32

## 2023-12-27 RX ADMIN — BRIMONIDINE TARTRATE 1 DROP: 2 SOLUTION OPHTHALMIC at 08:33

## 2023-12-27 RX ADMIN — DORZOLAMIDE HYDROCHLORIDE 1 DROP: 20 SOLUTION/ DROPS OPHTHALMIC at 15:03

## 2023-12-27 RX ADMIN — ATORVASTATIN CALCIUM 10 MG: 20 TABLET, FILM COATED ORAL at 08:32

## 2023-12-27 RX ADMIN — AMLODIPINE BESYLATE 2.5 MG: 5 TABLET ORAL at 08:32

## 2023-12-27 RX ADMIN — DORZOLAMIDE HYDROCHLORIDE 1 DROP: 20 SOLUTION/ DROPS OPHTHALMIC at 08:33

## 2023-12-27 RX ADMIN — Medication 1000 UNITS: at 08:33

## 2023-12-27 ASSESSMENT — PAIN SCALES - GENERAL: PAINLEVEL_OUTOF10: 0

## 2023-12-27 NOTE — DISCHARGE INSTR - DIET

## 2023-12-27 NOTE — PROGRESS NOTES
[address] [place] [address] ELECTROPHYSIOLOGY DEPARTMENT/DIVISION OF CARDIOLOGY  Inpatient progress Report  PATIENT: Jorge Rutherford  MEDICAL RECORD NUMBER: [id number]  DATE OF SERVICE:  12/27/2023  ATTENDING ELECTROPHYSIOLOGIST:  Celestina Hernandez DO   REFERRING PHYSICIAN: No ref. provider found and Chandan Chamorro DO  CHIEF COMPLAINT: AV block    HPI: Jroge Rutherford is a 81 y.o. female with a history of recurrent syncope, SDH (7/2021), HTN, DM2, OA, and cataracts. She is managed by PCP with amlodipine 2.5 mg twice daily, aspirin 81 mg daily, and atorvastatin 10 mg daily. On 12/25/2023, patient presented with chief complaint of syncope resulting in head trauma. She was diagnosed with intermittent high-grade AV block, which was treated with temporary pacemaker via right IJ.  EP service is now consulted by admitting physician for further evaluation/management. 12/27/23: MDT dc PPM implant 12/26/23. No complaints at this time. Prior cardiac testing:  -- Limited TTE (12/26/23): LVEF = 60-65%, mild MAC.  - TTE (3/4/2021): LVEF = 65%, borderline concentric LVH, stage I LVDD, borderline LAE, mild MR.     Past Medical History:   Diagnosis Date    Arthritis     Cataract     Diabetes mellitus ([address])     Fall from standing 2/17/2020    Injury of left upper arm 2/17/2020    Pneumonia     Subdural hematoma ([address]) 7/9/2021    Syncope and collapse 10/17/2016    Uncontrolled type 2 diabetes mellitus 8/17/2010     Past Surgical History:   Procedure Laterality Date    BRONCHOSCOPY N/A 7/1/2021    BRONCHOSCOPY ALVEOLAR LAVAGE performed by Betsy Larios MD at [facility] N/A 12/25/2023    Coronary angiography performed by Sara Clifton MD at [address] [place] [address] CATH LAB    CARDIAC PROCEDURE N/A 12/25/2023    Insert temporary pacemaker performed by Sara Clifton MD at [address] [place] [address] CATH LAB    CATARACT REMOVAL WITH IMPLANT      EP DEVICE PROCEDURE N/A 12/26/2023    Insert PPM dual 109* 109*   CO2 22 22 22   BUN 22 18 19   CREATININE 0.7 0.7 0.8   CALCIUM 9.1 8.7 8.4*     Recent Labs     12/25/23  1514   INR 1.0     No results for input(s): \"TSH\" in the last 72 hours.  Lab Results   Component Value Date/Time    MG 1.8 12/27/2023 04:00 AM     Telemetry: Sinus, intermittent RV pacing    Assessment/plan:  High-grade AV block sp MDT dc PPM (DOI: 12/26/23 with LBB area lead - Dr De Jesus)  - Stable device function: RA: 2.4 mV, 475 ohms, 0.5 V @ 0.4 ms; RV: 6.5 mV, 608 ohms, 0.5 V @ 0.4 ms.  -- CXR with stable lead position and no PTX.  -- Site stable.  -- Patient to enroll in remote monitoring every 91 days.  - Device Clinic appointment 1/9/24 at 11:00 AM.  - EP provider appointment in ~3 months. My office will arrange.  - EP service will sign off. Please contact with questions/concerns.    2. Debility  - Patient's daughter would like patient to be discharged to rehab and assistance to arrange PCP who performs house calls.  - Management per admitting service.    I spent 35 minutes of critical care time.    Thank you for allowing me to participate in your patient's care.  Please call me if there are any questions.      Rinku De Jesus, DO   Cardiac Electrophysiology  [facility]  [facility]

## 2023-12-27 NOTE — PLAN OF CARE
Problem: Pain  Goal: Verbalizes/displays adequate comfort level or baseline comfort level  12/27/2023 0853 by Alexa Kohler RN  Outcome: Progressing  Flowsheets (Taken 12/27/2023 1579)  Verbalizes/displays adequate comfort level or baseline comfort level:   Encourage patient to monitor pain and request assistance   Assess pain using appropriate pain scale   Administer analgesics based on type and severity of pain and evaluate response   Implement non-pharmacological measures as appropriate and evaluate response   Consider cultural and social influences on pain and pain management   Notify Licensed Independent Practitioner if interventions unsuccessful or patient reports new pain  Note: Monitor pain level reposition  pt at needed adm meds as ordered monitor pt for confusion  12/27/2023 0322 by Bharat Chacko RN  Outcome: Progressing     Problem: Safety - Adult  Goal: Free from fall injury  12/27/2023 0853 by Alexa Kohler RN  Outcome: Progressing  Flowsheets (Taken 12/27/2023 1062)  Free From Fall Injury:   Instruct family/caregiver on patient safety   Based on caregiver fall risk screen, instruct family/caregiver to ask for assistance with transferring infant if caregiver noted to have fall risk factors  Note: Pt given and instructed on call-light frequent rounding on pt siderails elevated x4 , pt frequently reoriented and updated on care and condition  12/27/2023 0322 by Bharat Chacko RN  Outcome: Progressing     Problem: Cardiovascular - Adult  Goal: Maintains optimal cardiac output and hemodynamic stability  Outcome: Progressing  Flowsheets (Taken 44/78/9195 2922 by Karrie Escoto RN)  Maintains optimal cardiac output and hemodynamic stability:   Monitor blood pressure and heart rate   Monitor urine output and notify Licensed Independent Practitioner for values outside of normal range  Note: Monitor vital signs and heart rhythm adm meds as ordered

## 2023-12-27 NOTE — DISCHARGE INSTR - COC
Continuity of Care Form    Patient Name: Nora Núñez   :  1935  MRN:  [id number]    Admit date:  2023  Discharge date:  23      Code Status Order: Full Code   Advance Directives:     Admitting Physician:  Kylee Valverde MD  PCP: Kirsten Johnson DO    Discharging Nurse:   [address] [place] [address] Unit/Room#: 0090/2061-B  Discharging Unit Phone Number:   [phone]    Emergency Contact:   Extended Emergency Contact Information  Primary Emergency Contact: Dominick Olson  Home Phone: [phone]  Relation: Child   needed?  No  Secondary Emergency Contact: Onesimo Campo, [address]  Home Phone: [phone]  Relation: Child    Past Surgical History:  Past Surgical History:   Procedure Laterality Date    BRONCHOSCOPY N/A 2021    BRONCHOSCOPY ALVEOLAR LAVAGE performed by Dottie Mendes MD at [facility] N/A 2023    Coronary angiography performed by Radha Stevens MD at [address] [place] [address] CATH LAB    CARDIAC PROCEDURE N/A 2023    Insert temporary pacemaker performed by Radha Stevens MD at [address] [place] [address] CATH LAB    CATARACT REMOVAL WITH IMPLANT      EP DEVICE PROCEDURE N/A 2023    Insert PPM dual performed by Porfirio Romero DO at [address] [place] [address] CATH LAB    HYSTERECTOMY (CERVIX STATUS UNKNOWN)         Immunization History:   Immunization History   Administered Date(s) Administered    DTaP vaccine 2020    TDaP, ADACEL (age 6y-58y), [facility] (age 10y+), IM, 0.5mL 2020       Active Problems:  Patient Active Problem List   Diagnosis Code    Type 2 diabetes mellitus with complication, without long-term current use of insulin ([address]) E11.8    Bilateral foot pain M79.671, M79.672    Essential hypertension I10    Mixed hyperlipidemia E78.2    Age-related osteoporosis without current pathological fracture M81.0    Primary osteoarthritis involving multiple joints M15.9    Class 1 obesity due to excess calories with serious Certification: I certify the above information and transfer of Carlene Olson  is necessary for the continuing treatment of the diagnosis listed and that she requires {Admit to Appropriate Level of Care:00396} for {GREATER/LESS:053567987} 30 days.     Update Admission H&P: {CHP DME Changes in HandP:355617351}    PHYSICIAN SIGNATURE:  {Esignature:[id number]}

## 2023-12-27 NOTE — TELEPHONE ENCOUNTER
----- Message from Rinku De Jesus DO sent at 12/27/2023  1:15 PM EST -----  Regarding: appt  Ep appt in 3 months.    -Rinku De Jesus DO

## 2023-12-27 NOTE — PLAN OF CARE
Problem: Chronic Conditions and Co-morbidities  Goal: Patient's chronic conditions and co-morbidity symptoms are monitored and maintained or improved  Outcome: Progressing     Problem: Pain  Goal: Verbalizes/displays adequate comfort level or baseline comfort level  Outcome: Progressing     Problem: ABCDS Injury Assessment  Goal: Absence of physical injury  Outcome: Progressing     Problem: Safety - Adult  Goal: Free from fall injury  Outcome: Progressing     Problem: Neurosensory - Adult  Goal: Achieves stable or improved neurological status  Outcome: Progressing     Problem: Respiratory - Adult  Goal: Achieves optimal ventilation and oxygenation  Outcome: Progressing     Problem: Skin/Tissue Integrity - Adult  Goal: Skin integrity remains intact  Outcome: Progressing     Problem: Musculoskeletal - Adult  Goal: Return mobility to safest level of function  Outcome: Progressing

## 2023-12-27 NOTE — CARE COORDINATION
12/27 Care Coordination: Pt remains in [facility]. Post [employer] 12/26. Spoke with pt's Daughter Loise Payment. Pt is [place]. Ivon is her POA. PTA Ivon states pt lives with her Jaz Hyatt, He is with some disabilities, cognitive. She uses a [employer] at home. Discussed discharge plan. She would like her mom to go to [facility] [place]. Referral called to [place]. Await PT/OT areli. CM/SW will continue to follow for discharge planning.    Greyson PONCE,RN-CV-BC  [phone]

## 2023-12-27 NOTE — PROGRESS NOTES
Daughter Ivon called and updated on patient condition and informed her that her mother will be transported to [facility].

## 2023-12-27 NOTE — PROGRESS NOTES
RN called nutrition services to place order for patients breakfast. Voicemail left with patients name, room number, and breakfast order.

## 2023-12-27 NOTE — PROGRESS NOTES
[address]   [facility]:                                                               Patient Name: Laurel Fortunen  MRN: [id number]  : 1935  Room: [address] [place] [address]    Evaluating OT: Rita Trinh OTR/L [id number]    Referring Provider: Toby Turcios MD    Specific Provider Orders/Date: OT eval and treat (23)        Diagnosis: Syncope and collapse [R55]  Complete heart block ([address]) [I44.2]  STEMI (ST elevation myocardial infarction) ([address]) [I21.3]         Surgery/Procedures:  PPM            Pertinent Medical History:    Past Medical History:   Diagnosis Date    Arthritis     Cataract     Diabetes mellitus ([address])     Fall from standing 2020    Injury of left upper arm 2020    Pneumonia     Subdural hematoma ([address]) 2021    Syncope and collapse 10/17/2016    Uncontrolled type 2 diabetes mellitus 2010          *Precautions:  Fall Risk,  pacemaker, sling LUE, significantly [place], blind in L eye, chair alarm    Assessment of current deficits   [x] Functional mobility  [x]ADLs  [x] Strength               [x]Cognition   [x] Functional transfers   [x] IADLs         [x] Safety Awareness   [x]Endurance   [] Fine Coordination        [x] ROM     [] Vision/perception   []Sensation    []Gross Motor Coordination [x] Balance   [] Delirium                  []Motor Control     [x] Communication    OT PLAN OF CARE   OT POC based on physician orders, patient diagnosis and results of clinical assessment.        Frequency/Duration: 1-3 days/wk for 1-2 weeks PRN    Specific OT Treatment to include:   ADL retraining/adapted techniques and AE recommendations to increase functional independence within precautions                    Energy conservation techniques to improve tolerance for selfcare routine   Functional transfer/mobility training/DME

## 2023-12-27 NOTE — PROGRESS NOTES
[facility], nurse to nurse report given to Nikos. Pt  is tentative for 1530.

## 2023-12-27 NOTE — DISCHARGE SUMMARY
or TV annulus. The sheath was split, and the lead was sutured to the pectoralis major muscle with a non-absorbable 0 silk suture over suture sleeve. The pocket was then irrigated with antibiotic infused saline in order to remove tissue debris and uncover any persistent bleeding. Hemostasis was again confirmed. The leads were connected to the [employer] [place] XT DR MRI SureScan (model: [id number]) device. The leads were curled in the pocket in fashion to avoid any acute angles. The device was then placed in a TYRX pouch then into the pocket with the leads beneath the device. SURGIFLO hemostatic matrix with thrombin was injected into the pocket above and below the device. Fluoroscopy was used to confirm ideal device and lead position in the pocket, lead connector pins in the device header, adequate lead slack, and lead position. The device was secured in place over the leads with a non-absorbable 0 silk suture. The pocket was closed with multiple layers of suture. An absorbable 2-0 Vicryl violet braided suture with CT-1 needle was used to approximate the clavipectoral deep fascial layer with running suture technique with deep bites of equal spacing in order to isolate the pocket, restore anterior wall structure, and prevent device erosion. An absorbable 2-0 Vicryl violet braided suture with CT-1 needle was used to approximate the subcutaneous tissue with running suture technique with deep bites of equal spacing. An absorbable 4-0 Monocryl suture with reverse cutting needle was used to approximate the subcuticular tissue with minimal tension through running suture technique with closely spaced bites and buried sutured knot at the ends. Dermabond was placed over the incision. An Aquacel Ag surgical dressing was placed over the incision. A pressure dressing was placed over the incision.  Final lead assessment: RA: sensing = 1.4 mV, impedance = 570 ohms, capture threshold = 1.0 V @ 0.4 msec RV: sensing = 5.3 mV, impedance = 741 HISTORY: fall, pain TECHNOLOGIST PROVIDED HISTORY: Reason for exam:->fall, pain What reading provider will be dictating this exam?->CRC FINDINGS: No evidence of pelvic fracture.  Bilateral hips demonstrate normal alignment. No focal osseous lesion.  SI joints are symmetric.     No acute abnormality of the pelvis and left hip.     XR CHEST PORTABLE    Result Date: 12/25/2023  EXAMINATION: ONE XRAY VIEW OF THE CHEST 12/25/2023 3:30 pm COMPARISON: None. HISTORY: ORDERING SYSTEM PROVIDED HISTORY: fall TECHNOLOGIST PROVIDED HISTORY: Reason for exam:->fall What reading provider will be dictating this exam?->CRC FINDINGS: The lungs are without acute focal process.  There is no effusion or pneumothorax. The cardiomediastinal silhouette is without acute process. The osseous structures are without acute process.     No acute process. Borderline cardiomegaly.       Patient Instructions:      Medication List        CONTINUE taking these medications      Accu-Chek FastClix Lancets Misc  USE DAILY AS DIRECTED     acetaminophen 325 MG tablet  Commonly known as: TYLENOL     amLODIPine 2.5 MG tablet  Commonly known as: NORVASC  TAKE 1 TABLET BY MOUTH TWICE A DAY     aspirin 81 MG tablet     atorvastatin 10 MG tablet  Commonly known as: LIPITOR  TAKE 1 TABLET BY MOUTH EVERY DAY     brimonidine 0.2 % ophthalmic solution  Commonly known as: ALPHAGAN     D3-1000 25 MCG (1000 UT) Tabs tablet  Generic drug: vitamin D  TAKE 1 TABLET BY MOUTH EVERY DAY     dorzolamide 2 % ophthalmic solution  Commonly known as: TRUSOPT  Place 1 drop into the left eye 3 times daily     menthol 7.5 MG lozenge  Take 1 lozenge by mouth every 2 hours as needed (cough)     mentholatum 9-1.3 % Oint ointment  Apply 1 each topically 3 times daily as needed (joint pain)     metFORMIN 500 MG tablet  Commonly known as: GLUCOPHAGE  TAKE 1 TABLET BY MOUTH TWICE A DAY     prednisoLONE acetate 1 % ophthalmic suspension  Commonly known as: PRED FORTE            STOP taking

## 2023-12-27 NOTE — CARE COORDINATION
12/27 Care Coordination: Pt has Auth. Plan to discharge today to [facility]. Lonnie Navarro will transport. RN updated, Also RN will call daughter. Bogdan complete Envelope in Soft chart. CM/SW will continue to follow for discharge planning.    Timo IBARRAN,RN--BC  [phone]

## 2023-12-27 NOTE — PROGRESS NOTES
Pt discharged to [facility] rehab, transported via wheel chair. Daughter called and updated. All patient belongings sent with patient.

## 2023-12-27 NOTE — PROGRESS NOTES
Physical Therapy  Physical Therapy Initial Assessment     Name: Jesús Root  : 1935  MRN: [id number]      Date of Service: 2023    Evaluating PT:  Prashant Wong PT, DPT [id number]    Room #:  6909/5128-Y  Diagnosis:  Syncope and collapse [R55]  Complete heart block (HCC) [I44.2]  STEMI (ST elevation myocardial infarction) ([address]) [I21.3]  PMHx/PSHx:    Past Medical History:   Diagnosis Date    Arthritis     Cataract     Diabetes mellitus ([address])     Fall from standing 2020    Injury of left upper arm 2020    Pneumonia     Subdural hematoma ([address]) 2021    Syncope and collapse 10/17/2016    Uncontrolled type 2 diabetes mellitus 2010     Procedure/Surgery:   PPM  Precautions:  Falls, pacemaker, sling LUE, significantly [place], blind in L eye, chair alarm  Equipment Needs:  TBD    SUBJECTIVE:    Pt lives with brother, per chart. Pt ambulated with [employer] PTA, per chart. .    OBJECTIVE:   Initial Evaluation  Date: 23 Treatment Short Term/ Long Term   Goals   AM-PAC 6 Clicks 96/50     Was pt agreeable to Eval/treatment? Yes     Does pt have pain? LUE soreness     Bed Mobility  Rolling: NT  Supine to sit: ModA with HOB elevated  Sit to supine: NT  Scooting: ModA  Mod Independent   Transfers Sit to stand: ModA from elevated bed  Stand to sit: ModA  Stand pivot: ModA with HHA  Mod Independent with AAD   Ambulation   A few steps to chair with ModA with HHA  >150 feet with Mod Independent with AAD    Stair negotiation: ascended and descended NT  >4 steps with 1 rail Mod Independent   ROM BUE:  Defer to OT note  BLE:  WFL     Strength BUE:  Defer to OT note  BLE:  4-/5 grossly  Increase by 1/3 MMT grade   Balance Sitting EOB:  Kamran  Dynamic Standing:  ModA with HHA  Sitting EOB:  Independent  Dynamic Standing:   Mod Independent with AAD     Pt is A & O x 4  CAM-ICU: NT  RASS: +1 - anxious  Sensation:  no reported paresthesias  Edema:  none    Vitals:  Heart Rate at rest 57 bpm Heart Rate post

## 2023-12-28 ENCOUNTER — OUTSIDE SERVICES (OUTPATIENT)
Dept: PRIMARY CARE CLINIC | Age: 88
End: 2023-12-28

## 2023-12-28 DIAGNOSIS — R55 SYNCOPE AND COLLAPSE: Primary | ICD-10-CM

## 2023-12-28 DIAGNOSIS — I10 HYPERTENSION, UNSPECIFIED TYPE: ICD-10-CM

## 2023-12-28 DIAGNOSIS — I44.2 COMPLETE HEART BLOCK (HCC): ICD-10-CM

## 2023-12-28 DIAGNOSIS — E78.5 HYPERLIPIDEMIA, UNSPECIFIED HYPERLIPIDEMIA TYPE: ICD-10-CM

## 2023-12-28 DIAGNOSIS — R53.1 GENERALIZED WEAKNESS: ICD-10-CM

## 2023-12-28 DIAGNOSIS — E11.8 TYPE 2 DIABETES MELLITUS WITH UNSPECIFIED COMPLICATIONS (HCC): ICD-10-CM

## 2023-12-28 LAB
25(OH)D3 SERPL-MCNC: 26.1 NG/ML (ref 30–100)
ALBUMIN SERPL-MCNC: 3 G/DL (ref 3.5–5.2)
ALP SERPL-CCNC: 84 U/L (ref 35–104)
ALT SERPL-CCNC: 11 U/L (ref 0–32)
ANION GAP SERPL CALCULATED.3IONS-SCNC: 11 MMOL/L (ref 7–16)
AST SERPL-CCNC: 13 U/L (ref 0–31)
BILIRUB SERPL-MCNC: 0.6 MG/DL (ref 0–1.2)
BUN SERPL-MCNC: 19 MG/DL (ref 6–23)
CALCIUM SERPL-MCNC: 8.6 MG/DL (ref 8.6–10.2)
CHLORIDE SERPL-SCNC: 106 MMOL/L (ref 98–107)
CHOLEST SERPL-MCNC: 127 MG/DL
CO2 SERPL-SCNC: 23 MMOL/L (ref 22–29)
CREAT SERPL-MCNC: 0.7 MG/DL (ref 0.5–1)
EKG ATRIAL RATE: 64 BPM
EKG P AXIS: 61 DEGREES
EKG P-R INTERVAL: 142 MS
EKG Q-T INTERVAL: 416 MS
EKG QRS DURATION: 94 MS
EKG QTC CALCULATION (BAZETT): 429 MS
EKG R AXIS: 45 DEGREES
EKG T AXIS: 60 DEGREES
EKG VENTRICULAR RATE: 64 BPM
ERYTHROCYTE [DISTWIDTH] IN BLOOD BY AUTOMATED COUNT: 13.2 % (ref 11.5–15)
GFR SERPL CREATININE-BSD FRML MDRD: >60 ML/MIN/1.73M2
GLUCOSE SERPL-MCNC: 163 MG/DL (ref 74–99)
HBA1C MFR BLD: 7.2 % (ref 4–5.6)
HCT VFR BLD AUTO: 32 % (ref 34–48)
HDLC SERPL-MCNC: 63 MG/DL
HGB BLD-MCNC: 10.1 G/DL (ref 11.5–15.5)
LDLC SERPL CALC-MCNC: 56 MG/DL
MCH RBC QN AUTO: 29.4 PG (ref 26–35)
MCHC RBC AUTO-ENTMCNC: 31.6 G/DL (ref 32–34.5)
MCV RBC AUTO: 93 FL (ref 80–99.9)
PLATELET # BLD AUTO: 257 K/UL (ref 130–450)
PMV BLD AUTO: 9.5 FL (ref 7–12)
POTASSIUM SERPL-SCNC: 3.6 MMOL/L (ref 3.5–5)
PROT SERPL-MCNC: 5.9 G/DL (ref 6.4–8.3)
RBC # BLD AUTO: 3.44 M/UL (ref 3.5–5.5)
SODIUM SERPL-SCNC: 140 MMOL/L (ref 132–146)
TRIGL SERPL-MCNC: 42 MG/DL
VLDLC SERPL CALC-MCNC: 8 MG/DL
WBC OTHER # BLD: 7.8 K/UL (ref 4.5–11.5)

## 2023-12-28 PROCEDURE — 93010 ELECTROCARDIOGRAM REPORT: CPT | Performed by: INTERNAL MEDICINE

## 2023-12-28 NOTE — PROGRESS NOTES
Visit Date: 2023  Trey Smoker (:  1935) is a 81 y.o. female. History & Physical    Subjective   SUBJECTIVE/OBJECTIVE:  HPI:  Trey Smoker is a 81 y.o. presented with Syncope and collapse [R55]  Complete heart block ([address]) [I44.2]  STEMI (ST elevation myocardial infarction) [facility]) [I243]  60-year-old female brought to the ED by EMS with chief concern of multiple syncopal episode. Patient has medical condition of hyperlipidemia, diabetes, hypertension. She had multiple episodes of syncopal episode at home. Initial evaluation in the ED revealed patient multiple dropped beats on telemetry monitor. EP was notified and they recommended temporary pacemaker. Patient was taken to the Cath Lab and internal medicine was consulted to admit the patient. Patient had temporary pacemaker  placed . They put a dual-chamber pacemaker . Patient tolerated the procedure well. She was discharged to rehab facility for PT/OT. Past Medical History:   Diagnosis Date    Arthritis     Cataract     Diabetes mellitus ([address])     Fall from standing 2020    Injury of left upper arm 2020    Pneumonia     Subdural hematoma ([address]) 2021    Syncope and collapse 10/17/2016    Uncontrolled type 2 diabetes mellitus 2010      Past Surgical History:   Procedure Laterality Date    BRONCHOSCOPY N/A 2021    BRONCHOSCOPY ALVEOLAR LAVAGE performed by Amador Loyd MD at [facility] N/A 2023    Coronary angiography performed by Ramesh Trinh MD at [address] [place] [address] N/A 2023    Insert temporary pacemaker performed by Ramesh Trinh MD at [address] [place] [address] CATH LAB    CATARACT REMOVAL WITH IMPLANT      EP DEVICE PROCEDURE N/A 2023    Insert PPM dual performed by Edmundo Garcia DO at [address] [place] [address] CATH LAB    HYSTERECTOMY (CERVIX STATUS UNKNOWN)        No family history on file.    Social History     Socioeconomic History    Marital status:

## 2023-12-29 LAB
EKG ATRIAL RATE: 71 BPM
EKG P-R INTERVAL: 148 MS
EKG Q-T INTERVAL: 398 MS
EKG QRS DURATION: 82 MS
EKG QTC CALCULATION (BAZETT): 432 MS
EKG R AXIS: 177 DEGREES
EKG T AXIS: 158 DEGREES
EKG VENTRICULAR RATE: 71 BPM

## 2023-12-30 VITALS
DIASTOLIC BLOOD PRESSURE: 58 MMHG | OXYGEN SATURATION: 97 % | RESPIRATION RATE: 16 BRPM | TEMPERATURE: 99.1 F | SYSTOLIC BLOOD PRESSURE: 102 MMHG | HEART RATE: 85 BPM | WEIGHT: 158 LBS | BODY MASS INDEX: 28.9 KG/M2

## 2024-01-08 ENCOUNTER — OUTSIDE SERVICES (OUTPATIENT)
Dept: PRIMARY CARE CLINIC | Age: 89
End: 2024-01-08
Payer: MEDICARE

## 2024-01-08 VITALS
RESPIRATION RATE: 18 BRPM | HEART RATE: 64 BPM | TEMPERATURE: 98.4 F | BODY MASS INDEX: 28.9 KG/M2 | SYSTOLIC BLOOD PRESSURE: 146 MMHG | OXYGEN SATURATION: 96 % | DIASTOLIC BLOOD PRESSURE: 68 MMHG | WEIGHT: 158 LBS

## 2024-01-08 DIAGNOSIS — D64.9 ANEMIA, UNSPECIFIED TYPE: ICD-10-CM

## 2024-01-08 DIAGNOSIS — J13 CAP (COMMUNITY ACQUIRED PNEUMONIA) DUE TO PNEUMOCOCCUS (HCC): ICD-10-CM

## 2024-01-08 DIAGNOSIS — E11.8 TYPE 2 DIABETES MELLITUS WITH UNSPECIFIED COMPLICATIONS (HCC): ICD-10-CM

## 2024-01-08 DIAGNOSIS — E78.5 HYPERLIPIDEMIA, UNSPECIFIED HYPERLIPIDEMIA TYPE: ICD-10-CM

## 2024-01-08 DIAGNOSIS — I10 HYPERTENSION, UNSPECIFIED TYPE: ICD-10-CM

## 2024-01-08 DIAGNOSIS — I44.2 COMPLETE HEART BLOCK (HCC): Primary | ICD-10-CM

## 2024-01-08 DIAGNOSIS — R53.1 GENERALIZED WEAKNESS: ICD-10-CM

## 2024-01-08 DIAGNOSIS — R26.2 DIFFICULTY IN WALKING: ICD-10-CM

## 2024-01-08 PROCEDURE — 99309 SBSQ NF CARE MODERATE MDM 30: CPT | Performed by: INTERNAL MEDICINE

## 2024-01-08 NOTE — PROGRESS NOTES
Visit Date: 2024  Carlene Olson (:  1935) is a 88 y.o. female.    Subjective   SUBJECTIVE/OBJECTIVE:  HPI:  Patient is seen today for follow-up exam of pneumonia, hypertension, and diabetes. She is in no acute distress.     Past Medical History:   Diagnosis Date    Arthritis     Cataract     Diabetes mellitus (HCC)     Fall from standing 2020    Injury of left upper arm 2020    Pneumonia     Subdural hematoma (HCC) 2021    Syncope and collapse 10/17/2016    Uncontrolled type 2 diabetes mellitus 2010      Past Surgical History:   Procedure Laterality Date    BRONCHOSCOPY N/A 2021    BRONCHOSCOPY ALVEOLAR LAVAGE performed by [place] [facility]    CARDIAC PROCEDURE N/A 2023    Coronary angiography performed by Chance Morfin MD [place] [facility] [place] [place] [facility]    CARDIAC PROCEDURE N/A 2023    Insert temporary pacemaker performed by [place] Morfin MD [place] [facility] [place] [place] [facility]    CATARACT REMOVAL WITH IMPLANT      EP DEVICE PROCEDURE N/A 2023    Insert PPM dual performed by Rinku De Jesus DO at [facility] [place] [place] [facility]    HYSTERECTOMY (CERVIX STATUS UNKNOWN)        No family history on file.   Social History     Socioeconomic History    Marital status:    Tobacco Use    Smoking status: Never    Smokeless tobacco: Never   Substance and Sexual Activity    Alcohol use: No    Drug use: No        Current Outpatient Medications   Medication Sig Dispense Refill    Accu-Chek FastClix Lancets MISC USE DAILY AS DIRECTED 102 each 5    menthol 7.5 MG lozenge Take 1 lozenge by mouth every 2 hours as needed (cough) 30 lozenge 0    Camphor-Menthol (MENTHOLATUM) 9-1.3 % OINT ointment Apply 1 each topically 3 times daily as needed (joint pain) 28 g 0    D3-1000 25 MCG (1000 UT) TABS tablet TAKE 1 TABLET BY MOUTH EVERY DAY 30 tablet 1    atorvastatin (LIPITOR) 10 MG tablet TAKE 1 TABLET BY MOUTH EVERY DAY 30 tablet 3    metFORMIN (GLUCOPHAGE) 500 MG tablet TAKE 1 TABLET

## 2024-01-09 ENCOUNTER — NURSE ONLY (OUTPATIENT)
Dept: NON INVASIVE DIAGNOSTICS | Age: 89
End: 2024-01-09

## 2024-01-09 DIAGNOSIS — Z95.0 CARDIAC PACEMAKER IN SITU: ICD-10-CM

## 2024-01-09 DIAGNOSIS — I44.2 COMPLETE HEART BLOCK (HCC): Primary | ICD-10-CM

## 2024-01-09 NOTE — PROGRESS NOTES
See Murj report.    Amada Escobar RN, BSN  [facility] [place]   Device Clinic

## 2024-01-09 NOTE — PATIENT INSTRUCTIONS
Continue arm restrictions until 2/7/24  You may shower starting now    Call if any signs or symptoms of infection [phone] ext: [phone]  Fevers, chills, redness, swelling or drainage.       Hook up home  Monitor  [email or web address] Stay connected: [phone]

## 2024-01-20 LAB
ALBUMIN SERPL-MCNC: 3.1 G/DL (ref 3.5–5.2)
ALP SERPL-CCNC: 133 U/L (ref 35–104)
ALT SERPL-CCNC: 8 U/L (ref 0–32)
ANION GAP SERPL CALCULATED.3IONS-SCNC: 12 MMOL/L (ref 7–16)
AST SERPL-CCNC: 11 U/L (ref 0–31)
BILIRUB SERPL-MCNC: 0.4 MG/DL (ref 0–1.2)
BUN SERPL-MCNC: 18 MG/DL (ref 6–23)
CALCIUM SERPL-MCNC: 9 MG/DL (ref 8.6–10.2)
CHLORIDE SERPL-SCNC: 109 MMOL/L (ref 98–107)
CO2 SERPL-SCNC: 23 MMOL/L (ref 22–29)
CREAT SERPL-MCNC: 0.8 MG/DL (ref 0.5–1)
ERYTHROCYTE [DISTWIDTH] IN BLOOD BY AUTOMATED COUNT: 13.8 % (ref 11.5–15)
GFR SERPL CREATININE-BSD FRML MDRD: >60 ML/MIN/1.73M2
GLUCOSE SERPL-MCNC: 98 MG/DL (ref 74–99)
HCT VFR BLD AUTO: 35.9 % (ref 34–48)
HGB BLD-MCNC: 11.2 G/DL (ref 11.5–15.5)
MCH RBC QN AUTO: 29.5 PG (ref 26–35)
MCHC RBC AUTO-ENTMCNC: 31.2 G/DL (ref 32–34.5)
MCV RBC AUTO: 94.5 FL (ref 80–99.9)
PLATELET # BLD AUTO: 299 K/UL (ref 130–450)
PMV BLD AUTO: 9.5 FL (ref 7–12)
POTASSIUM SERPL-SCNC: 4.3 MMOL/L (ref 3.5–5)
PROT SERPL-MCNC: 6 G/DL (ref 6.4–8.3)
RBC # BLD AUTO: 3.8 M/UL (ref 3.5–5.5)
SODIUM SERPL-SCNC: 144 MMOL/L (ref 132–146)
WBC OTHER # BLD: 5.7 K/UL (ref 4.5–11.5)

## 2024-01-23 LAB
ALBUMIN SERPL-MCNC: 3.1 G/DL (ref 3.5–5.2)
ALP SERPL-CCNC: 127 U/L (ref 35–104)
ALT SERPL-CCNC: 7 U/L (ref 0–32)
ANION GAP SERPL CALCULATED.3IONS-SCNC: 12 MMOL/L (ref 7–16)
AST SERPL-CCNC: 10 U/L (ref 0–31)
BILIRUB SERPL-MCNC: 0.3 MG/DL (ref 0–1.2)
BUN SERPL-MCNC: 23 MG/DL (ref 6–23)
CALCIUM SERPL-MCNC: 8.6 MG/DL (ref 8.6–10.2)
CHLORIDE SERPL-SCNC: 110 MMOL/L (ref 98–107)
CO2 SERPL-SCNC: 22 MMOL/L (ref 22–29)
CREAT SERPL-MCNC: 0.7 MG/DL (ref 0.5–1)
ERYTHROCYTE [DISTWIDTH] IN BLOOD BY AUTOMATED COUNT: 13.9 % (ref 11.5–15)
GFR SERPL CREATININE-BSD FRML MDRD: >60 ML/MIN/1.73M2
GLUCOSE SERPL-MCNC: 87 MG/DL (ref 74–99)
HCT VFR BLD AUTO: 35.3 % (ref 34–48)
HGB BLD-MCNC: 10.9 G/DL (ref 11.5–15.5)
MCH RBC QN AUTO: 29.4 PG (ref 26–35)
MCHC RBC AUTO-ENTMCNC: 30.9 G/DL (ref 32–34.5)
MCV RBC AUTO: 95.1 FL (ref 80–99.9)
PLATELET # BLD AUTO: 285 K/UL (ref 130–450)
PMV BLD AUTO: 9.7 FL (ref 7–12)
POTASSIUM SERPL-SCNC: 4.1 MMOL/L (ref 3.5–5)
PROT SERPL-MCNC: 5.9 G/DL (ref 6.4–8.3)
RBC # BLD AUTO: 3.71 M/UL (ref 3.5–5.5)
SODIUM SERPL-SCNC: 144 MMOL/L (ref 132–146)
WBC OTHER # BLD: 6.4 K/UL (ref 4.5–11.5)

## 2024-01-30 ENCOUNTER — TELEPHONE (OUTPATIENT)
Dept: PRIMARY CARE CLINIC | Age: 89
End: 2024-01-30

## 2024-01-30 NOTE — TELEPHONE ENCOUNTER
Patient is discharging from nursing home. Will you follow and sign orders for nursing and PT with [facility]?     Last office visit for patient was 7/2021

## 2024-02-22 NOTE — PROGRESS NOTES
Occupational Therapy  OT BEDSIDE TREATMENT NOTE   [facility], [place], operated [facility] [address]  [address] [place] [address]      Date:2023  Patient Name: Nicol Abdul  MRN: [id number]  : 1935  Room: [address] [place] [address]-     Referring Provider: Germain Small DO  Specific Provider Orders/Date: OT evaluation and treat 6/15/23     Evaluating OT: Araceli Louise. Marques, OTR/L #[id number]     Diagnosis: Knee pain [M25.569]  Fall, initial encounter [W19. XXXA]  Contusion of knee, unspecified laterality, initial encounter [S80.00XA]  Mass of right lower extremity [R22.41]       Surgery:   Past Surgical History         Past Surgical History:   Procedure Laterality Date    BRONCHOSCOPY N/A 2021     BRONCHOSCOPY ALVEOLAR LAVAGE performed by Radha No MD at [facility] ENDOSCOPY    CATARACT EXTRACTION W/  INTRAOCULAR LENS IMPLANT        HYSTERECTOMY                   Pertinent Medical History:  has a past medical history of Arthritis, Cataract, Diabetes mellitus ([address] Fall from standing, Injury of left upper arm, Pneumonia, Subdural hematoma ([address] Syncope and collapse, and Uncontrolled type 2 diabetes mellitus ([address]       Precautions:  Fall Risk, safety , [facility]- used stethoscope, incontintent of urine- purr wick, L eye blind     Assessment of current deficits   [x] Functional mobility             [x]ADLs           [x] Strength                  []Cognition   [x] Functional transfers           [x] IADLs         [x] Safety Awareness   [x]Endurance   [] Fine Coordination              [] Balance      [x] Vision/perception   [x]Sensation     []Gross Motor Coordination  [x] ROM           [] Delirium                   [] Motor Control      OT PLAN OF CARE   OT POC based on physician orders, patient diagnosis and results of clinical assessment     Frequency/Duration   2-4 days/wk for 1 week PRN   Specific OT Treatment Interventions to include:   * Instruction/training on adapted ADL techniques and AE [FreeTextEntry1] : 83yoF with stage IV NSCLC presents for follow-up palliative care visit.  PMH significant for NSTEMI, DM2, Afib with RVR and RML PE, chronic low back pain, former smoker.   Patient reports that she was suffering with chronic low back pain and progressive gait instability, was evaluated by orthopedist in 2022. She underwent imaging of the spinerain imaging demonstrated evidence of metastatic disease including a dominant right temporal lobe 3.1 cm enhancing mass with vasogenic edema. CT scan of her body demonstrated a ANTHONY tumor with subcentimeter pulmonary nodules and a nonspecific adrenal nodule. Bronchoscopy with biopsy performed on 2022 demonstrated adenocarcinoma consistent with lung primary; tumor tested PD-L1 negative and was negative for actionable mutations. She underwent craniotomy resection of the dominant brain mass on 2022 with pathology revealing the same findings. Hospital course was complicated by NSTEMI, A. fib with RVR and RML PE. She was put on medications for seizure prophylaxis. She was put on anticoagulation and currently remains on prophylactic dosing of anticoagulation with enoxaparin 40 mg subcu daily, pending clearance for therapeutic anticoagulation by neurosurgery. She was subsequently discharged to acute rehab on 8/. Has initiated home PT services.   Pt met with Dr. Funes yesterday for evaluation for RT.  Patient presents to establish with palliative care early on.  She reports fatigue and weakness as her major issues lately.   Interval history (24): Patient seen for follow up via telemedicine, last seen by palliative care in 2023.  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 She remains on a prednisone taper, currently 5mg, for autoimmune hepatitis. She follows with hepatology and her liver enzymes have now normalized.  Appetite is good. Constipated, moves her bowels every two to three days with Miralax as-needed. Mood fluctuates, she is frustrated with her loss of independence and ongoing pain.   She has a long history of spine pain related to spinal stenosis, this has been managed in the past with epidural injections and a pain regimen with Dr. Salazar. Her most recent epidural injection provided relief for ~4 days, she does not wish to try any further interventional procedures. She has stopped following with Dr. Salazar due to her limited ability to attend multiple physician appointments, and instead has been following closely with palliative care while at [facility]. She describes her pain as sharp stabbing pain to her lower back and bilateral ilium. The pain comes with standing or walking; pain reaches 8/10 and she is never without pain. Her pain becomes less severe at the end of the day which she attributes to taking her PRN analgesia.  She is using PRN Tramadol 50 mg three times a day. Remains on gabapentin 100mg QAM and 300mg QHS. She has resumed medical cannabis 1:1 tincture BID which has been helpful for her pain and appetite. She finds this regimen to be helpful, but she continues to struggle on a daily basis with the pain.  She has significant weakness in her legs due to the pain and feels off balance. She ambulates with a walker.   ROS: + mood labile + has been losing her hair (this precedes her cancer diagnosis) + appetite is low; improved + constipation, uses PRN Miralax + sleep disturbances, finds medical cannabis helpful Denies n/v Remainder of ROS non-contributory  Patient is , lives alone. She has one son who lives nearby (two other sons are ). She has a 24/7 HHA since being home, is requiring assistance with her ADLs. She has a shower chair, grab bars. Has not had any falls since being home. Prior to the surgery she was independent, driving.   PMD/Cardiologist: Dr. Kimani Uribe  Endocrinologist: Dr. Chaz Richardson  I-STOP Ref#: [id number]

## 2024-03-12 RX ORDER — ATORVASTATIN CALCIUM 10 MG/1
TABLET, FILM COATED ORAL
Qty: 90 TABLET | Refills: 1 | Status: SHIPPED | OUTPATIENT
Start: 2024-03-12

## 2024-03-31 PROCEDURE — 93296 REM INTERROG EVL PM/IDS: CPT | Performed by: STUDENT IN AN ORGANIZED HEALTH CARE EDUCATION/TRAINING PROGRAM

## 2024-03-31 PROCEDURE — 93294 REM INTERROG EVL PM/LDLS PM: CPT | Performed by: STUDENT IN AN ORGANIZED HEALTH CARE EDUCATION/TRAINING PROGRAM

## 2024-04-16 ENCOUNTER — TELEPHONE (OUTPATIENT)
Dept: NON INVASIVE DIAGNOSTICS | Age: 89
End: 2024-04-16

## 2024-04-16 NOTE — TELEPHONE ENCOUNTER
Pt caregiver Birgit Saunders [phone] Called stated pt refused to come to appointment today  Advised she did let pt daughter know.  Please call caregiver for any questions or concerns

## 2024-06-26 RX ORDER — ATORVASTATIN CALCIUM 10 MG/1
TABLET, FILM COATED ORAL
Qty: 90 TABLET | Refills: 1 | Status: SHIPPED | OUTPATIENT
Start: 2024-06-26

## 2024-06-30 PROCEDURE — 93296 REM INTERROG EVL PM/IDS: CPT | Performed by: STUDENT IN AN ORGANIZED HEALTH CARE EDUCATION/TRAINING PROGRAM

## 2024-06-30 PROCEDURE — 93294 REM INTERROG EVL PM/LDLS PM: CPT | Performed by: STUDENT IN AN ORGANIZED HEALTH CARE EDUCATION/TRAINING PROGRAM

## 2024-09-29 PROCEDURE — 93294 REM INTERROG EVL PM/LDLS PM: CPT | Performed by: STUDENT IN AN ORGANIZED HEALTH CARE EDUCATION/TRAINING PROGRAM

## 2024-09-29 PROCEDURE — 93296 REM INTERROG EVL PM/IDS: CPT | Performed by: STUDENT IN AN ORGANIZED HEALTH CARE EDUCATION/TRAINING PROGRAM

## 2024-09-30 RX ORDER — BLOOD SUGAR DIAGNOSTIC
STRIP MISCELLANEOUS
Qty: 50 STRIP | Refills: 7 | OUTPATIENT
Start: 2024-09-30

## 2024-10-03 RX ORDER — BLOOD SUGAR DIAGNOSTIC
STRIP MISCELLANEOUS
Qty: 50 STRIP | Refills: 7 | OUTPATIENT
Start: 2024-10-03

## 2024-10-08 RX ORDER — BLOOD SUGAR DIAGNOSTIC
STRIP MISCELLANEOUS
Qty: 50 STRIP | Refills: 7 | OUTPATIENT
Start: 2024-10-08

## 2024-10-09 RX ORDER — BLOOD SUGAR DIAGNOSTIC
STRIP MISCELLANEOUS
Qty: 50 STRIP | Refills: 7 | OUTPATIENT
Start: 2024-10-09

## 2024-11-21 ENCOUNTER — APPOINTMENT (OUTPATIENT)
Dept: GENERAL RADIOLOGY | Age: 89
End: 2024-11-21
Payer: MEDICARE

## 2024-11-21 ENCOUNTER — APPOINTMENT (OUTPATIENT)
Dept: CT IMAGING | Age: 89
End: 2024-11-21
Payer: MEDICARE

## 2024-11-21 ENCOUNTER — HOSPITAL ENCOUNTER (EMERGENCY)
Age: 89
Discharge: HOME | End: 2024-11-22
Attending: EMERGENCY MEDICINE
Payer: MEDICARE

## 2024-11-21 VITALS
HEART RATE: 86 BPM | RESPIRATION RATE: 16 BRPM | SYSTOLIC BLOOD PRESSURE: 112 MMHG | DIASTOLIC BLOOD PRESSURE: 69 MMHG | TEMPERATURE: 98.1 F | OXYGEN SATURATION: 98 %

## 2024-11-21 DIAGNOSIS — S80.02XA CONTUSION OF LEFT KNEE, INITIAL ENCOUNTER: Primary | ICD-10-CM
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
## 2024-11-21 PROCEDURE — 73564 X-RAY EXAM KNEE 4 OR MORE: CPT

## 2024-11-21 PROCEDURE — 72125 CT NECK SPINE W/O DYE: CPT

## 2024-11-21 PROCEDURE — 99284 EMERGENCY DEPT VISIT MOD MDM: CPT

## 2024-11-21 PROCEDURE — 70450 CT HEAD/BRAIN W/O DYE: CPT

## 2024-11-21 RX ORDER — ACETAMINOPHEN 500 MG
1000 TABLET ORAL ONCE
Status: DISCONTINUED | OUTPATIENT
Start: 2024-11-21 | End: 2024-11-22 | Stop reason: HOSPADM

## 2024-11-21 ASSESSMENT — ENCOUNTER SYMPTOMS
EYE REDNESS: 0
VOMITING: 0
NAUSEA: 0
ABDOMINAL PAIN: 0
SHORTNESS OF BREATH: 0

## 2024-11-22 NOTE — ED PROVIDER NOTES
preliminarily interpreted by the ED Provider       Interpretation per the Radiologist below, if available at the time of this note:    CT HEAD WO CONTRAST   Final Result   No acute intracranial abnormality.      No acute cervical spine fracture or traumatic malalignment.         CT CERVICAL SPINE WO CONTRAST   Final Result   No acute intracranial abnormality.      No acute cervical spine fracture or traumatic malalignment.         XR KNEE LEFT (MIN 4 VIEWS)   Final Result   1. No acute fracture or dislocation.   2. 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   3. Mild degenerative osteoarthritis of the lateral and patellofemoral   articulation           No results found.    No results found.    PROCEDURES   Unless otherwise noted below, none       MDM:   Dr. Bro MAYERS am the primary physician of record.    Carlene Olson is a 89 y.o. female who presents to the ED for fall  Vital signs upon arrival /69   Pulse 86   Temp 98.1 °F (36.7 °C) (Oral)   Resp 16   SpO2 98%     History/physical examination/differential diagnosis/Labs and imaging  Patient presents emergency department the chief complaint of fall.  Patient sitting in bed no acute distress, mild tenderness to palpation left knee.  All compartments of the left lower extremity are soft.  2+ dorsalis pedis posterior tibial pulse present.  Differential diagnose  includes but not to fracture, strain, contusion.  The patient did have imaging reviewed by me demonstrating CT head, C-spine demonstrate no acute process, x-ray of the left knee demonstrates no acute process.  Patient able to ambulate in emergency department patient will be discharged and follow-up outpatient    Chronic conditions:   Past Medical History:   Diagnosis Date    Arthritis     Cataract     Diabetes mellitus (HCC)     Fall from standing 2/17/2020    Injury of left upper arm 2/17/2020    Pneumonia     Subdural hematoma 7/9/2021    Syncope and collapse 10/17/2016

## 2024-12-27 ENCOUNTER — APPOINTMENT (OUTPATIENT)
Dept: GENERAL RADIOLOGY | Age: 89
DRG: 607 | End: 2024-12-27
Payer: MEDICARE

## 2024-12-27 ENCOUNTER — HOSPITAL ENCOUNTER (INPATIENT)
Age: 89
LOS: 3 days | Discharge: NURSING, REHAB OR LONG-TERM CARE | DRG: 607 | End: 2025-01-02
Attending: EMERGENCY MEDICINE | Admitting: STUDENT IN AN ORGANIZED HEALTH CARE EDUCATION/TRAINING PROGRAM
Payer: MEDICARE

## 2024-12-27 ENCOUNTER — APPOINTMENT (OUTPATIENT)
Dept: CT IMAGING | Age: 89
DRG: 607 | End: 2024-12-27
Payer: MEDICARE

## 2024-12-27 DIAGNOSIS — N17.9 AKI (ACUTE KIDNEY INJURY) (HCC): Primary | ICD-10-CM

## 2024-12-27 DIAGNOSIS — R22.41 MASS OF RIGHT THIGH: ICD-10-CM

## 2024-12-27 DIAGNOSIS — R26.2 INABILITY TO WALK: ICD-10-CM

## 2024-12-27 PROBLEM — E78.5 HLD (HYPERLIPIDEMIA): Status: ACTIVE | Noted: 2019-09-16

## 2024-12-27 PROBLEM — R53.1 GENERALIZED WEAKNESS: Status: ACTIVE | Noted: 2024-12-27

## 2024-12-27 PROBLEM — E11.9 DM (DIABETES MELLITUS) (HCC): Status: ACTIVE | Noted: 2019-09-16

## 2024-12-27 PROBLEM — J10.1 INFLUENZA A: Status: ACTIVE | Noted: 2024-12-27

## 2024-12-27 LAB
ALBUMIN SERPL-MCNC: 3.8 G/DL (ref 3.5–5.2)
ALP SERPL-CCNC: 95 U/L (ref 35–104)
ALT SERPL-CCNC: 12 U/L (ref 0–32)
ANION GAP SERPL CALCULATED.3IONS-SCNC: 13 MMOL/L (ref 7–16)
AST SERPL-CCNC: 28 U/L (ref 0–31)
B PARAP IS1001 DNA NPH QL NAA+NON-PROBE: NOT DETECTED
B PERT DNA SPEC QL NAA+PROBE: NOT DETECTED
BACTERIA URNS QL MICRO: ABNORMAL
BASOPHILS # BLD: 0 K/UL (ref 0–0.2)
BASOPHILS NFR BLD: 0 % (ref 0–2)
BILIRUB SERPL-MCNC: 0.5 MG/DL (ref 0–1.2)
BILIRUB UR QL STRIP: ABNORMAL
BUN SERPL-MCNC: 24 MG/DL (ref 6–23)
C PNEUM DNA NPH QL NAA+NON-PROBE: NOT DETECTED
CALCIUM SERPL-MCNC: 9.4 MG/DL (ref 8.6–10.2)
CHLORIDE SERPL-SCNC: 103 MMOL/L (ref 98–107)
CLARITY UR: ABNORMAL
CO2 SERPL-SCNC: 23 MMOL/L (ref 22–29)
COLOR UR: ABNORMAL
CREAT SERPL-MCNC: 1.3 MG/DL (ref 0.5–1)
EOSINOPHIL # BLD: 0 K/UL (ref 0.05–0.5)
EOSINOPHILS RELATIVE PERCENT: 0 % (ref 0–6)
ERYTHROCYTE [DISTWIDTH] IN BLOOD BY AUTOMATED COUNT: 13.7 % (ref 11.5–15)
FLUAV H3 RNA NPH QL NAA+NON-PROBE: DETECTED
FLUAV RNA NPH QL NAA+NON-PROBE: DETECTED
FLUBV RNA NPH QL NAA+NON-PROBE: NOT DETECTED
GFR, ESTIMATED: 40 ML/MIN/1.73M2
GLUCOSE BLD-MCNC: 110 MG/DL (ref 74–99)
GLUCOSE BLD-MCNC: 143 MG/DL (ref 74–99)
GLUCOSE BLD-MCNC: 91 MG/DL (ref 74–99)
GLUCOSE SERPL-MCNC: 179 MG/DL (ref 74–99)
GLUCOSE UR STRIP-MCNC: NEGATIVE MG/DL
HADV DNA NPH QL NAA+NON-PROBE: NOT DETECTED
HBA1C MFR BLD: 6.4 % (ref 4–5.6)
HCOV 229E RNA NPH QL NAA+NON-PROBE: NOT DETECTED
HCOV HKU1 RNA NPH QL NAA+NON-PROBE: NOT DETECTED
HCOV NL63 RNA NPH QL NAA+NON-PROBE: NOT DETECTED
HCOV OC43 RNA NPH QL NAA+NON-PROBE: NOT DETECTED
HCT VFR BLD AUTO: 37.2 % (ref 34–48)
HGB BLD-MCNC: 12 G/DL (ref 11.5–15.5)
HGB UR QL STRIP.AUTO: NEGATIVE
HMPV RNA NPH QL NAA+NON-PROBE: NOT DETECTED
HPIV1 RNA NPH QL NAA+NON-PROBE: NOT DETECTED
HPIV2 RNA NPH QL NAA+NON-PROBE: NOT DETECTED
HPIV3 RNA NPH QL NAA+NON-PROBE: NOT DETECTED
HPIV4 RNA NPH QL NAA+NON-PROBE: NOT DETECTED
KETONES UR STRIP-MCNC: ABNORMAL MG/DL
LEUKOCYTE ESTERASE UR QL STRIP: NEGATIVE
LYMPHOCYTES NFR BLD: 0.29 K/UL (ref 1.5–4)
LYMPHOCYTES RELATIVE PERCENT: 4 % (ref 20–42)
M PNEUMO DNA NPH QL NAA+NON-PROBE: NOT DETECTED
MCH RBC QN AUTO: 30 PG (ref 26–35)
MCHC RBC AUTO-ENTMCNC: 32.3 G/DL (ref 32–34.5)
MCV RBC AUTO: 93 FL (ref 80–99.9)
METAMYELOCYTES ABSOLUTE COUNT: 0.06 K/UL (ref 0–0.12)
METAMYELOCYTES: 1 % (ref 0–1)
MONOCYTES NFR BLD: 0.35 K/UL (ref 0.1–0.95)
MONOCYTES NFR BLD: 5 % (ref 2–12)
NEUTROPHILS NFR BLD: 90 % (ref 43–80)
NEUTS SEG NFR BLD: 6 K/UL (ref 1.8–7.3)
NITRITE UR QL STRIP: NEGATIVE
PH UR STRIP: 7 [PH] (ref 5–9)
PLATELET # BLD AUTO: 309 K/UL (ref 130–450)
PMV BLD AUTO: 9.1 FL (ref 7–12)
POTASSIUM SERPL-SCNC: 4.3 MMOL/L (ref 3.5–5)
PROT SERPL-MCNC: 7.7 G/DL (ref 6.4–8.3)
PROT UR STRIP-MCNC: 30 MG/DL
RBC # BLD AUTO: 4 M/UL (ref 3.5–5.5)
RBC # BLD: ABNORMAL 10*6/UL
RBC #/AREA URNS HPF: ABNORMAL /HPF
RSV RNA NPH QL NAA+NON-PROBE: NOT DETECTED
RV+EV RNA NPH QL NAA+NON-PROBE: NOT DETECTED
SARS-COV-2 RNA NPH QL NAA+NON-PROBE: NOT DETECTED
SODIUM SERPL-SCNC: 139 MMOL/L (ref 132–146)
SP GR UR STRIP: 1.02 (ref 1–1.03)
SPECIMEN DESCRIPTION: ABNORMAL
UROBILINOGEN UR STRIP-ACNC: 1 EU/DL (ref 0–1)
WBC #/AREA URNS HPF: ABNORMAL /HPF
WBC OTHER # BLD: 6.7 K/UL (ref 4.5–11.5)

## 2024-12-27 PROCEDURE — 99285 EMERGENCY DEPT VISIT HI MDM: CPT

## 2024-12-27 PROCEDURE — G0378 HOSPITAL OBSERVATION PER HR: HCPCS

## 2024-12-27 PROCEDURE — 2500000003 HC RX 250 WO HCPCS: Performed by: NURSE PRACTITIONER

## 2024-12-27 PROCEDURE — 96372 THER/PROPH/DIAG INJ SC/IM: CPT

## 2024-12-27 PROCEDURE — 82947 ASSAY GLUCOSE BLOOD QUANT: CPT

## 2024-12-27 PROCEDURE — 6360000002 HC RX W HCPCS: Performed by: NURSE PRACTITIONER

## 2024-12-27 PROCEDURE — 73701 CT LOWER EXTREMITY W/DYE: CPT

## 2024-12-27 PROCEDURE — 6370000000 HC RX 637 (ALT 250 FOR IP): Performed by: NURSE PRACTITIONER

## 2024-12-27 PROCEDURE — 93005 ELECTROCARDIOGRAM TRACING: CPT | Performed by: EMERGENCY MEDICINE

## 2024-12-27 PROCEDURE — 80053 COMPREHEN METABOLIC PANEL: CPT

## 2024-12-27 PROCEDURE — 73502 X-RAY EXAM HIP UNI 2-3 VIEWS: CPT

## 2024-12-27 PROCEDURE — 81001 URINALYSIS AUTO W/SCOPE: CPT

## 2024-12-27 PROCEDURE — 73552 X-RAY EXAM OF FEMUR 2/>: CPT

## 2024-12-27 PROCEDURE — 2580000003 HC RX 258: Performed by: NURSE PRACTITIONER

## 2024-12-27 PROCEDURE — 83036 HEMOGLOBIN GLYCOSYLATED A1C: CPT

## 2024-12-27 PROCEDURE — 6360000004 HC RX CONTRAST MEDICATION: Performed by: RADIOLOGY

## 2024-12-27 PROCEDURE — 70450 CT HEAD/BRAIN W/O DYE: CPT

## 2024-12-27 PROCEDURE — 0202U NFCT DS 22 TRGT SARS-COV-2: CPT

## 2024-12-27 PROCEDURE — 72125 CT NECK SPINE W/O DYE: CPT

## 2024-12-27 PROCEDURE — 99222 1ST HOSP IP/OBS MODERATE 55: CPT | Performed by: NURSE PRACTITIONER

## 2024-12-27 PROCEDURE — 71045 X-RAY EXAM CHEST 1 VIEW: CPT

## 2024-12-27 PROCEDURE — 85025 COMPLETE CBC W/AUTO DIFF WBC: CPT

## 2024-12-27 PROCEDURE — 72170 X-RAY EXAM OF PELVIS: CPT

## 2024-12-27 RX ORDER — ONDANSETRON 4 MG/1
4 TABLET, ORALLY DISINTEGRATING ORAL EVERY 8 HOURS PRN
Status: DISCONTINUED | OUTPATIENT
Start: 2024-12-27 | End: 2025-01-02 | Stop reason: HOSPADM

## 2024-12-27 RX ORDER — SODIUM CHLORIDE 0.9 % (FLUSH) 0.9 %
5-40 SYRINGE (ML) INJECTION EVERY 12 HOURS SCHEDULED
Status: DISCONTINUED | OUTPATIENT
Start: 2024-12-27 | End: 2025-01-02 | Stop reason: HOSPADM

## 2024-12-27 RX ORDER — ONDANSETRON 2 MG/ML
4 INJECTION INTRAMUSCULAR; INTRAVENOUS EVERY 6 HOURS PRN
Status: DISCONTINUED | OUTPATIENT
Start: 2024-12-27 | End: 2025-01-02 | Stop reason: HOSPADM

## 2024-12-27 RX ORDER — INSULIN LISPRO 100 [IU]/ML
0-4 INJECTION, SOLUTION INTRAVENOUS; SUBCUTANEOUS
Status: DISCONTINUED | OUTPATIENT
Start: 2024-12-27 | End: 2025-01-02 | Stop reason: HOSPADM

## 2024-12-27 RX ORDER — PREDNISOLONE ACETATE 10 MG/ML
1 SUSPENSION/ DROPS OPHTHALMIC 3 TIMES DAILY
Status: DISCONTINUED | OUTPATIENT
Start: 2024-12-27 | End: 2024-12-27

## 2024-12-27 RX ORDER — AMLODIPINE BESYLATE 5 MG/1
2.5 TABLET ORAL 2 TIMES DAILY
Status: DISCONTINUED | OUTPATIENT
Start: 2024-12-27 | End: 2025-01-02 | Stop reason: HOSPADM

## 2024-12-27 RX ORDER — ATORVASTATIN CALCIUM 10 MG/1
10 TABLET, FILM COATED ORAL DAILY
Status: DISCONTINUED | OUTPATIENT
Start: 2024-12-27 | End: 2025-01-02 | Stop reason: HOSPADM

## 2024-12-27 RX ORDER — SODIUM CHLORIDE 9 MG/ML
INJECTION, SOLUTION INTRAVENOUS PRN
Status: DISCONTINUED | OUTPATIENT
Start: 2024-12-27 | End: 2025-01-02 | Stop reason: HOSPADM

## 2024-12-27 RX ORDER — SODIUM CHLORIDE 9 MG/ML
INJECTION, SOLUTION INTRAVENOUS CONTINUOUS
Status: DISCONTINUED | OUTPATIENT
Start: 2024-12-27 | End: 2024-12-27

## 2024-12-27 RX ORDER — BRIMONIDINE TARTRATE 2 MG/ML
1 SOLUTION/ DROPS OPHTHALMIC 2 TIMES DAILY
Status: DISCONTINUED | OUTPATIENT
Start: 2024-12-27 | End: 2025-01-02 | Stop reason: HOSPADM

## 2024-12-27 RX ORDER — ACETAMINOPHEN 325 MG/1
650 TABLET ORAL EVERY 6 HOURS PRN
Status: DISCONTINUED | OUTPATIENT
Start: 2024-12-27 | End: 2025-01-02 | Stop reason: HOSPADM

## 2024-12-27 RX ORDER — IOPAMIDOL 755 MG/ML
75 INJECTION, SOLUTION INTRAVASCULAR
Status: COMPLETED | OUTPATIENT
Start: 2024-12-27 | End: 2024-12-27

## 2024-12-27 RX ORDER — DEXTROSE MONOHYDRATE 100 MG/ML
INJECTION, SOLUTION INTRAVENOUS CONTINUOUS PRN
Status: DISCONTINUED | OUTPATIENT
Start: 2024-12-27 | End: 2025-01-02 | Stop reason: HOSPADM

## 2024-12-27 RX ORDER — ACETAMINOPHEN 650 MG/1
650 SUPPOSITORY RECTAL EVERY 6 HOURS PRN
Status: DISCONTINUED | OUTPATIENT
Start: 2024-12-27 | End: 2025-01-02 | Stop reason: HOSPADM

## 2024-12-27 RX ORDER — POLYETHYLENE GLYCOL 3350 17 G/17G
17 POWDER, FOR SOLUTION ORAL DAILY PRN
Status: DISCONTINUED | OUTPATIENT
Start: 2024-12-27 | End: 2025-01-02 | Stop reason: HOSPADM

## 2024-12-27 RX ORDER — SODIUM CHLORIDE 9 MG/ML
INJECTION, SOLUTION INTRAVENOUS CONTINUOUS
Status: ACTIVE | OUTPATIENT
Start: 2024-12-27 | End: 2024-12-27

## 2024-12-27 RX ORDER — ASPIRIN 81 MG/1
81 TABLET ORAL DAILY
Status: DISCONTINUED | OUTPATIENT
Start: 2024-12-27 | End: 2025-01-02 | Stop reason: HOSPADM

## 2024-12-27 RX ORDER — SODIUM CHLORIDE 0.9 % (FLUSH) 0.9 %
5-40 SYRINGE (ML) INJECTION PRN
Status: DISCONTINUED | OUTPATIENT
Start: 2024-12-27 | End: 2025-01-02 | Stop reason: HOSPADM

## 2024-12-27 RX ORDER — ENOXAPARIN SODIUM 100 MG/ML
30 INJECTION SUBCUTANEOUS DAILY
Status: DISCONTINUED | OUTPATIENT
Start: 2024-12-27 | End: 2025-01-02 | Stop reason: HOSPADM

## 2024-12-27 RX ORDER — DORZOLAMIDE HCL 20 MG/ML
1 SOLUTION/ DROPS OPHTHALMIC 3 TIMES DAILY
Status: DISCONTINUED | OUTPATIENT
Start: 2024-12-27 | End: 2025-01-02 | Stop reason: HOSPADM

## 2024-12-27 RX ORDER — GLUCAGON 1 MG/ML
1 KIT INJECTION PRN
Status: DISCONTINUED | OUTPATIENT
Start: 2024-12-27 | End: 2025-01-02 | Stop reason: HOSPADM

## 2024-12-27 RX ADMIN — AMLODIPINE BESYLATE 2.5 MG: 5 TABLET ORAL at 20:05

## 2024-12-27 RX ADMIN — BRIMONIDINE TARTRATE 1 DROP: 2 SOLUTION/ DROPS OPHTHALMIC at 20:11

## 2024-12-27 RX ADMIN — AMLODIPINE BESYLATE 2.5 MG: 5 TABLET ORAL at 09:58

## 2024-12-27 RX ADMIN — ATORVASTATIN CALCIUM 10 MG: 10 TABLET, FILM COATED ORAL at 20:05

## 2024-12-27 RX ADMIN — IOPAMIDOL 75 ML: 755 INJECTION, SOLUTION INTRAVENOUS at 08:06

## 2024-12-27 RX ADMIN — ATORVASTATIN CALCIUM 10 MG: 10 TABLET, FILM COATED ORAL at 10:04

## 2024-12-27 RX ADMIN — DORZOLAMIDE HYDROCHLORIDE 1 DROP: 20 SOLUTION OPHTHALMIC at 20:12

## 2024-12-27 RX ADMIN — ENOXAPARIN SODIUM 30 MG: 100 INJECTION SUBCUTANEOUS at 09:59

## 2024-12-27 RX ADMIN — SODIUM CHLORIDE, PRESERVATIVE FREE 10 ML: 5 INJECTION INTRAVENOUS at 19:34

## 2024-12-27 RX ADMIN — SODIUM CHLORIDE: 9 INJECTION, SOLUTION INTRAVENOUS at 10:24

## 2024-12-27 RX ADMIN — ASPIRIN 81 MG: 81 TABLET, COATED ORAL at 09:58

## 2024-12-27 ASSESSMENT — PAIN SCALES - GENERAL: PAINLEVEL_OUTOF10: 0

## 2024-12-27 NOTE — ED PROVIDER NOTES
[facility] EMERGENCY DEPARTMENT  EMERGENCY DEPARTMENT ENCOUNTER        Pt Name: Carlene Olson  MRN: [id number]  Birthdate 1935  Date of evaluation: 12/27/2024  Provider: Catalino Anthony DO  PCP: No primary care provider on file.  Note Started: 6:51 AM EST 12/27/24    CHIEF COMPLAINT       Chief Complaint   Patient presents with    Extremity Weakness     (Family called, patient having frequent falls, weakness)       HISTORY OF PRESENT ILLNESS: 1 or more Elements   History From: Patient//family    Limitations to history : Patient hard of hearing    Carlene Olson is a 89 y.o. female who presents to the emergency department for frequent falls and difficulty ambulating.  Per EMS they were called for a lift assist.  When they got there the son was worried that the patient had been having generalized weakness.  When the son arrived to the emergency department he states that the patient has had frequent falls over the last several months.  He states that she is unable to bear weight on her right lower extremity and has had a mass to her inner thigh.  Therefore he wanted her evaluated.  Patient currently denies any acute complaints.    Nursing Notes were all reviewed and agreed with or any disagreements were addressed in the HPI.      REVIEW OF EXTERNAL NOTE :       PDMP Monitoring:    Last PDMP Anders as Reviewed:  Review User Review Instant Review Result            Urine Drug Screenings (1 yr)    No resulted procedures found.       Medication Contract and Consent for Opioid Use Documents Filed        No documents found                      REVIEW OF SYSTEMS :           Positives and Pertinent negatives as per HPI.     SURGICAL HISTORY     Past Surgical History:   Procedure Laterality Date    BRONCHOSCOPY N/A 7/1/2021    BRONCHOSCOPY ALVEOLAR LAVAGE performed by Tracy Giles MD at [facility]    CARDIAC PROCEDURE N/A 12/25/2023    Coronary angiography performed by

## 2024-12-27 NOTE — PROGRESS NOTES
4 Eyes Skin Assessment     NAME:  Carlene Olson  YOB: 1935  MEDICAL RECORD NUMBER:  [id number]    The patient is being assessed for  Admission    I agree that at least one RN has performed a thorough Head to Toe Skin Assessment on the patient. ALL assessment sites listed below have been assessed.      Areas assessed by both nurses:    Head, Face, Ears, Shoulders, Back, Chest, Arms, Elbows, Hands, Sacrum. Buttock, Coccyx, Ischium, Legs. Feet and Heels, and Under Medical Devices         Does the Patient have a Wound? Yes wound(s) were present on assessment. LDA wound assessment was Initiated and completed by RN  Bruising BUE, L eyebrow, L hip  Abrasion L knee  DTI Posterior R thigh  Nonblanchable redness buttocks  Redness/Excoriation L groin fold  Dry/boggy/blanchable redness Bilat heels       John Prevention initiated by RN: Yes  Wound Care Orders initiated by RN: Yes    Pressure Injury (Stage 3,4, Unstageable, DTI, NWPT, and Complex wounds) if present, place Wound referral order by RN under : No    New Ostomies, if present place, Ostomy referral order under : No     Nurse 1 eSignature: Electronically signed by Maggy Stafford RN on 12/27/24 at 11:22 AM EST    **SHARE this note so that the co-signing nurse can place an eSignature**    Nurse 2 eSignature: {Esignature:[id number]}

## 2024-12-27 NOTE — CONSULTS
Department of Orthopedic Surgery  Resident Consult Note          Reason for Consult: Right thigh mass    HISTORY OF PRESENT ILLNESS:       Patient is a 89 y.o. female with history of type 2 diabetes, hypertension, hyperlipidemia, currently admitted to the hospital for treatment of influenza and acute kidney injury.  Her history was provided from the medical chart as well as her daughter by telephone given that the patient is very hard of hearing and was not able to provide any history during examination.  Orthopedic surgery was consulted due to a mass involving soft tissue of the medial thigh on the right.  Per her daughter this mass has been there for many years.  She has been evaluated for this in the past, and has refused any treatment.  Per the daughter, she is not a very compliant patient.  She has had increasing falls recently.  She resides at home with home health and family.  She is able to ambulate in the house.  She has been ambulatory within the past few days.   No additional complaints or concerns.    Past Medical History:        Diagnosis Date    Arthritis     Cataract     Diabetes mellitus (HCC)     Fall from standing 2/17/2020    Injury of left upper arm 2/17/2020    Pneumonia     Subdural hematoma 7/9/2021    Syncope and collapse 10/17/2016    Uncontrolled type 2 diabetes mellitus 8/17/2010     Past Surgical History:        Procedure Laterality Date    BRONCHOSCOPY N/A 7/1/2021    BRONCHOSCOPY ALVEOLAR LAVAGE performed [place]iles [place] [facility]    CARDIAC PROCEDURE N/A 12/25/2023    Coronary angiography performed by Chance Morfin MD at [facility]    CARDIAC PROCEDURE N/A 12/25/2023    Insert temporary pacemaker performed by Chance Morfin MD at [facility]    CATARACT REMOVAL WITH IMPLANT      EP DEVICE PROCEDURE N/A 12/26/2023    Insert PPM dual performed by Rinku De Jesus DO at [facility]    HYSTERECTOMY (CERVIX STATUS UNKNOWN)       Current  Medications:   Current Facility-Administered Medications: sodium chloride flush 0.9 % injection 5-40 mL, 5-40 mL, IntraVENous, 2 times per day  sodium chloride flush 0.9 % injection 5-40 mL, 5-40 mL, IntraVENous, PRN  0.9 % sodium chloride infusion, , IntraVENous, PRN  enoxaparin Sodium (LOVENOX) injection 30 mg, 30 mg, SubCUTAneous, Daily  ondansetron (ZOFRAN-ODT) disintegrating tablet 4 mg, 4 mg, Oral, Q8H PRN **OR** ondansetron (ZOFRAN) injection 4 mg, 4 mg, IntraVENous, Q6H PRN  polyethylene glycol (GLYCOLAX) packet 17 g, 17 g, Oral, Daily PRN  acetaminophen (TYLENOL) tablet 650 mg, 650 mg, Oral, Q6H PRN **OR** acetaminophen (TYLENOL) suppository 650 mg, 650 mg, Rectal, Q6H PRN  amLODIPine (NORVASC) tablet 2.5 mg, 2.5 mg, Oral, BID  aspirin EC tablet 81 mg, 81 mg, Oral, Daily  atorvastatin (LIPITOR) tablet 10 mg, 10 mg, Oral, Daily  brimonidine (ALPHAGAN) 0.2 % ophthalmic solution 1 drop, 1 drop, Left Eye, BID  dorzolamide (TRUSOPT) 2 % ophthalmic solution 1 drop, 1 drop, Left Eye, TID  insulin lispro (HUMALOG,ADMELOG) injection vial 0-4 Units, 0-4 Units, SubCUTAneous, 4x Daily AC & HS  glucose chewable tablet 16 g, 4 tablet, Oral, PRN  dextrose bolus 10% 125 mL, 125 mL, IntraVENous, PRN **OR** dextrose bolus 10% 250 mL, 250 mL, IntraVENous, PRN  glucagon injection 1 mg, 1 mg, SubCUTAneous, PRN  dextrose 10 % infusion, , IntraVENous, Continuous PRN  0.9 % sodium chloride infusion, , IntraVENous, Continuous  Allergies:  Penicillins    Social History:   TOBACCO:   reports that she has never smoked. She has never used smokeless tobacco.  ETOH:   reports no history of alcohol use.  DRUGS:   reports no history of drug use.  ACTIVITIES OF DAILY LIVING:    OCCUPATION:    Family History:   History reviewed. No pertinent family history.    REVIEW OF SYSTEMS:  Patient was unable to provide a review of systems    PHYSICAL EXAM:      VITALS:  BP (!) 131/55   Pulse 63   Temp 97.6 °F (36.4 °C) (Temporal)   Resp 18   Ht

## 2024-12-27 NOTE — PROGRESS NOTES
Occupational Therapy  OT eval and treat order received and chart was reviewed. There is an order on chart for Orthopedic Surgery Consult regarding Lesion in R Femur. Will hold OT eval pending Orthopedic Surgery recommendations.Paradise Huizar OTR/L [id number]

## 2024-12-27 NOTE — H&P
1. No fracture or dislocation.   2. Mild soft tissue prominence medial right mid and upper thigh.         CT Head W/O Contrast   Final Result   No acute intracranial abnormality.         CT CSpine W/O Contrast   Final Result   No acute abnormality of the cervical spine.         XR CHEST PORTABLE   Final Result   No acute cardiopulmonary process.         CT FEMUR RIGHT W CONTRAST    (Results Pending)         ASSESSMENT:      Principal Problem:    BIGG (acute kidney injury) ([facility])  Active Problems:    Frequent falls    Generalized weakness    Influenza B    Influenza A    DM (diabetes mellitus) ([facility])    HTN (hypertension)    HLD (hyperlipidemia)    Unable to ambulate  Resolved Problems:    * No resolved hospital problems. *      PLAN:    Generalized weakness/inability to ambulate/frequent falls: No acute fractures on imaging.  Weakness could be partially secondary to viral infection.  Cardiac echo reviewed.  Get orthostatic BP.  Place pt on telemetry.  Consult PT/OT for evaluation.  Social work consulted for possible rehab placement.  Influenza A/B infection: Symptomatic management.  BIGG: Baseline creatinine 0.7-0.8.  Creatinine today 1.3.  Likely 2/2 dehydration.  Will give normal saline at 100 mL/h x 1 L.  Monitor BMP.  HTN: Resume Norvasc 2.5 mg twice daily.  HLD: Resume home dose of ASA 81 mg daily and Lipitor 10 mg daily.  Type II DM: HgbA1c 7.2% on 12/28/2023.  Get repeat A1c.  On metformin outpatient.  Will hold for now as patient received IV dye with imaging.  Start low-dose SSI.  Hypoglycemic protocol.  Carb controlled diet.    Code Status: Full code  DVT prophylaxis: Lovenox    In review of EMR, evaluation, management, and diagnosis. Care plan has been discussed with attending. Time spent 55 minutes     NOTE: This report was transcribed using voice recognition software. Every effort was made to ensure accuracy; however, inadvertent computerized transcription errors may be present.  Electronically signed by  Ely Vargas, MICHELLE - CNP on 12/27/2024 at 7:46 AM

## 2024-12-27 NOTE — PROGRESS NOTES
[employer] pacer [place]- complete MRI conditonal system- confirmed with [employer]. Cardiology form faxed to floor and Dr. De Jesus office. MRI cannot be completed without cardiology completed by patient cardiology doctor and faxed back to MRI.

## 2024-12-28 PROBLEM — R22.41 MASS OF RIGHT THIGH: Status: ACTIVE | Noted: 2024-12-28

## 2024-12-28 LAB
ANION GAP SERPL CALCULATED.3IONS-SCNC: 11 MMOL/L (ref 7–16)
ANION GAP SERPL CALCULATED.3IONS-SCNC: 13 MMOL/L (ref 7–16)
BASOPHILS # BLD: 0.01 K/UL (ref 0–0.2)
BASOPHILS NFR BLD: 0 % (ref 0–2)
BUN SERPL-MCNC: 19 MG/DL (ref 6–23)
BUN SERPL-MCNC: 20 MG/DL (ref 6–23)
CALCIUM SERPL-MCNC: 8.4 MG/DL (ref 8.6–10.2)
CALCIUM SERPL-MCNC: 8.6 MG/DL (ref 8.6–10.2)
CHLORIDE SERPL-SCNC: 105 MMOL/L (ref 98–107)
CHLORIDE SERPL-SCNC: 106 MMOL/L (ref 98–107)
CO2 SERPL-SCNC: 21 MMOL/L (ref 22–29)
CO2 SERPL-SCNC: 21 MMOL/L (ref 22–29)
CREAT SERPL-MCNC: 0.9 MG/DL (ref 0.5–1)
CREAT SERPL-MCNC: 1 MG/DL (ref 0.5–1)
EKG ATRIAL RATE: 79 BPM
EKG P AXIS: 39 DEGREES
EKG P-R INTERVAL: 148 MS
EKG Q-T INTERVAL: 386 MS
EKG QRS DURATION: 74 MS
EKG QTC CALCULATION (BAZETT): 442 MS
EKG R AXIS: 15 DEGREES
EKG T AXIS: 25 DEGREES
EKG VENTRICULAR RATE: 79 BPM
EOSINOPHIL # BLD: 0 K/UL (ref 0.05–0.5)
EOSINOPHILS RELATIVE PERCENT: 0 % (ref 0–6)
ERYTHROCYTE [DISTWIDTH] IN BLOOD BY AUTOMATED COUNT: 14.1 % (ref 11.5–15)
GFR, ESTIMATED: 58 ML/MIN/1.73M2
GFR, ESTIMATED: 62 ML/MIN/1.73M2
GLUCOSE BLD-MCNC: 103 MG/DL (ref 74–99)
GLUCOSE BLD-MCNC: 109 MG/DL (ref 74–99)
GLUCOSE BLD-MCNC: 111 MG/DL (ref 74–99)
GLUCOSE BLD-MCNC: 160 MG/DL (ref 74–99)
GLUCOSE SERPL-MCNC: 82 MG/DL (ref 74–99)
GLUCOSE SERPL-MCNC: 93 MG/DL (ref 74–99)
HCT VFR BLD AUTO: 33.9 % (ref 34–48)
HGB BLD-MCNC: 10.8 G/DL (ref 11.5–15.5)
IMM GRANULOCYTES # BLD AUTO: <0.03 K/UL (ref 0–0.58)
IMM GRANULOCYTES NFR BLD: 0 % (ref 0–5)
LYMPHOCYTES NFR BLD: 0.63 K/UL (ref 1.5–4)
LYMPHOCYTES RELATIVE PERCENT: 14 % (ref 20–42)
MCH RBC QN AUTO: 29.7 PG (ref 26–35)
MCHC RBC AUTO-ENTMCNC: 31.9 G/DL (ref 32–34.5)
MCV RBC AUTO: 93.1 FL (ref 80–99.9)
MONOCYTES NFR BLD: 0.37 K/UL (ref 0.1–0.95)
MONOCYTES NFR BLD: 8 % (ref 2–12)
NEUTROPHILS NFR BLD: 78 % (ref 43–80)
NEUTS SEG NFR BLD: 3.56 K/UL (ref 1.8–7.3)
PLATELET # BLD AUTO: 263 K/UL (ref 130–450)
PMV BLD AUTO: 9.7 FL (ref 7–12)
POTASSIUM SERPL-SCNC: 3.7 MMOL/L (ref 3.5–5)
POTASSIUM SERPL-SCNC: 3.8 MMOL/L (ref 3.5–5)
RBC # BLD AUTO: 3.64 M/UL (ref 3.5–5.5)
SODIUM SERPL-SCNC: 138 MMOL/L (ref 132–146)
SODIUM SERPL-SCNC: 139 MMOL/L (ref 132–146)
WBC OTHER # BLD: 4.6 K/UL (ref 4.5–11.5)

## 2024-12-28 PROCEDURE — 93010 ELECTROCARDIOGRAM REPORT: CPT | Performed by: INTERNAL MEDICINE

## 2024-12-28 PROCEDURE — G0378 HOSPITAL OBSERVATION PER HR: HCPCS

## 2024-12-28 PROCEDURE — 6360000002 HC RX W HCPCS: Performed by: NURSE PRACTITIONER

## 2024-12-28 PROCEDURE — 99232 SBSQ HOSP IP/OBS MODERATE 35: CPT | Performed by: STUDENT IN AN ORGANIZED HEALTH CARE EDUCATION/TRAINING PROGRAM

## 2024-12-28 PROCEDURE — 6370000000 HC RX 637 (ALT 250 FOR IP): Performed by: NURSE PRACTITIONER

## 2024-12-28 PROCEDURE — 99222 1ST HOSP IP/OBS MODERATE 55: CPT | Performed by: STUDENT IN AN ORGANIZED HEALTH CARE EDUCATION/TRAINING PROGRAM

## 2024-12-28 PROCEDURE — 97161 PT EVAL LOW COMPLEX 20 MIN: CPT

## 2024-12-28 PROCEDURE — 36415 COLL VENOUS BLD VENIPUNCTURE: CPT

## 2024-12-28 PROCEDURE — 2500000003 HC RX 250 WO HCPCS: Performed by: NURSE PRACTITIONER

## 2024-12-28 PROCEDURE — 85025 COMPLETE CBC W/AUTO DIFF WBC: CPT

## 2024-12-28 PROCEDURE — 96372 THER/PROPH/DIAG INJ SC/IM: CPT

## 2024-12-28 PROCEDURE — 80048 BASIC METABOLIC PNL TOTAL CA: CPT

## 2024-12-28 PROCEDURE — 97535 SELF CARE MNGMENT TRAINING: CPT

## 2024-12-28 PROCEDURE — 97165 OT EVAL LOW COMPLEX 30 MIN: CPT

## 2024-12-28 PROCEDURE — 82947 ASSAY GLUCOSE BLOOD QUANT: CPT

## 2024-12-28 RX ADMIN — AMLODIPINE BESYLATE 2.5 MG: 5 TABLET ORAL at 09:08

## 2024-12-28 RX ADMIN — DORZOLAMIDE HYDROCHLORIDE 1 DROP: 20 SOLUTION OPHTHALMIC at 11:16

## 2024-12-28 RX ADMIN — ACETAMINOPHEN 650 MG: 325 TABLET ORAL at 11:19

## 2024-12-28 RX ADMIN — ATORVASTATIN CALCIUM 10 MG: 10 TABLET, FILM COATED ORAL at 20:57

## 2024-12-28 RX ADMIN — BRIMONIDINE TARTRATE 1 DROP: 2 SOLUTION/ DROPS OPHTHALMIC at 20:57

## 2024-12-28 RX ADMIN — ASPIRIN 81 MG: 81 TABLET, COATED ORAL at 09:03

## 2024-12-28 RX ADMIN — DORZOLAMIDE HYDROCHLORIDE 1 DROP: 20 SOLUTION OPHTHALMIC at 20:57

## 2024-12-28 RX ADMIN — SODIUM CHLORIDE, PRESERVATIVE FREE 10 ML: 5 INJECTION INTRAVENOUS at 09:04

## 2024-12-28 RX ADMIN — AMLODIPINE BESYLATE 2.5 MG: 5 TABLET ORAL at 20:57

## 2024-12-28 RX ADMIN — ENOXAPARIN SODIUM 30 MG: 100 INJECTION SUBCUTANEOUS at 11:15

## 2024-12-28 RX ADMIN — SODIUM CHLORIDE, PRESERVATIVE FREE 10 ML: 5 INJECTION INTRAVENOUS at 20:57

## 2024-12-28 ASSESSMENT — PAIN SCALES - GENERAL: PAINLEVEL_OUTOF10: 0

## 2024-12-28 NOTE — PROGRESS NOTES
Physical Therapy  Physical Therapy Initial Assessment     Name: Carlene Olosn  : 1935  MRN: [id number]      Date of Service: 2024    Evaluating PT:  Kyung Jacinto, PT, DPT, [id number]    Room #:  8402/8402-B  Diagnosis:  Inability to walk [R26.2]  BIGG (acute kidney injury) (HCC) [N17.9]  Mass of right thigh [R22.41]  PMHx/PSHx:    Past Medical History:   Diagnosis Date    Arthritis     Cataract     Diabetes mellitus (HCC)     Fall from standing 2020    Injury of left upper arm 2020    Pneumonia     Subdural hematoma 2021    Syncope and collapse 10/17/2016    Uncontrolled type 2 diabetes mellitus 2010      Past Surgical History:   Procedure Laterality Date    BRONCHOSCOPY N/A 2021    BRONCHOSCOPY ALVEOLAR LAVAGE performed by Tracy Giles MD at [facility]COPY    CARDIAC PROCEDURE N/A 2023    Coronary angiography performed by Chance Morfin MD at [facility]ARDIAC CATH LAB    CARDIAC PROCEDURE N/A 2023    Insert temporary pacemaker performed by Chance Morfin MD at [facility] CARDIAC CATH LAB    CATARACT REMOVAL WITH IMPLANT      EP DEVICE PROCEDURE N/A 2023    Insert PPM dual performed by Rinku De Jesus DO at [facility] CARDIAC CATH LAB    HYSTERECTOMY (CERVIX STATUS UNKNOWN)        Procedure/Surgery:  none this admission   Precautions:  Fall Risk, WBAT BLE, Droplet Precautions (Influenza), Blind L eye, [place]  Equipment Needs:  TBD    SUBJECTIVE:    Pt unable to provide subjective information. Per chart review, pt lives with her family with home health. Pt ambulated at home PTA.    OBJECTIVE:   Initial Evaluation  Date: 24 Treatment Short Term/ Long Term   Goals   AM-PAC 6 Clicks      Was pt agreeable to Eval/treatment? yes     Does pt have pain? No c/o pain      Bed Mobility  Rolling: NT  Supine to sit: Depx2  Sit to supine: Depx2  Scooting: NT  Rolling: MaxA  Supine to sit: MaxA  Sit to supine: MaxA  Scooting: Maxa   Transfers Sit to stand: NT  Stand to sit:

## 2024-12-28 NOTE — PROGRESS NOTES
Hospitalist Progress Note      SYNOPSIS: Patient admitted on 2024 for BIGG (acute kidney injury) (HCC)  89-year-old female with a past medical history of arthritis, cataracts, type II DM, SDH, and syncope who presented to the ED  for weakness and  falls.   ED course is as follows: Vital signs-T98.5, HR 66, /79, SpO2 96% on room air.  BUNs/creatinine 24/1.3, glucose 179.  Flu A/B+.  X-ray of pelvis is negative for fracture.  X-ray of right femur shows no fracture or dislocation mild soft tissue prominence.  CXR negative.  CT head and CT cervical spine negative.  CT right femur with contrast  superficial fluid collection or cystic lesion in the medial soft tissues at the mid diaphyseal level of the femur without significant wall thickening   Orthopedic surgery consulted for further recommendation    SUBJECTIVE:  Stable overnight. No other overnight issues reported.   Patient seen and examined at bedside today a.m. patient is hard of hearing, does complain of pain in her right leg  Records reviewed.         Temp (24hrs), Av.1 °F (36.7 °C), Min:97.6 °F (36.4 °C), Max:98.8 °F (37.1 °C)    DIET: ADULT DIET; Regular; 5 carb choices (75 gm/meal)  CODE: Full Code    Intake/Output Summary (Last 24 hours) at 2024 1044  Last data filed at 2024 0852  Gross per 24 hour   Intake 182 ml   Output 800 ml   Net -618 ml       Review of Systems  Could not be assessed properly  Patient denies any shortness of breath, chest pain      OBJECTIVE:    /86   Pulse 56   Temp 98.8 °F (37.1 °C) (Temporal)   Resp 18   Ht 1.575 m (5' 2\")   Wt 71.7 kg (158 lb)   SpO2 90%   BMI 28.90 kg/m²     General appearance:  awake, alert, hard of hearing  HEENT:  Conjunctivae/corneas clear.   Neck: Supple. No jugular venous distention.   Respiratory: symmetrical; clear to auscultation bilaterally; no wheezes; no rhonchi; no rales  Cardiovascular: rhythm regular; rate controlled; no murmurs  Abdomen: Soft,  nontender, nondistended  Extremities:  peripheral pulses present; no peripheral edema; no ulcers  Right lower extremity mass present  Musculoskeletal: No clubbing, cyanosis, no bilateral lower extremity edema. Brisk capillary refill.   Skin:  No rashes  on visible skin  Neurologic: awake, alert      ASSESSMENT and PLAN:  Fall  Right lower extremity medial thigh mass  Physical deconditioning  Influenza positive    Plan  Discussed with orthopedic surgery awaiting MRI with without contrast right femur  Encourage oral feeding with assistance  Continue patient's home medication  Symptomatic management for influenza  PT OT      DVT Prophylaxis [] Lovenox, []  Heparin, [] SCDs, [] Ambulation   GI Prophylaxis [] PPI,  [] H2 Blocker,  [] Carafate,  [] Diet/Tube Feeds   Disposition Patient requires continued admission due to evaluation for right femur mass   MDM [] Low, [] Moderate,[]  High  Patient's risk as above due to        Medications:  REVIEWED DAILY    Infusion Medications    sodium chloride      dextrose       Scheduled Medications    sodium chloride flush  5-40 mL IntraVENous 2 times per day    enoxaparin  30 mg SubCUTAneous Daily    amLODIPine  2.5 mg Oral BID    aspirin  81 mg Oral Daily    atorvastatin  10 mg Oral Daily    brimonidine  1 drop Left Eye BID    dorzolamide  1 drop Left Eye TID    insulin lispro  0-4 Units SubCUTAneous 4x Daily AC & HS     PRN Meds: sodium chloride flush, sodium chloride, ondansetron **OR** ondansetron, polyethylene glycol, acetaminophen **OR** acetaminophen, glucose, dextrose bolus **OR** dextrose bolus, glucagon (rDNA), dextrose    Labs:     Recent Labs     12/27/24  0404 12/28/24  0614   WBC 6.7 4.6   HGB 12.0 10.8*   HCT 37.2 33.9*    263       Recent Labs     12/27/24  0404 12/28/24  0614 12/28/24  0842    139 138   K 4.3 3.7 3.8    105 106   CO2 23 21* 21*   BUN 24* 19 20   CREATININE 1.3* 0.9 1.0   CALCIUM 9.4 8.4* 8.6       Recent Labs     12/27/24  0404

## 2024-12-28 NOTE — PROGRESS NOTES
Occupational Therapy  OCCUPATIONAL THERAPY INITIAL EVALUATION    [facility]  [address], [place]      Date:2024                                                Patient Name: Carlene Olson  MRN: [id number]  : 1935  Room: 8402/8402-    Evaluating OT: Diony Chau OTR/L #[id number]     Referring Provider: Ely Vargas APRN - CNP   Specific Provider Orders/Date: OT eval and treat 24    Diagnosis: Inability to walk [R26.2]  BIGG (acute kidney injury) (HCC) [N17.9]  Mass of right thigh [R22.41]   Pt admitted to hospital with inability to ambulate      Pertinent Medical History:  has a past medical history of Arthritis, Cataract, Diabetes mellitus (HCC), Fall from standing, Injury of left upper arm, Pneumonia, Subdural hematoma, Syncope and collapse, and Uncontrolled type 2 diabetes mellitus.       Precautions:  Fall Risk, cognition, WBAT BLE, droplet precautions (influenza), bed alarm, TAPS    Assessment of current deficits    [x] Functional mobility  [x]ADLs  [x] Strength               [x]Cognition    [x] Functional transfers   [x] IADLs         [x] Safety Awareness   [x]Endurance    [x] Fine Coordination              [x] Balance      [x] Vision/perception   [x]Sensation     [x]Gross Motor Coordination  [x] ROM  [x] Delirium                   [x] Motor Control     OT PLAN OF CARE   OT POC based on physician orders, patient diagnosis and results of clinical assessment    Frequency/Duration 1-3 days/wk for 2 weeks PRN   Specific OT Treatment Interventions to include:   * Instruction/training on adapted ADL techniques and AE recommendations to increase functional independence within precautions       * Training on energy conservation strategies, correct breathing pattern and techniques to improve independence/tolerance for self-care routine  * Functional transfer/mobility training/DME recommendations for increased independence, safety,  poor   Judgement/safety:  poor     Functional Assessment:  AM-PAC Daily Activity Raw Score: 9/24   Initial Eval Status  Date: 12/28/24 Treatment Status  Date: STGs = LTGs  Time frame: 10-14 days   Feeding Maximal Assist   Set-up   Grooming Maximal Assist   Minimal Assist    UB Dressing Maximal Assist   Moderate Assist    LB Dressing Dependent   Maximal Assist    Bathing Dependent  Maximal Assist    Toileting Dependent     Incontinent of BM; dependent for hygiene   Maximal Assist    Bed Mobility  Supine to sit: Dependent x2  Sit to supine: Dependent x2  Rolling: Dep  Supine to sit: Maximal Assist   Sit to supine: Maximal Assist  Rolling: min A   Functional Transfers NT  Maximal Assist    Functional Mobility NT  Maximal Assist    Balance Sitting: Dep     Activity Tolerance P  F   Visual/  Perceptual Continue to assess                  Hand Dominance unknown    Strength ROM Additional Info:    RUE  Grossly 3+/5 continue to assess Grossly wfl  Continue to assess fair  and P+ FMC/dexterity noted during ADL tasks     LUE Grossly 3+/5 continue to assess Grossly wfl  Continue to assess Fair   and P+ FMC/dexterity noted during ADL tasks     Hearing: [place]  Sensation: continue to assess  Tone:continue to assess  Edema:none noted     Comments: Upon arrival patient supine in bed and agreeable to OT session.   Therapist educated pt on role of OT. At end of session, patient semi-supine in bed (TAPS wedged / pillows placed and bed alarm on with call light and phone within reach, all lines and tubes intact.  Overall patient demonstrated  decreased independence and safety during completion of ADL/functional transfer/mobility tasks.  Pt would benefit from continued skilled OT to increase safety and independence with completion of ADL/IADL tasks for functional independence and quality of life.    Treatment: OT treatment provided this date includes: Facilitation of bed mobility (rolling, supine<>sit), sitting balance at EOB , BUE

## 2024-12-29 LAB
ANION GAP SERPL CALCULATED.3IONS-SCNC: 11 MMOL/L (ref 7–16)
BASOPHILS # BLD: 0.01 K/UL (ref 0–0.2)
BASOPHILS NFR BLD: 0 % (ref 0–2)
BUN SERPL-MCNC: 18 MG/DL (ref 6–23)
CALCIUM SERPL-MCNC: 8.2 MG/DL (ref 8.6–10.2)
CHLORIDE SERPL-SCNC: 103 MMOL/L (ref 98–107)
CO2 SERPL-SCNC: 21 MMOL/L (ref 22–29)
CREAT SERPL-MCNC: 0.8 MG/DL (ref 0.5–1)
EOSINOPHIL # BLD: 0.01 K/UL (ref 0.05–0.5)
EOSINOPHILS RELATIVE PERCENT: 0 % (ref 0–6)
ERYTHROCYTE [DISTWIDTH] IN BLOOD BY AUTOMATED COUNT: 14 % (ref 11.5–15)
GFR, ESTIMATED: 70 ML/MIN/1.73M2
GLUCOSE BLD-MCNC: 127 MG/DL (ref 74–99)
GLUCOSE BLD-MCNC: 157 MG/DL (ref 74–99)
GLUCOSE BLD-MCNC: 169 MG/DL (ref 74–99)
GLUCOSE SERPL-MCNC: 99 MG/DL (ref 74–99)
HCT VFR BLD AUTO: 35.3 % (ref 34–48)
HGB BLD-MCNC: 11.3 G/DL (ref 11.5–15.5)
IMM GRANULOCYTES # BLD AUTO: <0.03 K/UL (ref 0–0.58)
IMM GRANULOCYTES NFR BLD: 1 % (ref 0–5)
LYMPHOCYTES NFR BLD: 0.6 K/UL (ref 1.5–4)
LYMPHOCYTES RELATIVE PERCENT: 14 % (ref 20–42)
MCH RBC QN AUTO: 29.9 PG (ref 26–35)
MCHC RBC AUTO-ENTMCNC: 32 G/DL (ref 32–34.5)
MCV RBC AUTO: 93.4 FL (ref 80–99.9)
MONOCYTES NFR BLD: 0.45 K/UL (ref 0.1–0.95)
MONOCYTES NFR BLD: 10 % (ref 2–12)
NEUTROPHILS NFR BLD: 75 % (ref 43–80)
NEUTS SEG NFR BLD: 3.25 K/UL (ref 1.8–7.3)
PLATELET # BLD AUTO: 246 K/UL (ref 130–450)
PMV BLD AUTO: 9.4 FL (ref 7–12)
POTASSIUM SERPL-SCNC: 3.7 MMOL/L (ref 3.5–5)
RBC # BLD AUTO: 3.78 M/UL (ref 3.5–5.5)
SODIUM SERPL-SCNC: 135 MMOL/L (ref 132–146)
WBC OTHER # BLD: 4.3 K/UL (ref 4.5–11.5)

## 2024-12-29 PROCEDURE — 36415 COLL VENOUS BLD VENIPUNCTURE: CPT

## 2024-12-29 PROCEDURE — 93296 REM INTERROG EVL PM/IDS: CPT | Performed by: STUDENT IN AN ORGANIZED HEALTH CARE EDUCATION/TRAINING PROGRAM

## 2024-12-29 PROCEDURE — G0378 HOSPITAL OBSERVATION PER HR: HCPCS

## 2024-12-29 PROCEDURE — 93294 REM INTERROG EVL PM/LDLS PM: CPT | Performed by: STUDENT IN AN ORGANIZED HEALTH CARE EDUCATION/TRAINING PROGRAM

## 2024-12-29 PROCEDURE — 85025 COMPLETE CBC W/AUTO DIFF WBC: CPT

## 2024-12-29 PROCEDURE — 6370000000 HC RX 637 (ALT 250 FOR IP): Performed by: STUDENT IN AN ORGANIZED HEALTH CARE EDUCATION/TRAINING PROGRAM

## 2024-12-29 PROCEDURE — 82947 ASSAY GLUCOSE BLOOD QUANT: CPT

## 2024-12-29 PROCEDURE — 2500000003 HC RX 250 WO HCPCS: Performed by: NURSE PRACTITIONER

## 2024-12-29 PROCEDURE — 6360000002 HC RX W HCPCS: Performed by: NURSE PRACTITIONER

## 2024-12-29 PROCEDURE — 6370000000 HC RX 637 (ALT 250 FOR IP): Performed by: NURSE PRACTITIONER

## 2024-12-29 PROCEDURE — 99232 SBSQ HOSP IP/OBS MODERATE 35: CPT | Performed by: STUDENT IN AN ORGANIZED HEALTH CARE EDUCATION/TRAINING PROGRAM

## 2024-12-29 PROCEDURE — 80048 BASIC METABOLIC PNL TOTAL CA: CPT

## 2024-12-29 PROCEDURE — 96372 THER/PROPH/DIAG INJ SC/IM: CPT

## 2024-12-29 RX ADMIN — PETROLATUM: 420 OINTMENT TOPICAL at 21:44

## 2024-12-29 RX ADMIN — ACETAMINOPHEN 650 MG: 325 TABLET ORAL at 13:23

## 2024-12-29 RX ADMIN — SODIUM CHLORIDE, PRESERVATIVE FREE 10 ML: 5 INJECTION INTRAVENOUS at 07:40

## 2024-12-29 RX ADMIN — DORZOLAMIDE HYDROCHLORIDE 1 DROP: 20 SOLUTION OPHTHALMIC at 07:29

## 2024-12-29 RX ADMIN — ACETAMINOPHEN 650 MG: 325 TABLET ORAL at 07:31

## 2024-12-29 RX ADMIN — AMLODIPINE BESYLATE 2.5 MG: 5 TABLET ORAL at 21:23

## 2024-12-29 RX ADMIN — AMLODIPINE BESYLATE 2.5 MG: 5 TABLET ORAL at 07:31

## 2024-12-29 RX ADMIN — BRIMONIDINE TARTRATE 1 DROP: 2 SOLUTION/ DROPS OPHTHALMIC at 21:24

## 2024-12-29 RX ADMIN — DORZOLAMIDE HYDROCHLORIDE 1 DROP: 20 SOLUTION OPHTHALMIC at 21:25

## 2024-12-29 RX ADMIN — DORZOLAMIDE HYDROCHLORIDE 1 DROP: 20 SOLUTION OPHTHALMIC at 13:24

## 2024-12-29 RX ADMIN — SODIUM CHLORIDE, PRESERVATIVE FREE 10 ML: 5 INJECTION INTRAVENOUS at 21:24

## 2024-12-29 RX ADMIN — ASPIRIN 81 MG: 81 TABLET, COATED ORAL at 07:31

## 2024-12-29 RX ADMIN — ATORVASTATIN CALCIUM 10 MG: 10 TABLET, FILM COATED ORAL at 21:23

## 2024-12-29 RX ADMIN — BRIMONIDINE TARTRATE 1 DROP: 2 SOLUTION/ DROPS OPHTHALMIC at 07:29

## 2024-12-29 RX ADMIN — ENOXAPARIN SODIUM 30 MG: 100 INJECTION SUBCUTANEOUS at 07:31

## 2024-12-29 ASSESSMENT — PAIN SCALES - GENERAL: PAINLEVEL_OUTOF10: 0

## 2024-12-29 NOTE — PROGRESS NOTES
stools  Genito-Urinary:  Dysuria, urgency, frequency, hematuria  Musculoskeletal:  Joint pain, joint stiffness, joint swelling, muscle pain  Neurology:  Headache, focal neurological deficits, weakness, numbness, paresthesia  Derm:  Rashes, ulcers, excoriations, bruising  Extremities:  Decreased ROM, peripheral edema, mottling      OBJECTIVE:    /69   Pulse 77   Temp 98.2 °F (36.8 °C) (Temporal)   Resp 19   Ht 1.575 m (5' 2\")   Wt 71.7 kg (158 lb)   SpO2 90%   BMI 28.90 kg/m²     General appearance:  awake, alert, hard of hearing  HEENT:  Conjunctivae/corneas clear.   Neck: Supple. No jugular venous distention.   Respiratory: symmetrical; clear to auscultation bilaterally; no wheezes; no rhonchi; no rales  Cardiovascular: rhythm regular; rate controlled; no murmurs  Abdomen: Soft, nontender, nondistended  Extremities:  peripheral pulses present; no peripheral edema; no ulcers  Right lower extremity mass present  Musculoskeletal: No clubbing, cyanosis, no bilateral lower extremity edema. Brisk capillary refill.   Skin:  No rashes  on visible skin  Neurologic: awake, alert      ASSESSMENT and PLAN:  Fall  Right lower extremity medial thigh mass  Physical deconditioning  Influenza positive     Plan  Discussed with orthopedic surgery awaiting MRI with without contrast right femur  Encourage oral feeding with assistance  Continue patient's home medication  Symptomatic management for influenza  PT OT            DVT Prophylaxis [] Lovenox, []  Heparin, [] SCDs, [] Ambulation   GI Prophylaxis [] PPI,  [] H2 Blocker,  [] Carafate,  [] Diet/Tube Feeds   Disposition Patient requires continued admission due to awaiting MRI evaluation of right femur mass   MDM [] Low, [] Moderate,[]  High  Patient's risk as above due to        Medications:  REVIEWED DAILY    Infusion Medications    sodium chloride      dextrose       Scheduled Medications    sodium chloride flush  5-40 mL IntraVENous 2 times per day    enoxaparin  30  mg SubCUTAneous Daily    amLODIPine  2.5 mg Oral BID    aspirin  81 mg Oral Daily    atorvastatin  10 mg Oral Daily    brimonidine  1 drop Left Eye BID    dorzolamide  1 drop Left Eye TID    insulin lispro  0-4 Units SubCUTAneous 4x Daily AC & HS     PRN Meds: sodium chloride flush, sodium chloride, ondansetron **OR** ondansetron, polyethylene glycol, acetaminophen **OR** acetaminophen, glucose, dextrose bolus **OR** dextrose bolus, glucagon (rDNA), dextrose    Labs:     Recent Labs     12/27/24  0404 12/28/24  0614 12/29/24  0509   WBC 6.7 4.6 4.3*   HGB 12.0 10.8* 11.3*   HCT 37.2 33.9* 35.3    263 246       Recent Labs     12/28/24  0614 12/28/24  0842 12/29/24  0509    138 135   K 3.7 3.8 3.7    106 103   CO2 21* 21* 21*   BUN 19 20 18   CREATININE 0.9 1.0 0.8   CALCIUM 8.4* 8.6 8.2*       Recent Labs     12/27/24  0404   ALKPHOS 95   ALT 12   AST 28   BILITOT 0.5       No results for input(s): \"INR\" in the last 72 hours.    No results for input(s): \"CKTOTAL\", \"TROPONINI\" in the last 72 hours.    Chronic labs:    Lab Results   Component Value Date    CHOL 127 12/28/2023    TRIG 42 12/28/2023    HDL 63 12/28/2023    TSH 1.090 06/15/2023    INR 1.0 12/25/2023    LABA1C 6.4 (H) 12/27/2024       Radiology: REVIEWED DAILY    +++++++++++++++++++++++++++++++++++++++++++++++++  Anthony Peres MD   Hospitalist  [facility], [place]  +++++++++++++++++++++++++++++++++++++++++++++++++  NOTE: This report was transcribed using voice recognition software. Every effort was made to ensure accuracy; however, inadvertent computerized transcription errors may be present.

## 2024-12-30 ENCOUNTER — APPOINTMENT (OUTPATIENT)
Dept: MRI IMAGING | Age: 89
DRG: 607 | End: 2024-12-30
Payer: MEDICARE

## 2024-12-30 PROBLEM — R22.41 LEG MASS, RIGHT: Status: ACTIVE | Noted: 2024-12-30

## 2024-12-30 PROBLEM — Z51.5 PALLIATIVE CARE ENCOUNTER: Status: ACTIVE | Noted: 2024-12-30

## 2024-12-30 LAB
ANION GAP SERPL CALCULATED.3IONS-SCNC: 12 MMOL/L (ref 7–16)
BASOPHILS # BLD: 0.01 K/UL (ref 0–0.2)
BASOPHILS NFR BLD: 0 % (ref 0–2)
BUN SERPL-MCNC: 15 MG/DL (ref 6–23)
CALCIUM SERPL-MCNC: 8.5 MG/DL (ref 8.6–10.2)
CHLORIDE SERPL-SCNC: 103 MMOL/L (ref 98–107)
CO2 SERPL-SCNC: 21 MMOL/L (ref 22–29)
CREAT SERPL-MCNC: 0.8 MG/DL (ref 0.5–1)
EOSINOPHIL # BLD: 0.02 K/UL (ref 0.05–0.5)
EOSINOPHILS RELATIVE PERCENT: 1 % (ref 0–6)
ERYTHROCYTE [DISTWIDTH] IN BLOOD BY AUTOMATED COUNT: 13.7 % (ref 11.5–15)
GFR, ESTIMATED: 68 ML/MIN/1.73M2
GLUCOSE BLD-MCNC: 117 MG/DL (ref 74–99)
GLUCOSE BLD-MCNC: 169 MG/DL (ref 74–99)
GLUCOSE BLD-MCNC: 93 MG/DL (ref 74–99)
GLUCOSE SERPL-MCNC: 114 MG/DL (ref 74–99)
HCT VFR BLD AUTO: 34.6 % (ref 34–48)
HGB BLD-MCNC: 11.1 G/DL (ref 11.5–15.5)
IMM GRANULOCYTES # BLD AUTO: <0.03 K/UL (ref 0–0.58)
IMM GRANULOCYTES NFR BLD: 1 % (ref 0–5)
LYMPHOCYTES NFR BLD: 0.93 K/UL (ref 1.5–4)
LYMPHOCYTES RELATIVE PERCENT: 23 % (ref 20–42)
MCH RBC QN AUTO: 29.5 PG (ref 26–35)
MCHC RBC AUTO-ENTMCNC: 32.1 G/DL (ref 32–34.5)
MCV RBC AUTO: 92 FL (ref 80–99.9)
MONOCYTES NFR BLD: 0.39 K/UL (ref 0.1–0.95)
MONOCYTES NFR BLD: 10 % (ref 2–12)
NEUTROPHILS NFR BLD: 66 % (ref 43–80)
NEUTS SEG NFR BLD: 2.65 K/UL (ref 1.8–7.3)
PLATELET # BLD AUTO: 240 K/UL (ref 130–450)
PMV BLD AUTO: 9.3 FL (ref 7–12)
POTASSIUM SERPL-SCNC: 3.7 MMOL/L (ref 3.5–5)
RBC # BLD AUTO: 3.76 M/UL (ref 3.5–5.5)
SODIUM SERPL-SCNC: 136 MMOL/L (ref 132–146)
WBC OTHER # BLD: 4 K/UL (ref 4.5–11.5)

## 2024-12-30 PROCEDURE — 6360000002 HC RX W HCPCS: Performed by: STUDENT IN AN ORGANIZED HEALTH CARE EDUCATION/TRAINING PROGRAM

## 2024-12-30 PROCEDURE — 82947 ASSAY GLUCOSE BLOOD QUANT: CPT

## 2024-12-30 PROCEDURE — 73720 MRI LWR EXTREMITY W/O&W/DYE: CPT

## 2024-12-30 PROCEDURE — 80048 BASIC METABOLIC PNL TOTAL CA: CPT

## 2024-12-30 PROCEDURE — 96372 THER/PROPH/DIAG INJ SC/IM: CPT

## 2024-12-30 PROCEDURE — 99232 SBSQ HOSP IP/OBS MODERATE 35: CPT | Performed by: STUDENT IN AN ORGANIZED HEALTH CARE EDUCATION/TRAINING PROGRAM

## 2024-12-30 PROCEDURE — 36415 COLL VENOUS BLD VENIPUNCTURE: CPT

## 2024-12-30 PROCEDURE — 2500000003 HC RX 250 WO HCPCS: Performed by: NURSE PRACTITIONER

## 2024-12-30 PROCEDURE — 99222 1ST HOSP IP/OBS MODERATE 55: CPT | Performed by: NURSE PRACTITIONER

## 2024-12-30 PROCEDURE — 6360000004 HC RX CONTRAST MEDICATION: Performed by: RADIOLOGY

## 2024-12-30 PROCEDURE — 6370000000 HC RX 637 (ALT 250 FOR IP): Performed by: NURSE PRACTITIONER

## 2024-12-30 PROCEDURE — 85025 COMPLETE CBC W/AUTO DIFF WBC: CPT

## 2024-12-30 PROCEDURE — A9579 GAD-BASE MR CONTRAST NOS,1ML: HCPCS | Performed by: RADIOLOGY

## 2024-12-30 PROCEDURE — 6360000002 HC RX W HCPCS: Performed by: NURSE PRACTITIONER

## 2024-12-30 PROCEDURE — 09C3XZZ EXTIRPATION OF MATTER FROM RIGHT EXTERNAL AUDITORY CANAL, EXTERNAL APPROACH: ICD-10-PCS | Performed by: STUDENT IN AN ORGANIZED HEALTH CARE EDUCATION/TRAINING PROGRAM

## 2024-12-30 PROCEDURE — 1200000000 HC SEMI PRIVATE

## 2024-12-30 RX ORDER — HALOPERIDOL 5 MG/ML
1 INJECTION INTRAMUSCULAR ONCE
Status: COMPLETED | OUTPATIENT
Start: 2024-12-31 | End: 2024-12-31

## 2024-12-30 RX ORDER — LORAZEPAM 2 MG/ML
0.5 INJECTION INTRAMUSCULAR ONCE
Status: COMPLETED | OUTPATIENT
Start: 2024-12-30 | End: 2024-12-30

## 2024-12-30 RX ADMIN — DORZOLAMIDE HYDROCHLORIDE 1 DROP: 20 SOLUTION OPHTHALMIC at 16:45

## 2024-12-30 RX ADMIN — ENOXAPARIN SODIUM 30 MG: 100 INJECTION SUBCUTANEOUS at 08:54

## 2024-12-30 RX ADMIN — GADOTERIDOL 15 ML: 279.3 INJECTION, SOLUTION INTRAVENOUS at 16:19

## 2024-12-30 RX ADMIN — ASPIRIN 81 MG: 81 TABLET, COATED ORAL at 08:53

## 2024-12-30 RX ADMIN — SODIUM CHLORIDE, PRESERVATIVE FREE 10 ML: 5 INJECTION INTRAVENOUS at 21:26

## 2024-12-30 RX ADMIN — BRIMONIDINE TARTRATE 1 DROP: 2 SOLUTION/ DROPS OPHTHALMIC at 21:26

## 2024-12-30 RX ADMIN — AMLODIPINE BESYLATE 2.5 MG: 5 TABLET ORAL at 21:27

## 2024-12-30 RX ADMIN — PETROLATUM: 420 OINTMENT TOPICAL at 08:55

## 2024-12-30 RX ADMIN — SODIUM CHLORIDE, PRESERVATIVE FREE 10 ML: 5 INJECTION INTRAVENOUS at 08:54

## 2024-12-30 RX ADMIN — ATORVASTATIN CALCIUM 10 MG: 10 TABLET, FILM COATED ORAL at 21:26

## 2024-12-30 RX ADMIN — BRIMONIDINE TARTRATE 1 DROP: 2 SOLUTION/ DROPS OPHTHALMIC at 08:54

## 2024-12-30 RX ADMIN — DORZOLAMIDE HYDROCHLORIDE 1 DROP: 20 SOLUTION OPHTHALMIC at 21:26

## 2024-12-30 RX ADMIN — PETROLATUM: 420 OINTMENT TOPICAL at 21:31

## 2024-12-30 RX ADMIN — DORZOLAMIDE HYDROCHLORIDE 1 DROP: 20 SOLUTION OPHTHALMIC at 08:53

## 2024-12-30 RX ADMIN — LORAZEPAM 0.5 MG: 2 INJECTION INTRAMUSCULAR; INTRAVENOUS at 14:50

## 2024-12-30 RX ADMIN — AMLODIPINE BESYLATE 2.5 MG: 5 TABLET ORAL at 08:53

## 2024-12-30 ASSESSMENT — PAIN SCALES - GENERAL: PAINLEVEL_OUTOF10: 0

## 2024-12-30 NOTE — PROGRESS NOTES
Hospitalist Progress Note      SYNOPSIS: Patient admitted on 2024 for BIGG (acute kidney injury) (HCC)  89-year-old female with a past medical history of arthritis, cataracts, type II DM, SDH, and syncope who presented to the ED  for weakness and  falls.     ED course is as follows: Vital signs-T98.5, HR 66, /79, SpO2 96% on room air.  BUNs/creatinine 24/1.3, glucose 179.  Flu A/B+.  X-ray of pelvis is negative for fracture.  X-ray of right femur shows no fracture or dislocation mild soft tissue prominence.  CXR negative.  CT head and CT cervical spine negative.  CT right femur with contrast  superficial fluid collection or cystic lesion in the medial soft tissues at the mid diaphyseal level of the femur without significant wall thickening   Orthopedic surgery consulted for further recommendation, MRI right femur ordered    SUBJECTIVE:  Stable overnight. No other overnight issues reported.   Patient seen and examined at bedside today a.m., at baseline mentation, very hard of hearing  Discussed with nurse present at bedside  Bowel movement normal  Records reviewed.         Temp (24hrs), Av.8 °F (36.6 °C), Min:97.6 °F (36.4 °C), Max:98 °F (36.7 °C)    DIET: ADULT DIET; Easy to Chew; 5 carb choices (75 gm/meal)  CODE: Full Code    Intake/Output Summary (Last 24 hours) at 2024 1108  Last data filed at 2024 1004  Gross per 24 hour   Intake 440 ml   Output 400 ml   Net 40 ml       Review of Systems  All bolded are positive; please see HPI  General:  Fever, chills, diaphoresis, fatigue, malaise, night sweats, weight loss  Psychological:  Anxiety, disorientation, hallucinations.  ENT:  Epistaxis, headaches, vertigo, visual changes.  Cardiovascular:  Chest pain, irregular heartbeats, palpitations, paroxysmal nocturnal dyspnea.  Respiratory:  Shortness of breath, coughing, sputum production, hemoptysis, wheezing, orthopnea.  Gastrointestinal:  Nausea, vomiting, diarrhea, heartburn,  constipation, abdominal pain, hematemesis, hematochezia, melena, acholic stools  Genito-Urinary:  Dysuria, urgency, frequency, hematuria  Musculoskeletal:  Joint pain, joint stiffness, joint swelling, muscle pain  Neurology:  Headache, focal neurological deficits, weakness, numbness, paresthesia  Derm:  Rashes, ulcers, excoriations, bruising  Extremities:  Decreased ROM, peripheral edema, mottling      OBJECTIVE:    BP (!) 150/76   Pulse 67   Temp 98 °F (36.7 °C) (Temporal)   Resp 18   Ht 1.575 m (5' 2\")   Wt 71.7 kg (158 lb)   SpO2 91%   BMI 28.90 kg/m²     General appearance:  awake, alert, and oriented to person, place, time, and purpose; appears stated age and cooperative; no apparent distress no labored breathing  HEENT:  Conjunctivae/corneas clear.   Neck: Supple. No jugular venous distention.   Respiratory: symmetrical; clear to auscultation bilaterally; no wheezes; no rhonchi; no rales  Cardiovascular: rhythm regular; rate controlled; no murmurs  Abdomen: Soft, nontender, nondistended  Extremities:  peripheral pulses present; no peripheral edema; no ulcers  Musculoskeletal: No clubbing, cyanosis, no bilateral lower extremity edema. Brisk capillary refill.   Skin:  No rashes  on visible skin  Neurologic: awake, alert and following commands     ASSESSMENT and PLAN:  Fall  Right lower extremity medial thigh mass  Physical deconditioning  Influenza positive  Hard of hearing ?  Impacted wax     Plan  Discussed with orthopedic surgery awaiting MRI with without contrast right femur  Encourage oral feeding with assistance  Continue patient's home medication  Symptomatic management for influenza  PT OT  Palliative consult  Discussed with daughter, she wants vacs to be removed while she is in the hospital, not able to follow-up outpatient considering her condition  ENT consulted         DVT Prophylaxis [] Lovenox, []  Heparin, [] SCDs, [] Ambulation   GI Prophylaxis [] PPI,  [] H2 Blocker,  [] Carafate,  []

## 2024-12-30 NOTE — CONSULTS
OTOLARYNGOLOGY  CONSULT NOTE  12/30/2024    Physician Consulted: Dr. Chamberlain  Reason for Consult: Cerumen impaction  Referring Physician: Dr. Larisa BAE  Carlene Olson is a 89 y.o. female who ENT was consulted for evaluation of cerumen impaction. Pt admitted for frequent falls. Barely responds to voice commands so history is limited. Per page family wants ears cleaned inpatient.     Review of Systems   Unable to perform ROS: Age       Past Medical History:   Diagnosis Date    Arthritis     Cataract     Diabetes mellitus (HCC)     Fall from standing 2/17/2020    Injury of left upper arm 2/17/2020    Pneumonia     Subdural hematoma 7/9/2021    Syncope and collapse 10/17/2016    Uncontrolled type 2 diabetes mellitus 8/17/2010       Past Surgical History:   Procedure Laterality Date    BRONCHOSCOPY N/A 7/1/2021    BRONCHOSCOPY ALVEOLAR LAVAGE performed by Tracy Giles MD at [facility] ENDOSCOPY    CARDIAC PROCEDURE N/A 12/25/2023    Coronary angiography performed by Chance Morfin MD at [facility] [place] CARDIAC CATH LAB    CARDIAC PROCEDURE N/A 12/25/2023    Insert temporary pacemaker performed by Chance Morfin MD at [facility] [place] CARDIAC CATH LAB    CATARACT REMOVAL WITH IMPLANT      EP DEVICE PROCEDURE N/A 12/26/2023    Insert PPM dual performed by Rinku De Jesus DO at [facility] CARDIAC CATH LAB    HYSTERECTOMY (CERVIX STATUS UNKNOWN)         Medications Prior to Admission:    Prior to Admission medications    Medication Sig Start Date End Date Taking? Authorizing Provider   atorvastatin (LIPITOR) 10 MG tablet TAKE 1 TABLET BY MOUTH EVERY DAY 6/26/24   Chandan Chamorro DO   Accu-Chek FastClix Lancets MISC USE DAILY AS DIRECTED 11/13/23   Chandan Chamorro DO   menthol 7.5 MG lozenge Take 1 lozenge by mouth every 2 hours as needed (cough) 6/20/23   Chet Larsen MD   Camphor-Menthol (MENTHOLATUM) 9-1.3 % OINT ointment Apply 1 each topically 3 times daily as needed (joint pain) 6/20/23   Chet Larsen MD   D3-1000 25 MCG (1000 UT)  minimal peripheral calcifications inferior margin and medially however no significant wall thickening or enhancement/nodularity.  Location measures up to 6.4 x 4.6 by 5.5 cm series 4, image 125 and series 601, image 57 for example favored seroma, lymphangioma, hematoma or less likely abscess without surrounding inflammation abscess is felt less likely however not excluded.  No soft tissue gas adjacent or gas locules contained within the collection. Collection is abutting the medial margin of the quadriceps musculature without invasion or bulk involvement and no cortical destruction or erosive findings.  Partially imaged pelvic soft tissues grossly unremarkable.  Fat containing right lower abdominal right paramedian herniation without associated bowel. Joint: Moderate right hip and right knee degenerative changes. No osseous erosions.     1. Superficial fluid collection or cystic lesion in the medial soft tissues at the mid diaphyseal level of the femur without significant wall thickening or enhancement/nodularity. Location measures up to 6.4 x 4.6 x 5.5 cm for example favored seroma, lymphangioma, hematoma or less likely abscess without surrounding inflammation. No soft tissue gas adjacent or gas locules contained within the collection. Collection is abutting the medial margin of the quadriceps musculature without invasion or bulk involvement and no cortical destruction or erosive findings.  Correlation with instrumentation, surgery or injury at this site.  Sterility of this collection cannot be ascertained by imaging alone consider aspiration for further evaluation. 2. Moderate right hip and right knee degenerative changes.     XR PELVIS (1-2 VIEWS)    Result Date: 12/27/2024  EXAMINATION: ONE XRAY VIEW OF THE PELVIS 12/27/2024 6:09 am COMPARISON: December 25, 2023 HISTORY: ORDERING SYSTEM PROVIDED HISTORY: trauma TECHNOLOGIST PROVIDED HISTORY: Reason for exam:->trauma FINDINGS: No fracture, dislocation or osseous

## 2024-12-30 NOTE — PLAN OF CARE
Problem: Safety - Adult  Goal: Free from fall injury  12/30/2024 1006 by Ewelina Burton RN  Outcome: Progressing  12/29/2024 2337 by Swapna Garcia RN  Outcome: Progressing     Problem: Chronic Conditions and Co-morbidities  Goal: Patient's chronic conditions and co-morbidity symptoms are monitored and maintained or improved  12/30/2024 1006 by Ewelina Burton RN  Outcome: Progressing  12/29/2024 2337 by Swapna Garcia RN  Outcome: Progressing     Problem: Skin/Tissue Integrity  Goal: Absence of new skin breakdown  Description: 1.  Monitor for areas of redness and/or skin breakdown  2.  Assess vascular access sites hourly  3.  Every 4-6 hours minimum:  Change oxygen saturation probe site  4.  Every 4-6 hours:  If on nasal continuous positive airway pressure, respiratory therapy assess nares and determine need for appliance change or resting period.  12/30/2024 1006 by Ewelina Burton RN  Outcome: Progressing  12/29/2024 2337 by Swapna Garcia RN  Outcome: Progressing     Problem: ABCDS Injury Assessment  Goal: Absence of physical injury  12/30/2024 1006 by Ewelina Burton RN  Outcome: Progressing  12/29/2024 2337 by Swapna Garcia RN  Outcome: Progressing     Problem: Pain  Goal: Verbalizes/displays adequate comfort level or baseline comfort level  12/30/2024 1006 by Ewelina Burton RN  Outcome: Progressing  12/29/2024 2337 by Swapna Garcia RN  Outcome: Progressing

## 2024-12-30 NOTE — CONSULTS
Palliative Care Department  [phone]  Palliative Care Initial Consult  Provider Lucrecia Reynolds, APRN - CNP     Carlene Olson  [id number]  Hospital Day: 4  Date of Initial Consult: 12/30/2024  Referring Provider: Anthony Peres MD   Palliative Medicine was consulted for assistance with: Goals of care    HPI:   Carlene Olson is a 89 y.o. with a medical history of arthritis, cataracts, type II DM, SDH, and syncope who was admitted on 12/27/2024 from home with a CHIEF COMPLAINT of weakness, falls.  Per ER physician note EMS was called last night for a lift assist.  When they arrived to the patient's residence patient's son was there and states that he was worried that the patient has been having generalized weakness.  He states the patient has been falling frequently over the last several months.  He expressed concerns that she is unable to bear weight on her right lower extremity as well as a mass that she has on her inner thigh.  CXR negative.  CT head and CT cervical spine negative.  CT right femur with contrast  superficial fluid collection or cystic lesion in the medial soft tissues at the mid diaphyseal level of the femur without significant wall thickening.  Ortho consulted.  MRI of the right femur ordered.  Palliative medicine consulted for further assistance.    ASSESSMENT/PLAN:     Pertinent Hospital Diagnoses     Fall  Right lower extremity medial thigh mass  Positive influenza      Palliative Care Encounter / Counseling Regarding Goals of Care  Please see detailed goals of care discussion as below  At this time, Carlene Olson, Does Not have capacity for medical decision-making.  Capacity is time limited and situation/question specific  During encounter Ivon was surrogate medical decision-maker  Outcome of goals of care meeting:   Continue current medical management  Discussed CODE STATUS options  Reviewed advanced directives  Code status Full Code  Advanced Directives: POA or living will in  follow.      OBJECTIVE:   Prognosis: Guarded    Physical Exam:  BP (!) 150/76   Pulse 67   Temp 98 °F (36.7 °C) (Temporal)   Resp 18   Ht 1.575 m (5' 2\")   Wt 71.7 kg (158 lb)   SpO2 91%   BMI 28.90 kg/m²   Constitutional:  Elderly, awake  Eyes: no scleral icterus, normal lids, no discharge  ENMT:  Normocephalic, atraumatic, mucosa moist, EOMI  Neck:  trachea midline, no JVD  Lungs: Diminished  Heart::  RRR  Abd:  Soft, non tender, non distended, bowel sounds present  Ext:   no edema, pulses present  Skin:  Warm and dry, no rashes on visible skin  Psych: restless, impulsive  Neuro:  A&Ox3, +[place], intermittent confusion, following commands    Objective data reviewed: labs, images, records, medication use, vitals, and chart    Discussed patient and the plan of care with the other IDT members: Floor Nurse, Patient, and Family    Time/Communication  Greater than 50% of time spent, total 55 minutes in counseling and coordination of care at the bedside regarding goals of care, diagnosis and prognosis, and see above.    Thank you for allowing Palliative Medicine to participate in the care of Carlene Olson.

## 2024-12-30 NOTE — CARE COORDINATION
Here as observation for frequent falls and difficulty ambulating ,unable to bear weight on her right lower extremity and has had a mass to her inner thigh. Ortho consult CT right femur reviewed. There are no fractures or dislocations. There is a large fluid-filled cystic structure at the anteromedial femur in the mid thigh region. Over 5 cm in diameter. Mostly homogenous in appearance..PT 6 OT 9   MRI would be useful to further categorize the lesion/mass for treatment going forward. Asked for iptient order. Attempted to meet patient - very sleepy Called and spoke with daughter Ivon to discuss role/transition of care.  Patient lives with her 70 yr son in 2 story home with 1st floor set up.  Her PCP is Dr Chamorro (however has not seen for > 1 yr)  Her pharmacy is [email or web address]. She has a walker.She has had [facility] and [facility]. She has  caregivers 5 days/week all day. And has lauren history of  [facility]. Daughter asking about palliative /hospice and also about ltc. Message to attending for palliative consult. CM/SW to follow.Electronically signed by Sita Shepherd RN on 12/30/2024 at 10:37 AM    Case Management Assessment  Initial Evaluation    Date/Time of Evaluation: 12/30/2024 10:41 AM  Assessment Completed by: Sita Shepherd RN    If patient is discharged prior to next notation, then this note serves as note for discharge by case management.    Patient Name: Carlene Olson                   YOB: 1935  Diagnosis: Inability to walk [R26.2]  BIGG (acute kidney injury) (HCC) [N17.9]  Mass of right thigh [R22.41]  Leg mass, right [R22.41]                   Date / Time: 12/27/2024  3:37 AM    Patient Admission Status: Inpatient   Readmission Risk (Low < 19, Mod (19-27), High > 27): No data recorded  Current PCP: Chandan Chamorro, DO  PCP verified by CM? Yes    Chart Reviewed: Yes      History Provided by: Child/Family  Patient Orientation: Person    Patient Cognition: Alert    Hospitalization in the last 30

## 2024-12-31 LAB
GLUCOSE BLD-MCNC: 110 MG/DL (ref 74–99)
GLUCOSE BLD-MCNC: 138 MG/DL (ref 74–99)
GLUCOSE BLD-MCNC: 149 MG/DL (ref 74–99)
GLUCOSE BLD-MCNC: 166 MG/DL (ref 74–99)

## 2024-12-31 PROCEDURE — 6360000002 HC RX W HCPCS

## 2024-12-31 PROCEDURE — 2500000003 HC RX 250 WO HCPCS: Performed by: STUDENT IN AN ORGANIZED HEALTH CARE EDUCATION/TRAINING PROGRAM

## 2024-12-31 PROCEDURE — 82962 GLUCOSE BLOOD TEST: CPT

## 2024-12-31 PROCEDURE — 97530 THERAPEUTIC ACTIVITIES: CPT

## 2024-12-31 PROCEDURE — 94640 AIRWAY INHALATION TREATMENT: CPT

## 2024-12-31 PROCEDURE — 6370000000 HC RX 637 (ALT 250 FOR IP): Performed by: STUDENT IN AN ORGANIZED HEALTH CARE EDUCATION/TRAINING PROGRAM

## 2024-12-31 PROCEDURE — 1200000000 HC SEMI PRIVATE

## 2024-12-31 PROCEDURE — 2500000003 HC RX 250 WO HCPCS: Performed by: NURSE PRACTITIONER

## 2024-12-31 PROCEDURE — 99232 SBSQ HOSP IP/OBS MODERATE 35: CPT | Performed by: STUDENT IN AN ORGANIZED HEALTH CARE EDUCATION/TRAINING PROGRAM

## 2024-12-31 PROCEDURE — 6360000002 HC RX W HCPCS: Performed by: NURSE PRACTITIONER

## 2024-12-31 PROCEDURE — 6370000000 HC RX 637 (ALT 250 FOR IP): Performed by: NURSE PRACTITIONER

## 2024-12-31 RX ORDER — IPRATROPIUM BROMIDE AND ALBUTEROL SULFATE 2.5; .5 MG/3ML; MG/3ML
1 SOLUTION RESPIRATORY (INHALATION)
Status: DISCONTINUED | OUTPATIENT
Start: 2024-12-31 | End: 2024-12-31

## 2024-12-31 RX ORDER — IPRATROPIUM BROMIDE AND ALBUTEROL SULFATE 2.5; .5 MG/3ML; MG/3ML
1 SOLUTION RESPIRATORY (INHALATION)
Status: DISCONTINUED | OUTPATIENT
Start: 2024-12-31 | End: 2025-01-01

## 2024-12-31 RX ADMIN — DORZOLAMIDE HYDROCHLORIDE 1 DROP: 20 SOLUTION OPHTHALMIC at 20:34

## 2024-12-31 RX ADMIN — PETROLATUM: 420 OINTMENT TOPICAL at 20:40

## 2024-12-31 RX ADMIN — PETROLATUM: 420 OINTMENT TOPICAL at 09:25

## 2024-12-31 RX ADMIN — DORZOLAMIDE HYDROCHLORIDE 1 DROP: 20 SOLUTION OPHTHALMIC at 13:51

## 2024-12-31 RX ADMIN — IPRATROPIUM BROMIDE AND ALBUTEROL SULFATE 1 DOSE: 2.5; .5 SOLUTION RESPIRATORY (INHALATION) at 14:16

## 2024-12-31 RX ADMIN — ATORVASTATIN CALCIUM 10 MG: 10 TABLET, FILM COATED ORAL at 20:37

## 2024-12-31 RX ADMIN — ASPIRIN 81 MG: 81 TABLET, COATED ORAL at 09:20

## 2024-12-31 RX ADMIN — ACETAMINOPHEN 650 MG: 325 TABLET ORAL at 20:38

## 2024-12-31 RX ADMIN — BRIMONIDINE TARTRATE 1 DROP: 2 SOLUTION/ DROPS OPHTHALMIC at 09:25

## 2024-12-31 RX ADMIN — AMLODIPINE BESYLATE 2.5 MG: 5 TABLET ORAL at 09:20

## 2024-12-31 RX ADMIN — SODIUM CHLORIDE, PRESERVATIVE FREE 10 ML: 5 INJECTION INTRAVENOUS at 20:39

## 2024-12-31 RX ADMIN — ENOXAPARIN SODIUM 30 MG: 100 INJECTION SUBCUTANEOUS at 09:20

## 2024-12-31 RX ADMIN — BRIMONIDINE TARTRATE 1 DROP: 2 SOLUTION/ DROPS OPHTHALMIC at 20:36

## 2024-12-31 RX ADMIN — MICONAZOLE NITRATE: 20.6 POWDER TOPICAL at 20:39

## 2024-12-31 RX ADMIN — HALOPERIDOL LACTATE 1 MG: 5 INJECTION, SOLUTION INTRAMUSCULAR at 00:13

## 2024-12-31 RX ADMIN — DORZOLAMIDE HYDROCHLORIDE 1 DROP: 20 SOLUTION OPHTHALMIC at 09:25

## 2024-12-31 RX ADMIN — SODIUM CHLORIDE, PRESERVATIVE FREE 10 ML: 5 INJECTION INTRAVENOUS at 09:25

## 2024-12-31 RX ADMIN — AMLODIPINE BESYLATE 2.5 MG: 5 TABLET ORAL at 20:37

## 2024-12-31 NOTE — FLOWSHEET NOTE
Inpatient Wound Care (Initial consult) 8402B    Admit Date: 12/27/2024  3:37 AM    Reason for consult:  Right posterior thigh    Significant history: Per H&P    CHIEF COMPLAINT: Generalized weakness/frequent falls     History of Present Illness: This is a 89-year-old female with a past medical history of arthritis, cataracts, type II DM, SDH, and syncope who presented to the ED this morning for weakness and  falls.  Per ER physician note EMS was called last night for a lift assist.  When they arrived to the patient's residence patient's son was there and states that he was worried that the patient has been having generalized weakness.  He states the patient has been falling frequently over the last several months.  He expressed concerns that she is unable to bear weight on her right lower extremity as well as a mass that she has on her inner thigh.  Patient was brought to the ED for evaluation. She is seen in CAT scan waiting.  She is sleepy and very hard of hearing.  Wakes to tactile stimulation.  Patient is oriented to self and situation.  Slightly disoriented to time and location.  Patient states she currently lives at home with her son.  She denies falling at home but does report that her right lower extremity has been weaker.  There is a decent sized mass on her right inner thigh with a soft exterior and fairly hard center.  Patient states the mass is painful.  Patient reports feeling more fatigued lately but denies any other viral symptoms.  Her respirations are unlabored on room air.  Lungs are clear.     Findings:     12/31/24 1415   Skin Integumentary    Skin Integrity   (dry flaky)   Location bilateral feet   Skin Integrity Site 2   Skin Integrity Location 2   (dry callous)   Location 2 bilateral plantar feet   Skin Integrity Site 3   Skin Integrity Location 3 Excoriation    Location 3 abdominal fold and breast folds   Skin Integrity Site 4   Skin Integrity Location 4 Ecchymosis   Location 4 BUE   Skin  Integrity Site 5   Skin Integrity Location 5   (Right posterior thigh: chronic skin discoloration, dry flaky)   Location 5 dry flaky: bilateral buttocks         Impression:  Skin assessment complete: See above documentation    Plan: Aquaphor  Antifungal powder  Heel protectors  TAPS  Patients nurse to request a podiatry consult for nail care.  Patient will need continued preventative care.      Kiara Garcia RN 12/31/2024 3:11 PM

## 2024-12-31 NOTE — CARE COORDINATION
Here for frequent falls and difficulty ambulating. MRI left femur completed. Palliative and Otolaryngology consults completed.Asked for updated PT/OT. Message to Denise to see if bed availability [facility] await return call. Electronically signed by Sita Shepherd RN on 12/31/2024 at 8:28 AM    Per Denise - no female [facility]. Called  and spoke with daughter Ivon would like referral to [facility]. Spoke with Deb and referral given. (Aware daughter may want to transition to LTC ) Daughter concerned that she is not drinking - spoke with bedside RN. She is a feeder.Electronically signed by Sita Shepherd RN on 12/31/2024 at 10:07 AM    Call back from Deb - can accept and she will start precert once pt note goes in.Electronically signed by Sita Shepherd RN on 12/31/2024 at 10:10 AM    [facility] ambulance form and envelope in soft chart.Electronically signed by Sita Shepherd RN on 12/31/2024 at 3:11 PM

## 2024-12-31 NOTE — PROGRESS NOTES
reach and bed alarm activated    Pt has made limited progress towards set goals.   Continue with current plan of care    Treatment Time In:0855            Treatment Time Out: 0912             Treatment Charges: Mins Units   Ther Ex  13565     Manual Therapy 06948     Thera Activities 74973 10 1   ADL/Home Mgt 54294 7    Neuro Re-ed 47621     Group Therapy      Orthotic manage/training  15796     Non-Billable Time     Total Timed Treatment 17 1       Desi RODRIGUEZ/L [id number]

## 2024-12-31 NOTE — PROGRESS NOTES
Hospitalist Progress Note      SYNOPSIS: Patient admitted on 2024 for BIGG (acute kidney injury) (HCC)  89-year-old female with a past medical history of arthritis, cataracts, type II DM, SDH, and syncope who presented to the ED  for weakness and  falls.     ED course is as follows: Vital signs-T98.5, HR 66, /79, SpO2 96% on room air.  BUNs/creatinine 24/1.3, glucose 179.  Flu A/B+.  X-ray of pelvis is negative for fracture.  X-ray of right femur shows no fracture or dislocation mild soft tissue prominence.  CXR negative.  CT head and CT cervical spine negative.  CT right femur with contrast  superficial fluid collection or cystic lesion in the medial soft tissues at the mid diaphyseal level of the femur without significant wall thickening   Orthopedic surgery consulted for further recommendation,   MRI right femur ordered which showed nonspecific cystic-appearing structure in the medial soft tissues of the mid distal thigh measuring up to 6.2 cm    SUBJECTIVE:  Stable overnight. No other overnight issues reported.   Patient seen and examined at bedside today a.m., at baseline mentation  Patient was having her breakfast with the help of an aide  Was having diffuse expiratory wheezing  Records reviewed.         Temp (24hrs), Av.9 °F (36.6 °C), Min:97.6 °F (36.4 °C), Max:98.1 °F (36.7 °C)    DIET: ADULT DIET; Easy to Chew; 5 carb choices (75 gm/meal)  CODE: Full Code    Intake/Output Summary (Last 24 hours) at 2024 1026  Last data filed at 2024 2153  Gross per 24 hour   Intake 200 ml   Output 400 ml   Net -200 ml       Review of Systems  Could not be assessed properly      OBJECTIVE:    BP (!) 152/73   Pulse 82   Temp 98.1 °F (36.7 °C) (Temporal)   Resp 18   Ht 1.575 m (5' 2\")   Wt 71.7 kg (158 lb)   SpO2 90%   BMI 28.90 kg/m²     General appearance: Hard of hearing, awake, alert,  HEENT:  Conjunctivae/corneas clear.   Neck: Supple. No jugular venous distention.

## 2024-12-31 NOTE — PROGRESS NOTES
Palliative Care Department  [phone]  Palliative Care Progress Note  Provider Dalia Berrios, APRN - CNP     Carlene Olson  [id number]  Hospital Day: 5  Date of Initial Consult: 12/30/2024  Referring Provider: Anthony Peres MD   Palliative Medicine was consulted for assistance with: Goals of care    HPI:   Carlene Olson is a 89 y.o. with a medical history of arthritis, cataracts, type II DM, SDH, and syncope who was admitted on 12/27/2024 from home with a CHIEF COMPLAINT of weakness, falls.  Per ER physician note EMS was called last night for a lift assist.  When they arrived to the patient's residence patient's son was there and states that he was worried that the patient has been having generalized weakness.  He states the patient has been falling frequently over the last several months.  He expressed concerns that she is unable to bear weight on her right lower extremity as well as a mass that she has on her inner thigh.  CXR negative.  CT head and CT cervical spine negative.  CT right femur with contrast  superficial fluid collection or cystic lesion in the medial soft tissues at the mid diaphyseal level of the femur without significant wall thickening.  Ortho consulted.  MRI of the right femur ordered.  Palliative medicine consulted for further assistance.    ASSESSMENT/PLAN:     Pertinent Hospital Diagnoses     Fall  Right lower extremity medial thigh mass  Positive influenza  Altered Mental Status      Palliative Care Encounter / Counseling Regarding Goals of Care  Please see detailed goals of care discussion as below  At this time, Carlene Olson, Does Not have capacity for medical decision-making.  Capacity is time limited and situation/question specific  During encounter Ivon was surrogate medical decision-maker  Outcome of goals of care meeting:   Continue current medical management  Change CODE STATUS to DNR CCA  Goal is nursing facility  Code status DNR-CCA  Advanced Directives: POA or

## 2024-12-31 NOTE — DISCHARGE INSTR - COC
Continuity of Care Form    Patient Name: Carlene Olson   :  1935  MRN:  [id number]    Admit date:  2024  Discharge date:      Code Status Order: DNR-CCA   Advance Directives:   Advance Care Flowsheet Documentation             Admitting Physician:  Anthony Peres MD  PCP: Chandan Chamorro DO    Discharging Nurse: nakul monreal  Discharging Hospital Unit/Room#: 8402/8402-B  Discharging Unit Phone Number: [phone]    Emergency Contact:   Extended Emergency Contact Information  Primary Emergency Contact: [place] [address]  Home Phone: [phone]  Relation: Child  Secondary Emergency Contact: Dominick Olson  Home Phone: [phone]  Relation: Child   needed? No    Past Surgical History:  Past Surgical History:   Procedure Laterality Date    BRONCHOSCOPY N/A 2021    BRONCHOSCOPY ALVEOLAR LAVAGE performed [place] [facility]    CARDIAC PROCEDURE N/A 2023    Coronary angiography performed by Chance Morfin MD [place] [facility]    CARDIAC PROCEDURE N/A 2023    Insert temporary pacemaker performed by Chance Morfin MD at [facility]    CATARACT REMOVAL WITH IMPLANT      EP DEVICE PROCEDURE N/A 2023    Insert PPM dual performed by Rinku De Jesus DO at [facility]    HYSTERECTOMY (CERVIX STATUS UNKNOWN)         Immunization History:   Immunization History   Administered Date(s) Administered    DTaP vaccine 2020    TDaP, ADACEL (age 10y-64y), BOOSTRIX (age 10y+), IM, 0.5mL 2020       Active Problems:  Patient Active Problem List   Diagnosis Code    DM (diabetes mellitus) (HCC) E11.9    Bilateral foot pain M79.671, M79.672    HTN (hypertension) I10    HLD (hyperlipidemia) E78.5    Age-related osteoporosis without current pathological fracture M81.0    Primary osteoarthritis involving multiple joints M15.0    Class 1 obesity due to excess calories with serious comorbidity and body mass index (BMI) of

## 2024-12-31 NOTE — PROGRESS NOTES
Orthopedic surgery interval update:    MRI reviewed right femur.  Nonspecific cystic changes with no suggestion of aggressive features to medial thigh mass.  Had a discussion with daughter Ivon on phone regarding imaging findings.  Patient's daughter reports mass has been present for some time.  Desires no surgical intervention, at this time no surgical intervention is warranted regarding mass.  Per MRI report, recommended to patient's family for follow-up in 3 to 6 months.  Discussed with daughter at that time if they desire repeat evaluation, Dr. Rod Paige will be available for evaluation.  If any concerns for malignant nature or progression, would recommend follow-up in [place] or [place] with orthopedic oncologist for evaluation and management.    Weight-bear as tolerated right lower extremity    Orthopedic surgery will follow from [facility] for remainder of visit, please contact with questions or concerns.    Electronically signed by Diony Laureano DO on 12/31/2024 at 11:33 AM

## 2024-12-31 NOTE — PROGRESS NOTES
Physical Therapy  Treatment Note    Name: Carlene Olson  : 1935  MRN: [id number]      Date of Service: 2024    Evaluating PT:  Kyung Jacinto, PT, DPT, [id number]    Room #:  8402/8402-B  Diagnosis:  Inability to walk [R26.2]  BIGG (acute kidney injury) (HCC) [N17.9]  Mass of right thigh [R22.41]  Leg mass, right [R22.41]  PMHx/PSHx:     has a past medical history of Arthritis, Cataract, Diabetes mellitus (HCC), Fall from standing, Injury of left upper arm, Pneumonia, Subdural hematoma, Syncope and collapse, and Uncontrolled type 2 diabetes mellitus.     Procedure/Surgery:  none this admission   Precautions:  Fall Risk, WBAT BLE, Droplet Precautions (Influenza), Blind L eye, [place], TSM, Cognition, Very [place]  Equipment Needs:  TBD    SUBJECTIVE:    Pt unable to provide subjective information. Per chart review, pt lives with her family with home health. Pt ambulated at home PTA.    OBJECTIVE:   Initial Evaluation  Date: 24 Treatment 24 Short Term/ Long Term   Goals   AM-PAC 6 Clicks     Was pt agreeable to Eval/treatment? yes yes    Does pt have pain? No c/o pain  No c/o pain    Bed Mobility  Rolling: NT  Supine to sit: Depx2  Sit to supine: Depx2  Scooting: NT Rolling: Dep  Supine to sit: Dep  Sit to supine: Dep  Scooting: NT Rolling: MaxA  Supine to sit: MaxA  Sit to supine: MaxA  Scooting: Maxa   Transfers Sit to stand: NT  Stand to sit: NT  Stand pivot: NT Sit to stand: NT  Stand to sit: NT  Stand pivot: NT TBD   Ambulation   NT NT TBD   Stair negotiation: ascended and descended NT NT TBD   ROM BUE:  Refer to OT note  BLE:  NT     Strength BUE:  Refer to OT note  BLE:  NT     Balance Sitting EOB:  Depx2  Dynamic Standing:  NT Sitting EOB:  Dep long sitting  Dynamic Standing:  NT Sitting EOB:  MaxA  Dynamic Standing:  TBD   Pt is A & O x 0. Pt very [place], unable to follow simple commands at time of session. Pt does not track toward audible stimulus. Cognitive and hearing deficits  noted  Sensation:  Pt denies numbness and tingling to extremities  Edema:  unremarkable     Patient education  Pt educated on role of PT and safety with functional mobility     Patient response to education:   Pt verbalized understanding Pt demonstrated skill Pt requires further education in this area   no no x     ASSESSMENT:    Conditions Requiring Skilled Therapeutic Intervention:    [x]Decreased strength     [x]Decreased ROM  [x]Decreased functional mobility  [x]Decreased balance   [x]Decreased endurance   [x]Decreased posture  []Decreased sensation  []Decreased coordination   [x]Decreased vision  [x]Decreased safety awareness   []Increased pain       Comments:  Pt agreeable to PT. Pt presents with strength, endurance, cognitive, and hearing deficits. Pt unable to follow simple commands this date. Pt requiring assistance and hand over hand facilitation for mobility. Patient would benefit from continued skilled PT to maximize functional mobility independence.   Alarm activated post session    Pt's/ family goals   1. Not stated    Prognosis is good for reaching above PT goals.    Patient and or family understand(s) diagnosis, prognosis, and plan of care.  yes    PHYSICAL THERAPY PLAN OF CARE:    PT POC is established based on physician order and patient diagnosis     Referring provider/PT Order:    12/27/24 0800  PT eval and treat  Start:  12/27/24 0800,   End:  12/27/24 0800,   ONE TIME,   Standing Count:  1 Occurrences,   R         Ely Vargas, MICHELLE - CNP     Diagnosis:  Inability to walk [R26.2]  BIGG (acute kidney injury) (HCC) [N17.9]  Mass of right thigh [R22.41]  Leg mass, right [R22.41]  Specific instructions for next treatment:  Maximize safe and independent functional mobility     Current Treatment Recommendations:     [x] Strengthening to improve independence with functional mobility   [x] ROM to improve independence with functional mobility   [x] Balance Training to improve static/dynamic balance

## 2024-12-31 NOTE — ACP (ADVANCE CARE PLANNING)
Advance Care Planning   Healthcare Decision Maker:    Primary Decision Maker: Ivon Chen - Child - [phone]    Secondary Decision Maker: Yunior Olson - Child - [phone]    Supplemental (Other) Decision Maker: Dominick Olson - Child - [phone]    Click here to complete Healthcare Decision Makers including selection of the Healthcare Decision Maker Relationship (ie \"Primary\").

## 2024-12-31 NOTE — PLAN OF CARE
Problem: Safety - Adult  Goal: Free from fall injury  12/30/2024 2302 by Stella Kang RN  Outcome: Progressing  Flowsheets (Taken 12/30/2024 2043)  Free From Fall Injury: Instruct family/caregiver on patient safety  12/30/2024 1006 by Ewelina Burton RN  Outcome: Progressing     Problem: Chronic Conditions and Co-morbidities  Goal: Patient's chronic conditions and co-morbidity symptoms are monitored and maintained or improved  12/30/2024 2302 by Stella Kang RN  Outcome: Progressing  Flowsheets (Taken 12/30/2024 2043)  Care Plan - Patient's Chronic Conditions and Co-Morbidity Symptoms are Monitored and Maintained or Improved: Monitor and assess patient's chronic conditions and comorbid symptoms for stability, deterioration, or improvement  12/30/2024 1006 by Ewelina Burton RN  Outcome: Progressing     Problem: Skin/Tissue Integrity  Goal: Absence of new skin breakdown  Description: 1.  Monitor for areas of redness and/or skin breakdown  2.  Assess vascular access sites hourly  3.  Every 4-6 hours minimum:  Change oxygen saturation probe site  4.  Every 4-6 hours:  If on nasal continuous positive airway pressure, respiratory therapy assess nares and determine need for appliance change or resting period.  12/30/2024 2302 by Stella Kang RN  Outcome: Progressing  12/30/2024 1006 by Ewelina Burton RN  Outcome: Progressing     Problem: ABCDS Injury Assessment  Goal: Absence of physical injury  12/30/2024 2302 by Stella Kang RN  Outcome: Progressing  12/30/2024 1006 by Ewelina Burton RN  Outcome: Progressing     Problem: Pain  Goal: Verbalizes/displays adequate comfort level or baseline comfort level  12/30/2024 2302 by Stella Kang RN  Outcome: Progressing  12/30/2024 1006 by Ewelina Burton RN  Outcome: Progressing

## 2025-01-01 LAB
GLUCOSE BLD-MCNC: 129 MG/DL (ref 74–99)
GLUCOSE BLD-MCNC: 164 MG/DL (ref 74–99)
GLUCOSE BLD-MCNC: 171 MG/DL (ref 74–99)
GLUCOSE BLD-MCNC: 226 MG/DL (ref 74–99)

## 2025-01-01 PROCEDURE — 1200000000 HC SEMI PRIVATE

## 2025-01-01 PROCEDURE — 2500000003 HC RX 250 WO HCPCS: Performed by: NURSE PRACTITIONER

## 2025-01-01 PROCEDURE — 82962 GLUCOSE BLOOD TEST: CPT

## 2025-01-01 PROCEDURE — 99232 SBSQ HOSP IP/OBS MODERATE 35: CPT | Performed by: STUDENT IN AN ORGANIZED HEALTH CARE EDUCATION/TRAINING PROGRAM

## 2025-01-01 PROCEDURE — 6370000000 HC RX 637 (ALT 250 FOR IP): Performed by: STUDENT IN AN ORGANIZED HEALTH CARE EDUCATION/TRAINING PROGRAM

## 2025-01-01 PROCEDURE — 6370000000 HC RX 637 (ALT 250 FOR IP): Performed by: NURSE PRACTITIONER

## 2025-01-01 PROCEDURE — 6360000002 HC RX W HCPCS: Performed by: NURSE PRACTITIONER

## 2025-01-01 RX ORDER — IPRATROPIUM BROMIDE AND ALBUTEROL SULFATE 2.5; .5 MG/3ML; MG/3ML
1 SOLUTION RESPIRATORY (INHALATION) 4 TIMES DAILY PRN
Status: DISCONTINUED | OUTPATIENT
Start: 2025-01-01 | End: 2025-01-02 | Stop reason: HOSPADM

## 2025-01-01 RX ADMIN — MICONAZOLE NITRATE: 20.6 POWDER TOPICAL at 20:10

## 2025-01-01 RX ADMIN — ENOXAPARIN SODIUM 30 MG: 100 INJECTION SUBCUTANEOUS at 08:51

## 2025-01-01 RX ADMIN — PETROLATUM: 420 OINTMENT TOPICAL at 08:52

## 2025-01-01 RX ADMIN — ASPIRIN 81 MG: 81 TABLET, COATED ORAL at 08:50

## 2025-01-01 RX ADMIN — MICONAZOLE NITRATE: 20.6 POWDER TOPICAL at 08:52

## 2025-01-01 RX ADMIN — BRIMONIDINE TARTRATE 1 DROP: 2 SOLUTION/ DROPS OPHTHALMIC at 20:00

## 2025-01-01 RX ADMIN — BRIMONIDINE TARTRATE 1 DROP: 2 SOLUTION/ DROPS OPHTHALMIC at 08:51

## 2025-01-01 RX ADMIN — ATORVASTATIN CALCIUM 10 MG: 10 TABLET, FILM COATED ORAL at 20:00

## 2025-01-01 RX ADMIN — PETROLATUM: 420 OINTMENT TOPICAL at 20:10

## 2025-01-01 RX ADMIN — DORZOLAMIDE HYDROCHLORIDE 1 DROP: 20 SOLUTION OPHTHALMIC at 08:51

## 2025-01-01 RX ADMIN — SODIUM CHLORIDE, PRESERVATIVE FREE 10 ML: 5 INJECTION INTRAVENOUS at 20:00

## 2025-01-01 RX ADMIN — AMLODIPINE BESYLATE 2.5 MG: 5 TABLET ORAL at 20:00

## 2025-01-01 RX ADMIN — DORZOLAMIDE HYDROCHLORIDE 1 DROP: 20 SOLUTION OPHTHALMIC at 15:59

## 2025-01-01 RX ADMIN — INSULIN LISPRO 1 UNITS: 100 INJECTION, SOLUTION INTRAVENOUS; SUBCUTANEOUS at 10:58

## 2025-01-01 RX ADMIN — DORZOLAMIDE HYDROCHLORIDE 1 DROP: 20 SOLUTION OPHTHALMIC at 20:00

## 2025-01-01 RX ADMIN — AMLODIPINE BESYLATE 2.5 MG: 5 TABLET ORAL at 08:50

## 2025-01-01 ASSESSMENT — PAIN SCALES - GENERAL
PAINLEVEL_OUTOF10: 0
PAINLEVEL_OUTOF10: 0

## 2025-01-01 NOTE — CONSULTS
Department of Podiatry   Consult Note        Reason for Consult: Bilateral calluses    CHIEF COMPLAINT: Bilateral calluses    HISTORY OF PRESENT ILLNESS:                The patient is a 89 y.o. female with significant past medical history of diabetes mellitus, hypertension, frequent falls, generalized weakness, presents to our podiatry service for bilateral plantar calluses.  Unable to obtain history from patient due to severely altered mental status.  Patient was seen with Prevalon boots on. Patient denies any N/V/D/F/C/SOB/CP and has no other pedal complaints at this time.     Past Medical History:        Diagnosis Date    Arthritis     Cataract     Diabetes mellitus (HCC)     Fall from standing 2/17/2020    Injury of left upper arm 2/17/2020    Pneumonia     Subdural hematoma 7/9/2021    Syncope and collapse 10/17/2016    Uncontrolled type 2 diabetes mellitus 8/17/2010       Past Surgical History:        Procedure Laterality Date    BRONCHOSCOPY N/A 7/1/2021    BRONCHOSCOPY ALVEOLAR LAVAGE performed [place] [facility]    CARDIAC PROCEDURE N/A 12/25/2023    Coronary angiography performed by [place] Morfin [place] [facility]    CARDIAC PROCEDURE N/A 12/25/2023    Insert temporary pacemaker performed by Chance Morfin MD at [facility]    CATARACT REMOVAL WITH IMPLANT      EP DEVICE PROCEDURE N/A 12/26/2023    Insert PPM dual performed by Rinku De Jesus DO at [facility]    HYSTERECTOMY (CERVIX STATUS UNKNOWN)         Medications Prior to Admission:    Medications Prior to Admission: atorvastatin (LIPITOR) 10 MG tablet, TAKE 1 TABLET BY MOUTH EVERY DAY  Accu-Chek FastClix Lancets MISC, USE DAILY AS DIRECTED  menthol 7.5 MG lozenge, Take 1 lozenge by mouth every 2 hours as needed (cough)  Camphor-Menthol (MENTHOLATUM) 9-1.3 % OINT ointment, Apply 1 each topically 3 times daily as needed (joint pain)  D3-1000 25 MCG (1000 UT) TABS tablet, TAKE 1 TABLET BY MOUTH EVERY

## 2025-01-01 NOTE — PROGRESS NOTES
RT Nebulizer Bronchodilator Protocol Note    There is a bronchodilator order in the chart from a provider indicating to follow the RT Bronchodilator Protocol and there is an “Initiate RT Bronchodilator Protocol” order as well (see protocol at bottom of note).    CXR Findings:  No results found.    The findings from the last RT Protocol Assessment were as follows:  Smoking: None or smoker <15 pack years  Respiratory Pattern: Regular pattern and RR 12-20 bpm  Breath Sounds: Clear breath sounds  Cough: Strong, spontaneous, non-productive  Indication for Bronchodilator Therapy:    Bronchodilator Assessment Score: 0    Aerosolized bronchodilator medication orders have been revised according to the RT Nebulizer Bronchodilator Protocol below.    Respiratory Therapist to perform RT Therapy Protocol Assessment initially then follow the protocol.  Repeat RT Therapy Protocol Assessment PRN for score 0-3 or on second treatment, BID, and PRN for scores above 3.    No Indications - adjust the frequency to every 6 hours PRN wheezing or bronchospasm, if no treatments needed after 48 hours then discontinue using Per Protocol order mode.     If indication present, adjust the RT bronchodilator orders based on the Bronchodilator Assessment Score as indicated below.  If a patient is on this medication at home then do not decrease Frequency below that used at home.    0-3 - enter or revise RT bronchodilator order(s) to equivalent RT Bronchodilator order with Frequency of every 4 hours PRN for wheezing or increased work of breathing using Per Protocol order mode.       4-6 - enter or revise RT Bronchodilator order(s) to two equivalent RT bronchodilator orders with one order with BID Frequency and one order with Frequency of every 4 hours PRN wheezing or increased work of breathing using Per Protocol order mode.         7-10 - enter or revise RT Bronchodilator order(s) to two equivalent RT bronchodilator orders with one order with TID  Frequency and one order with Frequency of every 4 hours PRN wheezing or increased work of breathing using Per Protocol order mode.       11-13 - enter or revise RT Bronchodilator order(s) to one equivalent RT bronchodilator order with QID Frequency and an Albuterol order with Frequency of every 4 hours PRN wheezing or increased work of breathing using Per Protocol order mode.      Greater than 13 - enter or revise RT Bronchodilator order(s) to one equivalent RT bronchodilator order with every 4 hours Frequency and an Albuterol order with Frequency of every 2 hours PRN wheezing or increased work of breathing using Per Protocol order mode.     RT to enter RT Home Evaluation for COPD & MDI Assessment order using Per Protocol order mode.    Electronically signed by Sarah Mac RCP on 1/1/2025 at 9:15 AM

## 2025-01-01 NOTE — PROGRESS NOTES
Hospitalist Progress Note      SYNOPSIS: Patient admitted on 2024 for BIGG (acute kidney injury) (HCC)  89-year-old female with a past medical history of arthritis, cataracts, type II DM, SDH, and syncope who presented to the ED  for weakness and  falls.     ED course is as follows: Vital signs-T98.5, HR 66, /79, SpO2 96% on room air.  BUNs/creatinine 24/1.3, glucose 179.  Flu A/B+.  X-ray of pelvis is negative for fracture.  X-ray of right femur shows no fracture or dislocation mild soft tissue prominence.  CXR negative.  CT head and CT cervical spine negative.  CT right femur with contrast  superficial fluid collection or cystic lesion in the medial soft tissues at the mid diaphyseal level of the femur without significant wall thickening   Orthopedic surgery consulted for further recommendation,   MRI right femur ordered which showed nonspecific cystic-appearing structure in the medial soft tissues of the mid distal thigh measuring up to 6.2 cm  Orthopedic recommended conservative management with follow-up in 3 to 6-month    SUBJECTIVE:  Stable overnight. No other overnight issues reported.   Patient seen and examined at bedside today a.m., very hard of hearing  At baseline mentation  Records reviewed.         Temp (24hrs), Av.2 °F (37.3 °C), Min:98.9 °F (37.2 °C), Max:99.5 °F (37.5 °C)    DIET: ADULT DIET; Easy to Chew; 5 carb choices (75 gm/meal)  CODE: DNR-CCA    Intake/Output Summary (Last 24 hours) at 2025 1010  Last data filed at 2024  Gross per 24 hour   Intake 200 ml   Output 800 ml   Net -600 ml       Review of Systems  Could not be assessed properly, patient very hard of hearing, not answering to my questions appropriately      OBJECTIVE:    /60   Pulse 77   Temp 98.9 °F (37.2 °C) (Temporal)   Resp 20   Ht 1.575 m (5' 2\")   Wt 71.7 kg (158 lb)   SpO2 94%   BMI 28.90 kg/m²     General appearance: Hard of hearing, awake, alert,  HEENT:

## 2025-01-02 VITALS
DIASTOLIC BLOOD PRESSURE: 92 MMHG | RESPIRATION RATE: 20 BRPM | OXYGEN SATURATION: 93 % | BODY MASS INDEX: 29.08 KG/M2 | WEIGHT: 158 LBS | HEIGHT: 62 IN | SYSTOLIC BLOOD PRESSURE: 112 MMHG | TEMPERATURE: 99.4 F | HEART RATE: 79 BPM

## 2025-01-02 LAB
GLUCOSE BLD-MCNC: 160 MG/DL (ref 74–99)
GLUCOSE BLD-MCNC: 166 MG/DL (ref 74–99)
GLUCOSE BLD-MCNC: 245 MG/DL (ref 74–99)

## 2025-01-02 PROCEDURE — 6370000000 HC RX 637 (ALT 250 FOR IP): Performed by: NURSE PRACTITIONER

## 2025-01-02 PROCEDURE — 82962 GLUCOSE BLOOD TEST: CPT

## 2025-01-02 PROCEDURE — 99239 HOSP IP/OBS DSCHRG MGMT >30: CPT | Performed by: STUDENT IN AN ORGANIZED HEALTH CARE EDUCATION/TRAINING PROGRAM

## 2025-01-02 PROCEDURE — 6360000002 HC RX W HCPCS: Performed by: NURSE PRACTITIONER

## 2025-01-02 PROCEDURE — 99232 SBSQ HOSP IP/OBS MODERATE 35: CPT | Performed by: STUDENT IN AN ORGANIZED HEALTH CARE EDUCATION/TRAINING PROGRAM

## 2025-01-02 PROCEDURE — 2500000003 HC RX 250 WO HCPCS: Performed by: NURSE PRACTITIONER

## 2025-01-02 RX ADMIN — SODIUM CHLORIDE, PRESERVATIVE FREE 10 ML: 5 INJECTION INTRAVENOUS at 10:37

## 2025-01-02 RX ADMIN — PETROLATUM: 420 OINTMENT TOPICAL at 10:38

## 2025-01-02 RX ADMIN — INSULIN LISPRO 1 UNITS: 100 INJECTION, SOLUTION INTRAVENOUS; SUBCUTANEOUS at 17:17

## 2025-01-02 RX ADMIN — MICONAZOLE NITRATE: 20.6 POWDER TOPICAL at 10:37

## 2025-01-02 RX ADMIN — AMLODIPINE BESYLATE 2.5 MG: 5 TABLET ORAL at 10:31

## 2025-01-02 RX ADMIN — DORZOLAMIDE HYDROCHLORIDE 1 DROP: 20 SOLUTION OPHTHALMIC at 10:34

## 2025-01-02 RX ADMIN — ENOXAPARIN SODIUM 30 MG: 100 INJECTION SUBCUTANEOUS at 10:31

## 2025-01-02 RX ADMIN — BRIMONIDINE TARTRATE 1 DROP: 2 SOLUTION/ DROPS OPHTHALMIC at 10:32

## 2025-01-02 RX ADMIN — ASPIRIN 81 MG: 81 TABLET, COATED ORAL at 10:30

## 2025-01-02 RX ADMIN — DORZOLAMIDE HYDROCHLORIDE 1 DROP: 20 SOLUTION OPHTHALMIC at 17:18

## 2025-01-02 ASSESSMENT — PAIN SCALES - GENERAL
PAINLEVEL_OUTOF10: 0
PAINLEVEL_OUTOF10: 0

## 2025-01-02 NOTE — DISCHARGE SUMMARY
measuring up to 6.2 cm    Orthopedic recommended conservative management with follow-up in 3 to 6-month    ENT consulted for impacted wax which was removed from right ear    Podiatry consulted for b/l calluses, recommended lac-hydrin to be applied.    Patient code status changed to DNRCCA.    She is being discharged to SNF to f/u with her PCP outpatient setting          Exam:     BP (!) 112/92 Comment: RN Notified  Pulse 79   Temp 99.4 °F (37.4 °C) (Temporal)   Resp 20   Ht 1.575 m (5' 2\")   Wt 71.7 kg (158 lb)   SpO2 93%   BMI 28.90 kg/m²     General appearance: Hard of hearing, awake, alert,  HEENT:  Conjunctivae/corneas clear.   Neck: Supple. No jugular venous distention.   Respiratory: symmetrical; clear to auscultation bilaterally; bilateral expiratory wheezing present; no rhonchi; no rales  Cardiovascular: rhythm regular; rate controlled; no murmurs  Abdomen: Soft, nontender, nondistended  Extremities: Swelling present in right thigh  Musculoskeletal: No clubbing, cyanosis, no bilateral lower extremity edema. Brisk capillary refill. BL foot calluses present  Skin:  No rashes  on visible skin  Neurologic: awake, alert      Consults:     IP CONSULT TO SOCIAL WORK  IP CONSULT TO ORTHOPEDIC SURGERY  IP CONSULT TO PALLIATIVE CARE  IP CONSULT TO OTOLARYNGOLOGY  IP CONSULT TO PODIATRY    Significant Diagnostic Studies:   Mri right femur, ct right femur, ct head wo contrast, ct cervical spine wo contrast    Disposition:  SNF    Discharge Instructions/Follow-up:  as documented    Code Status:  DNR-CCA     Activity: activity as tolerated    Condition: Stable at the time of discharge    Diet: Regular    Labs: For convenience and continuity at follow-up the following most recent labs are provided:      CBC:    Lab Results   Component Value Date/Time    WBC 4.0 12/30/2024 05:21 AM    HGB 11.1 12/30/2024 05:21 AM    HCT 34.6 12/30/2024 05:21 AM     12/30/2024 05:21 AM       Renal:    Lab Results   Component  evaluation, counseling and review of medications and discharge plan.      Signed:    Anthony Peres MD   1/2/2025

## 2025-01-02 NOTE — PROGRESS NOTES
Hospitalist Progress Note      SYNOPSIS: Patient admitted on 2024 for BIGG (acute kidney injury) (HCC)  89-year-old female with a past medical history of arthritis, cataracts, type II DM, SDH, and syncope who presented to the ED  for weakness and  falls.     ED course is as follows: Vital signs-T98.5, HR 66, /79, SpO2 96% on room air.  BUNs/creatinine 24/1.3, glucose 179.  Flu A/B+.  X-ray of pelvis is negative for fracture.  X-ray of right femur shows no fracture or dislocation mild soft tissue prominence.  CXR negative.  CT head and CT cervical spine negative.  CT right femur with contrast  superficial fluid collection or cystic lesion in the medial soft tissues at the mid diaphyseal level of the femur without significant wall thickening   Orthopedic surgery consulted for further recommendation,   MRI right femur ordered which showed nonspecific cystic-appearing structure in the medial soft tissues of the mid distal thigh measuring up to 6.2 cm  Orthopedic recommended conservative management with follow-up in 3 to 6-month    SUBJECTIVE:  Stable overnight. No other overnight issues reported.   Patient seen and examined at bedside today a.m., hard of hearing, at baseline mentation  Records reviewed.         Temp (24hrs), Av.9 °F (37.2 °C), Min:98.4 °F (36.9 °C), Max:99.4 °F (37.4 °C)    DIET: ADULT DIET; Easy to Chew; 5 carb choices (75 gm/meal)  CODE: DNR-CCA    Intake/Output Summary (Last 24 hours) at 2025 1022  Last data filed at 2025 1946  Gross per 24 hour   Intake --   Output 450 ml   Net -450 ml       Review of Systems  Could not be assessed properly very hard of hearing      OBJECTIVE:    BP (!) 112/92 Comment: RN Notified  Pulse 79   Temp 99.4 °F (37.4 °C) (Temporal)   Resp 20   Ht 1.575 m (5' 2\")   Wt 71.7 kg (158 lb)   SpO2 93%   BMI 28.90 kg/m²     General appearance: Hard of hearing, awake, alert,  HEENT:  Conjunctivae/corneas clear.   Neck: Supple. No jugular

## 2025-01-02 NOTE — PROGRESS NOTES
[facility]. Pt leaving unit with personal belongings including dentures. Attempted to give patient report to [facility]. No response. Left voicemail with callback phone number.

## 2025-01-02 NOTE — PROGRESS NOTES
Department of Podiatry  Progress Note    SUBJECTIVE:  Carlene Olson is seen at bedside for bilateral calluses.  Patient is nonverbal at this time.  Patient falls asleep detention through exam.  Appropriate chest rise and fall is visualized.  No acute events overnight. Patient denies any N/V/D/F/C/SOB/CP. No other pedal complaints at this time.     OBJECTIVE:    Scheduled Meds:   miconazole   Topical BID    white petrolatum   Topical BID    sodium chloride flush  5-40 mL IntraVENous 2 times per day    enoxaparin  30 mg SubCUTAneous Daily    amLODIPine  2.5 mg Oral BID    aspirin  81 mg Oral Daily    atorvastatin  10 mg Oral Daily    brimonidine  1 drop Left Eye BID    dorzolamide  1 drop Left Eye TID    insulin lispro  0-4 Units SubCUTAneous 4x Daily AC & HS     Continuous Infusions:   sodium chloride      dextrose       PRN Meds:.ipratropium 0.5 mg-albuterol 2.5 mg, white petrolatum, sodium chloride flush, sodium chloride, ondansetron **OR** ondansetron, polyethylene glycol, acetaminophen **OR** acetaminophen, glucose, dextrose bolus **OR** dextrose bolus, glucagon (rDNA), dextrose    Allergies   Allergen Reactions    Penicillins        BP (!) 112/92 Comment: RN Notified  Pulse 79   Temp 99.4 °F (37.4 °C) (Temporal)   Resp 20   Ht 1.575 m (5' 2\")   Wt 71.7 kg (158 lb)   SpO2 93%   BMI 28.90 kg/m²       EXAM:    VASCULAR:  DP and PT pulses are weak. CFT < 5 seconds B/L.  Warm to warm from the tibial tuberosity to the distal aspect of the digits dorsally. Hair growth is not noted to the distal aspects dorsally.    NEUROLOGIC:  light touch diminished    MUSCULOSKELETAL:  Deformities are Absent . 4/5 Gross Muscle strength in all 4 quadrants.    DERM:  Skin is intact to the bilateral lower extremities. Edema is absent. Erythema is Absent .  2 prominent HPK noted to each foot..  Webspaces 1-4 b/l are C/D/I.         Scheduled Meds:   miconazole   Topical BID    white petrolatum   Topical BID    sodium chloride flush  5-40

## 2025-01-02 NOTE — PLAN OF CARE
Problem: Safety - Adult  Goal: Free from fall injury  Outcome: Completed     Problem: Chronic Conditions and Co-morbidities  Goal: Patient's chronic conditions and co-morbidity symptoms are monitored and maintained or improved  Outcome: Completed     Problem: Skin/Tissue Integrity  Goal: Absence of new skin breakdown  Description: 1.  Monitor for areas of redness and/or skin breakdown  2.  Assess vascular access sites hourly  3.  Every 4-6 hours minimum:  Change oxygen saturation probe site  4.  Every 4-6 hours:  If on nasal continuous positive airway pressure, respiratory therapy assess nares and determine need for appliance change or resting period.  Outcome: Completed     Problem: ABCDS Injury Assessment  Goal: Absence of physical injury  Outcome: Completed     Problem: Pain  Goal: Verbalizes/displays adequate comfort level or baseline comfort level  Outcome: Completed

## 2025-01-02 NOTE — PLAN OF CARE
Problem: Safety - Adult  Goal: Free from fall injury  1/1/2025 2059 by Hayde Calvin RN  Outcome: Progressing  1/1/2025 1037 by Ewelina Burton RN  Outcome: Progressing     Problem: Chronic Conditions and Co-morbidities  Goal: Patient's chronic conditions and co-morbidity symptoms are monitored and maintained or improved  1/1/2025 2059 by Hayde Calvin RN  Outcome: Progressing  1/1/2025 1037 by Ewelina Burton RN  Outcome: Progressing     Problem: Skin/Tissue Integrity  Goal: Absence of new skin breakdown  Description: 1.  Monitor for areas of redness and/or skin breakdown  2.  Assess vascular access sites hourly  3.  Every 4-6 hours minimum:  Change oxygen saturation probe site  4.  Every 4-6 hours:  If on nasal continuous positive airway pressure, respiratory therapy assess nares and determine need for appliance change or resting period.  1/1/2025 2059 by Hayde Calvin RN  Outcome: Progressing  1/1/2025 1037 by Ewelina Burton RN  Outcome: Progressing     Problem: ABCDS Injury Assessment  Goal: Absence of physical injury  1/1/2025 2059 by Hayde Calvin RN  Outcome: Progressing  1/1/2025 1037 by Ewelina Burton RN  Outcome: Progressing     Problem: Pain  Goal: Verbalizes/displays adequate comfort level or baseline comfort level  1/1/2025 2059 by Hayde Calvin RN  Outcome: Progressing  1/1/2025 1037 by Ewelina Burton RN  Outcome: Progressing

## 2025-01-02 NOTE — CARE COORDINATION
Precert pending for [facility] since 12/31, message left with liaison to check status of auth. Will check discharge once auth obtained. Pasrr and ambulance on soft chart.     1357 Auth obtained for [facility]. Updated attending. Plan for discharge today.     1435 Patient for discharge today. [facility] for next available at 1700. Facility notified of discharge time. Daughter notified of discharge time.     For questions I can be reached at [phone]. MIKE Robert

## 2025-01-03 ENCOUNTER — APPOINTMENT (OUTPATIENT)
Dept: CT IMAGING | Age: 89
DRG: 871 | End: 2025-01-03
Payer: MEDICARE

## 2025-01-03 ENCOUNTER — HOSPITAL ENCOUNTER (INPATIENT)
Age: 89
LOS: 6 days | Discharge: HOME | DRG: 871 | End: 2025-01-11
Attending: EMERGENCY MEDICINE | Admitting: INTERNAL MEDICINE
Payer: MEDICARE

## 2025-01-03 ENCOUNTER — APPOINTMENT (OUTPATIENT)
Dept: GENERAL RADIOLOGY | Age: 89
DRG: 871 | End: 2025-01-03
Payer: MEDICARE

## 2025-01-03 DIAGNOSIS — J18.9 PNEUMONIA DUE TO INFECTIOUS ORGANISM, UNSPECIFIED LATERALITY, UNSPECIFIED PART OF LUNG: ICD-10-CM

## 2025-01-03 DIAGNOSIS — I33.0 ACUTE BACTERIAL ENDOCARDITIS: ICD-10-CM

## 2025-01-03 DIAGNOSIS — R41.82 ALTERED MENTAL STATUS, UNSPECIFIED ALTERED MENTAL STATUS TYPE: Primary | ICD-10-CM

## 2025-01-03 PROBLEM — G93.40 ENCEPHALOPATHY: Status: ACTIVE | Noted: 2025-01-03

## 2025-01-03 LAB
25(OH)D3 SERPL-MCNC: 35 NG/ML (ref 30–100)
ALBUMIN SERPL-MCNC: 2.9 G/DL (ref 3.5–5.2)
ALP SERPL-CCNC: 75 U/L (ref 35–104)
ALT SERPL-CCNC: 18 U/L (ref 0–32)
AMMONIA PLAS-SCNC: 11 UMOL/L (ref 11–51)
ANION GAP SERPL CALCULATED.3IONS-SCNC: 13 MMOL/L (ref 7–16)
AST SERPL-CCNC: 22 U/L (ref 0–31)
B PARAP IS1001 DNA NPH QL NAA+NON-PROBE: NOT DETECTED
B PERT DNA SPEC QL NAA+PROBE: NOT DETECTED
B.E.: -4 MMOL/L (ref -3–3)
BASOPHILS # BLD: 0 K/UL (ref 0–0.2)
BASOPHILS NFR BLD: 0 % (ref 0–2)
BILIRUB SERPL-MCNC: 0.6 MG/DL (ref 0–1.2)
BILIRUB UR QL STRIP: NEGATIVE
BUN SERPL-MCNC: 23 MG/DL (ref 6–23)
C PNEUM DNA NPH QL NAA+NON-PROBE: NOT DETECTED
CALCIUM SERPL-MCNC: 8.6 MG/DL (ref 8.6–10.2)
CHLORIDE SERPL-SCNC: 100 MMOL/L (ref 98–107)
CHOLEST SERPL-MCNC: 112 MG/DL
CLARITY UR: ABNORMAL
CO2 SERPL-SCNC: 24 MMOL/L (ref 22–29)
COHB: 0.5 % (ref 0–1.5)
COLOR UR: YELLOW
CREAT SERPL-MCNC: 0.9 MG/DL (ref 0.5–1)
CRITICAL: ABNORMAL
DATE ANALYZED: ABNORMAL
DATE OF COLLECTION: ABNORMAL
EKG ATRIAL RATE: 84 BPM
EKG P AXIS: 45 DEGREES
EKG P-R INTERVAL: 146 MS
EKG Q-T INTERVAL: 364 MS
EKG QRS DURATION: 78 MS
EKG QTC CALCULATION (BAZETT): 430 MS
EKG R AXIS: 24 DEGREES
EKG T AXIS: 39 DEGREES
EKG VENTRICULAR RATE: 84 BPM
EOSINOPHIL # BLD: 0 K/UL (ref 0.05–0.5)
EOSINOPHILS RELATIVE PERCENT: 0 % (ref 0–6)
EPI CELLS #/AREA URNS HPF: ABNORMAL /HPF
ERYTHROCYTE [DISTWIDTH] IN BLOOD BY AUTOMATED COUNT: 14.4 % (ref 11.5–15)
FLUBV RNA NPH QL NAA+NON-PROBE: NOT DETECTED
FOLATE SERPL-MCNC: 16.3 NG/ML (ref 4.8–24.2)
GFR, ESTIMATED: 63 ML/MIN/1.73M2
GLUCOSE BLD-MCNC: 148 MG/DL (ref 74–99)
GLUCOSE BLD-MCNC: 149 MG/DL (ref 74–99)
GLUCOSE BLD-MCNC: 155 MG/DL
GLUCOSE BLD-MCNC: 155 MG/DL (ref 74–99)
GLUCOSE SERPL-MCNC: 155 MG/DL (ref 74–99)
GLUCOSE UR STRIP-MCNC: NEGATIVE MG/DL
HADV DNA NPH QL NAA+NON-PROBE: NOT DETECTED
HBA1C MFR BLD: 6.6 % (ref 4–5.6)
HCO3: 17.9 MMOL/L (ref 22–26)
HCOV 229E RNA NPH QL NAA+NON-PROBE: NOT DETECTED
HCOV HKU1 RNA NPH QL NAA+NON-PROBE: NOT DETECTED
HCOV NL63 RNA NPH QL NAA+NON-PROBE: NOT DETECTED
HCOV OC43 RNA NPH QL NAA+NON-PROBE: NOT DETECTED
HCT VFR BLD AUTO: 38.4 % (ref 34–48)
HDLC SERPL-MCNC: 49 MG/DL
HGB BLD-MCNC: 12.1 G/DL (ref 11.5–15.5)
HGB UR QL STRIP.AUTO: NEGATIVE
HHB: 9.2 % (ref 0–5)
HMPV RNA NPH QL NAA+NON-PROBE: NOT DETECTED
HPIV1 RNA NPH QL NAA+NON-PROBE: NOT DETECTED
HPIV2 RNA NPH QL NAA+NON-PROBE: NOT DETECTED
HPIV3 RNA NPH QL NAA+NON-PROBE: NOT DETECTED
HPIV4 RNA NPH QL NAA+NON-PROBE: NOT DETECTED
INFLUENZA A (NO SUBTYPE) BY PCR: DETECTED
IRON SATN MFR SERPL: 7 % (ref 15–50)
IRON SERPL-MCNC: 11 UG/DL (ref 37–145)
KETONES UR STRIP-MCNC: NEGATIVE MG/DL
LAB: ABNORMAL
LACTATE BLDV-SCNC: 1.9 MMOL/L (ref 0.5–1.9)
LACTATE BLDV-SCNC: 1.9 MMOL/L (ref 0.5–1.9)
LDLC SERPL CALC-MCNC: 48 MG/DL
LEUKOCYTE ESTERASE UR QL STRIP: NEGATIVE
LYMPHOCYTES NFR BLD: 0.46 K/UL (ref 1.5–4)
LYMPHOCYTES RELATIVE PERCENT: 3 % (ref 20–42)
Lab: 1320
M PNEUMO DNA NPH QL NAA+NON-PROBE: NOT DETECTED
MAGNESIUM SERPL-MCNC: 1.7 MG/DL (ref 1.6–2.6)
MCH RBC QN AUTO: 29.7 PG (ref 26–35)
MCHC RBC AUTO-ENTMCNC: 31.5 G/DL (ref 32–34.5)
MCV RBC AUTO: 94.1 FL (ref 80–99.9)
METHB: 0.2 % (ref 0–1.5)
MODE: ABNORMAL
MONOCYTES NFR BLD: 0.61 K/UL (ref 0.1–0.95)
MONOCYTES NFR BLD: 4 % (ref 2–12)
NEUTROPHILS NFR BLD: 94 % (ref 43–80)
NEUTS SEG NFR BLD: 16.53 K/UL (ref 1.8–7.3)
NITRITE UR QL STRIP: POSITIVE
O2 SATURATION: 90.7 % (ref 92–98.5)
O2HB: 90.1 % (ref 94–97)
OPERATOR ID: 366
PATIENT TEMP: 37 C
PCO2: 24.7 MMHG (ref 35–45)
PH BLOOD GAS: 7.48 (ref 7.35–7.45)
PH UR STRIP: 6 [PH] (ref 5–9)
PLATELET # BLD AUTO: 294 K/UL (ref 130–450)
PMV BLD AUTO: 9.5 FL (ref 7–12)
PO2: 55.6 MMHG (ref 75–100)
POTASSIUM SERPL-SCNC: 3.5 MMOL/L (ref 3.5–5)
PROCALCITONIN SERPL-MCNC: 0.71 NG/ML (ref 0–0.08)
PROT SERPL-MCNC: 6.5 G/DL (ref 6.4–8.3)
PROT UR STRIP-MCNC: 100 MG/DL
RBC # BLD AUTO: 4.08 M/UL (ref 3.5–5.5)
RBC # BLD: ABNORMAL 10*6/UL
RBC #/AREA URNS HPF: ABNORMAL /HPF
RSV RNA NPH QL NAA+NON-PROBE: NOT DETECTED
RV+EV RNA NPH QL NAA+NON-PROBE: NOT DETECTED
SARS-COV-2 RNA NPH QL NAA+NON-PROBE: NOT DETECTED
SODIUM SERPL-SCNC: 137 MMOL/L (ref 132–146)
SOURCE, BLOOD GAS: ABNORMAL
SP GR UR STRIP: 1.02 (ref 1–1.03)
SPECIMEN DESCRIPTION: ABNORMAL
T4 FREE SERPL-MCNC: 1.4 NG/DL (ref 0.9–1.7)
THB: 12.5 G/DL (ref 11.5–16.5)
TIBC SERPL-MCNC: 169 UG/DL (ref 250–450)
TIME ANALYZED: 1322
TRIGL SERPL-MCNC: 74 MG/DL
TROPONIN I SERPL HS-MCNC: 37 NG/L (ref 0–9)
TSH SERPL DL<=0.05 MIU/L-ACNC: 0.74 UIU/ML (ref 0.27–4.2)
UROBILINOGEN UR STRIP-ACNC: 1 EU/DL (ref 0–1)
VIT B12 SERPL-MCNC: 777 PG/ML (ref 211–946)
VLDLC SERPL CALC-MCNC: 15 MG/DL
WBC #/AREA URNS HPF: ABNORMAL /HPF
WBC OTHER # BLD: 17.6 K/UL (ref 4.5–11.5)

## 2025-01-03 PROCEDURE — 96366 THER/PROPH/DIAG IV INF ADDON: CPT

## 2025-01-03 PROCEDURE — 85025 COMPLETE CBC W/AUTO DIFF WBC: CPT

## 2025-01-03 PROCEDURE — G0378 HOSPITAL OBSERVATION PER HR: HCPCS

## 2025-01-03 PROCEDURE — 6360000002 HC RX W HCPCS: Performed by: NURSE PRACTITIONER

## 2025-01-03 PROCEDURE — 82306 VITAMIN D 25 HYDROXY: CPT

## 2025-01-03 PROCEDURE — 83540 ASSAY OF IRON: CPT

## 2025-01-03 PROCEDURE — 2580000003 HC RX 258: Performed by: NURSE PRACTITIONER

## 2025-01-03 PROCEDURE — 2580000003 HC RX 258

## 2025-01-03 PROCEDURE — 93005 ELECTROCARDIOGRAM TRACING: CPT

## 2025-01-03 PROCEDURE — 96365 THER/PROPH/DIAG IV INF INIT: CPT

## 2025-01-03 PROCEDURE — 6360000002 HC RX W HCPCS

## 2025-01-03 PROCEDURE — 71045 X-RAY EXAM CHEST 1 VIEW: CPT

## 2025-01-03 PROCEDURE — 84145 PROCALCITONIN (PCT): CPT

## 2025-01-03 PROCEDURE — 96368 THER/DIAG CONCURRENT INF: CPT

## 2025-01-03 PROCEDURE — 96372 THER/PROPH/DIAG INJ SC/IM: CPT

## 2025-01-03 PROCEDURE — 70450 CT HEAD/BRAIN W/O DYE: CPT

## 2025-01-03 PROCEDURE — 83605 ASSAY OF LACTIC ACID: CPT

## 2025-01-03 PROCEDURE — 2500000003 HC RX 250 WO HCPCS: Performed by: NURSE PRACTITIONER

## 2025-01-03 PROCEDURE — 87150 DNA/RNA AMPLIFIED PROBE: CPT

## 2025-01-03 PROCEDURE — 84439 ASSAY OF FREE THYROXINE: CPT

## 2025-01-03 PROCEDURE — 87040 BLOOD CULTURE FOR BACTERIA: CPT

## 2025-01-03 PROCEDURE — 87077 CULTURE AEROBIC IDENTIFY: CPT

## 2025-01-03 PROCEDURE — 6370000000 HC RX 637 (ALT 250 FOR IP): Performed by: NURSE PRACTITIONER

## 2025-01-03 PROCEDURE — 82805 BLOOD GASES W/O2 SATURATION: CPT

## 2025-01-03 PROCEDURE — 87449 NOS EACH ORGANISM AG IA: CPT

## 2025-01-03 PROCEDURE — 80053 COMPREHEN METABOLIC PANEL: CPT

## 2025-01-03 PROCEDURE — 83036 HEMOGLOBIN GLYCOSYLATED A1C: CPT

## 2025-01-03 PROCEDURE — 99223 1ST HOSP IP/OBS HIGH 75: CPT | Performed by: NURSE PRACTITIONER

## 2025-01-03 PROCEDURE — 87086 URINE CULTURE/COLONY COUNT: CPT

## 2025-01-03 PROCEDURE — 80061 LIPID PANEL: CPT

## 2025-01-03 PROCEDURE — 82607 VITAMIN B-12: CPT

## 2025-01-03 PROCEDURE — 82746 ASSAY OF FOLIC ACID SERUM: CPT

## 2025-01-03 PROCEDURE — 93010 ELECTROCARDIOGRAM REPORT: CPT | Performed by: INTERNAL MEDICINE

## 2025-01-03 PROCEDURE — 87899 AGENT NOS ASSAY W/OPTIC: CPT

## 2025-01-03 PROCEDURE — 82962 GLUCOSE BLOOD TEST: CPT

## 2025-01-03 PROCEDURE — 82140 ASSAY OF AMMONIA: CPT

## 2025-01-03 PROCEDURE — 0202U NFCT DS 22 TRGT SARS-COV-2: CPT

## 2025-01-03 PROCEDURE — 84484 ASSAY OF TROPONIN QUANT: CPT

## 2025-01-03 PROCEDURE — 81001 URINALYSIS AUTO W/SCOPE: CPT

## 2025-01-03 PROCEDURE — 84443 ASSAY THYROID STIM HORMONE: CPT

## 2025-01-03 PROCEDURE — 99285 EMERGENCY DEPT VISIT HI MDM: CPT

## 2025-01-03 PROCEDURE — 36415 COLL VENOUS BLD VENIPUNCTURE: CPT

## 2025-01-03 PROCEDURE — 83550 IRON BINDING TEST: CPT

## 2025-01-03 PROCEDURE — 83735 ASSAY OF MAGNESIUM: CPT

## 2025-01-03 RX ORDER — AMLODIPINE BESYLATE 2.5 MG/1
2.5 TABLET ORAL 2 TIMES DAILY
Status: DISCONTINUED | OUTPATIENT
Start: 2025-01-03 | End: 2025-01-11 | Stop reason: HOSPADM

## 2025-01-03 RX ORDER — VITAMIN B COMPLEX
1000 TABLET ORAL DAILY
Status: DISCONTINUED | OUTPATIENT
Start: 2025-01-03 | End: 2025-01-11 | Stop reason: HOSPADM

## 2025-01-03 RX ORDER — MECOBALAMIN 5000 MCG
5 TABLET,DISINTEGRATING ORAL NIGHTLY PRN
Status: DISCONTINUED | OUTPATIENT
Start: 2025-01-03 | End: 2025-01-11 | Stop reason: HOSPADM

## 2025-01-03 RX ORDER — CALCIUM CARBONATE 500 MG/1
500 TABLET, CHEWABLE ORAL 3 TIMES DAILY PRN
Status: DISCONTINUED | OUTPATIENT
Start: 2025-01-03 | End: 2025-01-11 | Stop reason: HOSPADM

## 2025-01-03 RX ORDER — ONDANSETRON 4 MG/1
4 TABLET, ORALLY DISINTEGRATING ORAL EVERY 8 HOURS PRN
Status: DISCONTINUED | OUTPATIENT
Start: 2025-01-03 | End: 2025-01-11 | Stop reason: HOSPADM

## 2025-01-03 RX ORDER — SODIUM CHLORIDE 0.9 % (FLUSH) 0.9 %
5-40 SYRINGE (ML) INJECTION EVERY 12 HOURS SCHEDULED
Status: DISCONTINUED | OUTPATIENT
Start: 2025-01-03 | End: 2025-01-11 | Stop reason: HOSPADM

## 2025-01-03 RX ORDER — PREDNISOLONE ACETATE 10 MG/ML
1 SUSPENSION/ DROPS OPHTHALMIC 3 TIMES DAILY
Status: DISCONTINUED | OUTPATIENT
Start: 2025-01-03 | End: 2025-01-11 | Stop reason: HOSPADM

## 2025-01-03 RX ORDER — BENZONATATE 100 MG/1
100 CAPSULE ORAL 3 TIMES DAILY PRN
Status: DISCONTINUED | OUTPATIENT
Start: 2025-01-03 | End: 2025-01-11 | Stop reason: HOSPADM

## 2025-01-03 RX ORDER — DORZOLAMIDE HCL 20 MG/ML
1 SOLUTION/ DROPS OPHTHALMIC 3 TIMES DAILY
Status: DISCONTINUED | OUTPATIENT
Start: 2025-01-03 | End: 2025-01-11 | Stop reason: HOSPADM

## 2025-01-03 RX ORDER — SODIUM CHLORIDE 0.9 % (FLUSH) 0.9 %
5-40 SYRINGE (ML) INJECTION PRN
Status: DISCONTINUED | OUTPATIENT
Start: 2025-01-03 | End: 2025-01-11 | Stop reason: HOSPADM

## 2025-01-03 RX ORDER — ASPIRIN 81 MG/1
81 TABLET ORAL DAILY
Status: DISCONTINUED | OUTPATIENT
Start: 2025-01-03 | End: 2025-01-11 | Stop reason: HOSPADM

## 2025-01-03 RX ORDER — HYDRALAZINE HYDROCHLORIDE 20 MG/ML
10 INJECTION INTRAMUSCULAR; INTRAVENOUS EVERY 6 HOURS PRN
Status: DISCONTINUED | OUTPATIENT
Start: 2025-01-03 | End: 2025-01-11 | Stop reason: HOSPADM

## 2025-01-03 RX ORDER — ATORVASTATIN CALCIUM 10 MG/1
10 TABLET, FILM COATED ORAL DAILY
Status: DISCONTINUED | OUTPATIENT
Start: 2025-01-03 | End: 2025-01-11 | Stop reason: HOSPADM

## 2025-01-03 RX ORDER — MECOBALAMIN 5000 MCG
5 TABLET,DISINTEGRATING ORAL EVERY EVENING
Status: DISCONTINUED | OUTPATIENT
Start: 2025-01-03 | End: 2025-01-11 | Stop reason: HOSPADM

## 2025-01-03 RX ORDER — ACETAMINOPHEN 325 MG/1
650 TABLET ORAL EVERY 6 HOURS PRN
Status: DISCONTINUED | OUTPATIENT
Start: 2025-01-03 | End: 2025-01-11 | Stop reason: HOSPADM

## 2025-01-03 RX ORDER — ONDANSETRON 2 MG/ML
4 INJECTION INTRAMUSCULAR; INTRAVENOUS EVERY 6 HOURS PRN
Status: DISCONTINUED | OUTPATIENT
Start: 2025-01-03 | End: 2025-01-11 | Stop reason: HOSPADM

## 2025-01-03 RX ORDER — INSULIN LISPRO 100 [IU]/ML
0-4 INJECTION, SOLUTION INTRAVENOUS; SUBCUTANEOUS
Status: DISCONTINUED | OUTPATIENT
Start: 2025-01-03 | End: 2025-01-11 | Stop reason: HOSPADM

## 2025-01-03 RX ORDER — GLUCAGON 1 MG/ML
1 KIT INJECTION PRN
Status: DISCONTINUED | OUTPATIENT
Start: 2025-01-03 | End: 2025-01-11 | Stop reason: HOSPADM

## 2025-01-03 RX ORDER — HALOPERIDOL 5 MG/ML
1 INJECTION INTRAMUSCULAR ONCE
Status: COMPLETED | OUTPATIENT
Start: 2025-01-03 | End: 2025-01-03

## 2025-01-03 RX ORDER — BRIMONIDINE TARTRATE 2 MG/ML
1 SOLUTION/ DROPS OPHTHALMIC 2 TIMES DAILY
Status: DISCONTINUED | OUTPATIENT
Start: 2025-01-03 | End: 2025-01-11 | Stop reason: HOSPADM

## 2025-01-03 RX ORDER — POTASSIUM CHLORIDE 1500 MG/1
20 TABLET, EXTENDED RELEASE ORAL ONCE
Status: DISCONTINUED | OUTPATIENT
Start: 2025-01-03 | End: 2025-01-03

## 2025-01-03 RX ORDER — ENOXAPARIN SODIUM 100 MG/ML
40 INJECTION SUBCUTANEOUS DAILY
Status: DISCONTINUED | OUTPATIENT
Start: 2025-01-03 | End: 2025-01-11 | Stop reason: HOSPADM

## 2025-01-03 RX ORDER — SODIUM CHLORIDE 9 MG/ML
INJECTION, SOLUTION INTRAVENOUS PRN
Status: DISCONTINUED | OUTPATIENT
Start: 2025-01-03 | End: 2025-01-11 | Stop reason: HOSPADM

## 2025-01-03 RX ORDER — ACETAMINOPHEN 650 MG/1
650 SUPPOSITORY RECTAL EVERY 6 HOURS PRN
Status: DISCONTINUED | OUTPATIENT
Start: 2025-01-03 | End: 2025-01-11 | Stop reason: HOSPADM

## 2025-01-03 RX ORDER — FERROUS SULFATE 325(65) MG
325 TABLET ORAL 2 TIMES DAILY WITH MEALS
Status: DISCONTINUED | OUTPATIENT
Start: 2025-01-03 | End: 2025-01-11 | Stop reason: HOSPADM

## 2025-01-03 RX ORDER — DEXTROSE MONOHYDRATE 100 MG/ML
INJECTION, SOLUTION INTRAVENOUS CONTINUOUS PRN
Status: DISCONTINUED | OUTPATIENT
Start: 2025-01-03 | End: 2025-01-11 | Stop reason: HOSPADM

## 2025-01-03 RX ORDER — POLYETHYLENE GLYCOL 3350 17 G/17G
17 POWDER, FOR SOLUTION ORAL DAILY PRN
Status: DISCONTINUED | OUTPATIENT
Start: 2025-01-03 | End: 2025-01-11 | Stop reason: HOSPADM

## 2025-01-03 RX ADMIN — BRIMONIDINE TARTRATE 1 DROP: 2 SOLUTION/ DROPS OPHTHALMIC at 21:04

## 2025-01-03 RX ADMIN — PREDNISOLONE ACETATE 1 DROP: 10 SUSPENSION/ DROPS OPHTHALMIC at 21:05

## 2025-01-03 RX ADMIN — CEFEPIME 2000 MG: 2 INJECTION, POWDER, FOR SOLUTION INTRAVENOUS at 23:57

## 2025-01-03 RX ADMIN — SODIUM CHLORIDE 1500 MG: 9 INJECTION, SOLUTION INTRAVENOUS at 10:58

## 2025-01-03 RX ADMIN — MICONAZOLE NITRATE: 20.6 POWDER TOPICAL at 21:05

## 2025-01-03 RX ADMIN — DORZOLAMIDE HYDROCHLORIDE 1 DROP: 20 SOLUTION OPHTHALMIC at 21:04

## 2025-01-03 RX ADMIN — HALOPERIDOL LACTATE 1 MG: 5 INJECTION, SOLUTION INTRAMUSCULAR at 21:44

## 2025-01-03 RX ADMIN — SODIUM CHLORIDE, PRESERVATIVE FREE 10 ML: 5 INJECTION INTRAVENOUS at 21:05

## 2025-01-03 RX ADMIN — PETROLATUM: 420 OINTMENT TOPICAL at 21:05

## 2025-01-03 RX ADMIN — CEFEPIME 2000 MG: 2 INJECTION, POWDER, FOR SOLUTION INTRAVENOUS at 11:16

## 2025-01-03 RX ADMIN — ENOXAPARIN SODIUM 40 MG: 100 INJECTION SUBCUTANEOUS at 16:25

## 2025-01-03 NOTE — PROGRESS NOTES
4 Eyes Skin Assessment     NAME:  Carlene Olson  YOB: 1935  MEDICAL RECORD NUMBER:  [id number]    The patient is being assessed for  Admission    I agree that at least one RN has performed a thorough Head to Toe Skin Assessment on the patient. ALL assessment sites listed below have been assessed.      Areas assessed by both nurses:    Head, Face, Ears, Shoulders, Back, Chest, Arms, Elbows, Hands, Sacrum. Buttock, Coccyx, Ischium, and Legs. Feet and Heels        Does the Patient have a Wound? Yes wound(s) were present on assessment. LDA wound assessment was Initiated and completed by RN       John Prevention initiated by RN: Yes  Wound Care Orders initiated by RN: Yes    Pressure Injury (Stage 3,4, Unstageable, DTI, NWPT, and Complex wounds) if present, place Wound referral order by RN under : No    New Ostomies, if present place, Ostomy referral order under : No     Nurse 1 eSignature: Electronically signed by Libby Wagner RN on 1/3/25 at 6:21 PM EST    **SHARE this note so that the co-signing nurse can place an eSignature**    Nurse 2 eSignature: Electronically signed by Donna Escamilla RN on 1/3/25 at 6:31 PM EST

## 2025-01-03 NOTE — ED PROVIDER NOTES
administration in time range)   aspirin EC tablet 81 mg (has no administration in time range)   atorvastatin (LIPITOR) tablet 10 mg (has no administration in time range)   brimonidine (ALPHAGAN) 0.2 % ophthalmic solution 1 drop (has no administration in time range)   Vitamin D (CHOLECALCIFEROL) tablet 1,000 Units (has no administration in time range)   dorzolamide (TRUSOPT) 2 % ophthalmic solution 1 drop (has no administration in time range)   prednisoLONE acetate (PRED FORTE) 1 % ophthalmic suspension 1 drop (has no administration in time range)   white petrolatum ointment (has no administration in time range)   miconazole (MICOTIN) 2 % powder (has no administration in time range)   sodium chloride flush 0.9 % injection 5-40 mL (has no administration in time range)   sodium chloride flush 0.9 % injection 5-40 mL (has no administration in time range)   0.9 % sodium chloride infusion (has no administration in time range)   enoxaparin (LOVENOX) injection 40 mg (has no administration in time range)   ondansetron (ZOFRAN-ODT) disintegrating tablet 4 mg (has no administration in time range)     Or   ondansetron (ZOFRAN) injection 4 mg (has no administration in time range)   polyethylene glycol (GLYCOLAX) packet 17 g (has no administration in time range)   acetaminophen (TYLENOL) tablet 650 mg (has no administration in time range)     Or   acetaminophen (TYLENOL) suppository 650 mg (has no administration in time range)   ceFEPIme (MAXIPIME) 2,000 mg in sodium chloride 0.9 % 100 mL IVPB ([id number]) (has no administration in time range)   melatonin disintegrating tablet 5 mg (has no administration in time range)   melatonin disintegrating tablet 5 mg (has no administration in time range)   hydrALAZINE (APRESOLINE) injection 10 mg (has no administration in time range)   magnesium-glycerin-water (1-2-3) enema (has no administration in time range)   calcium carbonate (TUMS) chewable tablet 500 mg (has no administration in time  range)   Polyvinyl Alcohol-Povidone PF (REFRESH) 1.4-0.6 % ophthalmic solution 1 drop (has no administration in time range)   glucose chewable tablet 16 g (has no administration in time range)   dextrose bolus 10% 125 mL (has no administration in time range)     Or   dextrose bolus 10% 250 mL (has no administration in time range)   glucagon injection 1 mg (has no administration in time range)   dextrose 10 % infusion (has no administration in time range)   benzonatate (TESSALON) capsule 100 mg (has no administration in time range)   benzocaine-menthol (CEPACOL SORE THROAT) lozenge 1 lozenge (has no administration in time range)   magnesium hydroxide (MILK OF MAGNESIA) 400 MG/5ML suspension 30 mL (has no administration in time range)   sodium chloride (OCEAN, BABY AYR) 0.65 % nasal spray 1 spray (has no administration in time range)   potassium chloride (KLOR-CON M) extended release tablet 20 mEq (has no administration in time range)   insulin lispro (HUMALOG,ADMELOG) injection vial 0-4 Units (has no administration in time range)   ferrous sulfate (IRON 325) tablet 325 mg (has no administration in time range)   vancomycin (VANCOCIN) 1,500 mg in sodium chloride 0.9 % 250 mL IVPB ([id number]) (0 mg IntraVENous Stopped 1/3/25 1231)   ceFEPIme (MAXIPIME) 2,000 mg in sodium chloride 0.9 % 100 mL IVPB ([id number]) (0 mg IntraVENous Stopped 1/3/25 1145)   [id number] adaptor (  Given 1/3/25 1119)     89-year-old female presents from a nursing facility with concerns of altered mentation.  Patient was recently admitted and discharged to the rehab facility.  On arrival patient is febrile.  She is confused.  GCS of 12.  She is moving all 4 extremities.  Response to pain.  Lungs are clear to auscultation, abdomen is soft and nontender.  EKG without any ischemic changes.  Leukocytosis of 17, hemoglobin stable.  Positive for flu.  Patient did have fluid on her prior admission.  Nitrate positive urinalysis.  Glucose within normal limits.

## 2025-01-03 NOTE — PROGRESS NOTES
Antibiotic Extended Infusion Policy     This patient is on medication that requires renal, weight, and/or indication dose adjustment.      Date Body Weight IBW  Adjusted BW SCr  CrCl Dialysis status BMI   1/3/2025 73.9 kg (163 lb) Ideal body weight: 50.1 kg (110 lb 7.2 oz)  Adjusted ideal body weight: 59.6 kg (131 lb 7.5 oz) Serum creatinine: 0.9 mg/dL 01/03/25 0914  Estimated creatinine clearance: 41 mL/min N/a Body mass index is 29.81 kg/m².       Pharmacy has dose-adjusted the following medication(s):    Ordered Medication: Cefepime 2000mg q8h     Order Changed/converted to: Cefepime 2000mg q12h    These changes were made per protocol according to the [facility]   Automatic Extended Infusion Dose Adjustment Policy.     *Please note this dose may need readjusted if patient's condition changes.    Please contact pharmacy with any questions regarding these changes.    Peace Hatfield RPH  1/3/2025  1:01 PM

## 2025-01-03 NOTE — PROGRESS NOTES
Pharmacy Consultation Note  (Antibiotic Dosing and Monitoring)    Initial consult date: 1/3/25  Consulting physician/provider: Dr. Clements  Drug: Vancomycin  Indication: [place]    Age/  Gender Height Weight IBW  Allergy Information   89 y.o./female   73.9 kg (163 lb)     Ideal body weight: 50.1 kg (110 lb 7.2 oz)  Adjusted ideal body weight: 59.6 kg (131 lb 7.5 oz)   Penicillins      Renal Function:  Recent Labs     01/03/25  0914   BUN 23   CREATININE 0.9       Intake/Output Summary (Last 24 hours) at 1/3/2025 1544  Last data filed at 1/3/2025 1022  Gross per 24 hour   Intake --   Output 600 ml   Net -600 ml       Vancomycin Monitoring:  Trough:  No results for input(s): \"VANCOTROUGH\" in the last 72 hours.  Random:  No results for input(s): \"VANCORANDOM\" in the last 72 hours.    Vancomycin Administration Times:  Recent vancomycin administrations                     vancomycin (VANCOCIN) 1,500 mg in sodium chloride 0.9 % 250 mL IVPB ([id number]) (mg) 1,500 mg New Bag 01/03/25 1058                      Assessment:  Patient is a 89 y.o. female who has been initiated on vancomycin  Estimated Creatinine Clearance: 41 mL/min (based on SCr of 0.9 mg/dL).      Plan:  Will initiate vancomycin 1000 mg IV every 24 hours  Will check vancomycin levels when appropriate  Will continue to monitor renal function   Pharmacy to follow      Lyle Narayan PharmD, [facility] 1/3/2025 3:44 PM  [phone]  TANNER: [phone]  SEY: [phone]  SJW: [phone]

## 2025-01-03 NOTE — H&P
ESR:   Lab Results   Component Value Date    SEDRATE 36 (H) 04/29/2015     CRP:   Lab Results   Component Value Date    CRP 0.6 (H) 07/23/2021     Folate and B12:   Lab Results   Component Value Date    [id number] 261 06/15/2023   ,   Lab Results   Component Value Date    FOLATE >20.0 06/15/2023     Lactic Acid:   Lab Results   Component Value Date    LACTA 0.9 12/26/2023     Thyroid Studies:   Lab Results   Component Value Date    TSH 0.74 01/03/2025       Oupatient labs:  Lab Results   Component Value Date    CHOL 112 01/03/2025    TRIG 74 01/03/2025    HDL 49 01/03/2025    TSH 0.74 01/03/2025    INR 1.0 12/25/2023    LABA1C 6.6 (H) 01/03/2025       Urinalysis:    Lab Results   Component Value Date/Time    NITRU POSITIVE 01/03/2025 09:14 AM    WBCUA 0 TO 5 01/03/2025 09:14 AM    BACTERIA 1+ 12/27/2024 04:04 AM    RBCUA 0 TO 2 01/03/2025 09:14 AM    BLOODU LARGE 06/28/2021 07:45 AM    GLUCOSEU NEGATIVE 01/03/2025 09:14 AM       Imaging:  CT Head W/O Contrast    Result Date: 1/3/2025  1. There is no acute intracranial abnormality.  Specifically, there is no intracranial hemorrhage. 2. Atrophy and periventricular leukomalacia,     XR CHEST PORTABLE    Result Date: 1/3/2025  Mild increased markings at the left lung base concerning for possible early infiltrate.     MRI FEMUR RIGHT W WO CONTRAST    Result Date: 12/30/2024  1. Nonspecific cystic appearing structure in the medial soft tissues of the mid/distal thigh measuring up to 6.2 cm. No obvious solid or nodular enhancement associated with this is allowing for the extensive motion artifact.  Correlate clinically.  Given the extensive motion artifact on the study, would recommend follow-up exam in 3 6 months to assess for stability. 2. Osteoarthritis of the knee with moderate joint effusion.     XR HIP LEFT (2-3 VIEWS)    Result Date: 12/27/2024  1. No acute fracture or dislocation. 2. Bladder diverticula.     CT FEMUR RIGHT W CONTRAST    Result Date:  12/27/2024  1. Superficial fluid collection or cystic lesion in the medial soft tissues at the mid diaphyseal level of the femur without significant wall thickening or enhancement/nodularity. Location measures up to 6.4 x 4.6 x 5.5 cm for example favored seroma, lymphangioma, hematoma or less likely abscess without surrounding inflammation. No soft tissue gas adjacent or gas locules contained within the collection. Collection is abutting the medial margin of the quadriceps musculature without invasion or bulk involvement and no cortical destruction or erosive findings.  Correlation with instrumentation, surgery or injury at this site.  Sterility of this collection cannot be ascertained by imaging alone consider aspiration for further evaluation. 2. Moderate right hip and right knee degenerative changes.     XR PELVIS (1-2 VIEWS)    Result Date: 12/27/2024  No fracture or dislocation.     XR FEMUR RIGHT (MIN 2 VIEWS)    Result Date: 12/27/2024  1. No fracture or dislocation. 2. Mild soft tissue prominence medial right mid and upper thigh.     CT Head W/O Contrast    Result Date: 12/27/2024  No acute intracranial abnormality.     CT CSpine W/O Contrast    Result Date: 12/27/2024  No acute abnormality of the cervical spine.     XR CHEST PORTABLE    Result Date: 12/27/2024  No acute cardiopulmonary process.       Assessment and Plan:    Principal Problem:    Pneumonia due to organism  Active Problems:    Influenza A    Encephalopathy  Resolved Problems:    * No resolved hospital problems. *    Discussed patient's case with ED physician.  Admit to intermediate     HAP, LLL  Leukocytosis   Influenza A  --Cefepime + Vanco  --Check urine strep + leg, resp cx  --Check procal   --Isolation    Encephalopathy  --presumably 2/2 to above  --CTH unrevealing   --Check  TSH, b12, folate, ammonia, ABG   --Neuro checks  --Melatonin at HS     Chronic problems: Physical deconditioning, right lower extremity medial thigh mass,

## 2025-01-04 LAB
ANION GAP SERPL CALCULATED.3IONS-SCNC: 10 MMOL/L (ref 7–16)
BUN SERPL-MCNC: 28 MG/DL (ref 6–23)
CALCIUM SERPL-MCNC: 8.4 MG/DL (ref 8.6–10.2)
CHLORIDE SERPL-SCNC: 106 MMOL/L (ref 98–107)
CO2 SERPL-SCNC: 22 MMOL/L (ref 22–29)
CREAT SERPL-MCNC: 0.8 MG/DL (ref 0.5–1)
ERYTHROCYTE [DISTWIDTH] IN BLOOD BY AUTOMATED COUNT: 14.1 % (ref 11.5–15)
GFR, ESTIMATED: 73 ML/MIN/1.73M2
GLUCOSE BLD-MCNC: 115 MG/DL (ref 74–99)
GLUCOSE BLD-MCNC: 132 MG/DL (ref 74–99)
GLUCOSE BLD-MCNC: 141 MG/DL (ref 74–99)
GLUCOSE BLD-MCNC: 98 MG/DL (ref 74–99)
GLUCOSE SERPL-MCNC: 125 MG/DL (ref 74–99)
HCT VFR BLD AUTO: 34.6 % (ref 34–48)
HGB BLD-MCNC: 10.8 G/DL (ref 11.5–15.5)
L PNEUMO1 AG UR QL IA.RAPID: NEGATIVE
MCH RBC QN AUTO: 29.5 PG (ref 26–35)
MCHC RBC AUTO-ENTMCNC: 31.2 G/DL (ref 32–34.5)
MCV RBC AUTO: 94.5 FL (ref 80–99.9)
PLATELET # BLD AUTO: 278 K/UL (ref 130–450)
PMV BLD AUTO: 9.5 FL (ref 7–12)
POTASSIUM SERPL-SCNC: 3.7 MMOL/L (ref 3.5–5)
PROCALCITONIN SERPL-MCNC: 0.8 NG/ML (ref 0–0.08)
RBC # BLD AUTO: 3.66 M/UL (ref 3.5–5.5)
S PNEUM AG SPEC QL: NEGATIVE
SODIUM SERPL-SCNC: 138 MMOL/L (ref 132–146)
SPECIMEN SOURCE: 0
WBC OTHER # BLD: 14.9 K/UL (ref 4.5–11.5)

## 2025-01-04 PROCEDURE — 2500000003 HC RX 250 WO HCPCS: Performed by: INTERNAL MEDICINE

## 2025-01-04 PROCEDURE — 36415 COLL VENOUS BLD VENIPUNCTURE: CPT

## 2025-01-04 PROCEDURE — 6360000002 HC RX W HCPCS: Performed by: STUDENT IN AN ORGANIZED HEALTH CARE EDUCATION/TRAINING PROGRAM

## 2025-01-04 PROCEDURE — 86403 PARTICLE AGGLUT ANTBDY SCRN: CPT

## 2025-01-04 PROCEDURE — 85027 COMPLETE CBC AUTOMATED: CPT

## 2025-01-04 PROCEDURE — 2500000003 HC RX 250 WO HCPCS: Performed by: NURSE PRACTITIONER

## 2025-01-04 PROCEDURE — 6360000002 HC RX W HCPCS: Performed by: NURSE PRACTITIONER

## 2025-01-04 PROCEDURE — 51798 US URINE CAPACITY MEASURE: CPT

## 2025-01-04 PROCEDURE — 96366 THER/PROPH/DIAG IV INF ADDON: CPT

## 2025-01-04 PROCEDURE — 2580000003 HC RX 258: Performed by: INTERNAL MEDICINE

## 2025-01-04 PROCEDURE — 82962 GLUCOSE BLOOD TEST: CPT

## 2025-01-04 PROCEDURE — 2580000003 HC RX 258: Performed by: STUDENT IN AN ORGANIZED HEALTH CARE EDUCATION/TRAINING PROGRAM

## 2025-01-04 PROCEDURE — 96375 TX/PRO/DX INJ NEW DRUG ADDON: CPT

## 2025-01-04 PROCEDURE — 99232 SBSQ HOSP IP/OBS MODERATE 35: CPT | Performed by: STUDENT IN AN ORGANIZED HEALTH CARE EDUCATION/TRAINING PROGRAM

## 2025-01-04 PROCEDURE — 6360000002 HC RX W HCPCS: Performed by: INTERNAL MEDICINE

## 2025-01-04 PROCEDURE — 96376 TX/PRO/DX INJ SAME DRUG ADON: CPT

## 2025-01-04 PROCEDURE — 80048 BASIC METABOLIC PNL TOTAL CA: CPT

## 2025-01-04 PROCEDURE — 96372 THER/PROPH/DIAG INJ SC/IM: CPT

## 2025-01-04 PROCEDURE — 2580000003 HC RX 258: Performed by: NURSE PRACTITIONER

## 2025-01-04 PROCEDURE — 84145 PROCALCITONIN (PCT): CPT

## 2025-01-04 PROCEDURE — 87040 BLOOD CULTURE FOR BACTERIA: CPT

## 2025-01-04 PROCEDURE — 96368 THER/DIAG CONCURRENT INF: CPT

## 2025-01-04 PROCEDURE — G0378 HOSPITAL OBSERVATION PER HR: HCPCS

## 2025-01-04 RX ORDER — HALOPERIDOL 5 MG/ML
5 INJECTION INTRAMUSCULAR ONCE
Status: COMPLETED | OUTPATIENT
Start: 2025-01-04 | End: 2025-01-04

## 2025-01-04 RX ORDER — SODIUM CHLORIDE, SODIUM LACTATE, POTASSIUM CHLORIDE, CALCIUM CHLORIDE 600; 310; 30; 20 MG/100ML; MG/100ML; MG/100ML; MG/100ML
INJECTION, SOLUTION INTRAVENOUS CONTINUOUS
Status: ACTIVE | OUTPATIENT
Start: 2025-01-04 | End: 2025-01-05

## 2025-01-04 RX ADMIN — SODIUM CHLORIDE, POTASSIUM CHLORIDE, SODIUM LACTATE AND CALCIUM CHLORIDE: 600; 310; 30; 20 INJECTION, SOLUTION INTRAVENOUS at 20:02

## 2025-01-04 RX ADMIN — ENOXAPARIN SODIUM 40 MG: 100 INJECTION SUBCUTANEOUS at 07:45

## 2025-01-04 RX ADMIN — BRIMONIDINE TARTRATE 1 DROP: 2 SOLUTION/ DROPS OPHTHALMIC at 20:02

## 2025-01-04 RX ADMIN — PETROLATUM: 420 OINTMENT TOPICAL at 20:03

## 2025-01-04 RX ADMIN — WATER 2000 MG: 1 INJECTION INTRAMUSCULAR; INTRAVENOUS; SUBCUTANEOUS at 13:32

## 2025-01-04 RX ADMIN — DORZOLAMIDE HYDROCHLORIDE 1 DROP: 20 SOLUTION OPHTHALMIC at 20:02

## 2025-01-04 RX ADMIN — MICONAZOLE NITRATE: 20.6 POWDER TOPICAL at 20:02

## 2025-01-04 RX ADMIN — MICONAZOLE NITRATE: 20.6 POWDER TOPICAL at 07:46

## 2025-01-04 RX ADMIN — PREDNISOLONE ACETATE 1 DROP: 10 SUSPENSION/ DROPS OPHTHALMIC at 07:46

## 2025-01-04 RX ADMIN — DORZOLAMIDE HYDROCHLORIDE 1 DROP: 20 SOLUTION OPHTHALMIC at 13:32

## 2025-01-04 RX ADMIN — DORZOLAMIDE HYDROCHLORIDE 1 DROP: 20 SOLUTION OPHTHALMIC at 07:46

## 2025-01-04 RX ADMIN — SODIUM CHLORIDE, POTASSIUM CHLORIDE, SODIUM LACTATE AND CALCIUM CHLORIDE: 600; 310; 30; 20 INJECTION, SOLUTION INTRAVENOUS at 07:28

## 2025-01-04 RX ADMIN — BRIMONIDINE TARTRATE 1 DROP: 2 SOLUTION/ DROPS OPHTHALMIC at 07:46

## 2025-01-04 RX ADMIN — CEFEPIME 2000 MG: 2 INJECTION, POWDER, FOR SOLUTION INTRAVENOUS at 10:48

## 2025-01-04 RX ADMIN — WATER 2000 MG: 1 INJECTION INTRAMUSCULAR; INTRAVENOUS; SUBCUTANEOUS at 20:25

## 2025-01-04 RX ADMIN — PREDNISOLONE ACETATE 1 DROP: 10 SUSPENSION/ DROPS OPHTHALMIC at 13:32

## 2025-01-04 RX ADMIN — PREDNISOLONE ACETATE 1 DROP: 10 SUSPENSION/ DROPS OPHTHALMIC at 20:02

## 2025-01-04 RX ADMIN — HALOPERIDOL LACTATE 5 MG: 5 INJECTION, SOLUTION INTRAMUSCULAR at 23:14

## 2025-01-04 RX ADMIN — SODIUM CHLORIDE, PRESERVATIVE FREE 10 ML: 5 INJECTION INTRAVENOUS at 20:03

## 2025-01-04 RX ADMIN — VANCOMYCIN HYDROCHLORIDE 1000 MG: 1 INJECTION, POWDER, LYOPHILIZED, FOR SOLUTION INTRAVENOUS at 10:51

## 2025-01-04 NOTE — PLAN OF CARE
Problem: Safety - Adult  Goal: Free from fall injury  1/3/2025 2205 by Tj Tatum RN  Outcome: Progressing  1/3/2025 1816 by Libby Wagner RN  Outcome: Progressing     Problem: Chronic Conditions and Co-morbidities  Goal: Patient's chronic conditions and co-morbidity symptoms are monitored and maintained or improved  1/3/2025 2205 by Tj Tatum RN  Outcome: Progressing  1/3/2025 1816 by Libby Wagner RN  Outcome: Progressing     Problem: Discharge Planning  Goal: Discharge to home or other facility with appropriate resources  1/3/2025 2205 by Tj Tatum RN  Outcome: Progressing  1/3/2025 1816 by Libby Wagner RN  Outcome: Progressing     Problem: Skin/Tissue Integrity  Goal: Absence of new skin breakdown  Description: 1.  Monitor for areas of redness and/or skin breakdown  2.  Assess vascular access sites hourly  3.  Every 4-6 hours minimum:  Change oxygen saturation probe site  4.  Every 4-6 hours:  If on nasal continuous positive airway pressure, respiratory therapy assess nares and determine need for appliance change or resting period.  Outcome: Progressing     Problem: ABCDS Injury Assessment  Goal: Absence of physical injury  Outcome: Progressing

## 2025-01-04 NOTE — PROGRESS NOTES
Patients daughter Ivon updated on current patient condition. All questions answered at this time.

## 2025-01-04 NOTE — PROGRESS NOTES
Hospitalist Progress Note      Chief Complaint:  had concerns including Altered Mental Status ([facility], got there yesterday, nursing home didn't know baseline or LKW, recent admission. gaze up to Left, incomprehensible speech, 99.5 axillary, DNRCCA).    Admission Date: 1/3/2025     SYNOPSIS:Ms. Carlene Olson, a 89 y.o. year old female  who  has a past medical history of Arthritis, Cataract, Diabetes mellitus (HCC), Fall from standing, Injury of left upper arm, Pneumonia, Subdural hematoma, Syncope and collapse, and Uncontrolled type 2 diabetes mellitus.    Patient presented to the ED from [facility] with AMS.  Patient admitted for management of acute encephalopathy, staff bacteremia, leukocytosis    SUBJECTIVE:    Patient seen and examined at bedside, patient is drowsy but arousable  Records reviewed.   Stable overnight. No overnight issues reported     Temp (24hrs), Av.3 °F (36.8 °C), Min:97.8 °F (36.6 °C), Max:99.6 °F (37.6 °C)    DIET: No diet orders on file  CODE: DNR-CCA    Intake/Output Summary (Last 24 hours) at 2025 1423  Last data filed at 1/3/2025 1639  Gross per 24 hour   Intake 342.53 ml   Output --   Net 342.53 ml       OBJECTIVE:    BP (!) 119/53   Pulse 67   Temp 98.5 °F (36.9 °C) (Temporal)   Resp 29   Ht 1.575 m (5' 2\")   Wt 73.9 kg (163 lb)   SpO2 92%   BMI 29.81 kg/m²     General appearance: No apparent distress, appears stated age and cooperative.   HEENT:  Normocephalic. Conjunctivae/corneas clear. Moist mucosa   Neck: Supple. No jugular venous distention. Thyroid not visible, non tender   Respiratory: Normal respiratory effort. Clear to auscultation bilaterally. No stridor/wheezing/rhonchi/crackles   Cardiovascular: Regular heart beats, normal S1-S2. No M/G/R  Abdomen: Non distended, soft, non tender, no visceromegaly, no mass, normal bowel sounds   Musculoskeletal: No clubbing, cyanosis, no bilateral lower extremity edema. Brisk capillary refill.   Skin:   hydroxide, sodium chloride    Labs:     Recent Labs     01/03/25  0914 01/04/25  0506   WBC 17.6* 14.9*   HGB 12.1 10.8*   HCT 38.4 34.6    278       Recent Labs     01/03/25  0914 01/04/25  0506    138   K 3.5 3.7    106   CO2 24 22   BUN 23 28*   CREATININE 0.9 0.8   CALCIUM 8.6 8.4*       Recent Labs     01/03/25  0914   ALKPHOS 75   ALT 18   AST 22   BILITOT 0.6       No results for input(s): \"INR\" in the last 72 hours.    No results for input(s): \"CKTOTAL\", \"TROPONINI\" in the last 72 hours.    Chronic labs:    Lab Results   Component Value Date    CHOL 112 01/03/2025    TRIG 74 01/03/2025    HDL 49 01/03/2025    TSH 0.74 01/03/2025    INR 1.0 12/25/2023    LABA1C 6.6 (H) 01/03/2025       Radiology: REVIEWED DAILY    +++++++++++++++++++++++++++++++++++++++++++++++++  Sue Alexander MD  [facility]   [place], [place]  +++++++++++++++++++++++++++++++++++++++++++++++++  NOTE: This report was transcribed using voice recognition software. Every effort was made to ensure accuracy; however, inadvertent computerized transcription errors may be present.

## 2025-01-04 NOTE — PROGRESS NOTES
Dr. Fletcher notified of small episode of incontinence, bladder scan for 538mL. Order for straight cath once.

## 2025-01-04 NOTE — CONSULTS
Department of Internal Medicine  Infectious Diseases   Consult Note      Reason for Consult:  Staph bacteremia, sepsis       Requesting Physician:  Dr Alexander         HISTORY OF PRESENT ILLNESS:      This is an 89 yrs old female with hx of  DM, subdural hematoma , syncope, s/p pacer insertion ,who was discharged from [facility] on 1/2 ( treated for influenza A infection ) presented to the ER from nursing home with change in mental status and shortness of breath . She had low grade fever of 100.2 F   WBC was 17 K   Chest x ray - left lung base infiltrates   She was started on vancomycin and cefepime  Blood cx - Staph aureus,       Past Medical History:      Past Medical History:   Diagnosis Date    Arthritis     Cataract     Diabetes mellitus (HCC)     Fall from standing 2/17/2020    Injury of left upper arm 2/17/2020    Pneumonia     Subdural hematoma 7/9/2021    Syncope and collapse 10/17/2016    Uncontrolled type 2 diabetes mellitus 8/17/2010       Past Surgical History:      Past Surgical History:   Procedure Laterality Date    BRONCHOSCOPY N/A 7/1/2021    BRONCHOSCOPY ALVEOLAR LAVAGE performed by Tracy Giles MD at [facility] ENDOSCOPY    CARDIAC PROCEDURE N/A 12/25/2023    Coronary angiography performed by Chance Morfin MD at [facility] CARDIAC CATH LAB    CARDIAC PROCEDURE N/A 12/25/2023    Insert temporary pacemaker performed by Chance Morfin MD at [facility] CARDIAC CATH LAB    CATARACT REMOVAL WITH IMPLANT      EP DEVICE PROCEDURE N/A 12/26/2023    Insert PPM dual performed by Rinku De Jesus DO at [facility] CARDIAC CATH LAB    HYSTERECTOMY (CERVIX STATUS UNKNOWN)           Current Medications:      Current Facility-Administered Medications   Medication Dose Route Frequency Provider Last Rate Last Admin    lactated ringers infusion   IntraVENous Continuous Sue Alexander MD 75 mL/hr at 01/04/25 0728 New Bag at 01/04/25 0728    amLODIPine (NORVASC) tablet 2.5 mg  2.5 mg Oral BID Lucrecia Reeves, APRN - NP        aspirin EC

## 2025-01-04 NOTE — PROGRESS NOTES
Speech Language Pathology  NAME:  Carlene Olson  :  1935  DATE: 2025  ROOM:  [facility]6/8506-A    Pt unavailable at 0900 for Clinical Swallow Evaluation Discussed with patient nurse in person     REASON:  [place] Souza reported pt not following commands well and has been calling out as of late but cleared pt for attempt at CSE. Pt lethargic, would  not awaken long enough for CSE and was yelling out nonsensical output when SLP attempted CSE. Will re-attempt next available date 24 as appropriate. RN informed.       Thank You

## 2025-01-04 NOTE — PROGRESS NOTES
Pharmacy Consultation Note  (Antibiotic Dosing and Monitoring)    Initial consult date: 1/3/25  Consulting physician/provider: Dr. Clements  Drug: Vancomycin  Indication: Pnumonia    Age/  Gender Height Weight IBW  Allergy Information   89 y.o./female 157.5 cm (5' 2\") 73.9 kg (163 lb)     Ideal body weight: 50.1 kg (110 lb 7.2 oz)  Adjusted ideal body weight: 59.6 kg (131 lb 7.5 oz)   Penicillins      Renal Function:  Recent Labs     01/03/25  0914   BUN 23   CREATININE 0.9       Intake/Output Summary (Last 24 hours) at 1/4/2025 0613  Last data filed at 1/3/2025 1639  Gross per 24 hour   Intake 342.53 ml   Output 600 ml   Net -257.47 ml       Vancomycin Monitoring:  Trough:  No results for input(s): \"VANCOTROUGH\" in the last 72 hours.  Random:  No results for input(s): \"VANCORANDOM\" in the last 72 hours.    Vancomycin Administration Times:  Recent vancomycin administrations                     vancomycin (VANCOCIN) 1,500 mg in sodium chloride 0.9 % 250 mL IVPB ([id number]) (mg) 1,500 mg New Bag 01/03/25 1058                      Assessment:  Patient is a 89 y.o. female who has been initiated on vancomycin  Estimated Creatinine Clearance: 41 mL/min (based on SCr of 0.9 mg/dL).  Blood culture revealing for MSSA per PCR    Plan:  Vancomycin 1000 mg IV every 24 hours  Will check vancomycin levels when appropriate  Will continue to monitor renal function   Pharmacy to follow      Brianne Olivas, PharmD, BCPS, BCCCP 1/4/2025 6:13 AM   [phone]  TANNER: [phone]  SEY: [phone]  SJW: [phone]

## 2025-01-05 LAB
ACB COMPLEX DNA BLD POS QL NAA+NON-PROBE: NOT DETECTED
ANION GAP SERPL CALCULATED.3IONS-SCNC: 12 MMOL/L (ref 7–16)
B FRAGILIS DNA BLD POS QL NAA+NON-PROBE: NOT DETECTED
BASOPHILS # BLD: 0.02 K/UL (ref 0–0.2)
BASOPHILS NFR BLD: 0 % (ref 0–2)
BIOFIRE TEST COMMENT: ABNORMAL
BUN SERPL-MCNC: 31 MG/DL (ref 6–23)
C ALBICANS DNA BLD POS QL NAA+NON-PROBE: NOT DETECTED
C AURIS DNA BLD POS QL NAA+NON-PROBE: NOT DETECTED
C GATTII+NEOFOR DNA BLD POS QL NAA+N-PRB: NOT DETECTED
C GLABRATA DNA BLD POS QL NAA+NON-PROBE: NOT DETECTED
C KRUSEI DNA BLD POS QL NAA+NON-PROBE: NOT DETECTED
C PARAP DNA BLD POS QL NAA+NON-PROBE: NOT DETECTED
C TROPICLS DNA BLD POS QL NAA+NON-PROBE: NOT DETECTED
CALCIUM SERPL-MCNC: 8.7 MG/DL (ref 8.6–10.2)
CHLORIDE SERPL-SCNC: 109 MMOL/L (ref 98–107)
CO2 SERPL-SCNC: 22 MMOL/L (ref 22–29)
CREAT SERPL-MCNC: 0.7 MG/DL (ref 0.5–1)
E CLOAC COMP DNA BLD POS NAA+NON-PROBE: NOT DETECTED
E COLI DNA BLD POS QL NAA+NON-PROBE: NOT DETECTED
E FAECALIS DNA BLD POS QL NAA+NON-PROBE: NOT DETECTED
E FAECIUM DNA BLD POS QL NAA+NON-PROBE: NOT DETECTED
ENTEROBACTERALES DNA BLD POS NAA+N-PRB: NOT DETECTED
EOSINOPHIL # BLD: 0.01 K/UL (ref 0.05–0.5)
EOSINOPHILS RELATIVE PERCENT: 0 % (ref 0–6)
ERYTHROCYTE [DISTWIDTH] IN BLOOD BY AUTOMATED COUNT: 14.3 % (ref 11.5–15)
GFR, ESTIMATED: 84 ML/MIN/1.73M2
GLUCOSE BLD-MCNC: 123 MG/DL (ref 74–99)
GLUCOSE BLD-MCNC: 128 MG/DL (ref 74–99)
GLUCOSE BLD-MCNC: 134 MG/DL (ref 74–99)
GLUCOSE BLD-MCNC: 139 MG/DL (ref 74–99)
GLUCOSE SERPL-MCNC: 140 MG/DL (ref 74–99)
GP B STREP DNA BLD POS QL NAA+NON-PROBE: NOT DETECTED
HAEM INFLU DNA BLD POS QL NAA+NON-PROBE: NOT DETECTED
HCT VFR BLD AUTO: 32.4 % (ref 34–48)
HGB BLD-MCNC: 10.3 G/DL (ref 11.5–15.5)
IMM GRANULOCYTES # BLD AUTO: 0.1 K/UL (ref 0–0.58)
IMM GRANULOCYTES NFR BLD: 1 % (ref 0–5)
K OXYTOCA DNA BLD POS QL NAA+NON-PROBE: NOT DETECTED
KLEBSIELLA SP DNA BLD POS QL NAA+NON-PRB: NOT DETECTED
KLEBSIELLA SP DNA BLD POS QL NAA+NON-PRB: NOT DETECTED
L MONOCYTOG DNA BLD POS QL NAA+NON-PROBE: NOT DETECTED
LYMPHOCYTES NFR BLD: 0.68 K/UL (ref 1.5–4)
LYMPHOCYTES RELATIVE PERCENT: 5 % (ref 20–42)
MAGNESIUM SERPL-MCNC: 1.8 MG/DL (ref 1.6–2.6)
MCH RBC QN AUTO: 29.3 PG (ref 26–35)
MCHC RBC AUTO-ENTMCNC: 31.8 G/DL (ref 32–34.5)
MCV RBC AUTO: 92 FL (ref 80–99.9)
MECA+MECC+MREJ ISLT/SPM QL: NOT DETECTED
MICROORGANISM SPEC CULT: ABNORMAL
MICROORGANISM/AGENT SPEC: ABNORMAL
MICROORGANISM/AGENT SPEC: ABNORMAL
MONOCYTES NFR BLD: 0.66 K/UL (ref 0.1–0.95)
MONOCYTES NFR BLD: 5 % (ref 2–12)
N MEN DNA BLD POS QL NAA+NON-PROBE: NOT DETECTED
NEUTROPHILS NFR BLD: 89 % (ref 43–80)
NEUTS SEG NFR BLD: 12.2 K/UL (ref 1.8–7.3)
P AERUGINOSA DNA BLD POS NAA+NON-PROBE: NOT DETECTED
PLATELET # BLD AUTO: 320 K/UL (ref 130–450)
PMV BLD AUTO: 9.5 FL (ref 7–12)
POTASSIUM SERPL-SCNC: 3.4 MMOL/L (ref 3.5–5)
PROTEUS SP DNA BLD POS QL NAA+NON-PROBE: NOT DETECTED
RBC # BLD AUTO: 3.52 M/UL (ref 3.5–5.5)
S AUREUS DNA BLD POS QL NAA+NON-PROBE: DETECTED
S AUREUS+CONS DNA BLD POS NAA+NON-PROBE: DETECTED
S EPIDERMIDIS DNA BLD POS QL NAA+NON-PRB: NOT DETECTED
S LUGDUNENSIS DNA BLD POS QL NAA+NON-PRB: NOT DETECTED
S MALTOPHILIA DNA BLD POS QL NAA+NON-PRB: NOT DETECTED
S MARCESCENS DNA BLD POS NAA+NON-PROBE: NOT DETECTED
S PNEUM DNA BLD POS QL NAA+NON-PROBE: NOT DETECTED
S PYO DNA BLD POS QL NAA+NON-PROBE: NOT DETECTED
SALMONELLA DNA BLD POS QL NAA+NON-PROBE: NOT DETECTED
SERVICE CMNT-IMP: ABNORMAL
SODIUM SERPL-SCNC: 143 MMOL/L (ref 132–146)
SPECIMEN DESCRIPTION: ABNORMAL
STREPTOCOCCUS DNA BLD POS NAA+NON-PROBE: NOT DETECTED
WBC OTHER # BLD: 13.7 K/UL (ref 4.5–11.5)

## 2025-01-05 PROCEDURE — 96372 THER/PROPH/DIAG INJ SC/IM: CPT

## 2025-01-05 PROCEDURE — 96376 TX/PRO/DX INJ SAME DRUG ADON: CPT

## 2025-01-05 PROCEDURE — 2500000003 HC RX 250 WO HCPCS: Performed by: STUDENT IN AN ORGANIZED HEALTH CARE EDUCATION/TRAINING PROGRAM

## 2025-01-05 PROCEDURE — 2580000003 HC RX 258: Performed by: STUDENT IN AN ORGANIZED HEALTH CARE EDUCATION/TRAINING PROGRAM

## 2025-01-05 PROCEDURE — 6360000002 HC RX W HCPCS: Performed by: INTERNAL MEDICINE

## 2025-01-05 PROCEDURE — 2500000003 HC RX 250 WO HCPCS: Performed by: INTERNAL MEDICINE

## 2025-01-05 PROCEDURE — 85025 COMPLETE CBC W/AUTO DIFF WBC: CPT

## 2025-01-05 PROCEDURE — 99232 SBSQ HOSP IP/OBS MODERATE 35: CPT | Performed by: STUDENT IN AN ORGANIZED HEALTH CARE EDUCATION/TRAINING PROGRAM

## 2025-01-05 PROCEDURE — 36415 COLL VENOUS BLD VENIPUNCTURE: CPT

## 2025-01-05 PROCEDURE — 82962 GLUCOSE BLOOD TEST: CPT

## 2025-01-05 PROCEDURE — 51798 US URINE CAPACITY MEASURE: CPT

## 2025-01-05 PROCEDURE — 2580000003 HC RX 258: Performed by: INTERNAL MEDICINE

## 2025-01-05 PROCEDURE — 80048 BASIC METABOLIC PNL TOTAL CA: CPT

## 2025-01-05 PROCEDURE — 83735 ASSAY OF MAGNESIUM: CPT

## 2025-01-05 PROCEDURE — 2060000000 HC ICU INTERMEDIATE R&B

## 2025-01-05 PROCEDURE — 6360000002 HC RX W HCPCS: Performed by: NURSE PRACTITIONER

## 2025-01-05 RX ORDER — SODIUM CHLORIDE, SODIUM LACTATE, POTASSIUM CHLORIDE, CALCIUM CHLORIDE 600; 310; 30; 20 MG/100ML; MG/100ML; MG/100ML; MG/100ML
INJECTION, SOLUTION INTRAVENOUS CONTINUOUS
Status: ACTIVE | OUTPATIENT
Start: 2025-01-05 | End: 2025-01-06

## 2025-01-05 RX ADMIN — WATER 2000 MG: 1 INJECTION INTRAMUSCULAR; INTRAVENOUS; SUBCUTANEOUS at 20:27

## 2025-01-05 RX ADMIN — SODIUM CHLORIDE, POTASSIUM CHLORIDE, SODIUM LACTATE AND CALCIUM CHLORIDE: 600; 310; 30; 20 INJECTION, SOLUTION INTRAVENOUS at 11:35

## 2025-01-05 RX ADMIN — WATER 2000 MG: 1 INJECTION INTRAMUSCULAR; INTRAVENOUS; SUBCUTANEOUS at 05:30

## 2025-01-05 RX ADMIN — DORZOLAMIDE HYDROCHLORIDE 1 DROP: 20 SOLUTION OPHTHALMIC at 08:49

## 2025-01-05 RX ADMIN — MICONAZOLE NITRATE: 20.6 POWDER TOPICAL at 08:52

## 2025-01-05 RX ADMIN — SODIUM CHLORIDE 250 MG: 9 INJECTION, SOLUTION INTRAVENOUS at 11:31

## 2025-01-05 RX ADMIN — ENOXAPARIN SODIUM 40 MG: 100 INJECTION SUBCUTANEOUS at 08:49

## 2025-01-05 RX ADMIN — PREDNISOLONE ACETATE 1 DROP: 10 SUSPENSION/ DROPS OPHTHALMIC at 19:38

## 2025-01-05 RX ADMIN — DORZOLAMIDE HYDROCHLORIDE 1 DROP: 20 SOLUTION OPHTHALMIC at 13:43

## 2025-01-05 RX ADMIN — MICONAZOLE NITRATE: 20.6 POWDER TOPICAL at 19:39

## 2025-01-05 RX ADMIN — VANCOMYCIN HYDROCHLORIDE 1000 MG: 1 INJECTION, POWDER, LYOPHILIZED, FOR SOLUTION INTRAVENOUS at 10:35

## 2025-01-05 RX ADMIN — WATER 2000 MG: 1 INJECTION INTRAMUSCULAR; INTRAVENOUS; SUBCUTANEOUS at 13:42

## 2025-01-05 RX ADMIN — BRIMONIDINE TARTRATE 1 DROP: 2 SOLUTION/ DROPS OPHTHALMIC at 08:49

## 2025-01-05 RX ADMIN — PREDNISOLONE ACETATE 1 DROP: 10 SUSPENSION/ DROPS OPHTHALMIC at 13:43

## 2025-01-05 RX ADMIN — DORZOLAMIDE HYDROCHLORIDE 1 DROP: 20 SOLUTION OPHTHALMIC at 19:37

## 2025-01-05 RX ADMIN — PREDNISOLONE ACETATE 1 DROP: 10 SUSPENSION/ DROPS OPHTHALMIC at 08:49

## 2025-01-05 RX ADMIN — BRIMONIDINE TARTRATE 1 DROP: 2 SOLUTION/ DROPS OPHTHALMIC at 19:37

## 2025-01-05 RX ADMIN — SODIUM CHLORIDE 250 MG: 9 INJECTION, SOLUTION INTRAVENOUS at 19:35

## 2025-01-05 NOTE — PROGRESS NOTES
Pharmacy Consultation Note  (Antibiotic Dosing and Monitoring)    Initial consult date: 1/3/25  Consulting physician/provider: Dr. Clements  Drug: Vancomycin  Indication: Pnumonia    Vancomycin has been discontinued   Clinical Pharmacy to sign-off  Physician to re-consult pharmacy if future dosing is needed    Thank you for the consult,  Radha Goddard, PharmD  PGY1 Pharmacy Practice Resident [phone]  1/5/2025 2:31 PM    TANNER: [phone]  SEY: [phone]  SJW: [phone]

## 2025-01-05 NOTE — PROGRESS NOTES
Department of Internal Medicine  Infectious Diseases  Progress  Note      C/C : MSSA bacteremia r/o endocarditis       Pt is more awake, non conversant   No distress  Afebrile         Current Facility-Administered Medications   Medication Dose Route Frequency Provider Last Rate Last Admin    valproate (DEPACON) 250 mg in sodium chloride 0.9 % 100 mL IVPB  250 mg IntraVENous Q8H Sue Alexander MD   Stopped at 01/05/25 1246    lactated ringers infusion   IntraVENous Continuous Sue Alexander MD 75 mL/hr at 01/05/25 1135 New Bag at 01/05/25 1135    ceFAZolin (ANCEF) 2,000 mg in sterile water 20 mL IV syringe  2,000 mg IntraVENous Q8H LimbuEdgar MD   2,000 mg at 01/05/25 0530    amLODIPine (NORVASC) tablet 2.5 mg  2.5 mg Oral BID Lucrecia Reeves APRN - NP        aspirin EC tablet 81 mg  81 mg Oral Daily Lucrecia Reeves APRN - NP        atorvastatin (LIPITOR) tablet 10 mg  10 mg Oral Daily Lucrecia Reeves APRN - NP        brimonidine (ALPHAGAN) 0.2 % ophthalmic solution 1 drop  1 drop Left Eye BID Lucrecia Reeves APRN - NP   1 drop at 01/05/25 0849    Vitamin D (CHOLECALCIFEROL) tablet 1,000 Units  1,000 Units Oral Daily Lucrecia Reeves APRN - NP        dorzolamide (TRUSOPT) 2 % ophthalmic solution 1 drop  1 drop Left Eye TID Lucrecia Reeves APRN - NP   1 drop at 01/05/25 0849    prednisoLONE acetate (PRED FORTE) 1 % ophthalmic suspension 1 drop  1 drop Left Eye TID Lucrecia Reeves APRN - NP   1 drop at 01/05/25 0849    white petrolatum ointment   Topical TID PRN Lucrecia Reeves APRN - NP   Given at 01/04/25 2003    miconazole (MICOTIN) 2 % powder   Topical BID Lucrecia Reeves APRN - NP   Given at 01/05/25 0852    sodium chloride flush 0.9 % injection 5-40 mL  5-40 mL IntraVENous 2 times per day Lucrecia Reeves APRN - NP   10 mL at 01/04/25 2003    sodium chloride flush 0.9 % injection 5-40 mL  5-40 mL IntraVENous PRN Lucrecia Reeves APRN - NP        0.9 % sodium chloride

## 2025-01-05 NOTE — CONSULTS
Consult only for need for AMEE to rule out endocarditis in patient with PPM. AMEE to be done 1/6/2025. Made NPO except medications after midnight.  Electronically signed by REZA RICCI on 1/5/2025 at 9:17 AM

## 2025-01-05 NOTE — PLAN OF CARE
Problem: Safety - Adult  Goal: Free from fall injury  1/4/2025 2234 by Tj Tatum RN  Outcome: Progressing  1/4/2025 1512 by Libby Wagner RN  Outcome: Progressing     Problem: Chronic Conditions and Co-morbidities  Goal: Patient's chronic conditions and co-morbidity symptoms are monitored and maintained or improved  1/4/2025 2234 by Tj Tatum RN  Outcome: Progressing  1/4/2025 1512 by Libby Wagner RN  Outcome: Progressing     Problem: Discharge Planning  Goal: Discharge to home or other facility with appropriate resources  1/4/2025 2234 by Tj Tatum RN  Outcome: Progressing  1/4/2025 1512 by Libby Wagner RN  Outcome: Progressing     Problem: Skin/Tissue Integrity  Goal: Absence of new skin breakdown  Description: 1.  Monitor for areas of redness and/or skin breakdown  2.  Assess vascular access sites hourly  3.  Every 4-6 hours minimum:  Change oxygen saturation probe site  4.  Every 4-6 hours:  If on nasal continuous positive airway pressure, respiratory therapy assess nares and determine need for appliance change or resting period.  Outcome: Progressing     Problem: ABCDS Injury Assessment  Goal: Absence of physical injury  Outcome: Progressing

## 2025-01-05 NOTE — PROGRESS NOTES
Pharmacy Consultation Note  (Antibiotic Dosing and Monitoring)    Initial consult date: 1/3/25  Consulting physician/provider: Dr. Clements  Drug: Vancomycin  Indication: Pnumonia    Age/  Gender Height Weight IBW  Allergy Information   89 y.o./female 157.5 cm (5' 2\") 73.9 kg (163 lb)     Ideal body weight: 50.1 kg (110 lb 7.2 oz)  Adjusted ideal body weight: 59.6 kg (131 lb 7.5 oz)   Penicillins      Renal Function:  Recent Labs     01/03/25  0914 01/04/25  0506 01/05/25  0459   BUN 23 28* 31*   CREATININE 0.9 0.8 0.7       Intake/Output Summary (Last 24 hours) at 1/5/2025 0729  Last data filed at 1/5/2025 0536  Gross per 24 hour   Intake 0 ml   Output 1522 ml   Net -1522 ml       Vancomycin Monitoring:  Trough:  No results for input(s): \"VANCOTROUGH\" in the last 72 hours.  Random:  No results for input(s): \"VANCORANDOM\" in the last 72 hours.    Vancomycin Administration Times:  Recent vancomycin administrations                     vancomycin (VANCOCIN) 1,500 mg in sodium chloride 0.9 % 250 mL IVPB ([id number]) (mg) 1,500 mg New Bag 01/03/25 1058                      Assessment:  Patient is a 89 y.o. female who has been initiated on vancomycin  Estimated Creatinine Clearance: 52 mL/min (based on SCr of 0.7 mg/dL).  Blood culture revealing for MSSA per PCR - Pt Flu +   ID following    Plan:  Vancomycin 1000 mg IV every 24 hours  Level tomorrow at 0900  Will continue to monitor renal function   Pharmacy to follow      Brianne Olivas, PharmD, BCPS, BCCCP 1/5/2025 7:29 AM   [phone]  TANNER: [phone]  SEY: [phone]  SJW: [phone]

## 2025-01-05 NOTE — PLAN OF CARE
Problem: Safety - Adult  Goal: Free from fall injury  1/5/2025 1000 by Donna Escamilla RN  Outcome: Progressing  1/4/2025 2234 by Tj Tatum RN  Outcome: Progressing     Problem: Chronic Conditions and Co-morbidities  Goal: Patient's chronic conditions and co-morbidity symptoms are monitored and maintained or improved  1/5/2025 1000 by Donna Escamilla RN  Outcome: Progressing  1/4/2025 2234 by Tj Tatum RN  Outcome: Progressing     Problem: Discharge Planning  Goal: Discharge to home or other facility with appropriate resources  1/5/2025 1000 by Donna Escamilla RN  Outcome: Progressing  1/4/2025 2234 by Tj Tatum RN  Outcome: Progressing     Problem: Skin/Tissue Integrity  Goal: Absence of new skin breakdown  Description: 1.  Monitor for areas of redness and/or skin breakdown  2.  Assess vascular access sites hourly  3.  Every 4-6 hours minimum:  Change oxygen saturation probe site  4.  Every 4-6 hours:  If on nasal continuous positive airway pressure, respiratory therapy assess nares and determine need for appliance change or resting period.  1/5/2025 1000 by Donna Escamilla RN  Outcome: Progressing  1/4/2025 2234 by Tj Tatum RN  Outcome: Progressing     Problem: ABCDS Injury Assessment  Goal: Absence of physical injury  1/5/2025 1000 by Donna Escamilla RN  Outcome: Progressing  1/4/2025 2234 by Tj Tatum RN  Outcome: Progressing     Problem: ABCDS Injury Assessment  Goal: Absence of physical injury  1/4/2025 2234 by Tj Tatum RN  Outcome: Progressing

## 2025-01-05 NOTE — PROGRESS NOTES
Hospitalist Progress Note      Chief Complaint:  had concerns including Altered Mental Status ([facility], got there yesterday, nursing home didn't know baseline or LKW, recent admission. gaze up to Left, incomprehensible speech, 99.5 axillary, DNRCCA).    Admission Date: 1/3/2025     SYNOPSIS:Ms. Carlene Olson, a 89 y.o. year old female  who  has a past medical history of Arthritis, Cataract, Diabetes mellitus (HCC), Fall from standing, Injury of left upper arm, Pneumonia, Subdural hematoma, Syncope and collapse, and Uncontrolled type 2 diabetes mellitus.    Patient presented to the ED from [facility] with AMS.  Patient admitted for management of acute encephalopathy, staff bacteremia, leukocytosis  AMEE to r/o endocarditis, might need pacemaker removal, EP and cardiology consulted     SUBJECTIVE:    Patient seen and examined at bedside, patient was sleeping this morning did not wake her up, updates taken from bedside nurse, depakote added due to patient's agitation   Records reviewed.   Stable overnight. No overnight issues reported     Temp (24hrs), Av.2 °F (36.8 °C), Min:98 °F (36.7 °C), Max:98.8 °F (37.1 °C)    DIET: Diet NPO  Diet NPO Exceptions are: Sips of Water with Meds  CODE: DNR-CCA    Intake/Output Summary (Last 24 hours) at 2025 1316  Last data filed at 2025 0536  Gross per 24 hour   Intake 0 ml   Output 1522 ml   Net -1522 ml       OBJECTIVE:    BP (!) 160/60   Pulse 72   Temp 98.1 °F (36.7 °C) (Axillary)   Resp 20   Ht 1.575 m (5' 2\")   Wt 73.9 kg (163 lb)   SpO2 93%   BMI 29.81 kg/m²     General appearance: No apparent distress, appears stated age and cooperative.   HEENT:  Normocephalic. Conjunctivae/corneas clear. Moist mucosa   Neck: Supple. No jugular venous distention. Thyroid not visible, non tender   Respiratory: Normal respiratory effort. Clear to auscultation bilaterally. No stridor/wheezing/rhonchi/crackles   Cardiovascular: Regular heart beats, normal

## 2025-01-06 ENCOUNTER — ANESTHESIA EVENT (OUTPATIENT)
Age: 89
End: 2025-01-06
Payer: MEDICARE

## 2025-01-06 ENCOUNTER — HOSPITAL ENCOUNTER (OUTPATIENT)
Age: 89
Discharge: HOME | End: 2025-01-08
Attending: INTERNAL MEDICINE
Payer: MEDICARE

## 2025-01-06 ENCOUNTER — ANESTHESIA (OUTPATIENT)
Age: 89
End: 2025-01-06
Payer: MEDICARE

## 2025-01-06 VITALS
DIASTOLIC BLOOD PRESSURE: 76 MMHG | TEMPERATURE: 98.7 F | OXYGEN SATURATION: 96 % | RESPIRATION RATE: 25 BRPM | SYSTOLIC BLOOD PRESSURE: 129 MMHG | HEART RATE: 67 BPM

## 2025-01-06 PROBLEM — R78.81 BACTEREMIA: Status: ACTIVE | Noted: 2025-01-06

## 2025-01-06 LAB
ANION GAP SERPL CALCULATED.3IONS-SCNC: 10 MMOL/L (ref 7–16)
BASOPHILS # BLD: 0.03 K/UL (ref 0–0.2)
BASOPHILS NFR BLD: 0 % (ref 0–2)
BUN SERPL-MCNC: 28 MG/DL (ref 6–23)
CALCIUM SERPL-MCNC: 8.8 MG/DL (ref 8.6–10.2)
CHLORIDE SERPL-SCNC: 111 MMOL/L (ref 98–107)
CO2 SERPL-SCNC: 24 MMOL/L (ref 22–29)
CREAT SERPL-MCNC: 0.7 MG/DL (ref 0.5–1)
ECHO AO ASC DIAM: 3.7 CM
ECHO AO ASCENDING AORTA INDEX: 2.11 CM/M2
ECHO AO SINUS VALSALVA DIAM: 3.3 CM
ECHO AO SINUS VALSALVA INDEX: 1.89 CM/M2
ECHO EST RA PRESSURE: 8 MMHG
ECHO LV EF PHYSICIAN: 62 %
ECHO RIGHT VENTRICULAR SYSTOLIC PRESSURE (RVSP): 23 MMHG
ECHO TV REGURGITANT MAX VELOCITY: 1.95 M/S
ECHO TV REGURGITANT PEAK GRADIENT: 15 MMHG
EOSINOPHIL # BLD: 0.02 K/UL (ref 0.05–0.5)
EOSINOPHILS RELATIVE PERCENT: 0 % (ref 0–6)
ERYTHROCYTE [DISTWIDTH] IN BLOOD BY AUTOMATED COUNT: 14.3 % (ref 11.5–15)
GFR, ESTIMATED: 85 ML/MIN/1.73M2
GLUCOSE BLD-MCNC: 111 MG/DL (ref 74–99)
GLUCOSE BLD-MCNC: 125 MG/DL (ref 74–99)
GLUCOSE BLD-MCNC: 135 MG/DL (ref 74–99)
GLUCOSE SERPL-MCNC: 127 MG/DL (ref 74–99)
HCT VFR BLD AUTO: 33.4 % (ref 34–48)
HGB BLD-MCNC: 10.8 G/DL (ref 11.5–15.5)
IMM GRANULOCYTES # BLD AUTO: 0.2 K/UL (ref 0–0.58)
IMM GRANULOCYTES NFR BLD: 2 % (ref 0–5)
LYMPHOCYTES NFR BLD: 0.72 K/UL (ref 1.5–4)
LYMPHOCYTES RELATIVE PERCENT: 6 % (ref 20–42)
MAGNESIUM SERPL-MCNC: 1.8 MG/DL (ref 1.6–2.6)
MCH RBC QN AUTO: 29.3 PG (ref 26–35)
MCHC RBC AUTO-ENTMCNC: 32.3 G/DL (ref 32–34.5)
MCV RBC AUTO: 90.5 FL (ref 80–99.9)
MONOCYTES NFR BLD: 0.66 K/UL (ref 0.1–0.95)
MONOCYTES NFR BLD: 5 % (ref 2–12)
NEUTROPHILS NFR BLD: 87 % (ref 43–80)
NEUTS SEG NFR BLD: 10.5 K/UL (ref 1.8–7.3)
PLATELET # BLD AUTO: 363 K/UL (ref 130–450)
PMV BLD AUTO: 9.3 FL (ref 7–12)
POTASSIUM SERPL-SCNC: 3.2 MMOL/L (ref 3.5–5)
RBC # BLD AUTO: 3.69 M/UL (ref 3.5–5.5)
SODIUM SERPL-SCNC: 145 MMOL/L (ref 132–146)
WBC OTHER # BLD: 12.1 K/UL (ref 4.5–11.5)

## 2025-01-06 PROCEDURE — 6360000002 HC RX W HCPCS

## 2025-01-06 PROCEDURE — 3700000000 HC ANESTHESIA ATTENDED CARE: Performed by: INTERNAL MEDICINE

## 2025-01-06 PROCEDURE — 93312 ECHO TRANSESOPHAGEAL: CPT | Performed by: INTERNAL MEDICINE

## 2025-01-06 PROCEDURE — 36415 COLL VENOUS BLD VENIPUNCTURE: CPT

## 2025-01-06 PROCEDURE — B24BZZ4 ULTRASONOGRAPHY OF HEART WITH AORTA, TRANSESOPHAGEAL: ICD-10-PCS | Performed by: INTERNAL MEDICINE

## 2025-01-06 PROCEDURE — 99222 1ST HOSP IP/OBS MODERATE 55: CPT | Performed by: INTERNAL MEDICINE

## 2025-01-06 PROCEDURE — 83735 ASSAY OF MAGNESIUM: CPT

## 2025-01-06 PROCEDURE — 85025 COMPLETE CBC W/AUTO DIFF WBC: CPT

## 2025-01-06 PROCEDURE — 93320 DOPPLER ECHO COMPLETE: CPT

## 2025-01-06 PROCEDURE — 93320 DOPPLER ECHO COMPLETE: CPT | Performed by: INTERNAL MEDICINE

## 2025-01-06 PROCEDURE — 2580000003 HC RX 258

## 2025-01-06 PROCEDURE — 93325 DOPPLER ECHO COLOR FLOW MAPG: CPT | Performed by: INTERNAL MEDICINE

## 2025-01-06 PROCEDURE — 2500000003 HC RX 250 WO HCPCS: Performed by: STUDENT IN AN ORGANIZED HEALTH CARE EDUCATION/TRAINING PROGRAM

## 2025-01-06 PROCEDURE — 82962 GLUCOSE BLOOD TEST: CPT

## 2025-01-06 PROCEDURE — 6360000002 HC RX W HCPCS: Performed by: STUDENT IN AN ORGANIZED HEALTH CARE EDUCATION/TRAINING PROGRAM

## 2025-01-06 PROCEDURE — 6360000002 HC RX W HCPCS: Performed by: INTERNAL MEDICINE

## 2025-01-06 PROCEDURE — 3700000001 HC ADD 15 MINUTES (ANESTHESIA): Performed by: INTERNAL MEDICINE

## 2025-01-06 PROCEDURE — 80048 BASIC METABOLIC PNL TOTAL CA: CPT

## 2025-01-06 PROCEDURE — 2500000003 HC RX 250 WO HCPCS: Performed by: INTERNAL MEDICINE

## 2025-01-06 PROCEDURE — 2580000003 HC RX 258: Performed by: STUDENT IN AN ORGANIZED HEALTH CARE EDUCATION/TRAINING PROGRAM

## 2025-01-06 PROCEDURE — 99232 SBSQ HOSP IP/OBS MODERATE 35: CPT | Performed by: STUDENT IN AN ORGANIZED HEALTH CARE EDUCATION/TRAINING PROGRAM

## 2025-01-06 PROCEDURE — 2060000000 HC ICU INTERMEDIATE R&B

## 2025-01-06 PROCEDURE — 6370000000 HC RX 637 (ALT 250 FOR IP): Performed by: NURSE PRACTITIONER

## 2025-01-06 RX ORDER — PROPOFOL 10 MG/ML
INJECTION, EMULSION INTRAVENOUS
Status: DISCONTINUED | OUTPATIENT
Start: 2025-01-06 | End: 2025-01-06 | Stop reason: SDUPTHER

## 2025-01-06 RX ORDER — SODIUM CHLORIDE 9 MG/ML
INJECTION, SOLUTION INTRAVENOUS
Status: DISCONTINUED | OUTPATIENT
Start: 2025-01-06 | End: 2025-01-06 | Stop reason: SDUPTHER

## 2025-01-06 RX ORDER — POTASSIUM CHLORIDE 7.45 MG/ML
10 INJECTION INTRAVENOUS PRN
Status: DISCONTINUED | OUTPATIENT
Start: 2025-01-06 | End: 2025-01-07

## 2025-01-06 RX ADMIN — BRIMONIDINE TARTRATE 1 DROP: 2 SOLUTION/ DROPS OPHTHALMIC at 10:46

## 2025-01-06 RX ADMIN — MICONAZOLE NITRATE: 20.6 POWDER TOPICAL at 20:50

## 2025-01-06 RX ADMIN — POTASSIUM CHLORIDE 10 MEQ: 7.46 INJECTION, SOLUTION INTRAVENOUS at 16:38

## 2025-01-06 RX ADMIN — WATER 2000 MG: 1 INJECTION INTRAMUSCULAR; INTRAVENOUS; SUBCUTANEOUS at 20:41

## 2025-01-06 RX ADMIN — PREDNISOLONE ACETATE 1 DROP: 10 SUSPENSION/ DROPS OPHTHALMIC at 20:50

## 2025-01-06 RX ADMIN — WATER 2000 MG: 1 INJECTION INTRAMUSCULAR; INTRAVENOUS; SUBCUTANEOUS at 05:08

## 2025-01-06 RX ADMIN — PREDNISOLONE ACETATE 1 DROP: 10 SUSPENSION/ DROPS OPHTHALMIC at 10:46

## 2025-01-06 RX ADMIN — WATER 2000 MG: 1 INJECTION INTRAMUSCULAR; INTRAVENOUS; SUBCUTANEOUS at 13:38

## 2025-01-06 RX ADMIN — SODIUM CHLORIDE 250 MG: 9 INJECTION, SOLUTION INTRAVENOUS at 10:53

## 2025-01-06 RX ADMIN — BRIMONIDINE TARTRATE 1 DROP: 2 SOLUTION/ DROPS OPHTHALMIC at 20:50

## 2025-01-06 RX ADMIN — PROPOFOL 40 MG: 10 INJECTION, EMULSION INTRAVENOUS at 16:02

## 2025-01-06 RX ADMIN — PROPOFOL 30 MG: 10 INJECTION, EMULSION INTRAVENOUS at 16:08

## 2025-01-06 RX ADMIN — PETROLATUM: 420 OINTMENT TOPICAL at 10:45

## 2025-01-06 RX ADMIN — PREDNISOLONE ACETATE 1 DROP: 10 SUSPENSION/ DROPS OPHTHALMIC at 13:45

## 2025-01-06 RX ADMIN — ACETAMINOPHEN 650 MG: 650 SUPPOSITORY RECTAL at 13:37

## 2025-01-06 RX ADMIN — DORZOLAMIDE HYDROCHLORIDE 1 DROP: 20 SOLUTION OPHTHALMIC at 13:45

## 2025-01-06 RX ADMIN — SODIUM CHLORIDE 250 MG: 9 INJECTION, SOLUTION INTRAVENOUS at 03:34

## 2025-01-06 RX ADMIN — DORZOLAMIDE HYDROCHLORIDE 1 DROP: 20 SOLUTION OPHTHALMIC at 20:50

## 2025-01-06 RX ADMIN — SODIUM CHLORIDE: 9 INJECTION, SOLUTION INTRAVENOUS at 15:50

## 2025-01-06 RX ADMIN — DORZOLAMIDE HYDROCHLORIDE 1 DROP: 20 SOLUTION OPHTHALMIC at 10:45

## 2025-01-06 RX ADMIN — POTASSIUM CHLORIDE 10 MEQ: 7.46 INJECTION, SOLUTION INTRAVENOUS at 16:44

## 2025-01-06 RX ADMIN — MICONAZOLE NITRATE: 20.6 POWDER TOPICAL at 10:47

## 2025-01-06 RX ADMIN — POTASSIUM CHLORIDE 10 MEQ: 7.46 INJECTION, SOLUTION INTRAVENOUS at 13:39

## 2025-01-06 RX ADMIN — POTASSIUM CHLORIDE 10 MEQ: 7.46 INJECTION, SOLUTION INTRAVENOUS at 14:27

## 2025-01-06 ASSESSMENT — ENCOUNTER SYMPTOMS: SHORTNESS OF BREATH: 1

## 2025-01-06 NOTE — PROGRESS NOTES
Department of Internal Medicine  Infectious Diseases  Progress  Note      C/C : MSSA bacteremia r/o endocarditis       Pt is more awake  Confused   No distress  Afebrile         Current Facility-Administered Medications   Medication Dose Route Frequency Provider Last Rate Last Admin    potassium chloride 10 mEq/100 mL IVPB (Peripheral Line)  10 mEq IntraVENous PRN Sue Alexander  mL/hr at 01/06/25 1427 10 mEq at 01/06/25 1427    valproate (DEPACON) 250 mg in sodium chloride 0.9 % 100 mL IVPB  250 mg IntraVENous Q8H Sue Alexander MD   Stopped at 01/06/25 1254    ceFAZolin (ANCEF) 2,000 mg in sterile water 20 mL IV syringe  2,000 mg IntraVENous Q8H LimbuEdgar MD   2,000 mg at 01/06/25 1338    amLODIPine (NORVASC) tablet 2.5 mg  2.5 mg Oral BID Lucrecia Reeves, APRN - NP        aspirin EC tablet 81 mg  81 mg Oral Daily Lucrecia Reeves APRN - NP        atorvastatin (LIPITOR) tablet 10 mg  10 mg Oral Daily Lucrecia Reeves, APRN - NP        brimonidine (ALPHAGAN) 0.2 % ophthalmic solution 1 drop  1 drop Left Eye BID Lucrecia Reeves APRN - NP   1 drop at 01/06/25 1046    Vitamin D (CHOLECALCIFEROL) tablet 1,000 Units  1,000 Units Oral Daily Lucrecia Reeves, APRN - NP        dorzolamide (TRUSOPT) 2 % ophthalmic solution 1 drop  1 drop Left Eye TID Lucrecia Reeves APRN - NP   1 drop at 01/06/25 1345    prednisoLONE acetate (PRED FORTE) 1 % ophthalmic suspension 1 drop  1 drop Left Eye TID Lucrecia Reeves, APRN - NP   1 drop at 01/06/25 1345    white petrolatum ointment   Topical TID PRN Lucrecia Reeves APRN - NP   Given at 01/06/25 1045    miconazole (MICOTIN) 2 % powder   Topical BID Lucrecia Reeves, APRN - NP   Given at 01/06/25 1047    sodium chloride flush 0.9 % injection 5-40 mL  5-40 mL IntraVENous 2 times per day Lucrecia Reeves APRN - NP   10 mL at 01/04/25 2003    sodium chloride flush 0.9 % injection 5-40 mL  5-40 mL IntraVENous PRN Lucrecia Reeves APRN - NP

## 2025-01-06 NOTE — PLAN OF CARE
Problem: Safety - Adult  Goal: Free from fall injury  1/5/2025 2257 by Donna Escamilla RN  Outcome: Progressing  1/5/2025 1000 by Donna Escamilla RN  Outcome: Progressing     Problem: Chronic Conditions and Co-morbidities  Goal: Patient's chronic conditions and co-morbidity symptoms are monitored and maintained or improved  1/5/2025 2257 by Donna Escamilla RN  Outcome: Progressing  1/5/2025 1000 by Donna Escamilla RN  Outcome: Progressing     Problem: Discharge Planning  Goal: Discharge to home or other facility with appropriate resources  1/5/2025 2257 by Donna Escamilla RN  Outcome: Progressing  1/5/2025 1000 by Donna Escamilla RN  Outcome: Progressing     Problem: Skin/Tissue Integrity  Goal: Absence of new skin breakdown  Description: 1.  Monitor for areas of redness and/or skin breakdown  2.  Assess vascular access sites hourly  3.  Every 4-6 hours minimum:  Change oxygen saturation probe site  4.  Every 4-6 hours:  If on nasal continuous positive airway pressure, respiratory therapy assess nares and determine need for appliance change or resting period.  1/5/2025 2257 by Donna Escamilla RN  Outcome: Progressing  1/5/2025 1000 by Donna Escamilla RN  Outcome: Progressing     Problem: ABCDS Injury Assessment  Goal: Absence of physical injury  1/5/2025 2257 by Donna Escamilla RN  Outcome: Progressing  1/5/2025 1000 by Donna Escamilla RN  Outcome: Progressing

## 2025-01-06 NOTE — CONSULTS
[facility]- [facility] [place] [place] [facility]  Consultation Report  PATIENT: Carlene Olson  MEDICAL RECORD NUMBER: [id number]  DATE OF SERVICE:  1/6/2025  ATTENDING ELECTROPHYSIOLOGIST: Karyn Alston MD   PRIMARY ELECTROPHYSIOLOGIST: Rinku De Jesus DO   REFERRING PHYSICIAN:  Chandan Chamorro DO  CHIEF COMPLAINT:  Altered mental status.     HPI: This is a 89 y.o. female with a history of Intermittent complete heart block S/p [employer]tronic dual chamber pacemaker on 12/26/2023, recurrent syncope and subdural hematoma (07/2021), Hypertension, Type 2 diabetes mellitus, osteoarthritis, limited CODE status to DNR-CCA. Her home medications include  Norvasc, ASA 81 mg daily.     She presented for fall and loss of consciousness at home.  On 12/25/23, on presenting to the ER she was noted to have  intermittent complete heart block and underwent placement of a stat TVP. TTE showed an EF of 60-65% and Dr. De Jesus placed a dual chamber pacemaker on 12/26/2023 with the RV lead in the LBBB area. She was seen in the device clinic on 01/09/2024 and was RV pacing less than 1% at that time. She was offered office follow up in April of 2023 but elected not to follow up in office. She continues to send remotes without evidence of frequent RV pacing.     She was admitted from 12/27/24 to 01/02/2025 for a fall and right lower extermity medial thigh mass.  CT right femur with contrast revealed superficial fluid collection or cystic lesion in the medial soft tissues at the mid diaphyseal level of the femur without significant wall thickening. Orthopedic surgery was consulted and reccommended conservative treatment. She was then discharged to a skilled nursing facility. No blood cultures were drawn at that time.    She was readmitted with altered mental status a day after discharge, on 01/03/2025.  She had an upward gaze and incomprehensible speech. Due to leukocytosis and a CXR that showed a possible  Electrophysiology  [facility]  [facility] [place]: [place]  Electrophysiology  9:27 AM  1/6/2025    Assessment    Elderly debilitated lady hospitalized with change in mental status and the following issues    1.  Staphylococcus aureus bacteremia  Most likely iatrogenic and related to her recent hospitalization.  No significant open wounds or ulcers on her skin that could explain this bacteremia.  Patient currently on IV antibiotics per ID  Dual-chamber pacemaker in situ for the past year    2.  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  Other medical issues as noted above    Recommendations    AMEE to be completed later today.  May or may not show any abnormalities  Nevertheless her device would need to be extracted to ensure complete elimination of bacteremia.  Alternatively we could consider 6 weeks of IV antibiotics before considering transfer to [facility] for lead extraction in view of her advanced age and compromised medical status.  Will discuss with Dr. Irene from infectious disease      PHYSICIAN ADDENDUM:  I independently contributed to, made amendments as needed, and finalized the above documentation. I contributed >50% to the overall encounter time including review of testing, formulating a plan with the APC and communication with the other care team members and family.     Time of the day of service includes:  Preparing to see the patient (eg. Review of the medical record, such as tests).  Obtaining and/or reviewing separately obtained history.  Ordering prescription medications, tests, and/or procedures.  Communicating results to the patient/family/caregiver.  Counseling/educating the patient/family/caregiver.  Documenting clinical information in the patients electronic record.  Coordination of care for the patient.  Performing a medical appropriate exam and/or evaluation.    Karyn Alston MD  Cardiac electrophysiology  [facility]

## 2025-01-06 NOTE — PROGRESS NOTES
Speech Language Pathology  NAME:  Carlene Olson  :  1935  DATE: 2025  ROOM:  [facility]-A    Pt unavailable at 0920 for Clinical Swallow Evaluation Discussed with patient nurse in person.    REASON:  Patient NPO for AEME not able to address dysphagia goals due to this. RN also reports that patient is not following commands at this time.     Will re-attempt as appropriate.     Thank You,    Jamie Fine MSCCC/SLP  Speech Language Pathologist

## 2025-01-06 NOTE — PROGRESS NOTES
TRANSFER - OUT REPORT:    Verbal report given to 8SE on Carlene Olson being transferred to [facility] for routine progression of patient care       Report consisted of patient's Situation, Background, Assessment and   Recommendations(SBAR).     Information from the following report(s) Nurse Handoff Report was reviewed with the receiving nurse.    Opportunity for questions and clarification was provided.      Patient transported with:   Monitor

## 2025-01-06 NOTE — ACP (ADVANCE CARE PLANNING)
Advance Care Planning   Healthcare Decision Maker:    Primary Decision Maker: Ivon Chen - Child - [phone]    Secondary Decision Maker: WesleyYunior - Child - [phone]    Supplemental (Other) Decision Maker: WesleyDominick - Child - [phone]    Click here to complete Healthcare Decision Makers including selection of the Healthcare Decision Maker Relationship (ie \"Primary\").          documents are in epic. MIKE Bateman  1/6/2025

## 2025-01-06 NOTE — PROCEDURES
PROCEDURE NOTE  Date: 1/6/2025   Name: Carlene Olson  YOB: 1935    Procedures:      Preprocedure diagnosis: Bacteremia    Procedures: AMEE  Primary procedure  Written informed consent was obtained, the AMEE was performed under stable fasting condition. The patient was set up for monitoring of surface EKG and pulse oximetry continuously. Blood pressure was monitored and with automatic cuff measurements. Supplemental oxygen was administered. The procedure was performed under anesthesia by anesthesiology. After ensuring adequate anesthesia, AMEE probe was intubated without difficulty.    Result: No echocardiographic evidence of endocarditis, full report to follow.    Complication: None    Patient was monitored until awake and vitals remained stable.    Candy Monaco M.D.  [facility] cardiology

## 2025-01-06 NOTE — PROGRESS NOTES
Hospitalist Progress Note      Chief Complaint:  had concerns including Altered Mental Status ([facility], got there yesterday, nursing home didn't know baseline or LKW, recent admission. gaze up to Left, incomprehensible speech, 99.5 axillary, DNRCCA).    Admission Date: 1/3/2025     SYNOPSIS:Ms. Carlene Olson, a 89 y.o. year old female  who  has a past medical history of Arthritis, Cataract, Diabetes mellitus (HCC), Fall from standing, Injury of left upper arm, Pneumonia, Subdural hematoma, Syncope and collapse, and Uncontrolled type 2 diabetes mellitus.    Patient presented to the ED from [facility] with AMS.  Patient admitted for management of acute encephalopathy, staff bacteremia, leukocytosis  AMEE to r/o endocarditis, might need pacemaker removal, EP and cardiology consulted     SUBJECTIVE:    Patient seen and examined at bedside, updates taken from bedside nurse,   Records reviewed.   Stable overnight. No overnight issues reported     Temp (24hrs), Av.2 °F (36.8 °C), Min:97.8 °F (36.6 °C), Max:98.7 °F (37.1 °C)    DIET: Diet NPO Exceptions are: Sips of Water with Meds  CODE: DNR-CCA  No intake or output data in the 24 hours ending 25 7440      OBJECTIVE:    BP (!) 146/60   Pulse 80   Temp 98 °F (36.7 °C)   Resp 16   Ht 1.575 m (5' 2\")   Wt 73.9 kg (163 lb)   SpO2 91%   BMI 29.81 kg/m²     General appearance: No apparent distress, appears stated age and cooperative.   HEENT:  Normocephalic. Conjunctivae/corneas clear. Moist mucosa   Neck: Supple. No jugular venous distention. Thyroid not visible, non tender   Respiratory: Normal respiratory effort. Clear to auscultation bilaterally. No stridor/wheezing/rhonchi/crackles   Cardiovascular: Regular heart beats, normal S1-S2. No M/G/R  Abdomen: Non distended, soft, non tender, no visceromegaly, no mass, normal bowel sounds   Musculoskeletal: No clubbing, cyanosis, no bilateral lower extremity edema. Brisk capillary refill.  Oral QPM    insulin lispro  0-4 Units SubCUTAneous 4x Daily AC & HS    ferrous sulfate  325 mg Oral BID WC     PRN Meds: potassium chloride, white petrolatum, sodium chloride flush, sodium chloride, ondansetron **OR** ondansetron, polyethylene glycol, acetaminophen **OR** acetaminophen, melatonin, hydrALAZINE, calcium carbonate, Polyvinyl Alcohol-Povidone PF, glucose, dextrose bolus **OR** dextrose bolus, glucagon (rDNA), dextrose, benzonatate, benzocaine-menthol, magnesium hydroxide, sodium chloride    Labs:     Recent Labs     01/04/25  0506 01/05/25  0459 01/06/25  0657   WBC 14.9* 13.7* 12.1*   HGB 10.8* 10.3* 10.8*   HCT 34.6 32.4* 33.4*    320 363       Recent Labs     01/04/25  0506 01/05/25  0459 01/06/25  0657    143 145   K 3.7 3.4* 3.2*    109* 111*   CO2 22 22 24   BUN 28* 31* 28*   CREATININE 0.8 0.7 0.7   CALCIUM 8.4* 8.7 8.8       No results for input(s): \"ALKPHOS\", \"ALT\", \"AST\", \"BILITOT\", \"AMYLASE\", \"LIPASE\" in the last 72 hours.    Invalid input(s): \"PROT\", \"ALB\"      No results for input(s): \"INR\" in the last 72 hours.    No results for input(s): \"CKTOTAL\", \"TROPONINI\" in the last 72 hours.    Chronic labs:    Lab Results   Component Value Date    CHOL 112 01/03/2025    TRIG 74 01/03/2025    HDL 49 01/03/2025    TSH 0.74 01/03/2025    INR 1.0 12/25/2023    LABA1C 6.6 (H) 01/03/2025       Radiology: REVIEWED DAILY    +++++++++++++++++++++++++++++++++++++++++++++++++  Sue Alexander MD  [facility]   [place], [place]  +++++++++++++++++++++++++++++++++++++++++++++++++  NOTE: This report was transcribed using voice recognition software. Every effort was made to ensure accuracy; however, inadvertent computerized transcription errors may be present.

## 2025-01-06 NOTE — PROGRESS NOTES
Spoke to daughter, and received telephone consent with witness RN for AMEE. Consent placed in soft chart

## 2025-01-06 NOTE — PLAN OF CARE
Problem: Safety - Adult  Goal: Free from fall injury  1/6/2025 0148 by Tarun Anderson RN  Outcome: Progressing  1/5/2025 2257 by Donna Escamilla RN  Outcome: Progressing     Problem: Chronic Conditions and Co-morbidities  Goal: Patient's chronic conditions and co-morbidity symptoms are monitored and maintained or improved  1/6/2025 0148 by Tarun Anderson RN  Outcome: Progressing  1/5/2025 2257 by Donna Escamilla RN  Outcome: Progressing     Problem: Discharge Planning  Goal: Discharge to home or other facility with appropriate resources  1/6/2025 0148 by Tarun Anderson RN  Outcome: Progressing  1/5/2025 2257 by Donna Escamilla RN  Outcome: Progressing     Problem: Skin/Tissue Integrity  Goal: Absence of new skin breakdown  Description: 1.  Monitor for areas of redness and/or skin breakdown  2.  Assess vascular access sites hourly  3.  Every 4-6 hours minimum:  Change oxygen saturation probe site  4.  Every 4-6 hours:  If on nasal continuous positive airway pressure, respiratory therapy assess nares and determine need for appliance change or resting period.  1/6/2025 0148 by Tarun Anderson RN  Outcome: Progressing  1/5/2025 2257 by Donna Escamilla RN  Outcome: Progressing     Problem: ABCDS Injury Assessment  Goal: Absence of physical injury  1/6/2025 0148 by Tarun Anderson RN  Outcome: Progressing  1/5/2025 2257 by Donna Escamilla RN  Outcome: Progressing

## 2025-01-06 NOTE — ANESTHESIA PRE PROCEDURE
Department of Anesthesiology  Preprocedure Note       Name:  Carlene Olson   Age:  89 y.o.  :  1935                                          MRN:  [id number]         Date:  2025      Surgeon: * Surgery not found *    Procedure:     Medications prior to admission:   Prior to Admission medications    Medication Sig Start Date End Date Taking? Authorizing Provider   vitamin D (CHOLECALCIFEROL) 25 MCG (1000 UT) TABS tablet Take 1 tablet by mouth daily    Provider, Historical, MD   white petrolatum OINT ointment Apply topically in the morning and at bedtime  Patient not taking: Reported on 1/3/2025 1/2/25   Anthony Peres MD   white petrolatum OINT ointment Apply topically 3 times daily as needed (after toileting) 25   Anthony Peres MD   atorvastatin (LIPITOR) 10 MG tablet TAKE 1 TABLET BY MOUTH EVERY DAY 24   Chandan Chamorro DO   Accu-Chek FastClix Lancets MISC USE DAILY AS DIRECTED 23   Chandan Chamorro DO   menthol 7.5 MG lozenge Take 1 lozenge by mouth every 2 hours as needed (cough)  Patient not taking: Reported on 1/3/2025 6/20/23   Chet Larsen MD   Camphor-Menthol (MENTHOLATUM) 9-1.3 % OINT ointment Apply 1 each topically 3 times daily as needed (joint pain)  Patient not taking: Reported on 1/3/2025 6/20/23   Chet Larsen MD   D3-1000 25 MCG (1000 UT) TABS tablet TAKE 1 TABLET BY MOUTH EVERY DAY  Patient not taking: Reported on 1/3/2025 5/13/23   Chandan Chamorro DO   amLODIPine (NORVASC) 2.5 MG tablet TAKE 1 TABLET BY MOUTH TWICE A DAY 22   Chandan Chamorro DO   acetaminophen (TYLENOL) 325 MG tablet Take 2 tablets by mouth every 6 hours as needed for Pain or Fever    ProviderRohan MD   dorzolamide (TRUSOPT) 2 % ophthalmic solution Place 1 drop into the left eye 3 times daily 3/5/21   Montserrat Spangler,    brimonidine (ALPHAGAN) 0.2 % ophthalmic solution Place 1 drop into the left eye 2 times daily    Provider, MD Rohan   prednisoLONE acetate (PRED

## 2025-01-06 NOTE — ANESTHESIA POSTPROCEDURE EVALUATION
Department of Anesthesiology  Postprocedure Note    Patient: Carlene Olson  MRN: [id number]  YOB: 1935  Date of evaluation: 1/6/2025    Procedure Summary       Date: 01/06/25 Room / Location: [facility] Cardiac Cath Lab; [facility] Noninvasive Cardiology    Anesthesia Start: 1549 Anesthesia Stop: 1624    Procedure: AMEE (PRN CONTRAST/BUBBLE/3D) Diagnosis: Acute bacterial endocarditis    Scheduled Providers: Candy Monaco MD Responsible Provider: Rosamaria Leone MD    Anesthesia Type: MAC ASA Status: 4            Anesthesia Type: No value filed.    Arlette Phase I: Arlette Score: 10    Arlette Phase II:      Anesthesia Post Evaluation    Patient location during evaluation: PACU  Patient participation: complete - patient participated  Level of consciousness: awake and alert  Pain score: 0  Airway patency: patent  Nausea & Vomiting: no nausea and no vomiting  Cardiovascular status: blood pressure returned to baseline  Respiratory status: acceptable  Hydration status: stable  Pain management: adequate    No notable events documented.

## 2025-01-06 NOTE — CARE COORDINATION
Pt is from [facility], she was snf;loc and a precert is needed to return. Will get order for PT and OT. I called daughter, ivon. And she wants pt to return. All discharge paper work is in place. Pt is in isolation for influenza. AMEE for today. Pt is on iv LR, depacon, ancef and vanco. Speech to see. EP, ID are following. Wound care to see for right heel. ,.MIKE Bateman  1/6/2025    Case Management Assessment  Initial Evaluation    Date/Time of Evaluation: 1/6/2025 11:38 AM  Assessment Completed by: MIKE Bateman    If patient is discharged prior to next notation, then this note serves as note for discharge by case management.    Patient Name: Carlene Olson                   YOB: 1935  Diagnosis: Pneumonia due to organism [J18.9]  Altered mental status, unspecified altered mental status type [R41.82]  Pneumonia due to infectious organism, unspecified laterality, unspecified part of lung [J18.9]                   Date / Time: 1/3/2025  8:44 AM    Patient Admission Status: Inpatient   Readmission Risk (Low < 19, Mod (19-27), High > 27): Readmission Risk Score: 18.7    Current PCP: Chandan Chamorro, DO  PCP verified by CM? Yes    Chart Reviewed: Yes      History Provided by: Other (see comment) (lauren)  Patient Orientation: Person, Alert and Oriented    Patient Cognition: Alert    Hospitalization in the last 30 days (Readmission):  Yes    If yes, Readmission Assessment in CM Navigator will be completed.    Advance Directives:      Code Status: DNR-CCA   Patient's Primary Decision Maker is: Named in Scanned ACP Document    Primary Decision Maker: Ivon Chen - Child - [phone]    Secondary Decision Maker: WesleyYunior - Child - [phone]    Supplemental (Other) Decision Maker: WesleyDominick - Yanira - [phone]    Discharge Planning:    Patient lives with: Other (Comment) ([facility]) Type of Home: Skilled Nursing Facility  Primary Care Giver: Other (Comment) (lauren)  Patient Support Systems

## 2025-01-06 NOTE — DISCHARGE INSTR - COC
Continuity of Care Form    Patient Name: Carlene Olson   :  1935  MRN:  [id number]    Admit date:  1/3/2025  Discharge date:  25    Code Status Order: DNR-CCA   Advance Directives:   Advance Care Flowsheet Documentation             Admitting Physician:  Bridgett Clements DO  PCP: Chandan Chamorro DO    Discharging Nurse: ARISTEO  Discharging Hospital Unit/Room#: 8506/8506-A  Discharging Unit Phone Number: [phone]    Emergency Contact:   Extended Emergency Contact Information  Primary Emergency Contact: [place] [address]  Home Phone: [phone]  Relation: Child  Secondary Emergency Contact: Dominick Olson  Home Phone: [phone]  Relation: Child   needed? No    Past Surgical History:  Past Surgical History:   Procedure Laterality Date    BRONCHOSCOPY N/A 2021    BRONCHOSCOPY ALVEOLAR LAVAGE [place] [facility] [place]    CARDIAC PROCEDURE N/A 2023    Coronary angiography performed [place]fin [place] [facility]    CARDIAC PROCEDURE N/A 2023    Insert temporary pacemaker performed by [place]fin [place] [facility]    CATARACT REMOVAL WITH IMPLANT      EP DEVICE PROCEDURE N/A 2023    Insert PPM dual performed by Rinku De Jesus DO at [facility]    HYSTERECTOMY (CERVIX STATUS UNKNOWN)         Immunization History:   Immunization History   Administered Date(s) Administered    DTaP vaccine 2020    TDaP, ADACEL (age 10y-64y), BOOSTRIX (age 10y+), IM, 0.5mL 2020       Active Problems:  Patient Active Problem List   Diagnosis Code    DM (diabetes mellitus) (HCC) E11.9    Bilateral foot pain M79.671, M79.672    HTN (hypertension) I10    HLD (hyperlipidemia) E78.5    Age-related osteoporosis without current pathological fracture M81.0    Primary osteoarthritis involving multiple joints M15.0    Class 1 obesity due to excess calories with serious comorbidity and body mass index (BMI) of  SECTION    Inpatient Status Date: ***    Readmission Risk Assessment Score:  [facility] RISK OF UNPLANNED READMISSION 2.0             18.1 Total Score        Discharging to Facility/ Agency   Name: SHAKIRA VIERA.   Address:  Phone:  Fax:    Dialysis Facility (if applicable)   Name:  Address:  Dialysis Schedule:  Phone:  Fax:    / signature: Electronically signed by MIKE Bateman on 1/10/2025 at 9:18 AM          PHYSICIAN SECTION    Prognosis: Fair    Condition at Discharge: Stable    Rehab Potential (if transferring to Rehab): Fair    Recommended Labs or Other Treatments After Discharge:  Ancef IV  2 grams IV q 8 hrs for 6 weeks, follow by keflex oral suppressive therapy indefinitely     Physician Certification: I certify the above information and transfer of Carlene Olson  is necessary for the continuing treatment of the diagnosis listed and that she requires Skilled Nursing Facility for greater 30 days.     Update Admission H&P: No change in H&P    PHYSICIAN SIGNATURE:  Electronically signed by Braden Hutchison MD on 1/11/25 at 9:39 AM EST

## 2025-01-07 PROBLEM — Z95.0 CARDIAC PACEMAKER IN SITU: Status: ACTIVE | Noted: 2025-01-07

## 2025-01-07 PROBLEM — B95.61 STAPHYLOCOCCUS AUREUS BACTEREMIA: Status: ACTIVE | Noted: 2025-01-06

## 2025-01-07 LAB
ANION GAP SERPL CALCULATED.3IONS-SCNC: 11 MMOL/L (ref 7–16)
ANION GAP SERPL CALCULATED.3IONS-SCNC: 11 MMOL/L (ref 7–16)
BASOPHILS # BLD: 0.02 K/UL (ref 0–0.2)
BASOPHILS NFR BLD: 0 % (ref 0–2)
BUN SERPL-MCNC: 20 MG/DL (ref 6–23)
BUN SERPL-MCNC: 23 MG/DL (ref 6–23)
CALCIUM SERPL-MCNC: 8.6 MG/DL (ref 8.6–10.2)
CALCIUM SERPL-MCNC: 8.8 MG/DL (ref 8.6–10.2)
CHLORIDE SERPL-SCNC: 112 MMOL/L (ref 98–107)
CHLORIDE SERPL-SCNC: 112 MMOL/L (ref 98–107)
CO2 SERPL-SCNC: 21 MMOL/L (ref 22–29)
CO2 SERPL-SCNC: 24 MMOL/L (ref 22–29)
CREAT SERPL-MCNC: 0.5 MG/DL (ref 0.5–1)
CREAT SERPL-MCNC: 0.6 MG/DL (ref 0.5–1)
EOSINOPHIL # BLD: 0.05 K/UL (ref 0.05–0.5)
EOSINOPHILS RELATIVE PERCENT: 0 % (ref 0–6)
ERYTHROCYTE [DISTWIDTH] IN BLOOD BY AUTOMATED COUNT: 14.6 % (ref 11.5–15)
GFR, ESTIMATED: 88 ML/MIN/1.73M2
GFR, ESTIMATED: 89 ML/MIN/1.73M2
GLUCOSE BLD-MCNC: 128 MG/DL (ref 74–99)
GLUCOSE BLD-MCNC: 128 MG/DL (ref 74–99)
GLUCOSE BLD-MCNC: 211 MG/DL (ref 74–99)
GLUCOSE BLD-MCNC: 98 MG/DL (ref 74–99)
GLUCOSE SERPL-MCNC: 124 MG/DL (ref 74–99)
GLUCOSE SERPL-MCNC: 97 MG/DL (ref 74–99)
HCT VFR BLD AUTO: 33.5 % (ref 34–48)
HGB BLD-MCNC: 10.7 G/DL (ref 11.5–15.5)
IMM GRANULOCYTES # BLD AUTO: 0.32 K/UL (ref 0–0.58)
IMM GRANULOCYTES NFR BLD: 3 % (ref 0–5)
LYMPHOCYTES NFR BLD: 0.88 K/UL (ref 1.5–4)
LYMPHOCYTES RELATIVE PERCENT: 7 % (ref 20–42)
MAGNESIUM SERPL-MCNC: 1.7 MG/DL (ref 1.6–2.6)
MCH RBC QN AUTO: 29.3 PG (ref 26–35)
MCHC RBC AUTO-ENTMCNC: 31.9 G/DL (ref 32–34.5)
MCV RBC AUTO: 91.8 FL (ref 80–99.9)
MONOCYTES NFR BLD: 0.64 K/UL (ref 0.1–0.95)
MONOCYTES NFR BLD: 5 % (ref 2–12)
NEUTROPHILS NFR BLD: 84 % (ref 43–80)
NEUTS SEG NFR BLD: 10.12 K/UL (ref 1.8–7.3)
PLATELET # BLD AUTO: 392 K/UL (ref 130–450)
PMV BLD AUTO: 9.6 FL (ref 7–12)
POTASSIUM SERPL-SCNC: 3 MMOL/L (ref 3.5–5)
POTASSIUM SERPL-SCNC: 3.6 MMOL/L (ref 3.5–5)
POTASSIUM SERPL-SCNC: 3.7 MMOL/L (ref 3.5–5)
RBC # BLD AUTO: 3.65 M/UL (ref 3.5–5.5)
SODIUM SERPL-SCNC: 144 MMOL/L (ref 132–146)
SODIUM SERPL-SCNC: 147 MMOL/L (ref 132–146)
WBC OTHER # BLD: 12 K/UL (ref 4.5–11.5)

## 2025-01-07 PROCEDURE — 82962 GLUCOSE BLOOD TEST: CPT

## 2025-01-07 PROCEDURE — 99232 SBSQ HOSP IP/OBS MODERATE 35: CPT | Performed by: STUDENT IN AN ORGANIZED HEALTH CARE EDUCATION/TRAINING PROGRAM

## 2025-01-07 PROCEDURE — 97161 PT EVAL LOW COMPLEX 20 MIN: CPT

## 2025-01-07 PROCEDURE — 2580000003 HC RX 258: Performed by: STUDENT IN AN ORGANIZED HEALTH CARE EDUCATION/TRAINING PROGRAM

## 2025-01-07 PROCEDURE — 6370000000 HC RX 637 (ALT 250 FOR IP): Performed by: NURSE PRACTITIONER

## 2025-01-07 PROCEDURE — 87040 BLOOD CULTURE FOR BACTERIA: CPT

## 2025-01-07 PROCEDURE — 6360000002 HC RX W HCPCS

## 2025-01-07 PROCEDURE — 83735 ASSAY OF MAGNESIUM: CPT

## 2025-01-07 PROCEDURE — 6360000002 HC RX W HCPCS: Performed by: INTERNAL MEDICINE

## 2025-01-07 PROCEDURE — 36415 COLL VENOUS BLD VENIPUNCTURE: CPT

## 2025-01-07 PROCEDURE — 2500000003 HC RX 250 WO HCPCS: Performed by: STUDENT IN AN ORGANIZED HEALTH CARE EDUCATION/TRAINING PROGRAM

## 2025-01-07 PROCEDURE — 99233 SBSQ HOSP IP/OBS HIGH 50: CPT | Performed by: INTERNAL MEDICINE

## 2025-01-07 PROCEDURE — 2060000000 HC ICU INTERMEDIATE R&B

## 2025-01-07 PROCEDURE — 84132 ASSAY OF SERUM POTASSIUM: CPT

## 2025-01-07 PROCEDURE — 97535 SELF CARE MNGMENT TRAINING: CPT

## 2025-01-07 PROCEDURE — 6360000002 HC RX W HCPCS: Performed by: STUDENT IN AN ORGANIZED HEALTH CARE EDUCATION/TRAINING PROGRAM

## 2025-01-07 PROCEDURE — 80048 BASIC METABOLIC PNL TOTAL CA: CPT

## 2025-01-07 PROCEDURE — 97167 OT EVAL HIGH COMPLEX 60 MIN: CPT

## 2025-01-07 PROCEDURE — 97530 THERAPEUTIC ACTIVITIES: CPT

## 2025-01-07 PROCEDURE — 2500000003 HC RX 250 WO HCPCS: Performed by: INTERNAL MEDICINE

## 2025-01-07 PROCEDURE — 92610 EVALUATE SWALLOWING FUNCTION: CPT

## 2025-01-07 PROCEDURE — 6360000002 HC RX W HCPCS: Performed by: NURSE PRACTITIONER

## 2025-01-07 PROCEDURE — 85025 COMPLETE CBC W/AUTO DIFF WBC: CPT

## 2025-01-07 RX ORDER — POTASSIUM CHLORIDE 7.45 MG/ML
10 INJECTION INTRAVENOUS PRN
Status: DISCONTINUED | OUTPATIENT
Start: 2025-01-07 | End: 2025-01-11 | Stop reason: HOSPADM

## 2025-01-07 RX ORDER — POTASSIUM CHLORIDE 1500 MG/1
40 TABLET, EXTENDED RELEASE ORAL PRN
Status: DISCONTINUED | OUTPATIENT
Start: 2025-01-07 | End: 2025-01-11 | Stop reason: HOSPADM

## 2025-01-07 RX ORDER — DEXTROSE MONOHYDRATE 50 MG/ML
INJECTION, SOLUTION INTRAVENOUS CONTINUOUS
Status: DISCONTINUED | OUTPATIENT
Start: 2025-01-07 | End: 2025-01-07

## 2025-01-07 RX ORDER — DEXTROSE MONOHYDRATE 50 MG/ML
INJECTION, SOLUTION INTRAVENOUS CONTINUOUS
Status: DISCONTINUED | OUTPATIENT
Start: 2025-01-07 | End: 2025-01-08

## 2025-01-07 RX ORDER — HALOPERIDOL 5 MG/ML
5 INJECTION INTRAMUSCULAR ONCE
Status: COMPLETED | OUTPATIENT
Start: 2025-01-07 | End: 2025-01-07

## 2025-01-07 RX ADMIN — POTASSIUM CHLORIDE 10 MEQ: 10 INJECTION, SOLUTION INTRAVENOUS at 21:25

## 2025-01-07 RX ADMIN — SODIUM CHLORIDE 250 MG: 9 INJECTION, SOLUTION INTRAVENOUS at 05:35

## 2025-01-07 RX ADMIN — ENOXAPARIN SODIUM 40 MG: 100 INJECTION SUBCUTANEOUS at 10:09

## 2025-01-07 RX ADMIN — DORZOLAMIDE HYDROCHLORIDE 1 DROP: 20 SOLUTION OPHTHALMIC at 21:33

## 2025-01-07 RX ADMIN — BRIMONIDINE TARTRATE 1 DROP: 2 SOLUTION/ DROPS OPHTHALMIC at 10:09

## 2025-01-07 RX ADMIN — MICONAZOLE NITRATE: 20.6 POWDER TOPICAL at 21:37

## 2025-01-07 RX ADMIN — WATER 2000 MG: 1 INJECTION INTRAMUSCULAR; INTRAVENOUS; SUBCUTANEOUS at 05:34

## 2025-01-07 RX ADMIN — AMLODIPINE BESYLATE 2.5 MG: 5 TABLET ORAL at 21:33

## 2025-01-07 RX ADMIN — DORZOLAMIDE HYDROCHLORIDE 1 DROP: 20 SOLUTION OPHTHALMIC at 10:09

## 2025-01-07 RX ADMIN — PREDNISOLONE ACETATE 1 DROP: 10 SUSPENSION/ DROPS OPHTHALMIC at 21:33

## 2025-01-07 RX ADMIN — HALOPERIDOL LACTATE 5 MG: 5 INJECTION, SOLUTION INTRAMUSCULAR at 04:44

## 2025-01-07 RX ADMIN — POTASSIUM CHLORIDE 10 MEQ: 10 INJECTION, SOLUTION INTRAVENOUS at 18:08

## 2025-01-07 RX ADMIN — WATER 2000 MG: 1 INJECTION INTRAMUSCULAR; INTRAVENOUS; SUBCUTANEOUS at 21:32

## 2025-01-07 RX ADMIN — POTASSIUM CHLORIDE 10 MEQ: 10 INJECTION, SOLUTION INTRAVENOUS at 17:07

## 2025-01-07 RX ADMIN — POTASSIUM CHLORIDE 10 MEQ: 10 INJECTION, SOLUTION INTRAVENOUS at 16:07

## 2025-01-07 RX ADMIN — ASPIRIN 81 MG: 81 TABLET, COATED ORAL at 10:08

## 2025-01-07 RX ADMIN — WATER 2000 MG: 1 INJECTION INTRAMUSCULAR; INTRAVENOUS; SUBCUTANEOUS at 12:18

## 2025-01-07 RX ADMIN — INSULIN LISPRO 1 UNITS: 100 INJECTION, SOLUTION INTRAVENOUS; SUBCUTANEOUS at 21:32

## 2025-01-07 RX ADMIN — PREDNISOLONE ACETATE 1 DROP: 10 SUSPENSION/ DROPS OPHTHALMIC at 10:09

## 2025-01-07 RX ADMIN — ACETAMINOPHEN 650 MG: 325 TABLET ORAL at 21:37

## 2025-01-07 RX ADMIN — AMLODIPINE BESYLATE 2.5 MG: 5 TABLET ORAL at 10:08

## 2025-01-07 RX ADMIN — POTASSIUM CHLORIDE 10 MEQ: 10 INJECTION, SOLUTION INTRAVENOUS at 20:10

## 2025-01-07 RX ADMIN — SODIUM CHLORIDE 250 MG: 9 INJECTION, SOLUTION INTRAVENOUS at 23:04

## 2025-01-07 RX ADMIN — ATORVASTATIN CALCIUM 10 MG: 10 TABLET, FILM COATED ORAL at 10:08

## 2025-01-07 RX ADMIN — Medication 5 MG: at 21:33

## 2025-01-07 RX ADMIN — DEXTROSE MONOHYDRATE: 50 INJECTION, SOLUTION INTRAVENOUS at 16:01

## 2025-01-07 RX ADMIN — SODIUM CHLORIDE 250 MG: 9 INJECTION, SOLUTION INTRAVENOUS at 13:56

## 2025-01-07 RX ADMIN — BRIMONIDINE TARTRATE 1 DROP: 2 SOLUTION/ DROPS OPHTHALMIC at 21:33

## 2025-01-07 ASSESSMENT — PAIN DESCRIPTION - ORIENTATION: ORIENTATION: RIGHT;LEFT

## 2025-01-07 ASSESSMENT — PAIN - FUNCTIONAL ASSESSMENT: PAIN_FUNCTIONAL_ASSESSMENT: PREVENTS OR INTERFERES SOME ACTIVE ACTIVITIES AND ADLS

## 2025-01-07 ASSESSMENT — PAIN DESCRIPTION - LOCATION: LOCATION: LEG

## 2025-01-07 ASSESSMENT — PAIN DESCRIPTION - DESCRIPTORS: DESCRIPTORS: ACHING;DISCOMFORT;SORE

## 2025-01-07 NOTE — CARE COORDINATION
SOCIAL WORK/CASEMANAGEMENT TRANSITION OF CARE PLANNING( LAURA DOVER, -[phone]):  pt is from [facility], precert is needed to return and will start it once PT and OT evals are in epic. All discharge paper work with ambulance form is in place. Pt is on iv depacon and ancef. She is in isolation for influenza. AMEE done yesterday with EF of 60-65%. ID , cardio and EP are following. Speech has pt on pureed consistency with thin liquids. . Pt is on 2l nc. Per ID pt will need picc line. Laura Dover, MIKE  1/7/2025

## 2025-01-07 NOTE — PROGRESS NOTES
OCCUPATIONAL THERAPY INITIAL EVALUATION    [facility] [address] [place], [place]       Date:2025                                                  Patient Name: Carlene Olson  MRN: [id number]  : 1935  Room: [address] [place] [address]    Evaluating OT: Sameer Stone OTR/L [id number]    Referring Provider: Sue Alexander MD      Specific Provider Orders/Date: OT evaluation and treatment 24 1200    Diagnosis:  Pneumonia due to organism [J18.9]  Altered mental status, unspecified altered mental status type [R41.82]  Pneumonia due to infectious organism, unspecified laterality, unspecified part of lung [J18.9]      Pertinent Medical History:  has a past medical history of Arthritis, Cataract, Diabetes mellitus (HCC), Fall from standing, Injury of left upper arm, Pneumonia, Subdural hematoma, Syncope and collapse, and Uncontrolled type 2 diabetes mellitus.   Past Surgical History:   Procedure Laterality Date    BRONCHOSCOPY N/A 2021    BRONCHOSCOPY ALVEOLAR LAVAGE [place] [facility] [place]    CARDIAC PROCEDURE N/A 2023    Coronary angiography performed by [place]fin [place] [facility] [place] [facility]    CARDIAC PROCEDURE N/A 2023    Insert temporary pacemaker performed by [place]fin [place] [facility] [facility]    CATARACT REMOVAL WITH IMPLANT      EP DEVICE PROCEDURE N/A 2023    Insert PPM dual performed by Rinku De Jesus DO [place] [facility] [place] [facility]    HYSTERECTOMY (CERVIX STATUS UNKNOWN)         Pt presented to the emergency dept on 1/3 with AMS - L gaze and slurred speech   CT head 1/3   IMPRESSION:  1. There is no acute intracranial abnormality.  Specifically, there is no  intracranial hemorrhage.  2. Atrophy and periventricular leukomalacia,    Orders received, chart reviewed, eval complete.     Precautions:  Fall Risk, cognition, TSM, Cahto and low vision, droplet - flu     Assessment of current

## 2025-01-07 NOTE — PROGRESS NOTES
Department of Internal Medicine  Infectious Diseases  Progress  Note      C/C : MSSA bacteremia r/o endocarditis       Pt is more awake  Confused   No distress  Afebrile         Current Facility-Administered Medications   Medication Dose Route Frequency Provider Last Rate Last Admin    potassium chloride (KLOR-CON M) extended release tablet 40 mEq  40 mEq Oral PRN Sue Alexander MD        Or    potassium bicarb-citric acid (EFFER-K) effervescent tablet 40 mEq  40 mEq Oral PRN Sue Alexander MD        Or    potassium chloride 10 mEq/100 mL IVPB (Peripheral Line)  10 mEq IntraVENous PRN Sue Alexander MD        dextrose 5 % solution   IntraVENous Continuous Sue Alexander MD        valproate (DEPACON) 250 mg in sodium chloride 0.9 % 100 mL IVPB  250 mg IntraVENous Q8H Sue Alexander MD   Stopped at 01/07/25 1454    ceFAZolin (ANCEF) 2,000 mg in sterile water 20 mL IV syringe  2,000 mg IntraVENous Q8H LimbuEdgar MD   2,000 mg at 01/07/25 1218    amLODIPine (NORVASC) tablet 2.5 mg  2.5 mg Oral BID Lucrecia Reeves APRN - NP   2.5 mg at 01/07/25 1008    aspirin EC tablet 81 mg  81 mg Oral Daily Lucrecia Reeves APRN - NP   81 mg at 01/07/25 1008    atorvastatin (LIPITOR) tablet 10 mg  10 mg Oral Daily Lucrecia Reeves APRN - NP   10 mg at 01/07/25 1008    brimonidine (ALPHAGAN) 0.2 % ophthalmic solution 1 drop  1 drop Left Eye BID Lucrecia Reeves APRN - NP   1 drop at 01/07/25 1009    Vitamin D (CHOLECALCIFEROL) tablet 1,000 Units  1,000 Units Oral Daily Lucrecia Reeves APRN - NP        dorzolamide (TRUSOPT) 2 % ophthalmic solution 1 drop  1 drop Left Eye TID Lucrecia Reeves APRN - NP   1 drop at 01/07/25 1009    prednisoLONE acetate (PRED FORTE) 1 % ophthalmic suspension 1 drop  1 drop Left Eye TID Lucrecia Reeves APRN - NP   1 drop at 01/07/25 1009    white petrolatum ointment   Topical TID PRN Lucrecia Reeves, MICHELLE - NP   Given at 01/06/25 1045    miconazole (MICOTIN) 2 % powder

## 2025-01-07 NOTE — PROGRESS NOTES
[facility] PHYSICIANS- [facility] [place] [place] Electrophysiology  Consultation Report  PATIENT: Carlene Olson  MEDICAL RECORD NUMBER: [id number]  DATE OF SERVICE:  1/7/2025  ATTENDING ELECTROPHYSIOLOGIST: Kayrn Alston MD   PRIMARY ELECTROPHYSIOLOGIST: Rinku De Jesus DO   REFERRING PHYSICIAN:  Chandan Chamorro DO  CHIEF COMPLAINT:  Altered mental status.     HPI: This is a 89 y.o. female with a history of Intermittent complete heart block S/p Medtronic dual chamber pacemaker on 12/26/2023, recurrent syncope and subdural hematoma (07/2021), Hypertension, Type 2 diabetes mellitus, osteoarthritis, limited CODE status to DNR-CCA. Her home medications include  Norvasc, ASA 81 mg daily.     She presented for fall and loss of consciousness at home.  On 12/25/23, on presenting to the ER she was noted to have  intermittent complete heart block and underwent placement of a stat TVP. TTE showed an EF of 60-65% and Dr. De Jesus placed a dual chamber pacemaker on 12/26/2023 with the RV lead in the LBBB area. She was seen in the device clinic on 01/09/2024 and was RV pacing less than 1% at that time. She was offered office follow up in April of 2023 but elected not to follow up in office. She continues to send remotes without evidence of frequent RV pacing.     She was admitted from 12/27/24 to 01/02/2025 for a fall and right lower extermity medial thigh mass.  CT right femur with contrast revealed superficial fluid collection or cystic lesion in the medial soft tissues at the mid diaphyseal level of the femur without significant wall thickening. Orthopedic surgery was consulted and reccommended conservative treatment. She was then discharged to a skilled nursing facility. No blood cultures were drawn at that time.    She was readmitted with altered mental status a day after discharge, on 01/03/2025.  She had an upward gaze and incomprehensible speech. Due to leukocytosis and a CXR that showed a possible  interrogation throughout the year has shown less than 1% V pacing    3. Dual chamber Medtronic pacemaker in situ   - DOI 12/26/2023 with LBBB RV lead    - Compliant with remotes without office follow up.     4. Recent influenza A infection/pneumonia.     5. Debility/altered mental status this admission    6. Hypertension   - Elevated BP on Norvasc 2.5mg BID.     Recommendations:    Continue IV antibiotics for 6 weeks and a chronic oral antibiotic suppressive therapy per ID.  Options of management discussed with patient's daughter on the telephone as mentioned above.    All of the above was discussed with the patient's daughter on the telephone>50% of the time involved face-to-face time providing counseling and or coordination of care with the other providers,  reviewing records/tests, counseling/education of the patient, ordering medications/tests/procedures, coordinating care, and documenting clinical information in the EHR. I personally and independently saw and examined patient and reviewed all done pertinent laboratory data, imaging studies, ECGs and rhythm strips.   Thank you for allowing me to participate in your patient's care.  Please call me if there are any questions or concerns.        Karyn Alston MD  Cardiac Electrophysiology  [facility]  [facility] [place] [place]  Electrophysiology  10:22 AM  1/7/2025

## 2025-01-07 NOTE — PROGRESS NOTES
soft, non tender, no visceromegaly, no mass, normal bowel sounds   Musculoskeletal: No clubbing, cyanosis, no bilateral lower extremity edema. Brisk capillary refill.   Skin:  No rashes  on visible skin  Neurologic: Lethargic but arousable, opens eyes to voice,    ASSESSMENT and PLAN:    Staph bacteremia: Leukocytosis improving cultures growing staph, ID consulted, continue On Ancef  Cardiology consulted for AMEE, EP consulted for evaluation and removal of pacer, EP following   PICC line placement for IV abx     Hospital-acquired pneumonia, left lower lobe infiltrates, was recently diagnosed with influenza A, urine strep and Legionella negative, respiratory cultures pending, elevated Pro-Kali,    Acute encephalopathy: Likely secondary to above, CT head was negative for intracranial pathology    Hypokalemia: K supplement, continue to monitor .    Hypernatremia: Likely secondary to free water deficit, D5W at 50 cc/h started, monitor BMP    Oropharyngeal dysphagia: Patient cleared by speech for puréed diet    Hypertension: BP within normal range, continue to monitor    DVT prophylaxis Lovenox    DISPOSITION:  Remains admitted for management of bacteremia,Await final plans, appreciate  assistance    Medications:  REVIEWED DAILY    Infusion Medications    dextrose      sodium chloride      dextrose       Scheduled Medications    valproate (DEPACON) 250 mg in sodium chloride 0.9 % 100 mL IVPB  250 mg IntraVENous Q8H    ceFAZolin  2,000 mg IntraVENous Q8H    amLODIPine  2.5 mg Oral BID    aspirin  81 mg Oral Daily    atorvastatin  10 mg Oral Daily    brimonidine  1 drop Left Eye BID    Vitamin D  1,000 Units Oral Daily    dorzolamide  1 drop Left Eye TID    prednisoLONE acetate  1 drop Left Eye TID    miconazole   Topical BID    sodium chloride flush  5-40 mL IntraVENous 2 times per day    enoxaparin  40 mg SubCUTAneous Daily    melatonin  5 mg Oral QPM    insulin lispro  0-4 Units SubCUTAneous 4x Daily AC & HS

## 2025-01-07 NOTE — PROGRESS NOTES
Physical Therapy  Physical Therapy Initial Assessment    Name: Carlene Olson  : 1935  MRN: [id number]      Date of Service: 2025    Evaluating PT:  Madhav Rai PT, DPT [id number]    Room #:  8506/8506-A  Diagnosis:  Pneumonia due to organism [J18.9]  Altered mental status, unspecified altered mental status type [R41.82]  Pneumonia due to infectious organism, unspecified laterality, unspecified part of lung [J18.9]  PMHx/PSHx:   has a past medical history of Arthritis, Cataract, Diabetes mellitus (HCC), Fall from standing, Injury of left upper arm, Pneumonia, Subdural hematoma, Syncope and collapse, and Uncontrolled type 2 diabetes mellitus.   has a past surgical history that includes Hysterectomy; Cataract removal with implant; bronchoscopy (N/A, 2021); Cardiac procedure (N/A, 2023); Cardiac procedure (N/A, 2023); and ep device procedure (N/A, 2023).  Procedure/Surgery:  AMEE (2025)  Precautions:  Falls, droplet isolation, WBAT BLE (per chart 2024), blind L eye, [place], TSM  Equipment Needs:  TBD    SUBJECTIVE:    Per chart, Pt is from nursing facility.    OBJECTIVE:   Initial Evaluation  Date: 2025 Treatment Short Term/ Long Term   Goals   AM-PAC 6 Clicks      Was pt agreeable to Eval/treatment? Yes     Does pt have pain? LLE     Bed Mobility  Rolling: MaxA  Supine to sit: MaxA x2  Sit to supine: MaxA x2  Scooting: NT  Rolling: Kamran  Supine to sit: ModA  Sit to supine: ModA  Scooting: Kamran   Transfers Sit to stand: MaxA x2  Stand to sit: MaxA x2  Stand pivot: NT  Sit to stand: ModA  Stand to sit: ModA  Stand pivot: ModA with AAD   Ambulation    NT  TBD   Stair negotiation: ascended and descended  NT  TBD   ROM BUE:  See OT note  BLE:  WFL     Strength BUE:  See OT note  BLE:  WFL     Balance Sitting EOB:  ModA  Dynamic Standing:  NT  Sitting EOB:  SBA  Dynamic Standing:  ModA with AAD     Sensation:  NT  Edema:  Unremarkable    Patient education  Pt educated on role  of PT, safety during functional mobility.    Patient response to education:   Pt expressed understanding Pt demonstrated skill Pt requires further education in this area   Yes Yes Yes     ASSESSMENT:    Conditions Requiring Skilled Therapeutic Intervention:    [x]Decreased strength     []Decreased ROM  [x]Decreased functional mobility  [x]Decreased balance   []Decreased endurance   [x]Decreased posture  []Decreased sensation  []Decreased coordination   [x]Decreased vision  [x]Decreased safety awareness   [x]Increased pain       Comments:  Patient in bed upon arrival; agreeable to PT session with OT collaboration. Required increased time, assistance of trunk and BLE to perform bed mobility. Completed sit <> stand transfer. Incontinent of urine; rolling performed several times to replace linen/pads/TAPS. Patient left in bed with call light in reach. Patient would benefit from continued skilled PT services to address functional deficits and prevent deconditioning.    Treatment:  Patient practiced and was instructed in the following treatment:    Bed mobility - physical assistance provided as needed during activity  Static sitting/standing - performed to promote activity tolerance and balance maintenance  Functional transfers - physical assistance provided as needed during activity  Skilled positioning - patient positioned optimally to protect skin/joint integrity and promote comfort    Pt's/ family goals   1. None stated    Prognosis is good for reaching above PT goals.    Patient and or family understand(s) diagnosis, prognosis, and plan of care.  Yes    PHYSICAL THERAPY PLAN OF CARE:    PT POC is established based on physician order and patient diagnosis     Referring provider/PT Order:    01/06/25 1200  PT eval and treat  Start:  01/06/25 1200,   End:  01/06/25 1200,   ONE TIME,   Standing Count:  1 Occurrences,   R         Sue Alexander MD     Diagnosis:  Pneumonia due to organism [J18.9]  Altered mental status,

## 2025-01-08 ENCOUNTER — APPOINTMENT (OUTPATIENT)
Dept: GENERAL RADIOLOGY | Age: 89
DRG: 871 | End: 2025-01-08
Payer: MEDICARE

## 2025-01-08 LAB
ANION GAP SERPL CALCULATED.3IONS-SCNC: 10 MMOL/L (ref 7–16)
BASOPHILS # BLD: 0 K/UL (ref 0–0.2)
BASOPHILS NFR BLD: 0 % (ref 0–2)
BUN SERPL-MCNC: 18 MG/DL (ref 6–23)
CALCIUM SERPL-MCNC: 8.5 MG/DL (ref 8.6–10.2)
CHLORIDE SERPL-SCNC: 109 MMOL/L (ref 98–107)
CO2 SERPL-SCNC: 25 MMOL/L (ref 22–29)
CREAT SERPL-MCNC: 0.5 MG/DL (ref 0.5–1)
EOSINOPHIL # BLD: 0.08 K/UL (ref 0.05–0.5)
EOSINOPHILS RELATIVE PERCENT: 1 % (ref 0–6)
ERYTHROCYTE [DISTWIDTH] IN BLOOD BY AUTOMATED COUNT: 14.6 % (ref 11.5–15)
GFR, ESTIMATED: 89 ML/MIN/1.73M2
GLUCOSE BLD-MCNC: 166 MG/DL (ref 74–99)
GLUCOSE BLD-MCNC: 169 MG/DL (ref 74–99)
GLUCOSE BLD-MCNC: 178 MG/DL (ref 74–99)
GLUCOSE BLD-MCNC: 221 MG/DL (ref 74–99)
GLUCOSE SERPL-MCNC: 176 MG/DL (ref 74–99)
HCT VFR BLD AUTO: 32.7 % (ref 34–48)
HGB BLD-MCNC: 10.5 G/DL (ref 11.5–15.5)
LYMPHOCYTES NFR BLD: 0.54 K/UL (ref 1.5–4)
LYMPHOCYTES RELATIVE PERCENT: 6 % (ref 20–42)
MAGNESIUM SERPL-MCNC: 1.6 MG/DL (ref 1.6–2.6)
MCH RBC QN AUTO: 29.2 PG (ref 26–35)
MCHC RBC AUTO-ENTMCNC: 32.1 G/DL (ref 32–34.5)
MCV RBC AUTO: 91.1 FL (ref 80–99.9)
MICROORGANISM SPEC CULT: ABNORMAL
MICROORGANISM/AGENT SPEC: ABNORMAL
MONOCYTES NFR BLD: 0.23 K/UL (ref 0.1–0.95)
MONOCYTES NFR BLD: 3 % (ref 2–12)
NEUTROPHILS NFR BLD: 90 % (ref 43–80)
NEUTS SEG NFR BLD: 7.97 K/UL (ref 1.8–7.3)
PLATELET # BLD AUTO: 396 K/UL (ref 130–450)
PMV BLD AUTO: 9.5 FL (ref 7–12)
POTASSIUM SERPL-SCNC: 3.2 MMOL/L (ref 3.5–5)
PROMYELOCYTES ABSOLUTE COUNT: 0.08 K/UL
PROMYELOCYTES: 1 %
RBC # BLD AUTO: 3.59 M/UL (ref 3.5–5.5)
RBC # BLD: ABNORMAL 10*6/UL
SERVICE CMNT-IMP: ABNORMAL
SODIUM SERPL-SCNC: 144 MMOL/L (ref 132–146)
SPECIMEN DESCRIPTION: ABNORMAL
WBC OTHER # BLD: 8.9 K/UL (ref 4.5–11.5)

## 2025-01-08 PROCEDURE — 85025 COMPLETE CBC W/AUTO DIFF WBC: CPT

## 2025-01-08 PROCEDURE — 2060000000 HC ICU INTERMEDIATE R&B

## 2025-01-08 PROCEDURE — 80048 BASIC METABOLIC PNL TOTAL CA: CPT

## 2025-01-08 PROCEDURE — 6370000000 HC RX 637 (ALT 250 FOR IP): Performed by: STUDENT IN AN ORGANIZED HEALTH CARE EDUCATION/TRAINING PROGRAM

## 2025-01-08 PROCEDURE — 92526 ORAL FUNCTION THERAPY: CPT

## 2025-01-08 PROCEDURE — 82962 GLUCOSE BLOOD TEST: CPT

## 2025-01-08 PROCEDURE — 2500000003 HC RX 250 WO HCPCS: Performed by: INTERNAL MEDICINE

## 2025-01-08 PROCEDURE — 36415 COLL VENOUS BLD VENIPUNCTURE: CPT

## 2025-01-08 PROCEDURE — 2500000003 HC RX 250 WO HCPCS: Performed by: NURSE PRACTITIONER

## 2025-01-08 PROCEDURE — 99232 SBSQ HOSP IP/OBS MODERATE 35: CPT | Performed by: INTERNAL MEDICINE

## 2025-01-08 PROCEDURE — 99232 SBSQ HOSP IP/OBS MODERATE 35: CPT | Performed by: STUDENT IN AN ORGANIZED HEALTH CARE EDUCATION/TRAINING PROGRAM

## 2025-01-08 PROCEDURE — 6370000000 HC RX 637 (ALT 250 FOR IP): Performed by: NURSE PRACTITIONER

## 2025-01-08 PROCEDURE — 6360000002 HC RX W HCPCS: Performed by: INTERNAL MEDICINE

## 2025-01-08 PROCEDURE — 73552 X-RAY EXAM OF FEMUR 2/>: CPT

## 2025-01-08 PROCEDURE — 2580000003 HC RX 258: Performed by: STUDENT IN AN ORGANIZED HEALTH CARE EDUCATION/TRAINING PROGRAM

## 2025-01-08 PROCEDURE — 6360000002 HC RX W HCPCS: Performed by: NURSE PRACTITIONER

## 2025-01-08 PROCEDURE — 83735 ASSAY OF MAGNESIUM: CPT

## 2025-01-08 PROCEDURE — 2500000003 HC RX 250 WO HCPCS: Performed by: STUDENT IN AN ORGANIZED HEALTH CARE EDUCATION/TRAINING PROGRAM

## 2025-01-08 RX ADMIN — PREDNISOLONE ACETATE 1 DROP: 10 SUSPENSION/ DROPS OPHTHALMIC at 14:39

## 2025-01-08 RX ADMIN — Medication 5 MG: at 22:27

## 2025-01-08 RX ADMIN — SODIUM CHLORIDE, PRESERVATIVE FREE 10 ML: 5 INJECTION INTRAVENOUS at 22:21

## 2025-01-08 RX ADMIN — ENOXAPARIN SODIUM 40 MG: 100 INJECTION SUBCUTANEOUS at 08:57

## 2025-01-08 RX ADMIN — PREDNISOLONE ACETATE 1 DROP: 10 SUSPENSION/ DROPS OPHTHALMIC at 08:59

## 2025-01-08 RX ADMIN — POTASSIUM CHLORIDE 40 MEQ: 20 TABLET, EXTENDED RELEASE ORAL at 14:41

## 2025-01-08 RX ADMIN — SODIUM CHLORIDE 250 MG: 9 INJECTION, SOLUTION INTRAVENOUS at 04:46

## 2025-01-08 RX ADMIN — BRIMONIDINE TARTRATE 1 DROP: 2 SOLUTION/ DROPS OPHTHALMIC at 22:21

## 2025-01-08 RX ADMIN — ACETAMINOPHEN 650 MG: 325 TABLET ORAL at 22:27

## 2025-01-08 RX ADMIN — Medication 1000 UNITS: at 08:56

## 2025-01-08 RX ADMIN — DORZOLAMIDE HYDROCHLORIDE 1 DROP: 20 SOLUTION OPHTHALMIC at 08:59

## 2025-01-08 RX ADMIN — AMLODIPINE BESYLATE 2.5 MG: 5 TABLET ORAL at 08:57

## 2025-01-08 RX ADMIN — FERROUS SULFATE TAB 325 MG (65 MG ELEMENTAL FE) 325 MG: 325 (65 FE) TAB at 08:56

## 2025-01-08 RX ADMIN — PREDNISOLONE ACETATE 1 DROP: 10 SUSPENSION/ DROPS OPHTHALMIC at 22:21

## 2025-01-08 RX ADMIN — ASPIRIN 81 MG: 81 TABLET, COATED ORAL at 08:56

## 2025-01-08 RX ADMIN — WATER 2000 MG: 1 INJECTION INTRAMUSCULAR; INTRAVENOUS; SUBCUTANEOUS at 22:20

## 2025-01-08 RX ADMIN — AMLODIPINE BESYLATE 2.5 MG: 5 TABLET ORAL at 22:27

## 2025-01-08 RX ADMIN — DORZOLAMIDE HYDROCHLORIDE 1 DROP: 20 SOLUTION OPHTHALMIC at 14:39

## 2025-01-08 RX ADMIN — WATER 2000 MG: 1 INJECTION INTRAMUSCULAR; INTRAVENOUS; SUBCUTANEOUS at 14:39

## 2025-01-08 RX ADMIN — PETROLATUM: 420 OINTMENT TOPICAL at 22:28

## 2025-01-08 RX ADMIN — MICONAZOLE NITRATE: 20.6 POWDER TOPICAL at 22:26

## 2025-01-08 RX ADMIN — ATORVASTATIN CALCIUM 10 MG: 10 TABLET, FILM COATED ORAL at 08:56

## 2025-01-08 RX ADMIN — DORZOLAMIDE HYDROCHLORIDE 1 DROP: 20 SOLUTION OPHTHALMIC at 22:21

## 2025-01-08 RX ADMIN — BRIMONIDINE TARTRATE 1 DROP: 2 SOLUTION/ DROPS OPHTHALMIC at 08:59

## 2025-01-08 RX ADMIN — WATER 2000 MG: 1 INJECTION INTRAMUSCULAR; INTRAVENOUS; SUBCUTANEOUS at 05:57

## 2025-01-08 NOTE — PROGRESS NOTES
Hospitalist Progress Note      Chief Complaint:  had concerns including Altered Mental Status ([facility], got there yesterday, nursing home didn't know baseline or LKW, recent admission. gaze up to Left, incomprehensible speech, 99.5 axillary, DNRCCA).    Admission Date: 1/3/2025     SYNOPSIS:Ms. Carlene Olson, a 89 y.o. year old female  who  has a past medical history of Arthritis, Cataract, Diabetes mellitus (HCC), Fall from standing, Injury of left upper arm, Pneumonia, Subdural hematoma, Syncope and collapse, and Uncontrolled type 2 diabetes mellitus.    Patient presented to the ED from [facility] with AMS.  Patient admitted for management of acute encephalopathy, staph bacteremia, leukocytosis. ID following for Abx   AMEE negative for endocarditis, might need pacemaker removal EP and cardiology consulted.     SUBJECTIVE:    S/p AMEE   Positive for FLU A  PENDING BLOOD CULTURES AND PICC LINE AND FINAL ID RECS    Pt will need precert for [facility]     Temp (24hrs), Av.8 °F (36.6 °C), Min:97.1 °F (36.2 °C), Max:98.3 °F (36.8 °C)    DIET: ADULT DIET; Dysphagia - Pureed; 5 carb choices (75 gm/meal); Liquids by spoon only  CODE: DNR-CCA  No intake or output data in the 24 hours ending 25 1416        OBJECTIVE:    /74   Pulse 77   Temp 97.1 °F (36.2 °C) (Temporal)   Resp 18   Ht 1.575 m (5' 2\")   Wt 73.9 kg (163 lb)   SpO2 96%   BMI 29.81 kg/m²     General appearance: No apparent distress, appears stated age and cooperative.   HEENT:  Normocephalic. Conjunctivae/corneas clear. Moist mucosa   Neck: Supple. No jugular venous distention. Thyroid not visible, non tender   Respiratory: Normal respiratory effort. Clear to auscultation bilaterally. No stridor/wheezing/rhonchi/crackles   Cardiovascular: Regular heart beats, normal S1-S2. No M/G/R  Abdomen: Non distended, soft, non tender, no visceromegaly, no mass, normal bowel sounds   Musculoskeletal: No clubbing, cyanosis, no  chloride, sodium chloride flush, sodium chloride, ondansetron **OR** ondansetron, polyethylene glycol, acetaminophen **OR** acetaminophen, melatonin, hydrALAZINE, calcium carbonate, Polyvinyl Alcohol-Povidone PF, glucose, dextrose bolus **OR** dextrose bolus, glucagon (rDNA), dextrose, benzonatate, benzocaine-menthol, magnesium hydroxide, sodium chloride    Labs:     Recent Labs     01/06/25  0657 01/07/25  0722 01/08/25  0633   WBC 12.1* 12.0* 8.9   HGB 10.8* 10.7* 10.5*   HCT 33.4* 33.5* 32.7*    392 396       Recent Labs     01/07/25  0722 01/07/25  1817 01/07/25  2311 01/08/25  0633   * 144  --  144   K 3.0* 3.6 3.7 3.2*   * 112*  --  109*   CO2 24 21*  --  25   BUN 23 20  --  18   CREATININE 0.6 0.5  --  0.5   CALCIUM 8.8 8.6  --  8.5*       No results for input(s): \"ALKPHOS\", \"ALT\", \"AST\", \"BILITOT\", \"AMYLASE\", \"LIPASE\" in the last 72 hours.    Invalid input(s): \"PROT\", \"ALB\"      No results for input(s): \"INR\" in the last 72 hours.    No results for input(s): \"CKTOTAL\", \"TROPONINI\" in the last 72 hours.    Chronic labs:    Lab Results   Component Value Date    CHOL 112 01/03/2025    TRIG 74 01/03/2025    HDL 49 01/03/2025    TSH 0.74 01/03/2025    INR 1.0 12/25/2023    LABA1C 6.6 (H) 01/03/2025       Radiology: REVIEWED DAILY    +++++++++++++++++++++++++++++++++++++++++++++++++  Braden Hutchison MD  [facility]   [place]  +++++++++++++++++++++++++++++++++++++++++++++++++  NOTE: This report was transcribed using voice recognition software. Every effort was made to ensure accuracy; however, inadvertent computerized transcription errors may be present.

## 2025-01-08 NOTE — PROGRESS NOTES
SPEECH LANGUAGE PATHOLOGY  DAILY PROGRESS NOTE      PATIENT NAME:  Carlene Olson      :  1935          TODAY'S DATE:  2025 ROOM:  8506/8506-A    Current Diet: ADULT DIET; Dysphagia - Pureed; 5 carb choices (75 gm/meal); Liquids by spoon only    RN cleared patient for dysphagia therapy. He reported that she tolerated her medication crushed in puree with no overt s/s of aspiration earlier this morning. However, RN stated that patient was lethargic compared to yesterday. During session, patient required verbal and tactile cues to increase alertness. She was sitting upright in bed and displayed xerostomia. Therapist utilized a toothette to moisten patient's mouth. A white coating was noted on the patient's tongue blade and lips. Therapist provided oral care to improve oral hygiene prior to administrating trials of liquid. Trials of thin liquid via teaspoon were provided. No overt s/s of aspiration were displayed at bedside. Patient refused trials of puree on this date.     Recommendation: Patient is recommended a puree diet with thin liquid via TEASPOON. 1:1 assistance during meals is recommended due to cognitive status. She should sit upright during meals and is only to be fed when ALERT. Medication is recommended to be crushed in puree and oral care to be completed daily.       CPT code(s) 85569  dysphagia tx  Total minutes :  15 minutes    Jamie Fine M.S., CCC-SLP/L   Speech-Language Pathologist  [phone]

## 2025-01-08 NOTE — PROGRESS NOTES
Department of Internal Medicine  Infectious Diseases  Progress  Note      C/C : MSSA bacteremia r/o endocarditis       Pt is more awake  Confused   No distress  Afebrile         Current Facility-Administered Medications   Medication Dose Route Frequency Provider Last Rate Last Admin    potassium chloride (KLOR-CON M) extended release tablet 40 mEq  40 mEq Oral PRN Sue Alexander MD        Or    potassium bicarb-citric acid (EFFER-K) effervescent tablet 40 mEq  40 mEq Oral PRN Sue Alexander MD        Or    potassium chloride 10 mEq/100 mL IVPB (Peripheral Line)  10 mEq IntraVENous PRN Sue Alexander  mL/hr at 01/07/25 2125 10 mEq at 01/07/25 2125    valproate (DEPACON) 250 mg in sodium chloride 0.9 % 100 mL IVPB  250 mg IntraVENous Q8H Sue Alexander MD   Stopped at 01/08/25 0548    ceFAZolin (ANCEF) 2,000 mg in sterile water 20 mL IV syringe  2,000 mg IntraVENous Q8H LimbuEdgar MD   2,000 mg at 01/08/25 0557    amLODIPine (NORVASC) tablet 2.5 mg  2.5 mg Oral BID Lucrecia Reeves APRN - NP   2.5 mg at 01/08/25 0857    aspirin EC tablet 81 mg  81 mg Oral Daily Lucrecia Reeves, APRN - NP   81 mg at 01/08/25 0856    atorvastatin (LIPITOR) tablet 10 mg  10 mg Oral Daily Lucrecia Reeves, APRN - NP   10 mg at 01/08/25 0856    brimonidine (ALPHAGAN) 0.2 % ophthalmic solution 1 drop  1 drop Left Eye BID Lucrecia Reeves, APRN - NP   1 drop at 01/08/25 0859    Vitamin D (CHOLECALCIFEROL) tablet 1,000 Units  1,000 Units Oral Daily Lucrecia Reeves APRN - NP   1,000 Units at 01/08/25 0856    dorzolamide (TRUSOPT) 2 % ophthalmic solution 1 drop  1 drop Left Eye TID Lucrecia Reeves APRN - NP   1 drop at 01/08/25 0859    prednisoLONE acetate (PRED FORTE) 1 % ophthalmic suspension 1 drop  1 drop Left Eye TID Lucrecia Reeves APRN - NP   1 drop at 01/08/25 0859    white petrolatum ointment   Topical TID PRN Lucrecia Reeves APRN - NP   Given at 01/06/25 1045    miconazole (MICOTIN) 2 % powder    PRN Lucrecia Reeves, MICHELLE - NP        dextrose 10 % infusion   IntraVENous Continuous PRN Lucrecia Reeves, APRN - NP        benzonatate (TESSALON) capsule 100 mg  100 mg Oral TID PRN Lucrecia Reeves, APRN - NP        benzocaine-menthol (CEPACOL SORE THROAT) lozenge 1 lozenge  1 lozenge Oral Q2H PRN Lucrecia Reeves APRN - NP        magnesium hydroxide (MILK OF MAGNESIA) 400 MG/5ML suspension 30 mL  30 mL Oral Daily PRN Lucrecia Reeves, APRN - NP        sodium chloride (OCEAN, BABY AYR) 0.65 % nasal spray 1 spray  1 spray Each Nostril PRN Lucrecia Reeves, APRN - NP        insulin lispro (HUMALOG,ADMELOG) injection vial 0-4 Units  0-4 Units SubCUTAneous 4x Daily AC & HS Leandro, Lucrecia Peña, APRN - NP   1 Units at 01/07/25 2132    ferrous sulfate (IRON 325) tablet 325 mg  325 mg Oral BID WC Lucrecia Reeves, APRN - NP   325 mg at 01/08/25 0856       REVIEW OF SYSTEMS:    CONSTITUTIONAL:  fever. SOB,  mental status change         PHYSICAL EXAM:      Vitals:     Vitals:    01/08/25 0823   BP: 125/86   Pulse: 80   Resp: 19   Temp: 97.5 °F (36.4 °C)   SpO2: 93%       General Appearance:    Awake, non conversant    Head:    Normocephalic, atraumatic   Eyes:    No pallor, no icterus,   Ears:    No obvious deformity or drainage.   Nose:   No nasal drainage   Throat:   Mucosa moist, no oral thrush   Neck:   Supple, no lymphadenopathy   Lungs:     Crackles    Heart:    Regular rate and rhythm   Abdomen:     Soft, bowel sounds present    Extremities:   + edema    Pulses:   Dorsalis pedis palpable    Skin:   no rashes or lesions     CBC with Differential:      Lab Results   Component Value Date/Time    WBC 8.9 01/08/2025 06:33 AM    RBC 3.59 01/08/2025 06:33 AM    HGB 10.5 01/08/2025 06:33 AM    HCT 32.7 01/08/2025 06:33 AM     01/08/2025 06:33 AM    MCV 91.1 01/08/2025 06:33 AM    MCH 29.2 01/08/2025 06:33 AM    MCHC 32.1 01/08/2025 06:33 AM    RDW 14.6 01/08/2025 06:33 AM    METASPCT 1 12/27/2024 04:04

## 2025-01-08 NOTE — CARE COORDINATION
SOCIAL WORK/CASEMANAGEMENT TRANSITION OF CARE PLANNING( LAURA HUNG, ANNEW [phone]):  pt is from [facility]. Precert started today. All discharge paper work is in place to return. Pt is on iv depacon and ancef. ID is following. Per EP pt will need 6 weeks iv abx's per family choice. Pt will need a line placed. Pt is in isolation for influenza..MIKE Bateman  1/8/2025

## 2025-01-08 NOTE — FLOWSHEET NOTE
Inpatient Wound Care (Initial consult) 8506A    Admit Date: 1/3/2025  8:44 AM    Reason for consult:  Right heel    Significant history:  Per H&P    History of Present Illness:    Ms. Carlene Olson, a 89 y.o. year old female  who  has a past medical history of Arthritis, Cataract, Diabetes mellitus (HCC), Fall from standing, Injury of left upper arm, Pneumonia, Subdural hematoma, Syncope and collapse, and Uncontrolled type 2 diabetes mellitus.      Patient presented to the ED from [facility] with AMS.  She was discharged there yesterday at 7 PM and was alert and oriented on arrival but was reportedly found to be confused this morning. On my exam she is altered and unable to provide HPI or ROS.     Findings:     01/08/25 0853   Skin Integumentary    Skin Integrity Ecchymosis   Location BUE, left hip   Skin Integrity Site 2   Skin Integrity Location 2   (chronic skin discoloration)   Location 2 bilateral buttocks   Skin Integrity Site 4   Skin Integrity Location 4 Excoriation   Location 4 abdominal fold   Wound 01/03/25 Heel Right   Date First Assessed: 01/03/25   Present on Original Admission: Yes  Location: Heel  Wound Location Orientation: Right   Wound Image    Wound Etiology Deep tissue/Injury   Dressing/Treatment Open to air   Wound Length (cm) 1 cm   Wound Width (cm) 0.5 cm   Wound Depth (cm)   (Unable to determine)   Wound Surface Area (cm^2) 0.5 cm^2   Change in Wound Size % (l*w) -455.56   Wound Assessment Purple/maroon   Drainage Amount None (dry)   Odor None   Diana-wound Assessment Dry/flaky       **Informed Consent**    photos taken of wound and inserted into their chart as part of their permanent medical record for purposes of documentation, treatment management and/or medical review.   All Images taken on 1/8/25 of patient name: Carlene Olson were transmitted and stored on secured Epic  Site located within Media Folder Tab by a registered Epic-Haiku Mobile Application Device.         Impression:  Right heel: DTI    Plan: Aquaphor  Antifungal powder  Medix heel boots   TAPS  Low air loss module  Patient will need continued preventative care.      Kiara Garcia RN 1/8/2025 8:55 AM

## 2025-01-08 NOTE — PROGRESS NOTES
Messaged Dru Hall NP about pt's right thigh having a large hard lump. Pt stated her legs are sore but will not verbalize from 1-10. Xray ordered

## 2025-01-08 NOTE — PROGRESS NOTES
[facility]YSIC[facility]- [facility] [place]  Consultation Report  PATIENT: Carlene Olson  MEDICAL RECORD NUMBER: [id number]  DATE OF SERVICE:  1/8/2025  ATTENDING ELECTROPHYSIOLOGIST: Karyn Alston MD   PRIMARY ELECTROPHYSIOLOGIST: Rinku De Jesus DO   REFERRING PHYSICIAN:  Chandan Chamorro DO  CHIEF COMPLAINT:  Altered mental status.     HPI: This is a 89 y.o. female with a history of Intermittent complete heart block S/p Medtronic dual chamber pacemaker on 12/26/2023, recurrent syncope and subdural hematoma (07/2021), Hypertension, Type 2 diabetes mellitus, osteoarthritis, limited CODE status to DNR-CCA. Her home medications include  Norvasc, ASA 81 mg daily.     She presented for fall and loss of consciousness at home.  On 12/25/23, on presenting to the ER she was noted to have  intermittent complete heart block and underwent placement of a stat TVP. TTE showed an EF of 60-65% and Dr. De Jesus placed a dual chamber pacemaker on 12/26/2023 with the RV lead in the LBBB area. She was seen in the device clinic on 01/09/2024 and was RV pacing less than 1% at that time. She was offered office follow up in April of 2023 but elected not to follow up in office. She continues to send remotes without evidence of frequent RV pacing.     She was admitted from 12/27/24 to 01/02/2025 for a fall and right lower extermity medial thigh mass.  CT right femur with contrast revealed superficial fluid collection or cystic lesion in the medial soft tissues at the mid diaphyseal level of the femur without significant wall thickening. Orthopedic surgery was consulted and reccommended conservative treatment. She was then discharged to a skilled nursing facility. No blood cultures were drawn at that time.    She was readmitted with altered mental status a day after discharge, on 01/03/2025.  She had an upward gaze and incomprehensible speech. Due to leukocytosis and a CXR that showed a possible  ordered AMEE to be completed today (01/06/2025)   - ID also recommended removal of her device.    Options of 6 weeks of IV antibiotics and chronic suppressive antibiotic therapy versus transfer to [place] for lead extraction discussed with patient's daughter on the telephone in detail.  Risks and benefits explained especially in view of her advanced age and medical conditions.  Her daughter wishes to continue medical therapy at the present time    2. Intermittent sulma grade AV block approximately a year ago  - Presented to the ER with syncope and fall  requiring TVP 12/24/2023.  -AV block is completely resolved and pacemaker interrogation throughout the year has shown less than 1% V pacing    3. Dual chamber Medtronic pacemaker in situ   - DOI 12/26/2023 with LBBB RV lead    - Compliant with remotes without office follow up.     4. Recent influenza A infection/pneumonia.     5. Debility/altered mental status this admission    6. Hypertension   - Elevated BP on Norvasc 2.5mg BID.     Recommendations:    Continue IV antibiotics for 6 weeks and a chronic oral antibiotic suppressive therapy per ID.  Options of management discussed with patient's daughter on the telephone as mentioned above.  Electrophysiology to follow as needed    All of the above was discussed with the patient's daughter on the telephone>50% of the time involved face-to-face time providing counseling and or coordination of care with the other providers,  reviewing records/tests, counseling/education of the patient, ordering medications/tests/procedures, coordinating care, and documenting clinical information in the EHR. I personally and independently saw and examined patient and reviewed all done pertinent laboratory data, imaging studies, ECGs and rhythm strips.   Thank you for allowing me to participate in your patient's care.  Please call me if there are any questions or concerns.        Karyn Alston MD  Cardiac Electrophysiology  [facility]

## 2025-01-09 LAB
ANION GAP SERPL CALCULATED.3IONS-SCNC: 10 MMOL/L (ref 7–16)
BASOPHILS # BLD: 0 K/UL (ref 0–0.2)
BASOPHILS NFR BLD: 0 % (ref 0–2)
BUN SERPL-MCNC: 22 MG/DL (ref 6–23)
CALCIUM SERPL-MCNC: 8.9 MG/DL (ref 8.6–10.2)
CHLORIDE SERPL-SCNC: 110 MMOL/L (ref 98–107)
CO2 SERPL-SCNC: 24 MMOL/L (ref 22–29)
CREAT SERPL-MCNC: 0.7 MG/DL (ref 0.5–1)
EOSINOPHIL # BLD: 0 K/UL (ref 0.05–0.5)
EOSINOPHILS RELATIVE PERCENT: 0 % (ref 0–6)
ERYTHROCYTE [DISTWIDTH] IN BLOOD BY AUTOMATED COUNT: 15.2 % (ref 11.5–15)
GFR, ESTIMATED: 84 ML/MIN/1.73M2
GLUCOSE BLD-MCNC: 112 MG/DL (ref 74–99)
GLUCOSE BLD-MCNC: 126 MG/DL (ref 74–99)
GLUCOSE BLD-MCNC: 136 MG/DL (ref 74–99)
GLUCOSE BLD-MCNC: 211 MG/DL (ref 74–99)
GLUCOSE SERPL-MCNC: 166 MG/DL (ref 74–99)
HCT VFR BLD AUTO: 36.5 % (ref 34–48)
HGB BLD-MCNC: 11.6 G/DL (ref 11.5–15.5)
LYMPHOCYTES NFR BLD: 0.88 K/UL (ref 1.5–4)
LYMPHOCYTES RELATIVE PERCENT: 9 % (ref 20–42)
MCH RBC QN AUTO: 29.5 PG (ref 26–35)
MCHC RBC AUTO-ENTMCNC: 31.8 G/DL (ref 32–34.5)
MCV RBC AUTO: 92.9 FL (ref 80–99.9)
METAMYELOCYTES ABSOLUTE COUNT: 0.09 K/UL (ref 0–0.12)
METAMYELOCYTES: 1 % (ref 0–1)
MICROORGANISM SPEC CULT: NORMAL
MONOCYTES NFR BLD: 0.35 K/UL (ref 0.1–0.95)
MONOCYTES NFR BLD: 4 % (ref 2–12)
MYELOCYTES ABSOLUTE COUNT: 0.18 K/UL
MYELOCYTES: 2 %
NEUTROPHILS NFR BLD: 85 % (ref 43–80)
NEUTS SEG NFR BLD: 8.61 K/UL (ref 1.8–7.3)
PLATELET # BLD AUTO: 401 K/UL (ref 130–450)
PMV BLD AUTO: 9.2 FL (ref 7–12)
POTASSIUM SERPL-SCNC: 3.7 MMOL/L (ref 3.5–5)
RBC # BLD AUTO: 3.93 M/UL (ref 3.5–5.5)
RBC # BLD: ABNORMAL 10*6/UL
SERVICE CMNT-IMP: NORMAL
SODIUM SERPL-SCNC: 144 MMOL/L (ref 132–146)
SPECIMEN DESCRIPTION: NORMAL
WBC OTHER # BLD: 10.1 K/UL (ref 4.5–11.5)

## 2025-01-09 PROCEDURE — 80048 BASIC METABOLIC PNL TOTAL CA: CPT

## 2025-01-09 PROCEDURE — 99232 SBSQ HOSP IP/OBS MODERATE 35: CPT | Performed by: STUDENT IN AN ORGANIZED HEALTH CARE EDUCATION/TRAINING PROGRAM

## 2025-01-09 PROCEDURE — 92526 ORAL FUNCTION THERAPY: CPT

## 2025-01-09 PROCEDURE — 36415 COLL VENOUS BLD VENIPUNCTURE: CPT

## 2025-01-09 PROCEDURE — 6360000002 HC RX W HCPCS: Performed by: NURSE PRACTITIONER

## 2025-01-09 PROCEDURE — 99232 SBSQ HOSP IP/OBS MODERATE 35: CPT | Performed by: INTERNAL MEDICINE

## 2025-01-09 PROCEDURE — 6360000002 HC RX W HCPCS: Performed by: INTERNAL MEDICINE

## 2025-01-09 PROCEDURE — 2500000003 HC RX 250 WO HCPCS: Performed by: NURSE PRACTITIONER

## 2025-01-09 PROCEDURE — 82962 GLUCOSE BLOOD TEST: CPT

## 2025-01-09 PROCEDURE — 2500000003 HC RX 250 WO HCPCS: Performed by: INTERNAL MEDICINE

## 2025-01-09 PROCEDURE — 6370000000 HC RX 637 (ALT 250 FOR IP): Performed by: NURSE PRACTITIONER

## 2025-01-09 PROCEDURE — 85025 COMPLETE CBC W/AUTO DIFF WBC: CPT

## 2025-01-09 PROCEDURE — 2060000000 HC ICU INTERMEDIATE R&B

## 2025-01-09 RX ADMIN — ATORVASTATIN CALCIUM 10 MG: 10 TABLET, FILM COATED ORAL at 09:35

## 2025-01-09 RX ADMIN — MICONAZOLE NITRATE: 20.6 POWDER TOPICAL at 09:36

## 2025-01-09 RX ADMIN — WATER 2000 MG: 1 INJECTION INTRAMUSCULAR; INTRAVENOUS; SUBCUTANEOUS at 23:46

## 2025-01-09 RX ADMIN — ASPIRIN 81 MG: 81 TABLET, COATED ORAL at 09:35

## 2025-01-09 RX ADMIN — PREDNISOLONE ACETATE 1 DROP: 10 SUSPENSION/ DROPS OPHTHALMIC at 13:19

## 2025-01-09 RX ADMIN — PETROLATUM: 420 OINTMENT TOPICAL at 09:36

## 2025-01-09 RX ADMIN — AMLODIPINE BESYLATE 2.5 MG: 5 TABLET ORAL at 22:29

## 2025-01-09 RX ADMIN — PREDNISOLONE ACETATE 1 DROP: 10 SUSPENSION/ DROPS OPHTHALMIC at 09:35

## 2025-01-09 RX ADMIN — INSULIN LISPRO 1 UNITS: 100 INJECTION, SOLUTION INTRAVENOUS; SUBCUTANEOUS at 09:35

## 2025-01-09 RX ADMIN — SODIUM CHLORIDE, PRESERVATIVE FREE 10 ML: 5 INJECTION INTRAVENOUS at 22:30

## 2025-01-09 RX ADMIN — DORZOLAMIDE HYDROCHLORIDE 1 DROP: 20 SOLUTION OPHTHALMIC at 22:29

## 2025-01-09 RX ADMIN — ENOXAPARIN SODIUM 40 MG: 100 INJECTION SUBCUTANEOUS at 09:35

## 2025-01-09 RX ADMIN — BRIMONIDINE TARTRATE 1 DROP: 2 SOLUTION/ DROPS OPHTHALMIC at 22:29

## 2025-01-09 RX ADMIN — BRIMONIDINE TARTRATE 1 DROP: 2 SOLUTION/ DROPS OPHTHALMIC at 09:34

## 2025-01-09 RX ADMIN — Medication 1000 UNITS: at 09:35

## 2025-01-09 RX ADMIN — SODIUM CHLORIDE, PRESERVATIVE FREE 10 ML: 5 INJECTION INTRAVENOUS at 09:35

## 2025-01-09 RX ADMIN — FERROUS SULFATE TAB 325 MG (65 MG ELEMENTAL FE) 325 MG: 325 (65 FE) TAB at 09:35

## 2025-01-09 RX ADMIN — DORZOLAMIDE HYDROCHLORIDE 1 DROP: 20 SOLUTION OPHTHALMIC at 09:34

## 2025-01-09 RX ADMIN — WATER 2000 MG: 1 INJECTION INTRAMUSCULAR; INTRAVENOUS; SUBCUTANEOUS at 05:26

## 2025-01-09 RX ADMIN — DORZOLAMIDE HYDROCHLORIDE 1 DROP: 20 SOLUTION OPHTHALMIC at 13:19

## 2025-01-09 RX ADMIN — WATER 2000 MG: 1 INJECTION INTRAMUSCULAR; INTRAVENOUS; SUBCUTANEOUS at 16:30

## 2025-01-09 RX ADMIN — PETROLATUM: 420 OINTMENT TOPICAL at 22:30

## 2025-01-09 RX ADMIN — AMLODIPINE BESYLATE 2.5 MG: 5 TABLET ORAL at 09:36

## 2025-01-09 RX ADMIN — MICONAZOLE NITRATE: 20.6 POWDER TOPICAL at 22:29

## 2025-01-09 RX ADMIN — PREDNISOLONE ACETATE 1 DROP: 10 SUSPENSION/ DROPS OPHTHALMIC at 22:29

## 2025-01-09 ASSESSMENT — PAIN SCALES - GENERAL
PAINLEVEL_OUTOF10: 0
PAINLEVEL_OUTOF10: 0
PAINLEVEL_OUTOF10: 5
PAINLEVEL_OUTOF10: 0

## 2025-01-09 NOTE — PROGRESS NOTES
Indeterminate diastolic function.    Right Ventricle: Right ventricle size is normal. Normal systolic function. TAPSE is 2.2 cm.    Aortic Valve: No stenosis.    Mitral Valve: Mild annular calcification of the mitral valve.    Tricuspid Valve: Normal RVSP. The estimated RVSP is 32 mmHg.    Pericardium: No pericardial effusion.    Image quality is good.       TTE 03/04/2021:   Left ventricular internal dimensions were normal in diastole and systole.   Borderline concentric left ventricular hypertrophy.   No regional wall motion abnormalities seen.   Normal left ventricular ejection fraction.   There is doppler evidence of stage I diastolic dysfunction.   The left atrium is borderline dilated.   Mild mitral annular calcification.   Mild mitral regurgitation is present.   The aortic valve appears mildly sclerotic.    Device Interrogation ( 01/06/2025 )  Make/Model [employer] [place]  Mode DDDR 60/120  P wave: 1.5 mV  Impedance: 361 ohms   Threshold: 0.75 V @ 0.4 ms  RV R wave: 7.8 mV  Impedance: 608 ohms   Threshold: 0.5 V @ 0.4 ms  Pacing: A: 1.6%  RV: 0.1%    Battery Voltage/Longevity:  14.1 years.     Arrhythmias: SVT event on 12/26/2024 rate 154 bpm   Reprogramming included none.   Overall device function is normal  All device programmable settings were evaluated per above and in the scanned document, along with iterative adjustments (capture thresholds) to assess and select the most appropriate final programming to provide for consistent delivery of the appropriate therapy and to verify function of the device.       I have independently reviewed all of the ECGs and rhythm strips per above     Assessment/Plan:    1.  Staphylococcus aureus bacteremia   -Patient was recently hospitalized and therefore this may be related to an intravenous site  - Presenting from her nursing home 24 hours post discharge with fever and found to have 2/2 blood cultures positive for staph aures   - Dr. Irene consulted who ordered AMEE to be  MD Gamaliel  Cardiac Electrophysiology  [facility]  The Heart [facility] [place]: [place]  Electrophysiology  11:48 AM  1/9/2025

## 2025-01-09 NOTE — PROGRESS NOTES
SPEECH LANGUAGE PATHOLOGY  DAILY PROGRESS NOTE      PATIENT NAME:  Carlene Olson      :  1935          TODAY'S DATE:  2025 ROOM:  8506/8506-A    Current Diet: ADULT DIET; Dysphagia - Pureed; 5 carb choices (75 gm/meal); Liquids by spoon only    Patient was seen for dysphagia therapy on this date. RN reported that she required minimal verbal cues to swallow her medication crushed in puree. Upon arrival, therapist noted pocketing of medication in patient's oral cavity. RN was informed and recommended to complete oral care after medication administration to reduce oral residue. She verbalized her understanding.     During session, patient appeared hard-of-hearing and required therapist to speak at a loud volume. She also required the therapist to adjust her to sit upright in bed to safely consume trials of food and liquid. Patient is only tolerating puree food and thin liquid via spoon. No overt s/s of aspiration were observed with the recommended diet. Soft solid food is not appropriate at this time d/t patient's cognitive status.      Recommendation: Patient is recommended to continue a puree diet with thin liquid via TEASPOON. 1:1 assistance during meals is recommended due to cognitive status. She should sit upright during meals and is only to be fed when ALERT. Medication is recommended to be crushed in puree and oral care to be completed daily.       CPT code(s) 43701  dysphagia tx  Total minutes :  15 minutes    Jamie Fine M.S., CCC-SLP/L   Speech-Language Pathologist  [id number]

## 2025-01-09 NOTE — PROGRESS NOTES
\"PHART\", \"THGBART\", \"RWP3RZR\", \"PO2ART\", \"[id number]\"    MICROBIOLOGY:    Blood culture -    Susceptibility    Staphylococcus aureus (1)    Antibiotic Interpretation Microscan Method Status   clindamycin Sensitive 0.25 BACTERIAL SUSCEPTIBILITY PANEL GEO Final   DAPTOmycin Sensitive 0.25 BACTERIAL SUSCEPTIBILITY PANEL GEO Final   erythromycin Sensitive <=0.25 BACTERIAL SUSCEPTIBILITY PANEL GEO Final   gentamicin Sensitive <=0.5 BACTERIAL SUSCEPTIBILITY PANEL GEO Final    Gentamicin is used only in combination with other active agents that test susceptible.      Induced Clind Resist Sensitive NEGATIVE BACTERIAL SUSCEPTIBILITY PANEL GEO Final   oxacillin Sensitive <=0.25 BACTERIAL SUSCEPTIBILITY PANEL GEO Final   rifampin Sensitive <=0.5 BACTERIAL SUSCEPTIBILITY PANEL GEO Final   tigecycline Sensitive <=0.12 BACTERIAL SUSCEPTIBILITY PANEL GEO Final   trimethoprim-sulfamethoxazole Sensitive <=10 BACTERIAL SUSCEPTIBILITY PANEL GEO Final   vancomycin Sensitive <=0.5 BACTERIAL SUSCEPTIBILITY PANEL GEO Final   doxycycline Sensitive <=0.5 BACTERIAL SUSCEPTIBILITY PANEL GEO Final         Urine cx -    Susceptibility    Staphylococcus aureus (1)    Antibiotic Interpretation Microscan Method Status   DAPTOmycin Sensitive 0.25 BACTERIAL SUSCEPTIBILITY PANEL GEO Final   levofloxacin Sensitive 0.25 BACTERIAL SUSCEPTIBILITY PANEL GEO Final   linezolid Sensitive 2 BACTERIAL SUSCEPTIBILITY PANEL GEO Final   nitrofurantoin Sensitive <=16 BACTERIAL SUSCEPTIBILITY PANEL GEO Final   oxacillin Sensitive <=0.25 BACTERIAL SUSCEPTIBILITY PANEL GEO Final   rifampin Sensitive <=0.5 BACTERIAL SUSCEPTIBILITY PANEL GEO Final   trimethoprim-sulfamethoxazole Sensitive <=10 BACTERIAL SUSCEPTIBILITY PANEL GEO Final   vancomycin Sensitive 1 BACTERIAL SUSCEPTIBILITY PANEL GEO Final   doxycycline Sensitive <=0.5 BACTERIAL SUSCEPTIBILITY PANEL GEO Final           Collected: 01/04/25 1220       Order Status: Completed Specimen: Blood Updated: 01/07/25 0802     Specimen Description .BLOOD .ARM    Special Requests         Direct Exam DIRECT GRAM STAIN FROM BOTTLE: GRAM POSITIVE COCCI IN CLUSTERS Previous panic on this admission, call not needed per  SOP.    Culture CULTURE IN PROGRESS     Radiology :    Chest X ray -   Mild increased markings at the left lung base concerning for possible early  infiltrate.         AMEE-    Left Ventricle: Normal left ventricular systolic function with a visually estimated EF of 60 - 65%. Left ventricle size is normal. Normal wall thickness. Normal wall motion.    Right Ventricle: Right ventricle size is normal. Lead present in the right ventricle. Normal systolic function.    Aortic Valve: No stenosis.    Mitral Valve: Moderate annular calcification.    Tricuspid Valve: Normal RVSP. The estimated RVSP is 23 mmHg.    Left Atrium: Left atrium is dilated. Normal sized appendage. No left atrial appendage thrombus noted. No left atrial appendage mass noted.    Interatrial Septum: No interatrial shunt visualized with color Doppler. Agitated saline study was negative with and without provocation. No PFO present visible by saline contrast and color Doppler.    Pericardium: No pericardial effusion.    Image quality is good.    No echocardiographic evidence of endocarditis.       IMPRESSION:     MSSA  bacteremia, sepsis r/o endocarditis, CIED infection - high grade bacteremia ( 1/3, 1/4 +ve )   MSSA bacteriuria -sec to bacteremia   Recent influenza A infection - pneumonia            RECOMMENDATIONS:      Ancef IV  2 grams IV q 8 hrs  No PICC line yet - await blood cx 1/7

## 2025-01-09 NOTE — CARE COORDINATION
SOCIAL WORK/CASEMANAGEMENT TRANSITION OF CARE PLANNING( LAURA HUNG, MIKE [phone]): precert pending for return to [facility]. Waiting for ID to place orders and for line, pending blood cx's. MIKE Bateman  1/9/2025

## 2025-01-09 NOTE — PROGRESS NOTES
Hospitalist Progress Note      Chief Complaint:  had concerns including Altered Mental Status ([facility], got there yesterday, nursing home didn't know baseline or LKW, recent admission. gaze up to Left, incomprehensible speech, 99.5 axillary, DNRCCA).    Admission Date: 1/3/2025     SYNOPSIS:Ms. Carlene Olson, a 89 y.o. year old female  who  has a past medical history of Arthritis, Cataract, Diabetes mellitus (HCC), Fall from standing, Injury of left upper arm, Pneumonia, Subdural hematoma, Syncope and collapse, and Uncontrolled type 2 diabetes mellitus.    Patient presented to the ED from [facility] with AMS.  Patient admitted for management of acute encephalopathy, staph bacteremia, leukocytosis. ID following for Abx   AMEE negative for endocarditis, might need pacemaker removal EP and cardiology consulted.     SUBJECTIVE:    S/p AMEE   Positive for FLU A  PENDING BLOOD CULTURES AND PICC LINE AND FINAL ID RECS    Pt will need precert for [facility]     Temp (24hrs), Av.5 °F (36.4 °C), Min:97.1 °F (36.2 °C), Max:97.8 °F (36.6 °C)    DIET: ADULT DIET; Dysphagia - Pureed; 5 carb choices (75 gm/meal); Liquids by spoon only  CODE: DNR-CCA    Intake/Output Summary (Last 24 hours) at 2025 1250  Last data filed at 2025 0738  Gross per 24 hour   Intake 0 ml   Output 450 ml   Net -450 ml           OBJECTIVE:    BP (!) 130/51   Pulse 65   Temp 97.1 °F (36.2 °C) (Temporal)   Resp 22   Ht 1.575 m (5' 2\")   Wt 73.9 kg (163 lb)   SpO2 90%   BMI 29.81 kg/m²     General appearance: No apparent distress, appears stated age and cooperative.   HEENT:  Normocephalic. Conjunctivae/corneas clear. Moist mucosa   Neck: Supple. No jugular venous distention. Thyroid not visible, non tender   Respiratory: Normal respiratory effort. Clear to auscultation bilaterally. No stridor/wheezing/rhonchi/crackles   Cardiovascular: Regular heart beats, normal S1-S2. No M/G/R  Abdomen: Non distended, soft, non  tender, no visceromegaly, no mass, normal bowel sounds   Musculoskeletal: No clubbing, cyanosis, no bilateral lower extremity edema. Brisk capillary refill.   Skin:  No rashes  on visible skin  Neurologic: Lethargic but arousable, opens eyes to voice,    ASSESSMENT and PLAN:    Staph bacteremia: Leukocytosis improving cultures growing staph, ID consulted, continue On Ancef  Cardiology consulted for AMEE, EP consulted for evaluation and removal of pacer, EP following   PICC line placement for IV abx     Hospital-acquired pneumonia, left lower lobe infiltrates, was recently diagnosed with influenza A, urine strep and Legionella negative, respiratory cultures pending, elevated Pro-Kali,    Acute encephalopathy: Likely secondary to above, CT head was negative for intracranial pathology    Hypokalemia: K supplement, continue to monitor .    Hypernatremia: Likely secondary to free water deficit, D5W at 50 cc/h started, monitor BMP    Oropharyngeal dysphagia: Patient cleared by speech for puréed diet    Hypertension: BP within normal range, continue to monitor    DVT prophylaxis Lovenox    DISPOSITION:  Remains admitted for management of bacteremia,Await final plans, appreciate  assistance    Medications:  REVIEWED DAILY    Infusion Medications    sodium chloride      dextrose       Scheduled Medications    white petrolatum   Topical BID    ceFAZolin  2,000 mg IntraVENous Q8H    amLODIPine  2.5 mg Oral BID    aspirin  81 mg Oral Daily    atorvastatin  10 mg Oral Daily    brimonidine  1 drop Left Eye BID    Vitamin D  1,000 Units Oral Daily    dorzolamide  1 drop Left Eye TID    prednisoLONE acetate  1 drop Left Eye TID    miconazole   Topical BID    sodium chloride flush  5-40 mL IntraVENous 2 times per day    enoxaparin  40 mg SubCUTAneous Daily    melatonin  5 mg Oral QPM    insulin lispro  0-4 Units SubCUTAneous 4x Daily AC & HS    ferrous sulfate  325 mg Oral BID WC     PRN Meds: white petrolatum **AND**

## 2025-01-10 LAB
ANION GAP SERPL CALCULATED.3IONS-SCNC: 9 MMOL/L (ref 7–16)
BASOPHILS # BLD: 0 K/UL (ref 0–0.2)
BASOPHILS NFR BLD: 0 % (ref 0–2)
BUN SERPL-MCNC: 25 MG/DL (ref 6–23)
CALCIUM SERPL-MCNC: 8.8 MG/DL (ref 8.6–10.2)
CHLORIDE SERPL-SCNC: 111 MMOL/L (ref 98–107)
CO2 SERPL-SCNC: 25 MMOL/L (ref 22–29)
CREAT SERPL-MCNC: 0.6 MG/DL (ref 0.5–1)
EOSINOPHIL # BLD: 0 K/UL (ref 0.05–0.5)
EOSINOPHILS RELATIVE PERCENT: 0 % (ref 0–6)
ERYTHROCYTE [DISTWIDTH] IN BLOOD BY AUTOMATED COUNT: 15.4 % (ref 11.5–15)
GFR, ESTIMATED: 85 ML/MIN/1.73M2
GLUCOSE BLD-MCNC: 115 MG/DL (ref 74–99)
GLUCOSE BLD-MCNC: 137 MG/DL (ref 74–99)
GLUCOSE BLD-MCNC: 181 MG/DL (ref 74–99)
GLUCOSE SERPL-MCNC: 118 MG/DL (ref 74–99)
HCT VFR BLD AUTO: 33.1 % (ref 34–48)
HGB BLD-MCNC: 10.5 G/DL (ref 11.5–15.5)
LYMPHOCYTES NFR BLD: 0.64 K/UL (ref 1.5–4)
LYMPHOCYTES RELATIVE PERCENT: 7 % (ref 20–42)
MCH RBC QN AUTO: 29.1 PG (ref 26–35)
MCHC RBC AUTO-ENTMCNC: 31.7 G/DL (ref 32–34.5)
MCV RBC AUTO: 91.7 FL (ref 80–99.9)
METAMYELOCYTES ABSOLUTE COUNT: 0.24 K/UL (ref 0–0.12)
METAMYELOCYTES: 3 % (ref 0–1)
MONOCYTES NFR BLD: 0.16 K/UL (ref 0.1–0.95)
MONOCYTES NFR BLD: 2 % (ref 2–12)
MYELOCYTES ABSOLUTE COUNT: 0.4 K/UL
MYELOCYTES: 4 %
NEUTROPHILS NFR BLD: 84 % (ref 43–80)
NEUTS SEG NFR BLD: 7.57 K/UL (ref 1.8–7.3)
PLATELET # BLD AUTO: 416 K/UL (ref 130–450)
PMV BLD AUTO: 9.5 FL (ref 7–12)
POTASSIUM SERPL-SCNC: 4 MMOL/L (ref 3.5–5)
RBC # BLD AUTO: 3.61 M/UL (ref 3.5–5.5)
RBC # BLD: ABNORMAL 10*6/UL
SODIUM SERPL-SCNC: 145 MMOL/L (ref 132–146)
WBC OTHER # BLD: 9 K/UL (ref 4.5–11.5)

## 2025-01-10 PROCEDURE — 36569 INSJ PICC 5 YR+ W/O IMAGING: CPT

## 2025-01-10 PROCEDURE — 97530 THERAPEUTIC ACTIVITIES: CPT

## 2025-01-10 PROCEDURE — 6370000000 HC RX 637 (ALT 250 FOR IP): Performed by: NURSE PRACTITIONER

## 2025-01-10 PROCEDURE — 2500000003 HC RX 250 WO HCPCS: Performed by: INTERNAL MEDICINE

## 2025-01-10 PROCEDURE — 6360000002 HC RX W HCPCS: Performed by: NURSE PRACTITIONER

## 2025-01-10 PROCEDURE — 80048 BASIC METABOLIC PNL TOTAL CA: CPT

## 2025-01-10 PROCEDURE — 2060000000 HC ICU INTERMEDIATE R&B

## 2025-01-10 PROCEDURE — 76937 US GUIDE VASCULAR ACCESS: CPT

## 2025-01-10 PROCEDURE — 97535 SELF CARE MNGMENT TRAINING: CPT

## 2025-01-10 PROCEDURE — C1751 CATH, INF, PER/CENT/MIDLINE: HCPCS

## 2025-01-10 PROCEDURE — 99232 SBSQ HOSP IP/OBS MODERATE 35: CPT | Performed by: STUDENT IN AN ORGANIZED HEALTH CARE EDUCATION/TRAINING PROGRAM

## 2025-01-10 PROCEDURE — 6360000002 HC RX W HCPCS: Performed by: INTERNAL MEDICINE

## 2025-01-10 PROCEDURE — 2500000003 HC RX 250 WO HCPCS: Performed by: NURSE PRACTITIONER

## 2025-01-10 PROCEDURE — 2500000003 HC RX 250 WO HCPCS: Performed by: STUDENT IN AN ORGANIZED HEALTH CARE EDUCATION/TRAINING PROGRAM

## 2025-01-10 PROCEDURE — 82962 GLUCOSE BLOOD TEST: CPT

## 2025-01-10 PROCEDURE — 02HV33Z INSERTION OF INFUSION DEVICE INTO SUPERIOR VENA CAVA, PERCUTANEOUS APPROACH: ICD-10-PCS | Performed by: STUDENT IN AN ORGANIZED HEALTH CARE EDUCATION/TRAINING PROGRAM

## 2025-01-10 PROCEDURE — 85025 COMPLETE CBC W/AUTO DIFF WBC: CPT

## 2025-01-10 PROCEDURE — 36415 COLL VENOUS BLD VENIPUNCTURE: CPT

## 2025-01-10 RX ORDER — SODIUM CHLORIDE 0.9 % (FLUSH) 0.9 %
5-40 SYRINGE (ML) INJECTION EVERY 12 HOURS SCHEDULED
Status: DISCONTINUED | OUTPATIENT
Start: 2025-01-10 | End: 2025-01-11 | Stop reason: HOSPADM

## 2025-01-10 RX ORDER — SODIUM CHLORIDE 9 MG/ML
INJECTION, SOLUTION INTRAVENOUS PRN
Status: DISCONTINUED | OUTPATIENT
Start: 2025-01-10 | End: 2025-01-11 | Stop reason: HOSPADM

## 2025-01-10 RX ORDER — SODIUM CHLORIDE 0.9 % (FLUSH) 0.9 %
5-40 SYRINGE (ML) INJECTION PRN
Status: DISCONTINUED | OUTPATIENT
Start: 2025-01-10 | End: 2025-01-11 | Stop reason: HOSPADM

## 2025-01-10 RX ORDER — LIDOCAINE HYDROCHLORIDE 10 MG/ML
50 INJECTION, SOLUTION INFILTRATION; PERINEURAL ONCE
Status: DISCONTINUED | OUTPATIENT
Start: 2025-01-10 | End: 2025-01-11 | Stop reason: HOSPADM

## 2025-01-10 RX ADMIN — PREDNISOLONE ACETATE 1 DROP: 10 SUSPENSION/ DROPS OPHTHALMIC at 09:02

## 2025-01-10 RX ADMIN — WATER 2000 MG: 1 INJECTION INTRAMUSCULAR; INTRAVENOUS; SUBCUTANEOUS at 17:13

## 2025-01-10 RX ADMIN — DORZOLAMIDE HYDROCHLORIDE 1 DROP: 20 SOLUTION OPHTHALMIC at 22:05

## 2025-01-10 RX ADMIN — ACETAMINOPHEN 650 MG: 325 TABLET ORAL at 14:28

## 2025-01-10 RX ADMIN — PETROLATUM: 420 OINTMENT TOPICAL at 22:06

## 2025-01-10 RX ADMIN — Medication 1000 UNITS: at 09:02

## 2025-01-10 RX ADMIN — AMLODIPINE BESYLATE 2.5 MG: 5 TABLET ORAL at 09:02

## 2025-01-10 RX ADMIN — PETROLATUM: 420 OINTMENT TOPICAL at 09:12

## 2025-01-10 RX ADMIN — WATER 2000 MG: 1 INJECTION INTRAMUSCULAR; INTRAVENOUS; SUBCUTANEOUS at 09:01

## 2025-01-10 RX ADMIN — DORZOLAMIDE HYDROCHLORIDE 1 DROP: 20 SOLUTION OPHTHALMIC at 14:28

## 2025-01-10 RX ADMIN — PREDNISOLONE ACETATE 1 DROP: 10 SUSPENSION/ DROPS OPHTHALMIC at 14:28

## 2025-01-10 RX ADMIN — BRIMONIDINE TARTRATE 1 DROP: 2 SOLUTION/ DROPS OPHTHALMIC at 22:05

## 2025-01-10 RX ADMIN — ENOXAPARIN SODIUM 40 MG: 100 INJECTION SUBCUTANEOUS at 09:01

## 2025-01-10 RX ADMIN — SODIUM CHLORIDE, PRESERVATIVE FREE 10 ML: 5 INJECTION INTRAVENOUS at 22:05

## 2025-01-10 RX ADMIN — MICONAZOLE NITRATE: 20.6 POWDER TOPICAL at 09:12

## 2025-01-10 RX ADMIN — ATORVASTATIN CALCIUM 10 MG: 10 TABLET, FILM COATED ORAL at 09:02

## 2025-01-10 RX ADMIN — SODIUM CHLORIDE, PRESERVATIVE FREE 10 ML: 5 INJECTION INTRAVENOUS at 09:12

## 2025-01-10 RX ADMIN — FERROUS SULFATE TAB 325 MG (65 MG ELEMENTAL FE) 325 MG: 325 (65 FE) TAB at 09:02

## 2025-01-10 RX ADMIN — MICONAZOLE NITRATE: 20.6 POWDER TOPICAL at 22:06

## 2025-01-10 RX ADMIN — ASPIRIN 81 MG: 81 TABLET, COATED ORAL at 09:02

## 2025-01-10 RX ADMIN — PREDNISOLONE ACETATE 1 DROP: 10 SUSPENSION/ DROPS OPHTHALMIC at 22:05

## 2025-01-10 RX ADMIN — AMLODIPINE BESYLATE 2.5 MG: 5 TABLET ORAL at 22:05

## 2025-01-10 RX ADMIN — DORZOLAMIDE HYDROCHLORIDE 1 DROP: 20 SOLUTION OPHTHALMIC at 09:01

## 2025-01-10 RX ADMIN — BRIMONIDINE TARTRATE 1 DROP: 2 SOLUTION/ DROPS OPHTHALMIC at 09:01

## 2025-01-10 ASSESSMENT — PAIN SCALES - GENERAL: PAINLEVEL_OUTOF10: 7

## 2025-01-10 ASSESSMENT — PAIN DESCRIPTION - DESCRIPTORS: DESCRIPTORS: ACHING;DISCOMFORT

## 2025-01-10 ASSESSMENT — PAIN DESCRIPTION - LOCATION: LOCATION: GENERALIZED

## 2025-01-10 NOTE — PROGRESS NOTES
Chg double lumen picc Placement 1/10/2025    Product number: [id number]   Lot Number: [id number]      Ultrasound: yes   Right Basilic vein:                Upper Arm Circumference: (CM) 26cm    Size:(FR)/GUAGE 5.5fr/38cm    Exposed Length: (CM) 3cm    Internal Length: (CM) 35cm   Cut: (CM) 17cm   Vein Measurement: 0.60cm              Lidocaine Given:  yes-given in picc kit  Caren Gomez RN  1/10/2025  4:59 PM    ysabel

## 2025-01-10 NOTE — CARE COORDINATION
SOCIAL WORK/CASEMANAGEMENT TRANSITION OF CARE PLANNING( FELY DOVER, [phone]):  to return to [facility], precert obtained and is good thru 1/12. All discharge paper work with ambulance form is in place. Waiting for ID to determine coarse of action. Pt is on iv ancef currently,  pending blood cx's.from 1/7.  Fely Dover, MIKE  1/10/2025  Picc line ordered but waiting for ID to see what the abx plan is. .Fely Dover, MIKE  1/10/2025    I have notified [facility] that pt has yet to get picc line and ID wants Ancef IV  2 grams IV q 8 hrs for 6 weeks, follow by keflex oral suppressive therapy indefinitely and to anticipate return to the [facility] tomorrow. .Fely Dover, MIKE1/10/2025

## 2025-01-10 NOTE — PLAN OF CARE
Problem: Safety - Adult  Goal: Free from fall injury  1/10/2025 0122 by Tarun Anderson RN  Outcome: Progressing  1/9/2025 1428 by Libby Wagner RN  Outcome: Progressing     Problem: Chronic Conditions and Co-morbidities  Goal: Patient's chronic conditions and co-morbidity symptoms are monitored and maintained or improved  1/10/2025 0122 by Tarun Anderson RN  Outcome: Progressing  1/9/2025 1428 by Libby Wagner RN  Outcome: Progressing     Problem: Discharge Planning  Goal: Discharge to home or other facility with appropriate resources  1/10/2025 0122 by Tarun Anderson RN  Outcome: Progressing  1/9/2025 1428 by Libby Wagner RN  Outcome: Progressing     Problem: Skin/Tissue Integrity  Goal: Absence of new skin breakdown  Description: 1.  Monitor for areas of redness and/or skin breakdown  2.  Assess vascular access sites hourly  3.  Every 4-6 hours minimum:  Change oxygen saturation probe site  4.  Every 4-6 hours:  If on nasal continuous positive airway pressure, respiratory therapy assess nares and determine need for appliance change or resting period.  Outcome: Progressing     Problem: ABCDS Injury Assessment  Goal: Absence of physical injury  Outcome: Progressing     Problem: Pain  Goal: Verbalizes/displays adequate comfort level or baseline comfort level  Outcome: Progressing

## 2025-01-10 NOTE — PROGRESS NOTES
Hospitalist Progress Note      Chief Complaint:  had concerns including Altered Mental Status ([facility], got there yesterday, nursing home didn't know baseline or LKW, recent admission. gaze up to Left, incomprehensible speech, 99.5 axillary, DNRCCA).    Admission Date: 1/3/2025     SYNOPSIS:Ms. Carlene Olson, a 89 y.o. year old female  who  has a past medical history of Arthritis, Cataract, Diabetes mellitus (HCC), Fall from standing, Injury of left upper arm, Pneumonia, Subdural hematoma, Syncope and collapse, and Uncontrolled type 2 diabetes mellitus.    Patient presented to the ED from [facility] with AMS.  Patient admitted for management of acute encephalopathy, staph bacteremia, leukocytosis. ID following for Abx   AMEE negative for endocarditis, might need pacemaker removal EP and cardiology consulted.     SUBJECTIVE:    S/p AMEE   Positive for FLU A  PENDING BLOOD CULTURES AND PICC LINE AND FINAL ID RECS    Pt will need precert for [facility]     Temp (24hrs), Av.9 °F (36.6 °C), Min:97.3 °F (36.3 °C), Max:98.6 °F (37 °C)    DIET: ADULT DIET; Dysphagia - Pureed; 5 carb choices (75 gm/meal); Liquids by spoon only  CODE: DNR-CCA  No intake or output data in the 24 hours ending 01/10/25 1328          OBJECTIVE:    /75   Pulse 94   Temp 97.6 °F (36.4 °C) (Temporal)   Resp 22   Ht 1.575 m (5' 2\")   Wt 73.9 kg (163 lb)   SpO2 92%   BMI 29.81 kg/m²     General appearance: No apparent distress, appears stated age and cooperative.   HEENT:  Normocephalic. Conjunctivae/corneas clear. Moist mucosa   Neck: Supple. No jugular venous distention. Thyroid not visible, non tender   Respiratory: Normal respiratory effort. Clear to auscultation bilaterally. No stridor/wheezing/rhonchi/crackles   Cardiovascular: Regular heart beats, normal S1-S2. No M/G/R  Abdomen: Non distended, soft, non tender, no visceromegaly, no mass, normal bowel sounds   Musculoskeletal: No clubbing, cyanosis, no

## 2025-01-10 NOTE — PROGRESS NOTES
Toileting Total/Dependent  Incontinent of bladder x2 throughout session total A for krystal care and clothing management and bed change - bed level via rolling   Dep Max A   Bed Mobility  Rolling L/R: Max A   Supine to sit: Max x2   Sit to supine: Max x2  Dep x 2  With all bed mobility tasks, rolling  Supine < > Sit  Supine to sit: Mod A   Sit to supine: Min A    Functional Transfers Sit to stand: Max x2   Stand to sit: Max x2   Stand pivot: NT 2/2 safety   Max/Dep x 2  Sit < > Stand  B LE blocked  Only able to clear buttock, limited by cognition & weakness B LE Mod A    Functional Mobility NT   NT     Balance Sitting: Min A       Standing: NT  Sitting: Max A  Posterior lean   Seated at EOB 10-12 mins   Standing: Max/Dep x 2  Sitting: Supervision       Standing: Mod A    Activity Tolerance Poor +  Pt needs encouragement to participate - confusion and fearful during mobility  Poor+  Required encouragement, confusion eyes closed most of session, yelling at times, waxing & waning  Fair   Visual/  Perceptual no glasses present   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                    BUE  ROM/Strength/  Fine motor Coordination Hand dominance: unknown   Pt states she is not right handed but then uses R hand to attempt to comb her hair      RUE: ROM impaired at shoulder      Strength: grossly 2+/5      Strength: WFL      Coordination:  impaired     LUE: ROM impaired at shoulder      Strength: grossly 2+/5      Strength: WFL      Coordination: impaired       Safety   Poor  Poor  Fair during functional activity      Education:  Pt was educated through out treatment regarding proper technique & safety with bed mobility, functional transfers, improving awareness of current situation, cognitive retraining to increase participation during sessions & ADL retraining with compensatory strategies to ease tasks, improve safety & prevent falls to return home safely.  Poor understanding.     Comments: Upon arrival pt was in bed & agreeable

## 2025-01-10 NOTE — PROGRESS NOTES
Department of Internal Medicine  Infectious Diseases  Progress  Note      C/C : MSSA bacteremia r/o endocarditis       Pt is more awake  Confused   No distress  Afebrile         Current Facility-Administered Medications   Medication Dose Route Frequency Provider Last Rate Last Admin    sodium chloride flush 0.9 % injection 5-40 mL  5-40 mL IntraVENous 2 times per day Braden Hutchison MD        sodium chloride flush 0.9 % injection 5-40 mL  5-40 mL IntraVENous PRN Braden Hutchison MD        0.9 % sodium chloride infusion   IntraVENous PRN Braden Hutchison MD        lidocaine 1 % injection 50 mg  50 mg IntraDERmal Once Braden Hutchison MD        white petrolatum ointment   Topical BID Braden Hutchison MD   Given at 01/10/25 0912    And    white petrolatum ointment   Topical TID PRN Braden Hutchison MD        potassium chloride (KLOR-CON M) extended release tablet 40 mEq  40 mEq Oral PRN Sue Alexander MD   40 mEq at 01/08/25 1441    Or    potassium bicarb-citric acid (EFFER-K) effervescent tablet 40 mEq  40 mEq Oral PRN Sue Alexander MD        Or    potassium chloride 10 mEq/100 mL IVPB (Peripheral Line)  10 mEq IntraVENous PRN Sue Alexander  mL/hr at 01/07/25 2125 10 mEq at 01/07/25 2125    ceFAZolin (ANCEF) 2,000 mg in sterile water 20 mL IV syringe  2,000 mg IntraVENous Q8H Edgar Irene MD   2,000 mg at 01/10/25 0901    amLODIPine (NORVASC) tablet 2.5 mg  2.5 mg Oral BID Lucrecia Reeves APRN - NP   2.5 mg at 01/10/25 0902    aspirin EC tablet 81 mg  81 mg Oral Daily Lucrecia Reeves APRN - NP   81 mg at 01/10/25 0902    atorvastatin (LIPITOR) tablet 10 mg  10 mg Oral Daily Lucrecia Reeves APRN - NP   10 mg at 01/10/25 0902    brimonidine (ALPHAGAN) 0.2 % ophthalmic solution 1 drop  1 drop Left Eye BID Lucrecia Reeves APRN - NP   1 drop at 01/10/25 0901    Vitamin D (CHOLECALCIFEROL) tablet 1,000 Units  1,000 Units Oral Daily Lucrecia Reeves, MICHELLE - NP   1,000 Units at 01/10/25 0902     09:14 AM    BACTERIA 1+ 12/27/2024 04:04 AM    CLARITYU SL CLOUDY 06/28/2021 07:45 AM    LEUKOCYTESUR NEGATIVE 01/03/2025 09:14 AM    UROBILINOGEN 1.0 01/03/2025 09:14 AM    BILIRUBINUR NEGATIVE 01/03/2025 09:14 AM    BLOODU LARGE 06/28/2021 07:45 AM    GLUCOSEU NEGATIVE 01/03/2025 09:14 AM       ABG:  No results found for: \"SMO1IMG\", \"BEART\", \"J7LUCQEZ\", \"PHART\", \"THGBART\", \"EWK7KMW\", \"PO2ART\", \"RDI0YKC\"    MICROBIOLOGY:    Blood culture -    Susceptibility    Staphylococcus aureus (1)    Antibiotic Interpretation Microscan Method Status   clindamycin Sensitive 0.25 BACTERIAL SUSCEPTIBILITY PANEL GEO Final   DAPTOmycin Sensitive 0.25 BACTERIAL SUSCEPTIBILITY PANEL GEO Final   erythromycin Sensitive <=0.25 BACTERIAL SUSCEPTIBILITY PANEL GEO Final   gentamicin Sensitive <=0.5 BACTERIAL SUSCEPTIBILITY PANEL GEO Final    Gentamicin is used only in combination with other active agents that test susceptible.      Induced Clind Resist Sensitive NEGATIVE BACTERIAL SUSCEPTIBILITY PANEL GEO Final   oxacillin Sensitive <=0.25 BACTERIAL SUSCEPTIBILITY PANEL GEO Final   rifampin Sensitive <=0.5 BACTERIAL SUSCEPTIBILITY PANEL GEO Final   tigecycline Sensitive <=0.12 BACTERIAL SUSCEPTIBILITY PANEL GEO Final   trimethoprim-sulfamethoxazole Sensitive <=10 BACTERIAL SUSCEPTIBILITY PANEL GEO Final   vancomycin Sensitive <=0.5 BACTERIAL SUSCEPTIBILITY PANEL GEO Final   doxycycline Sensitive <=0.5 BACTERIAL SUSCEPTIBILITY PANEL GEO Final         Urine cx -    Susceptibility    Staphylococcus aureus (1)    Antibiotic Interpretation Microscan Method Status   DAPTOmycin Sensitive 0.25 BACTERIAL SUSCEPTIBILITY PANEL GEO Final   levofloxacin Sensitive 0.25 BACTERIAL SUSCEPTIBILITY PANEL GEO Final   linezolid Sensitive 2 BACTERIAL SUSCEPTIBILITY PANEL GEO Final   nitrofurantoin Sensitive <=16 BACTERIAL SUSCEPTIBILITY PANEL GEO Final   oxacillin Sensitive <=0.25 BACTERIAL SUSCEPTIBILITY PANEL GEO Final   rifampin Sensitive <=0.5 BACTERIAL

## 2025-01-10 NOTE — PROGRESS NOTES
Physical Therapy  Physical Therapy Treatment Note     Name: Carlene Olson  : 1935  MRN: [id number]      Date of Service: 1/10/2025    Evaluating PT:  Madhav Rai, PT, DPT [id number]    Room #:  8506/8506-A  Diagnosis:  Pneumonia due to organism [J18.9]  Altered mental status, unspecified altered mental status type [R41.82]  Pneumonia due to infectious organism, unspecified laterality, unspecified part of lung [J18.9]  PMHx/PSHx:   has a past medical history of Arthritis, Cataract, Diabetes mellitus (HCC), Fall from standing, Injury of left upper arm, Pneumonia, Subdural hematoma, Syncope and collapse, and Uncontrolled type 2 diabetes mellitus.   has a past surgical history that includes Hysterectomy; Cataract removal with implant; bronchoscopy (N/A, 2021); Cardiac procedure (N/A, 2023); Cardiac procedure (N/A, 2023); and ep device procedure (N/A, 2023).  Procedure/Surgery:  AMEE (2025)  Precautions:  Falls, droplet isolation, WBAT BLE (per chart 2024), blind L eye, [place], TSM  Equipment Needs:  TBD    SUBJECTIVE:    Per chart, Pt is from nursing facility.    OBJECTIVE:   Initial Evaluation  Date: 2025 Treatment  1/10/25 Short Term/ Long Term   Goals   AM-PAC 6 Clicks     Was pt agreeable to Eval/treatment? Yes Yes     Does pt have pain? LLE None     Bed Mobility  Rolling: MaxA  Supine to sit: MaxA x2  Sit to supine: MaxA x2  Scooting: NT Rolling dependent   Sit to supine dependent   Supine to sit dependent   Scooting dependent  Rolling: Kamran  Supine to sit: ModA  Sit to supine: ModA  Scooting: Kamran   Transfers Sit to stand: MaxA x2  Stand to sit: MaxA x2  Stand pivot: NT Sit to stand max   A of 2  Stand to sit max A of 2   Sit to stand: ModA  Stand to sit: ModA  Stand pivot: ModA with AAD   Ambulation    NT NT TBD   Stair negotiation: ascended and descended  NT Nt TBD   ROM BUE:  See OT note  BLE:  WFL     Strength BUE:  See OT note  BLE:  WFL     Balance Sitting

## 2025-01-10 NOTE — DISCHARGE INSTRUCTIONS
[facility]  ([facility])  [address]  [address]  [place] [address]  Phone [phone]   Fax [phone]    George Vanessa MD, FACP MD Alessandra Carrillo MD Indra P. Limbu, MD Eunice A. H. Wong, MD Pelon Knapp, MD Conrad Salmon, CNS Reed Curry, APRN, CNS  Bety Dietrich, APRN-CNP David Garcia, APRN, CNS Montserrat Schuler, APRN-CNP                 STANDING ORDERS (“ID Protocol”)     Visiting nurses are to write the Primary Care Physician and their own call back number on all laboratory requisition forms.   Abnormal lab values are called to the physician by the nurse and NOT by the laboratory.   Fax all labs to the office in a timely manner, during office hours. All faxes should include nurse’s name and call back number.  Vascular Access Devices or VADs (TLC, PICC, Midline, etc) will be replaced as necessary.  Draw all blood work from VADs, except for drug levels.  If unable to access a VAD, insert a peripheral catheter temporarily. Contact the Primary Care Physician or [facility] office for surgical referral.  Use tPA (Cathflo®, Alteplase®) as per agency protocol to restore patency of VAD.  Saline flush 10ml or heparin flush 10U/cc IV daily and as needed to maintain line patency.  Remove VAD upon completion of IV antibiotics, unless otherwise specified by the ordering physician.  If VAD cannot be removed, schedule appointment at office for removal.  If VAD was placed by Radiology, schedule appointment for removal.  Notify ordering physician or office if patient requires admission to the hospital with reason for admission.  Discontinue all blood work upon completion of IV antibiotics, unless otherwise specified by ordering physician.  Notify ordering physician if the patient does not receive the scheduled antibiotic for 24 hours or more.  The Pharmacy and Home Health Agency may adjust the timing of the infusion and blood work to

## 2025-01-11 VITALS
HEART RATE: 85 BPM | HEIGHT: 62 IN | OXYGEN SATURATION: 93 % | TEMPERATURE: 97.5 F | RESPIRATION RATE: 22 BRPM | DIASTOLIC BLOOD PRESSURE: 67 MMHG | WEIGHT: 163 LBS | BODY MASS INDEX: 30 KG/M2 | SYSTOLIC BLOOD PRESSURE: 147 MMHG

## 2025-01-11 LAB
BASOPHILS # BLD: 0 K/UL (ref 0–0.2)
BASOPHILS NFR BLD: 0 % (ref 0–2)
EOSINOPHIL # BLD: 0.34 K/UL (ref 0.05–0.5)
EOSINOPHILS RELATIVE PERCENT: 3 % (ref 0–6)
ERYTHROCYTE [DISTWIDTH] IN BLOOD BY AUTOMATED COUNT: 15.6 % (ref 11.5–15)
GLUCOSE BLD-MCNC: 120 MG/DL (ref 74–99)
GLUCOSE BLD-MCNC: 163 MG/DL (ref 74–99)
HCT VFR BLD AUTO: 31.2 % (ref 34–48)
HGB BLD-MCNC: 9.6 G/DL (ref 11.5–15.5)
LYMPHOCYTES NFR BLD: 0.6 K/UL (ref 1.5–4)
LYMPHOCYTES RELATIVE PERCENT: 6 % (ref 20–42)
MCH RBC QN AUTO: 29 PG (ref 26–35)
MCHC RBC AUTO-ENTMCNC: 30.8 G/DL (ref 32–34.5)
MCV RBC AUTO: 94.3 FL (ref 80–99.9)
MONOCYTES NFR BLD: 0.26 K/UL (ref 0.1–0.95)
MONOCYTES NFR BLD: 3 % (ref 2–12)
MYELOCYTES ABSOLUTE COUNT: 0.17 K/UL
MYELOCYTES: 2 %
NEUTROPHILS NFR BLD: 86 % (ref 43–80)
NEUTS SEG NFR BLD: 8.63 K/UL (ref 1.8–7.3)
PLATELET # BLD AUTO: 378 K/UL (ref 130–450)
PMV BLD AUTO: 9.4 FL (ref 7–12)
RBC # BLD AUTO: 3.31 M/UL (ref 3.5–5.5)
RBC # BLD: ABNORMAL 10*6/UL
WBC OTHER # BLD: 10 K/UL (ref 4.5–11.5)

## 2025-01-11 PROCEDURE — 6360000002 HC RX W HCPCS: Performed by: NURSE PRACTITIONER

## 2025-01-11 PROCEDURE — 6370000000 HC RX 637 (ALT 250 FOR IP): Performed by: NURSE PRACTITIONER

## 2025-01-11 PROCEDURE — 99239 HOSP IP/OBS DSCHRG MGMT >30: CPT | Performed by: STUDENT IN AN ORGANIZED HEALTH CARE EDUCATION/TRAINING PROGRAM

## 2025-01-11 PROCEDURE — 2500000003 HC RX 250 WO HCPCS: Performed by: INTERNAL MEDICINE

## 2025-01-11 PROCEDURE — 2500000003 HC RX 250 WO HCPCS: Performed by: STUDENT IN AN ORGANIZED HEALTH CARE EDUCATION/TRAINING PROGRAM

## 2025-01-11 PROCEDURE — 2500000003 HC RX 250 WO HCPCS: Performed by: NURSE PRACTITIONER

## 2025-01-11 PROCEDURE — 85025 COMPLETE CBC W/AUTO DIFF WBC: CPT

## 2025-01-11 PROCEDURE — 6360000002 HC RX W HCPCS: Performed by: INTERNAL MEDICINE

## 2025-01-11 PROCEDURE — 82962 GLUCOSE BLOOD TEST: CPT

## 2025-01-11 RX ORDER — FERROUS SULFATE 325(65) MG
325 TABLET ORAL 2 TIMES DAILY WITH MEALS
Qty: 30 TABLET | Refills: 3 | Status: SHIPPED | OUTPATIENT
Start: 2025-01-11

## 2025-01-11 RX ADMIN — ATORVASTATIN CALCIUM 10 MG: 10 TABLET, FILM COATED ORAL at 08:12

## 2025-01-11 RX ADMIN — BRIMONIDINE TARTRATE 1 DROP: 2 SOLUTION/ DROPS OPHTHALMIC at 08:11

## 2025-01-11 RX ADMIN — AMLODIPINE BESYLATE 2.5 MG: 5 TABLET ORAL at 08:12

## 2025-01-11 RX ADMIN — ASPIRIN 81 MG: 81 TABLET, COATED ORAL at 08:12

## 2025-01-11 RX ADMIN — ENOXAPARIN SODIUM 40 MG: 100 INJECTION SUBCUTANEOUS at 08:11

## 2025-01-11 RX ADMIN — WATER 2000 MG: 1 INJECTION INTRAMUSCULAR; INTRAVENOUS; SUBCUTANEOUS at 01:13

## 2025-01-11 RX ADMIN — Medication 5 MG: at 01:34

## 2025-01-11 RX ADMIN — PREDNISOLONE ACETATE 1 DROP: 10 SUSPENSION/ DROPS OPHTHALMIC at 08:11

## 2025-01-11 RX ADMIN — SODIUM CHLORIDE, PRESERVATIVE FREE 10 ML: 5 INJECTION INTRAVENOUS at 08:21

## 2025-01-11 RX ADMIN — SODIUM CHLORIDE, PRESERVATIVE FREE 10 ML: 5 INJECTION INTRAVENOUS at 08:13

## 2025-01-11 RX ADMIN — ACETAMINOPHEN 650 MG: 325 TABLET ORAL at 08:12

## 2025-01-11 RX ADMIN — Medication 1000 UNITS: at 08:21

## 2025-01-11 RX ADMIN — MICONAZOLE NITRATE: 20.6 POWDER TOPICAL at 08:14

## 2025-01-11 RX ADMIN — FERROUS SULFATE TAB 325 MG (65 MG ELEMENTAL FE) 325 MG: 325 (65 FE) TAB at 08:12

## 2025-01-11 RX ADMIN — PETROLATUM: 420 OINTMENT TOPICAL at 08:14

## 2025-01-11 RX ADMIN — WATER 2000 MG: 1 INJECTION INTRAMUSCULAR; INTRAVENOUS; SUBCUTANEOUS at 08:11

## 2025-01-11 RX ADMIN — DORZOLAMIDE HYDROCHLORIDE 1 DROP: 20 SOLUTION OPHTHALMIC at 08:11

## 2025-01-11 ASSESSMENT — PAIN - FUNCTIONAL ASSESSMENT: PAIN_FUNCTIONAL_ASSESSMENT: PREVENTS OR INTERFERES SOME ACTIVE ACTIVITIES AND ADLS

## 2025-01-11 ASSESSMENT — PAIN SCALES - GENERAL: PAINLEVEL_OUTOF10: 8

## 2025-01-11 ASSESSMENT — PAIN DESCRIPTION - LOCATION: LOCATION: GENERALIZED

## 2025-01-11 ASSESSMENT — PAIN DESCRIPTION - DESCRIPTORS: DESCRIPTORS: ACHING;DISCOMFORT

## 2025-01-11 NOTE — CARE COORDINATION
1/11/2025dc order noted. Latha set up [facility] ambulance for 3 pm back to [facility].Floor and facility rep notified.  Electronically signed by MIKE Sharma on 1/11/2025 at 11:11 AM

## 2025-01-11 NOTE — PROGRESS NOTES
Attempted to call [facility] twice to for nurse to nurse, no answer. Will attempt to call again later.

## 2025-01-11 NOTE — DISCHARGE SUMMARY
grams IV q 8 hrs for 6 weeks, follow by keflex oral suppressive therapy indefinitely     Discharge Medications:      Medication List        START taking these medications      ceFAZolin infusion  Commonly known as: ANCEF  Infuse 2,000 mg intravenously in the morning and 2,000 mg at noon and 2,000 mg in the evening. For 6 weeks.     ferrous sulfate 325 (65 Fe) MG tablet  Commonly known as: IRON 325  Take 1 tablet by mouth 2 times daily (with meals)            CHANGE how you take these medications      vitamin D 25 MCG (1000 UT) Tabs tablet  Commonly known as: CHOLECALCIFEROL  What changed: Another medication with the same name was removed. Continue taking this medication, and follow the directions you see here.     white petrolatum Oint ointment  Apply topically 3 times daily as needed (after toileting)  What changed: Another medication with the same name was removed. Continue taking this medication, and follow the directions you see here.            CONTINUE taking these medications      Accu-Chek FastClix Lancets Misc  USE DAILY AS DIRECTED     acetaminophen 325 MG tablet  Commonly known as: TYLENOL     amLODIPine 2.5 MG tablet  Commonly known as: NORVASC  TAKE 1 TABLET BY MOUTH TWICE A DAY     aspirin 81 MG tablet     atorvastatin 10 MG tablet  Commonly known as: LIPITOR  TAKE 1 TABLET BY MOUTH EVERY DAY     brimonidine 0.2 % ophthalmic solution  Commonly known as: ALPHAGAN     dorzolamide 2 % ophthalmic solution  Commonly known as: TRUSOPT  Place 1 drop into the left eye 3 times daily     prednisoLONE acetate 1 % ophthalmic suspension  Commonly known as: PRED FORTE            STOP taking these medications      menthol 7.5 MG lozenge     mentholatum 9-1.3 % Oint ointment               Where to Get Your Medications        These medications were sent to [facility]/pharmacy #[id number] - [place], [place] - [address] - P [phone] - F [phone]  [facility] [place] [address]      Phone: [phone]   ferrous

## 2025-01-11 NOTE — PROGRESS NOTES
Notified patient's daughter Ivon that the patient is being discharged to [facility] today and  is scheduled for 3 pm.

## 2025-01-11 NOTE — DISCHARGE INSTR - DIET

## 2025-01-12 LAB
MICROORGANISM SPEC CULT: NORMAL
MICROORGANISM SPEC CULT: NORMAL
SERVICE CMNT-IMP: NORMAL
SERVICE CMNT-IMP: NORMAL
SPECIMEN DESCRIPTION: NORMAL
SPECIMEN DESCRIPTION: NORMAL

## 2025-05-21 NOTE — PROGRESS NOTES
Physician Progress Note      PATIENT:               BEBO CAMACHO  CSN #:                  [id number]  :                       1935  ADMIT DATE:       1/3/2025 8:44 AM  DISCH DATE:  RESPONDING  PROVIDER #:        Sue Alexander MD          QUERY TEXT:    Pt admitted with AMS, Fever, weakness. noted to have Pneumonia and FLu + on   admission. . Pt noted to have elevated WBC, elevated CRP, elevated resp. rate   on admission.  If possible, please document in the progress notes and   discharge summary if you are evaluating and /or treating any of the following:    The medical record reflects the following:  Risk Factors: PNA, Flu  Clinical Indicators: On admission: WBC: 17.6, CRP: 0.6, T: 100.2, HR; 83, RR;   25, 31 STAPH +Culture, +FLU,Noted Encephalopathy  Treatment: Maxipime, Vancomcyin,  Options provided:  -- Sepsis, present on admission  -- Sepsis, developed following admission  -- Pneumonia without Sepsis  -- Other - I will add my own diagnosis  -- Disagree - Not applicable / Not valid  -- Disagree - Clinically unable to determine / Unknown  -- Refer to Clinical Documentation Reviewer    PROVIDER RESPONSE TEXT:    This patient has sepsis which was present on admission.    Query created by: Taryn Huber on 2025 5:22 PM      QUERY TEXT:    Pt admitted with PNA, FLU and has encephalopathy documented on the H&P. If   possible, please document in progress notes and discharge summary further   specificity regarding the type of encephalopathy:    The medical record reflects the following:  Risk Factors: PNA, Flu  Clinical Indicators: Pt. presented with AMS, fever and noted to have   Encephalopathy documented on the notes from H&P through to current day notes.   CT head was noted as negative.  Treatment: ***  Options provided:  -- Metabolic encephalopathy  -- Septic encephalopathy  -- Encephalopathy due to Pneumonia  -- Other - I will add my own diagnosis  -- Disagree - Not applicable / Not valid  --  We received numerous calls from patient upset with [facility] concerning  his recent RX (Imodium) being only partially filled. Pharmacist indicated patient was given all that was currently in stock (20) and informed the remaining pills would be mailed to his home.He was told he would receive them by Friday.   Disagree - Clinically unable to determine / Unknown  -- Refer to Clinical Documentation Reviewer    PROVIDER RESPONSE TEXT:    This patient has encephalopathy due to Pneumonia    Query created by: Taryn Huber on 1/6/2025 5:27 PM      Electronically signed by:  Sue Alexander MD 1/7/2025 8:37 AM

## (undated) DEVICE — SOLUTION IV IRRIG 500ML 0.9% SODIUM CHL 2F7123

## (undated) DEVICE — SINGLE USE BIOPSY VALVE MAJ-210: Brand: SINGLE USE BIOPSY VALVE (STERILE)

## (undated) DEVICE — SINGLE USE SUCTION VALVE MAJ-209: Brand: SINGLE USE SUCTION VALVE (STERILE)

## (undated) DEVICE — INTRODUCER SHTH L13CM OD7FR SH ORNG HUB SEAMLESS SAFSHTH

## (undated) DEVICE — ADAPTER TBNG DIA15MM SWVL FBROPT BRONCHSCP TERM 2 AXIS PEEP

## (undated) DEVICE — SURGICAL PROCEDURE PACK BRONCH

## (undated) DEVICE — KIT INTRO 9FR L13CM DIA0.118IN SPLITTABLE HEMSTAT ROBUST

## (undated) DEVICE — FIXED CORE WIRE GUIDE STRAIGHT: Brand: COOK

## (undated) DEVICE — PAD, DEFIB, ADULT, RADIOTRAN, PHYSIO, LO: Brand: MEDLINE

## (undated) DEVICE — AGENT HEMOSTATIC SURGIFLOW MATRIX KIT W/THROMBIN

## (undated) DEVICE — SHEATH INTRO L12CM DIA6FR W/ LUERLOCK HUB HEMSTAS VLV

## (undated) DEVICE — SET EXTN IV L30IN TBNG DIA0.1IN PRIMING 4ML MACBOR FEM ADPT

## (undated) DEVICE — ELECTROSURGICAL PENCIL BUTTON SWITCH E-Z CLEAN COATED BLADE ELECTRODE 10 FT (3 M) CORD HOLSTER: Brand: MEGADYNE

## (undated) DEVICE — Device: Brand: MEDEX

## (undated) DEVICE — SYRINGE MED 50ML LUERLOCK TIP

## (undated) DEVICE — CANNULA NSL CANN NSL L25FT TBNG AD O2 SUP SFT UC

## (undated) DEVICE — PACK SURG CARDIAC CATH